# Patient Record
Sex: FEMALE | Race: BLACK OR AFRICAN AMERICAN | Employment: UNEMPLOYED | URBAN - METROPOLITAN AREA
[De-identification: names, ages, dates, MRNs, and addresses within clinical notes are randomized per-mention and may not be internally consistent; named-entity substitution may affect disease eponyms.]

---

## 2019-01-15 ENCOUNTER — OP HISTORICAL/CONVERTED ENCOUNTER (OUTPATIENT)
Dept: OTHER | Age: 49
End: 2019-01-15

## 2019-03-30 ENCOUNTER — ED HISTORICAL/CONVERTED ENCOUNTER (OUTPATIENT)
Dept: OTHER | Age: 49
End: 2019-03-30

## 2022-02-09 ENCOUNTER — TRANSCRIBE ORDER (OUTPATIENT)
Dept: SCHEDULING | Age: 52
End: 2022-02-09

## 2022-02-09 DIAGNOSIS — Z12.31 ENCOUNTER FOR SCREENING MAMMOGRAM FOR MALIGNANT NEOPLASM OF BREAST: Primary | ICD-10-CM

## 2022-06-01 ENCOUNTER — APPOINTMENT (OUTPATIENT)
Dept: GENERAL RADIOLOGY | Age: 52
End: 2022-06-01
Attending: EMERGENCY MEDICINE
Payer: MEDICAID

## 2022-06-01 ENCOUNTER — HOSPITAL ENCOUNTER (EMERGENCY)
Age: 52
Discharge: HOME | End: 2022-06-01
Attending: EMERGENCY MEDICINE | Admitting: EMERGENCY MEDICINE
Payer: MEDICAID

## 2022-06-01 ENCOUNTER — APPOINTMENT (OUTPATIENT)
Dept: CT IMAGING | Age: 52
End: 2022-06-01
Attending: EMERGENCY MEDICINE
Payer: MEDICAID

## 2022-06-01 VITALS
SYSTOLIC BLOOD PRESSURE: 128 MMHG | HEART RATE: 70 BPM | BODY MASS INDEX: 35 KG/M2 | OXYGEN SATURATION: 98 % | DIASTOLIC BLOOD PRESSURE: 80 MMHG | HEIGHT: 71 IN | RESPIRATION RATE: 16 BRPM | WEIGHT: 250 LBS | TEMPERATURE: 98.5 F

## 2022-06-01 DIAGNOSIS — R20.0 NUMBNESS: Primary | ICD-10-CM

## 2022-06-01 DIAGNOSIS — K04.7 DENTAL ABSCESS: ICD-10-CM

## 2022-06-01 LAB
ALBUMIN SERPL-MCNC: 3.7 G/DL (ref 3.5–5)
ALBUMIN/GLOB SERPL: 0.9 {RATIO} (ref 1.1–2.2)
ALP SERPL-CCNC: 99 U/L (ref 45–117)
ALT SERPL-CCNC: 33 U/L (ref 12–78)
ANION GAP SERPL CALC-SCNC: 6 MMOL/L (ref 5–15)
AST SERPL W P-5'-P-CCNC: 49 U/L (ref 15–37)
BASOPHILS # BLD: 0 K/UL (ref 0–0.1)
BASOPHILS NFR BLD: 0 % (ref 0–1)
BILIRUB SERPL-MCNC: 0.6 MG/DL (ref 0.2–1)
BUN SERPL-MCNC: 2 MG/DL (ref 6–20)
BUN/CREAT SERPL: 3 (ref 12–20)
CA-I BLD-MCNC: 9 MG/DL (ref 8.5–10.1)
CHLORIDE SERPL-SCNC: 97 MMOL/L (ref 97–108)
CO2 SERPL-SCNC: 33 MMOL/L (ref 21–32)
CREAT SERPL-MCNC: 0.65 MG/DL (ref 0.55–1.02)
DIFFERENTIAL METHOD BLD: ABNORMAL
EOSINOPHIL # BLD: 0 K/UL (ref 0–0.4)
EOSINOPHIL NFR BLD: 0 % (ref 0–7)
ERYTHROCYTE [DISTWIDTH] IN BLOOD BY AUTOMATED COUNT: 13.7 % (ref 11.5–14.5)
GLOBULIN SER CALC-MCNC: 3.9 G/DL (ref 2–4)
GLUCOSE SERPL-MCNC: 114 MG/DL (ref 65–100)
HCT VFR BLD AUTO: 33.9 % (ref 35–47)
HGB BLD-MCNC: 11.1 G/DL (ref 11.5–16)
IMM GRANULOCYTES # BLD AUTO: 0 K/UL (ref 0–0.04)
IMM GRANULOCYTES NFR BLD AUTO: 0 % (ref 0–0.5)
LYMPHOCYTES # BLD: 1.5 K/UL (ref 0.8–3.5)
LYMPHOCYTES NFR BLD: 33 % (ref 12–49)
MCH RBC QN AUTO: 35.1 PG (ref 26–34)
MCHC RBC AUTO-ENTMCNC: 32.7 G/DL (ref 30–36.5)
MCV RBC AUTO: 107.3 FL (ref 80–99)
MONOCYTES # BLD: 0.3 K/UL (ref 0–1)
MONOCYTES NFR BLD: 6 % (ref 5–13)
NEUTS SEG # BLD: 2.7 K/UL (ref 1.8–8)
NEUTS SEG NFR BLD: 61 % (ref 32–75)
NRBC # BLD: 0 K/UL (ref 0–0.01)
NRBC BLD-RTO: 0 PER 100 WBC
PLATELET # BLD AUTO: 267 K/UL (ref 150–400)
PMV BLD AUTO: 8.7 FL (ref 8.9–12.9)
POTASSIUM SERPL-SCNC: 3.4 MMOL/L (ref 3.5–5.1)
PROT SERPL-MCNC: 7.6 G/DL (ref 6.4–8.2)
RBC # BLD AUTO: 3.16 M/UL (ref 3.8–5.2)
SODIUM SERPL-SCNC: 136 MMOL/L (ref 136–145)
TROPONIN-HIGH SENSITIVITY: 5 NG/L (ref 0–51)
WBC # BLD AUTO: 4.5 K/UL (ref 3.6–11)

## 2022-06-01 PROCEDURE — 85025 COMPLETE CBC W/AUTO DIFF WBC: CPT

## 2022-06-01 PROCEDURE — 99285 EMERGENCY DEPT VISIT HI MDM: CPT

## 2022-06-01 PROCEDURE — 93005 ELECTROCARDIOGRAM TRACING: CPT

## 2022-06-01 PROCEDURE — 36415 COLL VENOUS BLD VENIPUNCTURE: CPT

## 2022-06-01 PROCEDURE — 80053 COMPREHEN METABOLIC PANEL: CPT

## 2022-06-01 PROCEDURE — 71046 X-RAY EXAM CHEST 2 VIEWS: CPT

## 2022-06-01 PROCEDURE — 70450 CT HEAD/BRAIN W/O DYE: CPT

## 2022-06-01 PROCEDURE — 84484 ASSAY OF TROPONIN QUANT: CPT

## 2022-06-01 RX ORDER — PENICILLIN V POTASSIUM 500 MG/1
500 TABLET, FILM COATED ORAL 4 TIMES DAILY
Qty: 28 TABLET | Refills: 0 | Status: SHIPPED | OUTPATIENT
Start: 2022-06-01 | End: 2022-06-08

## 2022-06-01 RX ORDER — HYDROXYZINE 25 MG/1
25 TABLET, FILM COATED ORAL
Qty: 20 TABLET | Refills: 0 | Status: SHIPPED | OUTPATIENT
Start: 2022-06-01 | End: 2022-06-11

## 2022-06-01 NOTE — ED PROVIDER NOTES
EMERGENCY DEPARTMENT HISTORY AND PHYSICAL EXAM      Date: 6/1/2022  Patient Name: Jessica Barfield    History of Presenting Illness     Chief Complaint   Patient presents with    Numbness       History Provided By: Patient    HPI: Jessica Barfield, 45 y.o. female with a past medical history significant No significant past medical history presents to the ED with cc of 5 days history of diffuse numbness. Patient reports symptoms are in her extremities during this time. Patient denies chest pain or shortness of breath, denies fevers or chills. Patient denies history of the same. There are no other complaints, changes, or physical findings at this time. PCP: None    No current facility-administered medications on file prior to encounter. No current outpatient medications on file prior to encounter. Past History     Past Medical History:  No past medical history on file. Past Surgical History:  No past surgical history on file. Family History:  No family history on file. Social History:  Social History     Tobacco Use    Smoking status: Not on file    Smokeless tobacco: Not on file   Substance Use Topics    Alcohol use: Not on file    Drug use: Not on file       Allergies: Allergies   Allergen Reactions    Codeine Nausea Only         Review of Systems   Review of Systems   Constitutional: Negative for chills and fever. HENT: Negative for sinus pressure and sinus pain. Eyes: Negative for photophobia and redness. Respiratory: Negative for shortness of breath and wheezing. Cardiovascular: Negative for chest pain and palpitations. Gastrointestinal: Negative for abdominal pain and nausea. Genitourinary: Negative for flank pain and hematuria. Musculoskeletal: Negative for arthralgias and gait problem. Skin: Negative for color change and pallor. Neurological: Negative for dizziness and weakness.      Review of Systems    Physical Exam   Physical Exam  Constitutional:       General: No acute distress. Appearance: Normal appearance. Not toxic-appearing. HENT:      Head: Normocephalic and atraumatic. Nose: Nose normal.      Mouth/Throat:      Mouth: Mucous membranes are moist.   Eyes:      Extraocular Movements: Extraocular movements intact. Pupils: Pupils are equal, round, and reactive to light. Cardiovascular:      Rate and Rhythm: Normal rate. Pulses: Normal pulses. Pulmonary:      Effort: Pulmonary effort is normal.      Breath sounds: No stridor. Abdominal:      General: Abdomen is flat. There is no distension. Musculoskeletal:         General: Normal range of motion. Cervical back: Normal range of motion and neck supple. Skin:     General: Skin is warm and dry. Capillary Refill: Capillary refill takes less than 2 seconds. Neurological:      General: No focal deficit present. Cranial nerves II through XII intact. Mental Status: Aert and oriented to person, place, and time. Psychiatric:         Mood and Affect: Mood normal.         Behavior: Behavior normal.       Physical Exam    Lab and Diagnostic Study Results     Labs -     Recent Results (from the past 12 hour(s))   CBC WITH AUTOMATED DIFF    Collection Time: 06/01/22  3:05 PM   Result Value Ref Range    WBC 4.5 3.6 - 11.0 K/uL    RBC 3.16 (L) 3.80 - 5.20 M/uL    HGB 11.1 (L) 11.5 - 16.0 g/dL    HCT 33.9 (L) 35.0 - 47.0 %    .3 (H) 80.0 - 99.0 FL    MCH 35.1 (H) 26.0 - 34.0 PG    MCHC 32.7 30.0 - 36.5 g/dL    RDW 13.7 11.5 - 14.5 %    PLATELET 300 735 - 598 K/uL    MPV 8.7 (L) 8.9 - 12.9 FL    NRBC 0.0 0.0  WBC    ABSOLUTE NRBC 0.00 0.00 - 0.01 K/uL    NEUTROPHILS 61 32 - 75 %    LYMPHOCYTES 33 12 - 49 %    MONOCYTES 6 5 - 13 %    EOSINOPHILS 0 0 - 7 %    BASOPHILS 0 0 - 1 %    IMMATURE GRANULOCYTES 0 0 - 0.5 %    ABS. NEUTROPHILS 2.7 1.8 - 8.0 K/UL    ABS. LYMPHOCYTES 1.5 0.8 - 3.5 K/UL    ABS. MONOCYTES 0.3 0.0 - 1.0 K/UL    ABS. EOSINOPHILS 0.0 0.0 - 0.4 K/UL    ABS. BASOPHILS 0.0 0.0 - 0.1 K/UL    ABS. IMM. GRANS. 0.0 0.00 - 0.04 K/UL    DF AUTOMATED     METABOLIC PANEL, COMPREHENSIVE    Collection Time: 06/01/22  3:05 PM   Result Value Ref Range    Sodium 136 136 - 145 mmol/L    Potassium 3.4 (L) 3.5 - 5.1 mmol/L    Chloride 97 97 - 108 mmol/L    CO2 33 (H) 21 - 32 mmol/L    Anion gap 6 5 - 15 mmol/L    Glucose 114 (H) 65 - 100 mg/dL    BUN 2 (L) 6 - 20 mg/dL    Creatinine 0.65 0.55 - 1.02 mg/dL    BUN/Creatinine ratio 3 (L) 12 - 20      GFR est AA >60 >60 ml/min/1.73m2    GFR est non-AA >60 >60 ml/min/1.73m2    Calcium 9.0 8.5 - 10.1 mg/dL    Bilirubin, total 0.6 0.2 - 1.0 mg/dL    AST (SGOT) 49 (H) 15 - 37 U/L    ALT (SGPT) 33 12 - 78 U/L    Alk. phosphatase 99 45 - 117 U/L    Protein, total 7.6 6.4 - 8.2 g/dL    Albumin 3.7 3.5 - 5.0 g/dL    Globulin 3.9 2.0 - 4.0 g/dL    A-G Ratio 0.9 (L) 1.1 - 2.2     TROPONIN-HIGH SENSITIVITY    Collection Time: 06/01/22  3:05 PM   Result Value Ref Range    Troponin-High Sensitivity 5 0 - 51 ng/L       Radiologic Studies -   @[email or web address]@  CT Results  (Last 48 hours)               06/01/22 1449  CT HEAD WO CONT Final result    Impression:  No acute intracranial process. Other findings as above. Narrative:  CT head, 6/1/2022       History: Dizziness. Technique: No intravenous contrast was administered. Multiple contiguous axial   images were acquired from the vertex to the skull base. Coronal and sagittal   reconstruction was made. All CT scans at this facility are performed using dose reduction optimization   techniques as appropriate to perform the exam including the following: Automated   exposure control, adjustments of the mA and/or kV according to patient size, or   use iterative reconstruction technique. Comparison: None. Findings:  Sulcal markings are prominent. The ventricular system is normal in   size and configuration. Gray-white differentiation is preserved.  No acute   intracranial hemorrhage or midline shift is identified. The calvarium is intact. The included orbits and globes are intact. There is trace opacification of the   right ethmoid sinus without acute sinusitis. The remainder of the visualized   paranasal sinuses, middle ear canals and mastoid air cells are clear. CXR Results  (Last 48 hours)    None            Medical Decision Making   - I am the first provider for this patient. - I reviewed the vital signs, available nursing notes, past medical history, past surgical history, family history and social history. - Initial assessment performed. The patients presenting problems have been discussed, and they are in agreement with the care plan formulated and outlined with them. I have encouraged them to ask questions as they arise throughout their visit. Vital Signs-Reviewed the patient's vital signs. Patient Vitals for the past 12 hrs:   Temp Pulse Resp BP SpO2   06/01/22 1903 -- 70 -- -- 98 %   06/01/22 1845 -- 70 -- 128/80 98 %   06/01/22 1726 98.5 °F (36.9 °C) 71 16 (!) 133/92 99 %   06/01/22 1421 -- -- -- -- 97 %   06/01/22 1408 98.5 °F (36.9 °C) 86 16 (!) 129/90 97 %         Disposition   Disposition: DC- Adult Discharges: All of the diagnostic tests were reviewed and questions answered. Diagnosis, care plan and treatment options were discussed. The patient understands the instructions and will follow up as directed. The patients results have been reviewed with them. They have been counseled regarding their diagnosis. The patient verbally convey understanding and agreement of the signs, symptoms, diagnosis, treatment and prognosis and additionally agrees to follow up as recommended with their PCP in 24 - 48 hours. They also agree with the care-plan and convey that all of their questions have been answered.   I have also put together some discharge instructions for them that include: 1) educational information regarding their diagnosis, 2) how to care for their diagnosis at home, as well a 3) list of reasons why they would want to return to the ED prior to their follow-up appointment, should their condition change. Discharged    DISCHARGE PLAN:  1. There are no discharge medications for this patient. 2.   Follow-up Information     Follow up With Specialties Details Why Contact Info    [facility]  Schedule an appointment as soon as possible for a visit   [address] [facility]  [address]  [address]    Jacque York MD Neurology   [address]  [place]   [phone]          3. Return to ED if worse   4. Current Discharge Medication List      START taking these medications    Details   penicillin v potassium (VEETID) 500 mg tablet Take 1 Tablet by mouth four (4) times daily for 7 days. Qty: 28 Tablet, Refills: 0  Start date: 6/1/2022, End date: 6/8/2022      hydrOXYzine HCL (ATARAX) 25 mg tablet Take 1 Tablet by mouth three (3) times daily as needed (Stress response) for up to 10 days. Qty: 20 Tablet, Refills: 0  Start date: 6/1/2022, End date: 6/11/2022               Diagnosis     Clinical Impression:   1. Numbness    2. Dental abscess        Attestations:    Terry Rhodes MD    Please note that this dictation was completed with [email or web address], the computer voice recognition software. Quite often unanticipated grammatical, syntax, homophones, and other interpretive errors are inadvertently transcribed by the computer software. Please disregard these errors. Please excuse any errors that have escaped final proofreading. Thank you.

## 2022-06-01 NOTE — ED NOTES
Pt seen in ED for    Chief Complaint   Patient presents with    Numbness         1. Numbness    2. Dental abscess       Pt states her pain is still 7:10, but does not want to wait for anything at this time. Asked for food/drink, provided with water and crackers. Reviewed discharge instructions with pt to include medications, cautions/side effects, follow-up with PCP or specialist (neuro/dental) as advised, and access to MyChart. Pt alert, oriented to baseline and able to make decisions and be discharged via their choice of transportation. Pt verbalized understanding of discharge instructions. Pt ambulatory with steady gait; alone ; discharged with (2) e-scripts/prescriptions and a copy of discharge instructions.

## 2022-06-01 NOTE — ED NOTES
Pt comes in with complaints of numbness generally all over. Pt also states that she has dental concerns to which she feels she has an infection in her mouth. Pt states it is difficult to eat or drink. Pt denies CP and SOB. Pt has a hx of HTN and neuropathy. Pt is alert and oriented x4. Pt states a pain of 6/10 in back and left shoulder blade. Pt is cooperative.

## 2022-06-01 NOTE — DISCHARGE INSTRUCTIONS
Thank you! Thank you for allowing me to care for you in the emergency department. I sincerely hope that you are satisfied with your visit today. It is my goal to provide you with excellent care. Below you will find a list of your labs and imaging from your visit today. Should you have any questions regarding these results please do not hesitate to call the emergency department. Labs -     Recent Results (from the past 12 hour(s))   CBC WITH AUTOMATED DIFF    Collection Time: 06/01/22  3:05 PM   Result Value Ref Range    WBC 4.5 3.6 - 11.0 K/uL    RBC 3.16 (L) 3.80 - 5.20 M/uL    HGB 11.1 (L) 11.5 - 16.0 g/dL    HCT 33.9 (L) 35.0 - 47.0 %    .3 (H) 80.0 - 99.0 FL    MCH 35.1 (H) 26.0 - 34.0 PG    MCHC 32.7 30.0 - 36.5 g/dL    RDW 13.7 11.5 - 14.5 %    PLATELET 665 607 - 327 K/uL    MPV 8.7 (L) 8.9 - 12.9 FL    NRBC 0.0 0.0  WBC    ABSOLUTE NRBC 0.00 0.00 - 0.01 K/uL    NEUTROPHILS 61 32 - 75 %    LYMPHOCYTES 33 12 - 49 %    MONOCYTES 6 5 - 13 %    EOSINOPHILS 0 0 - 7 %    BASOPHILS 0 0 - 1 %    IMMATURE GRANULOCYTES 0 0 - 0.5 %    ABS. NEUTROPHILS 2.7 1.8 - 8.0 K/UL    ABS. LYMPHOCYTES 1.5 0.8 - 3.5 K/UL    ABS. MONOCYTES 0.3 0.0 - 1.0 K/UL    ABS. EOSINOPHILS 0.0 0.0 - 0.4 K/UL    ABS. BASOPHILS 0.0 0.0 - 0.1 K/UL    ABS. IMM. GRANS. 0.0 0.00 - 0.04 K/UL    DF AUTOMATED     METABOLIC PANEL, COMPREHENSIVE    Collection Time: 06/01/22  3:05 PM   Result Value Ref Range    Sodium 136 136 - 145 mmol/L    Potassium 3.4 (L) 3.5 - 5.1 mmol/L    Chloride 97 97 - 108 mmol/L    CO2 33 (H) 21 - 32 mmol/L    Anion gap 6 5 - 15 mmol/L    Glucose 114 (H) 65 - 100 mg/dL    BUN 2 (L) 6 - 20 mg/dL    Creatinine 0.65 0.55 - 1.02 mg/dL    BUN/Creatinine ratio 3 (L) 12 - 20      GFR est AA >60 >60 ml/min/1.73m2    GFR est non-AA >60 >60 ml/min/1.73m2    Calcium 9.0 8.5 - 10.1 mg/dL    Bilirubin, total 0.6 0.2 - 1.0 mg/dL    AST (SGOT) 49 (H) 15 - 37 U/L    ALT (SGPT) 33 12 - 78 U/L    Alk.  phosphatase 99 45 - 117 U/L    Protein, total 7.6 6.4 - 8.2 g/dL    Albumin 3.7 3.5 - 5.0 g/dL    Globulin 3.9 2.0 - 4.0 g/dL    A-G Ratio 0.9 (L) 1.1 - 2.2     TROPONIN-HIGH SENSITIVITY    Collection Time: 06/01/22  3:05 PM   Result Value Ref Range    Troponin-High Sensitivity 5 0 - 51 ng/L       Radiologic Studies -   CT HEAD WO CONT   Final Result   No acute intracranial process. Other findings as above. XR CHEST PA LAT    (Results Pending)     CT Results  (Last 48 hours)                 06/01/22 1449  CT HEAD WO CONT Final result    Impression:  No acute intracranial process. Other findings as above. Narrative:  CT head, 6/1/2022       History: Dizziness. Technique: No intravenous contrast was administered. Multiple contiguous axial   images were acquired from the vertex to the skull base. Coronal and sagittal   reconstruction was made. All CT scans at this facility are performed using dose reduction optimization   techniques as appropriate to perform the exam including the following: Automated   exposure control, adjustments of the mA and/or kV according to patient size, or   use iterative reconstruction technique. Comparison: None. Findings:  Sulcal markings are prominent. The ventricular system is normal in   size and configuration. Gray-white differentiation is preserved. No acute   intracranial hemorrhage or midline shift is identified. The calvarium is intact. The included orbits and globes are intact. There is trace opacification of the   right ethmoid sinus without acute sinusitis. The remainder of the visualized   paranasal sinuses, middle ear canals and mastoid air cells are clear. CXR Results  (Last 48 hours)      None               If you feel that you have not received excellent quality care or timely care, please ask to speak to the nurse manager. Please choose us in the future for your continued health care needs. ------------------------------------------------------------------------------------------------------------  The exam and treatment you received in the Emergency Department were for an urgent problem and are not intended as complete care. It is important that you follow-up with a doctor, nurse practitioner, or physician assistant to:  (1) confirm your diagnosis,  (2) re-evaluation of changes in your illness and treatment, and  (3) for ongoing care. If your symptoms become worse or you do not improve as expected and you are unable to reach your usual health care provider, you should return to the Emergency Department. We are available 24 hours a day. Please take your discharge instructions with you when you go to your follow-up appointment. If you have any problem arranging a follow-up appointment, contact the Emergency Department immediately. If a prescription has been provided, please have it filled as soon as possible to prevent a delay in treatment. Read the entire medication instruction sheet provided to you by the pharmacy. If you have any questions or reservations about taking the medication due to side effects or interactions with other medications, please call your primary care physician or contact the ER to speak with the charge nurse. Make an appointment with your family doctor or the physician you were referred to for follow-up of this visit as instructed on your discharge paperwork, as this is a mandatory follow-up. Return to the ER if you are unable to be seen or if you are unable to be seen in a timely manner. If you have any problem arranging the follow-up visit, contact the Emergency Department immediately.

## 2022-06-02 LAB
ATRIAL RATE: 85 BPM
CALCULATED P AXIS, ECG09: 50 DEGREES
CALCULATED R AXIS, ECG10: 1 DEGREES
CALCULATED T AXIS, ECG11: -26 DEGREES
DIAGNOSIS, 93000: NORMAL
P-R INTERVAL, ECG05: 162 MS
Q-T INTERVAL, ECG07: 432 MS
QRS DURATION, ECG06: 82 MS
QTC CALCULATION (BEZET), ECG08: 514 MS
VENTRICULAR RATE, ECG03: 85 BPM

## 2022-06-07 ENCOUNTER — HOSPITAL ENCOUNTER (EMERGENCY)
Age: 52
Discharge: HOME | End: 2022-06-07
Attending: STUDENT IN AN ORGANIZED HEALTH CARE EDUCATION/TRAINING PROGRAM
Payer: MEDICAID

## 2022-06-07 VITALS
TEMPERATURE: 97.8 F | RESPIRATION RATE: 16 BRPM | BODY MASS INDEX: 37.52 KG/M2 | OXYGEN SATURATION: 99 % | HEART RATE: 91 BPM | WEIGHT: 268 LBS | SYSTOLIC BLOOD PRESSURE: 123 MMHG | HEIGHT: 71 IN | DIASTOLIC BLOOD PRESSURE: 86 MMHG

## 2022-06-07 DIAGNOSIS — R20.8 DYSESTHESIA: ICD-10-CM

## 2022-06-07 DIAGNOSIS — G62.9 PERIPHERAL POLYNEUROPATHY: Primary | ICD-10-CM

## 2022-06-07 PROCEDURE — 74011250637 HC RX REV CODE- 250/637: Performed by: STUDENT IN AN ORGANIZED HEALTH CARE EDUCATION/TRAINING PROGRAM

## 2022-06-07 PROCEDURE — 99283 EMERGENCY DEPT VISIT LOW MDM: CPT

## 2022-06-07 RX ORDER — AMLODIPINE BESYLATE 10 MG/1
TABLET ORAL
COMMUNITY
Start: 2022-05-24 | End: 2022-08-25

## 2022-06-07 RX ORDER — ACETAMINOPHEN 325 MG/1
650 TABLET ORAL
Qty: 20 TABLET | Refills: 0 | Status: SHIPPED | OUTPATIENT
Start: 2022-06-07 | End: 2022-08-25

## 2022-06-07 RX ORDER — HYDROCODONE BITARTRATE AND ACETAMINOPHEN 5; 325 MG/1; MG/1
1 TABLET ORAL
Qty: 6 TABLET | Refills: 0 | Status: SHIPPED | OUTPATIENT
Start: 2022-06-07 | End: 2022-06-10

## 2022-06-07 RX ORDER — GABAPENTIN 300 MG/1
CAPSULE ORAL
COMMUNITY
Start: 2022-04-27 | End: 2022-07-14

## 2022-06-07 RX ORDER — METHOCARBAMOL 750 MG/1
750 TABLET, FILM COATED ORAL
Qty: 20 TABLET | Refills: 0 | Status: SHIPPED | OUTPATIENT
Start: 2022-06-07 | End: 2022-08-20

## 2022-06-07 RX ORDER — METOPROLOL SUCCINATE 50 MG/1
TABLET, EXTENDED RELEASE ORAL
COMMUNITY
Start: 2022-05-24 | End: 2022-08-25

## 2022-06-07 RX ORDER — HYDROCODONE BITARTRATE AND ACETAMINOPHEN 5; 325 MG/1; MG/1
1 TABLET ORAL ONCE
Status: COMPLETED | OUTPATIENT
Start: 2022-06-07 | End: 2022-06-07

## 2022-06-07 RX ADMIN — HYDROCODONE BITARTRATE AND ACETAMINOPHEN 1 TABLET: 5; 325 TABLET ORAL at 07:18

## 2022-06-07 NOTE — DISCHARGE INSTRUCTIONS
Thank you! Thank you for allowing me to care for you in the emergency department. I sincerely hope that you are satisfied with your visit today. It is my goal to provide you with excellent care. Below you will find a list of your labs and imaging from your visit today if applicable. Should you have any questions regarding these results please do not hesitate to call the emergency department. Please review [facility] for a more detailed result list since the below list may not be comprehensive. Instructions on how to sign up to [facility] should be provided in this packet. Labs -   No results found for this or any previous visit (from the past 12 hour(s)). Radiologic Studies -   No orders to display     CT Results  (Last 48 hours)      None          CXR Results  (Last 48 hours)      None               If you feel that you have not received excellent quality care or timely care, please ask to speak to the nurse manager. Please choose us in the future for your continued health care needs. ------------------------------------------------------------------------------------------------------------  The exam and treatment you received in the Emergency Department were for an urgent problem and are not intended as complete care. It is important that you follow-up with a doctor, nurse practitioner, or physician assistant to:  (1) confirm your diagnosis,  (2) re-evaluation of changes in your illness and treatment, and  (3) for ongoing care. If your symptoms become worse or you do not improve as expected and you are unable to reach your usual health care provider, you should return to the Emergency Department. We are available 24 hours a day. Please take your discharge instructions with you when you go to your follow-up appointment. If a prescription has been provided, please have it filled as soon as possible to prevent a delay in treatment.  Read the entire medication instruction sheet provided to you by the pharmacy. If you have any questions or reservations about taking the medication due to side effects or interactions with other medications, please call your primary care physician or contact the ER to speak with the charge nurse. Make an appointment with your family doctor or the physician you were referred to for follow-up of this visit as instructed on your discharge paperwork, as this is a mandatory follow-up. Return to the ER if you are unable to be seen or if you are unable to be seen in a timely manner. If you have any problem arranging the follow-up visit, contact the Emergency Department immediately.

## 2022-06-07 NOTE — ED TRIAGE NOTES
Pt states \"neuropathy pain in both legs and both feet\" Pt was started on gabepentin a month ago , pt states \"It doesn't work\" Rates pain a 10 at this time

## 2022-06-07 NOTE — ED PROVIDER NOTES
[facility]  EMERGENCY DEPARTMENT ENCOUNTER NOTE    Date: 6/7/2022  Patient Name: Mel Goodman    History of Presenting Illness     Chief Complaint   Patient presents with    Peripheral Neuropathy     HPI: Mel Goodman, 45 y.o. female with a past medical history and outpatient medications as listed and reviewed below  presents for bilateral lower extremity pain that has been progressively worsening for the past 2 years. The patient was diagnosed with peripheral neuropathy and being worked up. So far, she has been told that she does not have diabetes and she has an appointment with neurology. No acute changes however the patient presents because she has been on gabapentin for the past 2 months without relief. The pain is worse in the distal lower extremities that has been progressively and slowly seeing approximately now is in the mid calf area. No focal weaknesses. No slurred speech. No chest pain or shortness of breath. No abdominal pain, nausea, or vomiting. No trauma. No dysuria or hematuria. No other complaints. She has been using gabapentin 600 BID. Medical History   I reviewed the medical, surgical, family, and social history, as well as allergies:    PCP: None    Past Medical History:  Past Medical History:   Diagnosis Date    Anxiety     CAD (coronary artery disease)     Hypertension     Psychiatric disorder     anxiety     Past Surgical History:  History reviewed. No pertinent surgical history. Current Outpatient Medications:  Current Outpatient Medications   Medication Instructions    acetaminophen (TYLENOL) 650 mg, Oral, EVERY 6 HOURS AS NEEDED    amLODIPine (NORVASC) 10 mg tablet No dose, route, or frequency recorded.  gabapentin (NEURONTIN) 300 mg capsule No dose, route, or frequency recorded.     HYDROcodone-acetaminophen (Norco) 5-325 mg per tablet 1 Tablet, Oral, EVERY 12 HOURS AS NEEDED    hydrOXYzine HCL (ATARAX) 25 mg, Oral, 3 TIMES DAILY AS NEEDED    methocarbamoL (ROBAXIN-750) 750 mg, Oral, 3 TIMES DAILY AS NEEDED    metoprolol succinate (TOPROL-XL) 50 mg XL tablet No dose, route, or frequency recorded.  penicillin v potassium (VEETID) 500 mg, Oral, 4 TIMES DAILY      Family History:  History reviewed. No pertinent family history. Social History:  Social History     Tobacco Use    Smoking status: Current Every Day Smoker     Packs/day: 0.50    Smokeless tobacco: Never Used   Substance Use Topics    Alcohol use: Yes    Drug use: Never     Allergies: Allergies   Allergen Reactions    Codeine Nausea Only       Review of Systems     Review of Systems  Negative: All other systems negative. Physical Exam and Vital Signs   Vital Signs - Reviewed the patient's vital signs. Patient Vitals for the past 12 hrs:   Temp Pulse Resp BP SpO2   06/07/22 0644 97.8 °F (36.6 °C) 91 16 123/86 99 %     Physical Exam:    GENERAL: not in apparent distress  HEENT:  * EOMI  * Head atraumatic  CV:  * warm extremities  * no cyanosis  PULMONARY: no respiratory distress, non labored breathing, no audible wheezing or stridor, no accessory muscle use  ABDOMEN: soft, moving in bed and pulls to seated position without grimace or pain  EXTREMITIES/BACK: moving four extremities without limitation  SKIN: no rashes or signs of trauma  NEURO:  * Speech clear  * GCS 15  * LE bilateral exam: dysesthesia noted. Normal power. Normal DP pulses. Normal skin color. Normal cap refill. Medical Decision Making   - I am the first and primary provider for this patient and am the primary provider of record. - I reviewed the vital signs, available nursing notes, past medical history, past surgical history, family history and social history. - Initial assessment performed. The patients presenting problems have been discussed, and the staff are in agreement with the care plan formulated and outlined with them.   I have encouraged them to ask questions as they arise throughout their visit. - Available medical records, nursing notes, old EKGs, and EMS run sheets (if patient was EMS transported) were reviewed    MDM:   Patient is a 45 y.o. female presenting for peripheral neuropathy. Vitals reveal no significant abnormalities and physical exam reveals dysesthesia. Based on the history, physical exam, risk factors, and vital signs, differential includes: Chronic peripheral neuropathy. No concern for CVA as the patient has symmetric progressively worsening dysesthesia and sensory loss that is progressing from distal to proximal over the past 2 years. The patient already has follow-up with neurology. Will give symptomatic treatment. Results     Labs:  No results found for this or any previous visit (from the past 12 hour(s)). Radiologic Studies:  CT Results  (Last 48 hours)    None        CXR Results  (Last 48 hours)    None        Medications ordered:  Medications   HYDROcodone-acetaminophen (NORCO) 5-325 mg per tablet 1 Tablet (has no administration in time range)     ED Course and Reassessment     ED Course:          Reassessment:    Understanding was insured that at this time there is no evidence for a more malignant underlying process, but that early in the process of an illness, an emergency department workup can be falsely reassuring. Routine discharge counseling was given including the fact that any worsening, changing or persistent symptoms should prompt an immediate call or follow up with their primary physician or the emergency department. The importance of appropriate follow up was also discussed. More extensive discharge instructions were given in the patient's discharge paperwork. After completion of evaluation and discussion of results and diagnoses, all the questions were answered. If required, all follow up appointments and treatments were discussed and explained. Understanding was insured prior to discharge.       Final Disposition     Discharge: DISCHARGED FROM EMERGENCY DEPARTMENT    Patient will be discharged from the Emergency Department in stable condition. All of the diagnostic tests were reviewed and any questions were answered. Diagnosis, results, follow up if applicable, and return precautions were discussed. I have also put together printed discharge instructions for them that include: 1) educational information regarding their diagnosis, 2) how to care for their diagnosis at home, as well a 3) list of reasons why they would want to return to the ED prior to their follow-up appointment, should their condition change. Any labs or imaging done in the ED will be either printed with the discharge paperwork or available through [address] DISCHARGE PLAN:  1. Current Discharge Medication List      START taking these medications    Details   acetaminophen (TYLENOL) 325 mg tablet Take 2 Tablets by mouth every six (6) hours as needed for Pain. Qty: 20 Tablet, Refills: 0      methocarbamoL (Robaxin-750) 750 mg tablet Take 1 Tablet by mouth three (3) times daily as needed for Muscle Spasm(s). Qty: 20 Tablet, Refills: 0      HYDROcodone-acetaminophen (Norco) 5-325 mg per tablet Take 1 Tablet by mouth every twelve (12) hours as needed for Pain for up to 3 days. Max Daily Amount: 2 Tablets. Qty: 6 Tablet, Refills: 0    Associated Diagnoses: Peripheral polyneuropathy         CONTINUE these medications which have NOT CHANGED    Details   gabapentin (NEURONTIN) 300 mg capsule       amLODIPine (NORVASC) 10 mg tablet       metoprolol succinate (TOPROL-XL) 50 mg XL tablet       penicillin v potassium (VEETID) 500 mg tablet Take 1 Tablet by mouth four (4) times daily for 7 days. Qty: 28 Tablet, Refills: 0      hydrOXYzine HCL (ATARAX) 25 mg tablet Take 1 Tablet by mouth three (3) times daily as needed (Stress response) for up to 10 days.   Qty: 20 Tablet, Refills: 0            2.   Follow-up Information     Follow up With Specialties Details Why [facility], Darcie Linn MD Neurology Schedule an appointment as soon as possible for a visit in 1 week  [address]  [address] [facility]      [address] [place] [address] Emergency Medicine Go to  If symptoms worsen [address]  [phone]        3. Return to ED if worse    4. Current Discharge Medication List      START taking these medications    Details   acetaminophen (TYLENOL) 325 mg tablet Take 2 Tablets by mouth every six (6) hours as needed for Pain. Qty: 20 Tablet, Refills: 0  Start date: 6/7/2022      methocarbamoL (Robaxin-750) 750 mg tablet Take 1 Tablet by mouth three (3) times daily as needed for Muscle Spasm(s). Qty: 20 Tablet, Refills: 0  Start date: 6/7/2022      HYDROcodone-acetaminophen (Norco) 5-325 mg per tablet Take 1 Tablet by mouth every twelve (12) hours as needed for Pain for up to 3 days. Max Daily Amount: 2 Tablets. Qty: 6 Tablet, Refills: 0  Start date: 6/7/2022, End date: 6/10/2022    Associated Diagnoses: Peripheral polyneuropathy             Diagnosis     Clinical Impression:   1. Peripheral polyneuropathy    2. Dysesthesia      Attestations:    Slick Laguerre MD    Please note that this dictation was completed with [employer], the computer voice recognition software. Quite often unanticipated grammatical, syntax, homophones, and other interpretive errors are inadvertently transcribed by the computer software. Please disregard these errors. Please excuse any errors that have escaped final proofreading. Thank you.

## 2022-07-11 ENCOUNTER — HOSPITAL ENCOUNTER (EMERGENCY)
Age: 52
Discharge: HOME | End: 2022-07-11
Attending: EMERGENCY MEDICINE
Payer: MEDICAID

## 2022-07-11 VITALS
RESPIRATION RATE: 18 BRPM | HEART RATE: 95 BPM | OXYGEN SATURATION: 100 % | BODY MASS INDEX: 37.52 KG/M2 | SYSTOLIC BLOOD PRESSURE: 131 MMHG | DIASTOLIC BLOOD PRESSURE: 83 MMHG | TEMPERATURE: 98.3 F | WEIGHT: 268 LBS | HEIGHT: 71 IN

## 2022-07-11 DIAGNOSIS — G62.9 PERIPHERAL POLYNEUROPATHY: Primary | ICD-10-CM

## 2022-07-11 PROCEDURE — 99283 EMERGENCY DEPT VISIT LOW MDM: CPT

## 2022-07-11 PROCEDURE — 74011250637 HC RX REV CODE- 250/637: Performed by: EMERGENCY MEDICINE

## 2022-07-11 RX ORDER — TRAMADOL HYDROCHLORIDE 50 MG/1
TABLET ORAL
COMMUNITY
Start: 2022-06-22 | End: 2022-08-20

## 2022-07-11 RX ORDER — HYDROCODONE BITARTRATE AND ACETAMINOPHEN 5; 325 MG/1; MG/1
1 TABLET ORAL ONCE
Status: COMPLETED | OUTPATIENT
Start: 2022-07-11 | End: 2022-07-11

## 2022-07-11 RX ADMIN — HYDROCODONE BITARTRATE AND ACETAMINOPHEN 1 TABLET: 5; 325 TABLET ORAL at 07:38

## 2022-07-11 NOTE — ED TRIAGE NOTES
Pt has hx of Fibro. States hand and foot pain got worse yesterday. Unable to  items with her hands and states it feels like she is walking on rocks when she stands up.

## 2022-07-11 NOTE — ED PROVIDER NOTES
EMERGENCY DEPARTMENT HISTORY AND PHYSICAL EXAM      Date: 7/11/2022  Patient Name: Catarina Rodriguez    History of Presenting Illness     Chief Complaint   Patient presents with    Hand Pain    Foot Pain       History Provided By: Patient    HPI: Catarina Rodriguez, 45 y.o. female with a significant past medical history of fibromyalgia, peripheral neuropathy presents to the ED with pain. Reports pain and numbness to her bilateral feet and hands. She states is a longstanding issue and was diagnosed with peripheral neuropathy. She states she was previously on gabapentin but her primary care doctor took her off of this since it was not working. She was taking tramadol for this but ran out recently. Apparently she told EMS she was here because she was prescribed gabapentin for fibromyalgia but ran out of that. There are no other complaints, changes, or physical findings at this time. PCP: None    No current facility-administered medications on file prior to encounter. Current Outpatient Medications on File Prior to Encounter   Medication Sig Dispense Refill    gabapentin (NEURONTIN) 300 mg capsule       amLODIPine (NORVASC) 10 mg tablet       metoprolol succinate (TOPROL-XL) 50 mg XL tablet       methocarbamoL (Robaxin-750) 750 mg tablet Take 1 Tablet by mouth three (3) times daily as needed for Muscle Spasm(s). 20 Tablet 0    traMADoL (ULTRAM) 50 mg tablet       acetaminophen (TYLENOL) 325 mg tablet Take 2 Tablets by mouth every six (6) hours as needed for Pain. 20 Tablet 0       Past History     Past Medical History:  Past Medical History:   Diagnosis Date    Anxiety     CAD (coronary artery disease)     Fibromyalgia     Hypertension     Psychiatric disorder     anxiety       Past Surgical History:  History reviewed. No pertinent surgical history. Family History:  History reviewed. No pertinent family history.     Social History:  Social History     Tobacco Use    Smoking status: Current Every Day Smoker Packs/day: 0.50    Smokeless tobacco: Never Used   Substance Use Topics    Alcohol use: Yes     Comment: occosionally     Drug use: Never       Allergies: Allergies   Allergen Reactions    Codeine Nausea Only       Review of Systems   Review of Systems   Constitutional: Negative for fever. Gastrointestinal: Negative for vomiting. Musculoskeletal: Negative for back pain and neck pain. Skin: Negative for rash and wound. Neurological: Positive for numbness. Negative for weakness. Physical Exam   Physical Exam  Vitals and nursing note reviewed. Constitutional:       General: She is not in acute distress. Appearance: Normal appearance. HENT:      Head: Normocephalic and atraumatic. Mouth/Throat:      Mouth: Mucous membranes are moist.   Eyes:      Conjunctiva/sclera: Conjunctivae normal.   Pulmonary:      Effort: Pulmonary effort is normal. No respiratory distress. Musculoskeletal:      Cervical back: Normal range of motion. Comments: Full range of motion to upper and lower extremities. Normal  strength. Sensation grossly intact  Well-perfused extremities   Skin:     General: Skin is warm and dry. Neurological:      General: No focal deficit present. Mental Status: She is alert and oriented to person, place, and time. Mental status is at baseline. Lab and Diagnostic Study Results   Labs -   No results found for this or any previous visit (from the past 12 hour(s)). Radiologic Studies -   @lastxrresult@  CT Results  (Last 48 hours)    None        CXR Results  (Last 48 hours)    None          Medical Decision Making and ED Course   - I am the first provider for this patient. I reviewed the vital signs, available nursing notes, past medical history, past surgical history, family history and social history. - Initial assessment performed.  The patients presenting problems have been discussed, and they are in agreement with the care plan formulated and outlined with them. I have encouraged them to ask questions as they arise throughout their visit. Vital Signs-Reviewed the patient's vital signs. Patient Vitals for the past 12 hrs:   Temp Pulse Resp BP SpO2   07/11/22 0721 98.3 °F (36.8 °C) 95 18 131/83 100 %       Differential Diagnosis & Medical Decision Making Provider Note:   53-year-old female here for chronic discomfort from her peripheral neuropathy. Has been out of tramadol and no longer taking gabapentin. Counseled that patient's chronic pain medications should be prescribed by her primary care doctor. Will give dose of Norco here for pain control and discharge. [facility]     ED Course:            Disposition   Disposition: DC- Adult Discharges: All of the diagnostic tests were reviewed and questions answered. Diagnosis, care plan and treatment options were discussed. The patient understands the instructions and will follow up as directed. The patients results have been reviewed with them. They have been counseled regarding their diagnosis. The patient verbally convey understanding and agreement of the signs, symptoms, diagnosis, treatment and prognosis and additionally agrees to follow up as recommended with their PCP in 24 - 48 hours. They also agree with the care-plan and convey that all of their questions have been answered. I have also put together some discharge instructions for them that include: 1) educational information regarding their diagnosis, 2) how to care for their diagnosis at home, as well a 3) list of reasons why they would want to return to the ED prior to their follow-up appointment, should their condition change. Discharged    DISCHARGE PLAN:  1.    Current Discharge Medication List      CONTINUE these medications which have NOT CHANGED    Details   gabapentin (NEURONTIN) 300 mg capsule       amLODIPine (NORVASC) 10 mg tablet       metoprolol succinate (TOPROL-XL) 50 mg XL tablet       methocarbamoL (Robaxin-750) 750 mg tablet Take 1 Tablet by mouth three (3) times daily as needed for Muscle Spasm(s). Qty: 20 Tablet, Refills: 0      traMADoL (ULTRAM) 50 mg tablet       acetaminophen (TYLENOL) 325 mg tablet Take 2 Tablets by mouth every six (6) hours as needed for Pain. Qty: 20 Tablet, Refills: 0           2. Follow-up Information     Follow up With Specialties Details Why Contact Info    Everette Key MD Neurology   [address] [place] [address] [place] [address]  [phone]          3. Return to ED if worse   4. Current Discharge Medication List            Diagnosis/Clinical Impression     Clinical Impression:   1. Peripheral polyneuropathy        Attestations: Bea Medina MD    Please note that this dictation was completed with [employer], the computer voice recognition software. Quite often unanticipated grammatical, syntax, homophones, and other interpretive errors are inadvertently transcribed by the computer software. Please disregard these errors. Please excuse any errors that have escaped final proofreading. Thank you.

## 2022-07-14 ENCOUNTER — APPOINTMENT (OUTPATIENT)
Dept: GENERAL RADIOLOGY | Age: 52
End: 2022-07-14
Attending: EMERGENCY MEDICINE
Payer: MEDICAID

## 2022-07-14 ENCOUNTER — HOSPITAL ENCOUNTER (EMERGENCY)
Age: 52
Discharge: HOME | End: 2022-07-14
Attending: EMERGENCY MEDICINE
Payer: MEDICAID

## 2022-07-14 VITALS
SYSTOLIC BLOOD PRESSURE: 134 MMHG | TEMPERATURE: 98 F | OXYGEN SATURATION: 100 % | BODY MASS INDEX: 36.82 KG/M2 | HEIGHT: 71 IN | WEIGHT: 263 LBS | RESPIRATION RATE: 18 BRPM | HEART RATE: 104 BPM | DIASTOLIC BLOOD PRESSURE: 86 MMHG

## 2022-07-14 DIAGNOSIS — S00.83XA CONTUSION OF FACE, INITIAL ENCOUNTER: ICD-10-CM
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
## 2022-07-14 DIAGNOSIS — S63.502A SPRAIN OF LEFT WRIST, INITIAL ENCOUNTER: ICD-10-CM

## 2022-07-14 PROCEDURE — 96372 THER/PROPH/DIAG INJ SC/IM: CPT

## 2022-07-14 PROCEDURE — 99284 EMERGENCY DEPT VISIT MOD MDM: CPT

## 2022-07-14 PROCEDURE — 74011250636 HC RX REV CODE- 250/636: Performed by: EMERGENCY MEDICINE

## 2022-07-14 PROCEDURE — 73110 X-RAY EXAM OF WRIST: CPT

## 2022-07-14 RX ORDER — IBUPROFEN 800 MG/1
800 TABLET ORAL
Qty: 20 TABLET | Refills: 0 | Status: SHIPPED | OUTPATIENT
Start: 2022-07-14 | End: 2022-07-21

## 2022-07-14 RX ORDER — KETOROLAC TROMETHAMINE 30 MG/ML
60 INJECTION, SOLUTION INTRAMUSCULAR; INTRAVENOUS
Status: COMPLETED | OUTPATIENT
Start: 2022-07-14 | End: 2022-07-14

## 2022-07-14 RX ADMIN — KETOROLAC TROMETHAMINE 60 MG: 30 INJECTION, SOLUTION INTRAMUSCULAR; INTRAVENOUS at 03:07

## 2022-07-14 NOTE — Clinical Note
[address]  [address] [facility] [address]  [phone]    Work/School Note    Date: 7/14/2022    To Whom It May concern:    Sarah Suresh was seen and treated today in the emergency room by the following provider(s):  Attending Provider: Siddharth Quinn MD.      Sarah Suresh is excused from work/school on 07/14/22 and 07/15/22. She is medically clear to return to work/school on 7/16/2022.        Sincerely,          Clifton Anand MD

## 2022-07-14 NOTE — ED TRIAGE NOTES
Pt states GLF \"legs not working sometimes\" states hit face where toothache is, denies LOC, states left shoulder, left arm and left hand, denies dizziness, EMS Glucose 96

## 2022-07-14 NOTE — ED PROVIDER NOTES
EMERGENCY DEPARTMENT HISTORY AND PHYSICAL EXAM      Date: 2022  Patient Name: Anup Brasher    History of Presenting Illness     Chief Complaint   Patient presents with    Arm Injury       History Provided By: Patient EMS    HPI: Anup Brasher, 45 y.o. female   presents to the ED with cc of ground-level fall. Patient complains of a ground-level fall just prior to arrival at home. Patient states that she tripped and fell landed on her left side of face and shoulder and tried to break the fall with the left arm. No LOC. No neck pain. No back pain. Patient states that she has unsteady gait due to peripheral neuropathy. The pain is moderate degree and no OTC treatment. PCP: None    No current facility-administered medications on file prior to encounter. Current Outpatient Medications on File Prior to Encounter   Medication Sig Dispense Refill    amLODIPine (NORVASC) 10 mg tablet       metoprolol succinate (TOPROL-XL) 50 mg XL tablet       acetaminophen (TYLENOL) 325 mg tablet Take 2 Tablets by mouth every six (6) hours as needed for Pain. 20 Tablet 0    methocarbamoL (Robaxin-750) 750 mg tablet Take 1 Tablet by mouth three (3) times daily as needed for Muscle Spasm(s). 20 Tablet 0    traMADoL (ULTRAM) 50 mg tablet  (Patient not taking: Reported on 2022)      [DISCONTINUED] gabapentin (NEURONTIN) 300 mg capsule          Past History     Past Medical History:  Past Medical History:   Diagnosis Date    Anxiety     CAD (coronary artery disease)     Fibromyalgia     Hypertension     Neuropathy     Peripheral artery disease ([facility] [address]     Psychiatric disorder     anxiety       Past Surgical History:  Past Surgical History:   Procedure Laterality Date    HX ANKLE FRACTURE TX Left     surgical implants    HX  SECTION         Family History:  History reviewed. No pertinent family history.     Social History:  Social History     Tobacco Use    Smoking status: Current Every Day Smoker Packs/day: 0.50    Smokeless tobacco: Never Used   Substance Use Topics    Alcohol use: Yes     Comment: occosionally     Drug use: Not Currently       Allergies: Allergies   Allergen Reactions    Codeine Nausea Only         Review of Systems   Review of Systems   Constitutional: Negative for fever. HENT: Negative for facial swelling and nosebleeds. Eyes: Negative for pain. Respiratory: Negative for shortness of breath. Cardiovascular: Negative for chest pain. Gastrointestinal: Negative for abdominal pain and vomiting. Genitourinary: Negative for flank pain. Musculoskeletal: Negative for back pain and neck pain. Skin: Negative for wound. Neurological: Negative for headaches. Psychiatric/Behavioral: Negative for confusion. All other systems reviewed and are negative. Physical Exam   Physical Exam  Vitals and nursing note reviewed. Constitutional:       General: She is not in acute distress. Appearance: Normal appearance. She is well-developed. She is not ill-appearing or toxic-appearing. HENT:      Head: Normocephalic and atraumatic. Nose: No congestion. Mouth/Throat:      Mouth: Mucous membranes are moist.      Comments: No lip or tongue injury. Poor dentition. Left side of face mildly tender without edema or ecchymosis. Eyes:      Conjunctiva/sclera: Conjunctivae normal.   Cardiovascular:      Rate and Rhythm: Regular rhythm. Heart sounds: Normal heart sounds. Pulmonary:      Effort: Pulmonary effort is normal.      Breath sounds: Normal breath sounds. Abdominal:      General: Bowel sounds are normal.      Palpations: Abdomen is soft. Tenderness: There is no abdominal tenderness. Musculoskeletal:         General: No deformity. Cervical back: Neck supple. No tenderness. Comments: Left shoulder without tenderness deformity. Left elbow is normal nontender. Left wrist without deformity but with limited range of motion due to pain.   No localized tenderness. Left hand normal.   Skin:     General: Skin is warm and dry. Neurological:      General: No focal deficit present. Mental Status: She is alert. Psychiatric:         Mood and Affect: Mood normal.         Diagnostic Study Results     Labs -   No results found for this or any previous visit (from the past 12 hour(s)). Radiologic Studies -   XR WRIST LT AP/LAT/OBL MIN 3V   Final Result   No acute fracture. CT Results  (Last 48 hours)    None        CXR Results  (Last 48 hours)    None            Medical Decision Making   I am the first provider for this patient. I reviewed the vital signs, available nursing notes, past medical history, past surgical history, family history and social history. Vital Signs-Reviewed the patient's vital signs. Patient Vitals for the past 12 hrs:   Temp Pulse Resp BP SpO2   07/14/22 0240 98 °F (36.7 °C) (!) 104 18 134/86 100 %       Records Reviewed:     Provider Notes (Medical Decision Making):       ED Course:   Initial assessment performed. The patients presenting problems have been discussed, and they are in agreement with the care plan formulated and outlined with them. I have encouraged them to ask questions as they arise throughout their visit. PROCEDURES      Disposition: Condition stable   DC- Adult Discharges: All of the diagnostic tests were reviewed and questions answered. Diagnosis, care plan and treatment options were discussed. understand instructions and will follow up as directed. The patients results have been reviewed with them. They have been counseled regarding their diagnosis. The patient verbally convey understanding and agreement of the signs, symptoms, diagnosis, treatment and prognosis and additionally agrees to follow up as recommended. They also agree with the care-plan and convey that all of their questions have been answered.   I have also put together some discharge instructions for them that include: 1) educational information regarding their diagnosis, 2) how to care for their diagnosis at home, as well a 3) list of reasons why they would want to return to the ED prior to their follow-up appointment, should their condition change. PLAN:  1. Discharge Medication List as of 7/14/2022  3:29 AM      START taking these medications    Details   ibuprofen (MOTRIN) 800 mg tablet Take 1 Tablet by mouth every eight (8) hours as needed for Pain for up to 7 days. , Normal, Disp-20 Tablet, R-0         CONTINUE these medications which have NOT CHANGED    Details   amLODIPine (NORVASC) 10 mg tablet Historical Med      metoprolol succinate (TOPROL-XL) 50 mg XL tablet Historical Med      acetaminophen (TYLENOL) 325 mg tablet Take 2 Tablets by mouth every six (6) hours as needed for Pain., Normal, Disp-20 Tablet, R-0      methocarbamoL (Robaxin-750) 750 mg tablet Take 1 Tablet by mouth three (3) times daily as needed for Muscle Spasm(s). , Normal, Disp-20 Tablet, R-0      traMADoL (ULTRAM) 50 mg tablet Historical Med           2. Follow-up Information     Follow up With Specialties Details Why Contact Info    Follow up with [address] [place] [address] 16 [phone]  Schedule an appointment as soon as possible for a visit today          Return to ED if worse     Diagnosis     Clinical Impression:   1. Fall from ground level    2. Sprain of left wrist, initial encounter    3. Contusion of face, initial encounter        Please note that this dictation was completed with [email or web address], the computer voice recognition software. Quite often unanticipated grammatical, syntax, homophones, and other interpretive errors are inadvertently transcribed by the computer software. Please disregard these errors. Please excuse any errors that have escaped final proofreading. Thank you.

## 2022-07-30 ENCOUNTER — HOSPITAL ENCOUNTER (EMERGENCY)
Age: 52
Discharge: HOME | End: 2022-07-31
Attending: EMERGENCY MEDICINE
Payer: MEDICAID

## 2022-07-30 ENCOUNTER — APPOINTMENT (OUTPATIENT)
Dept: GENERAL RADIOLOGY | Age: 52
End: 2022-07-30
Attending: EMERGENCY MEDICINE
Payer: MEDICAID

## 2022-07-30 DIAGNOSIS — E87.6 HYPOKALEMIA: Primary | ICD-10-CM

## 2022-07-30 LAB
ALBUMIN SERPL-MCNC: 3 G/DL (ref 3.5–5)
ALBUMIN/GLOB SERPL: 0.8 {RATIO} (ref 1.1–2.2)
ALP SERPL-CCNC: 100 U/L (ref 45–117)
ALT SERPL-CCNC: 26 U/L (ref 12–78)
ANION GAP SERPL CALC-SCNC: 9 MMOL/L (ref 5–15)
AST SERPL W P-5'-P-CCNC: ABNORMAL U/L (ref 15–37)
BASOPHILS # BLD: 0 K/UL (ref 0–0.1)
BASOPHILS NFR BLD: 0 % (ref 0–1)
BILIRUB SERPL-MCNC: 0.7 MG/DL (ref 0.2–1)
BNP SERPL-MCNC: 133 PG/ML
BUN SERPL-MCNC: 9 MG/DL (ref 6–20)
BUN/CREAT SERPL: 17 (ref 12–20)
CA-I BLD-MCNC: 8.5 MG/DL (ref 8.5–10.1)
CHLORIDE SERPL-SCNC: 93 MMOL/L (ref 97–108)
CO2 SERPL-SCNC: 29 MMOL/L (ref 21–32)
CREAT SERPL-MCNC: 0.53 MG/DL (ref 0.55–1.02)
DIFFERENTIAL METHOD BLD: ABNORMAL
EOSINOPHIL # BLD: 0 K/UL (ref 0–0.4)
EOSINOPHIL NFR BLD: 0 % (ref 0–7)
ERYTHROCYTE [DISTWIDTH] IN BLOOD BY AUTOMATED COUNT: 15 % (ref 11.5–14.5)
GLOBULIN SER CALC-MCNC: 4 G/DL (ref 2–4)
GLUCOSE SERPL-MCNC: 78 MG/DL (ref 65–100)
HCT VFR BLD AUTO: 32.1 % (ref 35–47)
HGB BLD-MCNC: 10.6 G/DL (ref 11.5–16)
IMM GRANULOCYTES # BLD AUTO: 0 K/UL (ref 0–0.04)
IMM GRANULOCYTES NFR BLD AUTO: 1 % (ref 0–0.5)
LYMPHOCYTES # BLD: 1.2 K/UL (ref 0.8–3.5)
LYMPHOCYTES NFR BLD: 31 % (ref 12–49)
MCH RBC QN AUTO: 32.8 PG (ref 26–34)
MCHC RBC AUTO-ENTMCNC: 33 G/DL (ref 30–36.5)
MCV RBC AUTO: 99.4 FL (ref 80–99)
MONOCYTES # BLD: 0.3 K/UL (ref 0–1)
MONOCYTES NFR BLD: 7 % (ref 5–13)
NEUTS SEG # BLD: 2.3 K/UL (ref 1.8–8)
NEUTS SEG NFR BLD: 61 % (ref 32–75)
NRBC # BLD: 0 K/UL (ref 0–0.01)
NRBC BLD-RTO: 0 PER 100 WBC
PLATELET # BLD AUTO: 200 K/UL (ref 150–400)
PMV BLD AUTO: 9.3 FL (ref 8.9–12.9)
POTASSIUM SERPL-SCNC: ABNORMAL MMOL/L (ref 3.5–5.1)
PROT SERPL-MCNC: 7 G/DL (ref 6.4–8.2)
RBC # BLD AUTO: 3.23 M/UL (ref 3.8–5.2)
SODIUM SERPL-SCNC: 131 MMOL/L (ref 136–145)
TROPONIN-HIGH SENSITIVITY: 5 NG/L (ref 0–51)
WBC # BLD AUTO: 3.8 K/UL (ref 3.6–11)

## 2022-07-30 PROCEDURE — 85025 COMPLETE CBC W/AUTO DIFF WBC: CPT

## 2022-07-30 PROCEDURE — 36415 COLL VENOUS BLD VENIPUNCTURE: CPT

## 2022-07-30 PROCEDURE — 71045 X-RAY EXAM CHEST 1 VIEW: CPT

## 2022-07-30 PROCEDURE — 99285 EMERGENCY DEPT VISIT HI MDM: CPT

## 2022-07-30 PROCEDURE — 84132 ASSAY OF SERUM POTASSIUM: CPT

## 2022-07-30 PROCEDURE — 93005 ELECTROCARDIOGRAM TRACING: CPT

## 2022-07-30 PROCEDURE — 84484 ASSAY OF TROPONIN QUANT: CPT

## 2022-07-30 PROCEDURE — 83880 ASSAY OF NATRIURETIC PEPTIDE: CPT

## 2022-07-31 VITALS
WEIGHT: 263 LBS | RESPIRATION RATE: 17 BRPM | DIASTOLIC BLOOD PRESSURE: 79 MMHG | SYSTOLIC BLOOD PRESSURE: 119 MMHG | HEIGHT: 71 IN | HEART RATE: 78 BPM | TEMPERATURE: 98.4 F | OXYGEN SATURATION: 99 % | BODY MASS INDEX: 36.82 KG/M2

## 2022-07-31 LAB
ATRIAL RATE: 78 BPM
CALCULATED P AXIS, ECG09: 49 DEGREES
CALCULATED R AXIS, ECG10: 7 DEGREES
CALCULATED T AXIS, ECG11: 3 DEGREES
DIAGNOSIS, 93000: NORMAL
P-R INTERVAL, ECG05: 150 MS
POTASSIUM SERPL-SCNC: 2.5 MMOL/L (ref 3.5–5.1)
Q-T INTERVAL, ECG07: 454 MS
QRS DURATION, ECG06: 92 MS
QTC CALCULATION (BEZET), ECG08: 517 MS
VENTRICULAR RATE, ECG03: 78 BPM

## 2022-07-31 PROCEDURE — 96366 THER/PROPH/DIAG IV INF ADDON: CPT

## 2022-07-31 PROCEDURE — 74011250636 HC RX REV CODE- 250/636: Performed by: EMERGENCY MEDICINE

## 2022-07-31 PROCEDURE — 74011250637 HC RX REV CODE- 250/637: Performed by: EMERGENCY MEDICINE

## 2022-07-31 PROCEDURE — 96365 THER/PROPH/DIAG IV INF INIT: CPT

## 2022-07-31 RX ORDER — POTASSIUM CHLORIDE 1.5 G/1.77G
40 POWDER, FOR SOLUTION ORAL
Status: COMPLETED | OUTPATIENT
Start: 2022-07-31 | End: 2022-07-31

## 2022-07-31 RX ORDER — POTASSIUM CHLORIDE 7.45 MG/ML
10 INJECTION INTRAVENOUS
Status: COMPLETED | OUTPATIENT
Start: 2022-07-31 | End: 2022-07-31

## 2022-07-31 RX ADMIN — POTASSIUM CHLORIDE 10 MEQ: 7.46 INJECTION, SOLUTION INTRAVENOUS at 03:28

## 2022-07-31 RX ADMIN — POTASSIUM CHLORIDE 10 MEQ: 7.46 INJECTION, SOLUTION INTRAVENOUS at 01:18

## 2022-07-31 RX ADMIN — POTASSIUM CHLORIDE 10 MEQ: 7.46 INJECTION, SOLUTION INTRAVENOUS at 04:28

## 2022-07-31 RX ADMIN — POTASSIUM CHLORIDE 10 MEQ: 7.46 INJECTION, SOLUTION INTRAVENOUS at 05:26

## 2022-07-31 RX ADMIN — POTASSIUM CHLORIDE 40 MEQ: 1.5 FOR SOLUTION ORAL at 01:16

## 2022-07-31 NOTE — ED TRIAGE NOTES
Patient arrived via EMS. Patient states she has been having chest pressure for about 3 weeks. Patient denies any fevers, cough, congestion.

## 2022-07-31 NOTE — ED NOTES
Pt verbalized understanding of discharge instructions. IV removed. Pt wheeled to waiting room to wait for cab ride home.

## 2022-08-01 NOTE — ED PROVIDER NOTES
EMERGENCY DEPARTMENT HISTORY AND PHYSICAL EXAM      Date: 2022  Patient Name: Jeananne Hashimoto    History of Presenting Illness     Chief Complaint   Patient presents with    Chest Pain       History Provided By: Patient    HPI: Jeananne Hashimoto, 47 y.o. female with a significant past medical history of anxiety, CAD, hypertension, fibromyalgia presents to the ED with chief complaint of chest pressure for the last 3 weeks. It is described as constant in nature. Shortness of breath no nausea, no diaphoresis. Patient also complaining of bilateral lower extremity pain peripheral neuropathy. States that she is currently out of her gabapentin and is unable to sleep due to the pain. There are no other complaints, changes, or physical findings at this time. PCP: None    No current facility-administered medications on file prior to encounter. Current Outpatient Medications on File Prior to Encounter   Medication Sig Dispense Refill    traMADoL (ULTRAM) 50 mg tablet  (Patient not taking: Reported on 2022)      amLODIPine (NORVASC) 10 mg tablet       metoprolol succinate (TOPROL-XL) 50 mg XL tablet       acetaminophen (TYLENOL) 325 mg tablet Take 2 Tablets by mouth every six (6) hours as needed for Pain. 20 Tablet 0    methocarbamoL (Robaxin-750) 750 mg tablet Take 1 Tablet by mouth three (3) times daily as needed for Muscle Spasm(s). 20 Tablet 0       Past History     Past Medical History:  Past Medical History:   Diagnosis Date    Anxiety     CAD (coronary artery disease)     Fibromyalgia     Hypertension     Neuropathy     Peripheral artery disease ([facility] [address]     Psychiatric disorder     anxiety       Past Surgical History:  Past Surgical History:   Procedure Laterality Date    HX ANKLE FRACTURE TX Left     surgical implants    HX  SECTION         Family History:  No family history on file.     Social History:  Social History     Tobacco Use    Smoking status: Every Day     Packs/day: 0.50     Types: Cigarettes Smokeless tobacco: Never   Substance Use Topics    Alcohol use: Yes     Comment: occosionally     Drug use: Not Currently       Allergies: Allergies   Allergen Reactions    Codeine Nausea Only       Review of Systems   Review of Systems   Constitutional:  Negative for chills and fever. HENT:  Negative for congestion and sore throat. Eyes:  Negative for pain and visual disturbance. Respiratory:  Negative for cough and shortness of breath. Cardiovascular:  Positive for chest pain. Negative for palpitations. Gastrointestinal:  Negative for constipation, diarrhea, nausea and vomiting. Genitourinary:  Negative for dysuria and frequency. Musculoskeletal:  Negative for arthralgias and myalgias. Skin:  Negative for color change and rash. Neurological:  Positive for numbness (Chronic peripheral neuropathy). Negative for dizziness, weakness, light-headedness and headaches. Psychiatric/Behavioral:  Negative for dysphoric mood and sleep disturbance. Physical Exam   Physical Exam  Constitutional:       Appearance: Normal appearance. HENT:      Head: Normocephalic and atraumatic. Right Ear: External ear normal.      Left Ear: External ear normal.      Nose: Nose normal.      Mouth/Throat:      Mouth: Mucous membranes are moist.   Eyes:      Extraocular Movements: Extraocular movements intact. Conjunctiva/sclera: Conjunctivae normal.   Cardiovascular:      Rate and Rhythm: Normal rate and regular rhythm. Pulses: Normal pulses. Heart sounds: Normal heart sounds. Pulmonary:      Effort: Pulmonary effort is normal.      Breath sounds: Normal breath sounds. Abdominal:      General: Abdomen is flat. There is no distension. Palpations: Abdomen is soft. Tenderness: There is no abdominal tenderness. Musculoskeletal:         General: Normal range of motion. Cervical back: Normal range of motion. Skin:     General: Skin is warm and dry.       Capillary Refill: Capillary refill takes less than 2 seconds. Neurological:      General: No focal deficit present. Mental Status: She is alert and oriented to person, place, and time. Mental status is at baseline. Psychiatric:         Mood and Affect: Mood normal.         Behavior: Behavior normal.       Lab and Diagnostic Study Results   Labs -   No results found for this or any previous visit (from the past 12 hour(s)). Radiologic Studies -   @lastxrresult@  CT Results  (Last 48 hours)      None          CXR Results  (Last 48 hours)                 07/30/22 2243  XR CHEST PORT Final result    Impression:  No acute cardiopulmonary process. Narrative:  EXAM: XR CHEST PORT       INDICATION: cp       COMPARISON: None. FINDINGS: A portable AP radiograph of the chest was obtained at 2229 hours. The   patient is on a cardiac monitor. The lungs are clear. The cardiac and   mediastinal contours and pulmonary vascularity are normal.  The bones and soft   tissues are grossly within normal limits. Medical Decision Making and ED Course   - I am the first provider for this patient. I reviewed the vital signs, available nursing notes, past medical history, past surgical history, family history and social history. - Initial assessment performed. The patients presenting problems have been discussed, and they are in agreement with the care plan formulated and outlined with them. I have encouraged them to ask questions as they arise throughout their visit. Vital Signs-Reviewed the patient's vital signs. No data found. Differential Diagnosis & Medical Decision Making Provider Note:   50-year-old female with past medical history significant for anxiety, fibromyalgia, CAD presenting to the emergency department for evaluation of 3 weeks of chest pain. As well as bilateral lower extremity. Comfortably in bed. Vital signs limits.   Laboratory evaluation demonstrating significant hypokalemia with potassium of 2.5. Potassium replaced both p.o. and IV. Patient to be discharged after completion of potassium replacement. [facility]       ED Course:        Procedures   Performed by: Khris Rodney DO  Procedures      Disposition   Disposition: Condition stable  DC- Adult Discharges: All of the diagnostic tests were reviewed and questions answered. Diagnosis, care plan and treatment options were discussed. The patient understands the instructions and will follow up as directed. The patients results have been reviewed with them. They have been counseled regarding their diagnosis. The patient verbally convey understanding and agreement of the signs, symptoms, diagnosis, treatment and prognosis and additionally agrees to follow up as recommended with their PCP in 24 - 48 hours. They also agree with the care-plan and convey that all of their questions have been answered. I have also put together some discharge instructions for them that include: 1) educational information regarding their diagnosis, 2) how to care for their diagnosis at home, as well a 3) list of reasons why they would want to return to the ED prior to their follow-up appointment, should their condition change. DC-The patient was given verbal hypokalemia instructions  Discharged    DISCHARGE PLAN:  1. Cannot display discharge medications since this patient is not currently admitted. 2.   Follow-up Information    None       3. Return to ED if worse   4. Discharge Medication List as of 7/31/2022  7:05 AM         Remove if admitted/transferred    Diagnosis/Clinical Impression     Clinical Impression:   1. Hypokalemia        Attestations: Vi Quevedo DO, am the primary clinician of record. Please note that this dictation was completed with [email or web address], the computer voice recognition software. Quite often unanticipated grammatical, syntax, homophones, and other interpretive errors are inadvertently transcribed by the computer software. Please disregard these errors. Please excuse any errors that have escaped final proofreading. Thank you.

## 2022-08-08 ENCOUNTER — TRANSCRIBE ORDER (OUTPATIENT)
Dept: SCHEDULING | Age: 52
End: 2022-08-08

## 2022-08-08 DIAGNOSIS — G82.50 QUADRIPLEGIA (HCC): Primary | ICD-10-CM

## 2022-08-12 ENCOUNTER — HOSPITAL ENCOUNTER (OUTPATIENT)
Dept: MRI IMAGING | Age: 52
Discharge: HOME | End: 2022-08-12
Attending: PSYCHIATRY & NEUROLOGY
Payer: COMMERCIAL

## 2022-08-12 DIAGNOSIS — G82.50 QUADRIPLEGIA (HCC): ICD-10-CM

## 2022-08-12 PROCEDURE — 72141 MRI NECK SPINE W/O DYE: CPT

## 2022-08-16 ENCOUNTER — APPOINTMENT (OUTPATIENT)
Dept: GENERAL RADIOLOGY | Age: 52
End: 2022-08-16
Attending: NURSE PRACTITIONER
Payer: COMMERCIAL

## 2022-08-16 ENCOUNTER — HOSPITAL ENCOUNTER (EMERGENCY)
Age: 52
Discharge: HOME | End: 2022-08-16
Payer: COMMERCIAL

## 2022-08-16 VITALS
HEIGHT: 71 IN | SYSTOLIC BLOOD PRESSURE: 116 MMHG | DIASTOLIC BLOOD PRESSURE: 76 MMHG | TEMPERATURE: 98.7 F | OXYGEN SATURATION: 100 % | WEIGHT: 263 LBS | RESPIRATION RATE: 16 BRPM | HEART RATE: 73 BPM | BODY MASS INDEX: 36.82 KG/M2

## 2022-08-16 DIAGNOSIS — R07.89 CHEST WALL PAIN: Primary | ICD-10-CM

## 2022-08-16 DIAGNOSIS — D64.9 ANEMIA, UNSPECIFIED TYPE: ICD-10-CM

## 2022-08-16 DIAGNOSIS — E87.6 HYPOKALEMIA: ICD-10-CM

## 2022-08-16 DIAGNOSIS — M25.472 PAIN AND SWELLING OF LEFT ANKLE: ICD-10-CM

## 2022-08-16 DIAGNOSIS — M25.572 PAIN AND SWELLING OF LEFT ANKLE: ICD-10-CM

## 2022-08-16 LAB
ALBUMIN SERPL-MCNC: 3.4 G/DL (ref 3.5–5)
ALBUMIN/GLOB SERPL: 0.9 {RATIO} (ref 1.1–2.2)
ALP SERPL-CCNC: 102 U/L (ref 45–117)
ALT SERPL-CCNC: 20 U/L (ref 12–78)
ANION GAP SERPL CALC-SCNC: 19 MMOL/L (ref 5–15)
AST SERPL W P-5'-P-CCNC: 29 U/L (ref 15–37)
BASOPHILS # BLD: 0 K/UL (ref 0–0.1)
BASOPHILS NFR BLD: 0 % (ref 0–1)
BILIRUB SERPL-MCNC: 1.3 MG/DL (ref 0.2–1)
BUN SERPL-MCNC: 7 MG/DL (ref 6–20)
BUN/CREAT SERPL: 7 (ref 12–20)
CA-I BLD-MCNC: 9.2 MG/DL (ref 8.5–10.1)
CHLORIDE SERPL-SCNC: 93 MMOL/L (ref 97–108)
CO2 SERPL-SCNC: 26 MMOL/L (ref 21–32)
CREAT SERPL-MCNC: 0.94 MG/DL (ref 0.55–1.02)
DIFFERENTIAL METHOD BLD: ABNORMAL
EOSINOPHIL # BLD: 0 K/UL (ref 0–0.4)
EOSINOPHIL NFR BLD: 0 % (ref 0–7)
ERYTHROCYTE [DISTWIDTH] IN BLOOD BY AUTOMATED COUNT: 14.8 % (ref 11.5–14.5)
GLOBULIN SER CALC-MCNC: 3.8 G/DL (ref 2–4)
GLUCOSE SERPL-MCNC: 84 MG/DL (ref 65–100)
HCT VFR BLD AUTO: 28.9 % (ref 35–47)
HGB BLD-MCNC: 9.5 G/DL (ref 11.5–16)
IMM GRANULOCYTES # BLD AUTO: 0 K/UL (ref 0–0.04)
IMM GRANULOCYTES NFR BLD AUTO: 0 % (ref 0–0.5)
LYMPHOCYTES # BLD: 1 K/UL (ref 0.8–3.5)
LYMPHOCYTES NFR BLD: 20 % (ref 12–49)
MCH RBC QN AUTO: 33.1 PG (ref 26–34)
MCHC RBC AUTO-ENTMCNC: 32.9 G/DL (ref 30–36.5)
MCV RBC AUTO: 100.7 FL (ref 80–99)
MONOCYTES # BLD: 0.3 K/UL (ref 0–1)
MONOCYTES NFR BLD: 5 % (ref 5–13)
NEUTS SEG # BLD: 3.8 K/UL (ref 1.8–8)
NEUTS SEG NFR BLD: 75 % (ref 32–75)
PLATELET # BLD AUTO: 196 K/UL (ref 150–400)
PMV BLD AUTO: 8.6 FL (ref 8.9–12.9)
POTASSIUM SERPL-SCNC: 2.9 MMOL/L (ref 3.5–5.1)
PROT SERPL-MCNC: 7.2 G/DL (ref 6.4–8.2)
RBC # BLD AUTO: 2.87 M/UL (ref 3.8–5.2)
SODIUM SERPL-SCNC: 138 MMOL/L (ref 136–145)
TROPONIN-HIGH SENSITIVITY: 7 NG/L (ref 0–51)
TROPONIN-HIGH SENSITIVITY: 7 NG/L (ref 0–51)
WBC # BLD AUTO: 5.1 K/UL (ref 3.6–11)

## 2022-08-16 PROCEDURE — 73562 X-RAY EXAM OF KNEE 3: CPT

## 2022-08-16 PROCEDURE — 85025 COMPLETE CBC W/AUTO DIFF WBC: CPT

## 2022-08-16 PROCEDURE — 99285 EMERGENCY DEPT VISIT HI MDM: CPT

## 2022-08-16 PROCEDURE — 73610 X-RAY EXAM OF ANKLE: CPT

## 2022-08-16 PROCEDURE — 74011250637 HC RX REV CODE- 250/637: Performed by: NURSE PRACTITIONER

## 2022-08-16 PROCEDURE — 93005 ELECTROCARDIOGRAM TRACING: CPT

## 2022-08-16 PROCEDURE — 71045 X-RAY EXAM CHEST 1 VIEW: CPT

## 2022-08-16 PROCEDURE — 84484 ASSAY OF TROPONIN QUANT: CPT

## 2022-08-16 PROCEDURE — 80053 COMPREHEN METABOLIC PANEL: CPT

## 2022-08-16 RX ORDER — POTASSIUM CHLORIDE 750 MG/1
40 TABLET, FILM COATED, EXTENDED RELEASE ORAL
Status: COMPLETED | OUTPATIENT
Start: 2022-08-16 | End: 2022-08-16

## 2022-08-16 RX ORDER — PREGABALIN 100 MG/1
100 CAPSULE ORAL 3 TIMES DAILY
COMMUNITY
End: 2022-10-07

## 2022-08-16 RX ORDER — ASPIRIN 325 MG
325 TABLET ORAL ONCE
Status: COMPLETED | OUTPATIENT
Start: 2022-08-16 | End: 2022-08-16

## 2022-08-16 RX ORDER — POTASSIUM CHLORIDE 750 MG/1
20 TABLET, FILM COATED, EXTENDED RELEASE ORAL DAILY
Qty: 10 TABLET | Refills: 0 | Status: SHIPPED | OUTPATIENT
Start: 2022-08-16 | End: 2022-08-20

## 2022-08-16 RX ORDER — NAPROXEN 500 MG/1
500 TABLET ORAL 2 TIMES DAILY WITH MEALS
Qty: 20 TABLET | Refills: 0 | Status: SHIPPED | OUTPATIENT
Start: 2022-08-16 | End: 2022-08-25

## 2022-08-16 RX ADMIN — POTASSIUM CHLORIDE 40 MEQ: 750 TABLET, FILM COATED, EXTENDED RELEASE ORAL at 13:00

## 2022-08-16 RX ADMIN — ASPIRIN 325 MG ORAL TABLET 325 MG: 325 PILL ORAL at 11:39

## 2022-08-16 NOTE — ED TRIAGE NOTES
Natan Kimfred Saturday out of chair x 2 while sleeping both times, fell on left knee and ankle tender no swelling walks with assistance, after triage pt states she has chest pain on left side, hurts to move arms with chest pain, tender to touch mid sternal area and left lower chest, sharp pain.

## 2022-08-16 NOTE — ED PROVIDER NOTES
EMERGENCY DEPARTMENT HISTORY AND PHYSICAL EXAM      Date: 8/16/2022  Patient Name: Awilda Levine      History of Presenting Illness     Chief Complaint   Patient presents with    Knee Pain    Ankle Pain    Chest Pain       History Provided By: Patient    HPI: Awilda Levine, 47 y.o. female with a past medical history significant  presents to the ED with HTN, CAD, fibromyalgia, neuropathy cc of left knee and left ankle pain status post fall. She reports after taking her medications was sitting in a chair on Saturday evening when she dose of fell asleep fell on the ground. Reports left leg popped complains of left knee pain and swelling to ankle difficulty with ambulation. Today while in triage she also complains of sharp intermittent sternal chest pain. Aggravated with palpation. Denies any shortness of breath, fever, chills, nausea, vomiting, nichole pain, urinary frequency urgency burning, headache, diaphoresis dizziness. There are no other complaints, changes, or physical findings at this time. PCP: Rachelle Meraz NP    Current Outpatient Medications   Medication Sig Dispense Refill    pregabalin (LYRICA) 100 mg capsule Take 100 mg by mouth three (3) times daily. naproxen (Naprosyn) 500 mg tablet Take 1 Tablet by mouth two (2) times daily (with meals) for 10 days. 20 Tablet 0    potassium chloride SR (KLOR-CON 10) 10 mEq tablet Take 2 Tablets by mouth in the morning for 5 doses. 10 Tablet 0    amLODIPine (NORVASC) 10 mg tablet       metoprolol succinate (TOPROL-XL) 50 mg XL tablet       traMADoL (ULTRAM) 50 mg tablet  (Patient not taking: Reported on 7/14/2022)      acetaminophen (TYLENOL) 325 mg tablet Take 2 Tablets by mouth every six (6) hours as needed for Pain. 20 Tablet 0    methocarbamoL (Robaxin-750) 750 mg tablet Take 1 Tablet by mouth three (3) times daily as needed for Muscle Spasm(s).  (Patient not taking: Reported on 8/16/2022) 20 Tablet 0       Past History   Past Medical History:  Past Medical History:   Diagnosis Date    Anxiety     CAD (coronary artery disease)     Fibromyalgia     Hypertension     Neuropathy     Peripheral artery disease ([address]     Psychiatric disorder     anxiety       Past Surgical History:  Past Surgical History:   Procedure Laterality Date    HX ANKLE FRACTURE TX Left     surgical implants    HX  SECTION         Family History:  History reviewed. No pertinent family history. Social History:  Social History     Tobacco Use    Smoking status: Every Day     Packs/day: 0.50     Types: Cigarettes    Smokeless tobacco: Never   Substance Use Topics    Alcohol use: Yes     Comment: occosionally     Drug use: Not Currently       Allergies: Allergies   Allergen Reactions    Codeine Nausea Only     Review of Systems   Review of Systems   Constitutional:  Negative for chills and fever. HENT:  Negative for congestion, sinus pressure and sinus pain. Respiratory:  Negative for cough and shortness of breath. Cardiovascular:  Positive for chest pain. Negative for leg swelling. Gastrointestinal:  Negative for abdominal pain, nausea and vomiting. Genitourinary:  Negative for dysuria, frequency and urgency. Musculoskeletal:  Positive for arthralgias, gait problem and joint swelling. Negative for myalgias. Skin: Negative. Neurological:  Negative for dizziness, weakness, light-headedness, numbness and headaches. Psychiatric/Behavioral: Negative. All other systems reviewed and are negative. Physical Exam   Physical Exam  Vitals and nursing note reviewed. Constitutional:       General: She is not in acute distress. Appearance: Normal appearance. She is normal weight. She is not ill-appearing or toxic-appearing. HENT:      Head: Normocephalic and atraumatic.       Right Ear: Hearing normal.      Left Ear: Hearing normal.      Nose: Nose normal.      Mouth/Throat:      Mouth: Mucous membranes are moist.   Eyes:      General: Lids are normal.      Extraocular Movements: Extraocular movements intact. Pupils: Pupils are equal, round, and reactive to light. Cardiovascular:      Rate and Rhythm: Normal rate and regular rhythm. Pulses: Normal pulses. Radial pulses are 2+ on the right side and 2+ on the left side. Dorsalis pedis pulses are 2+ on the right side and 2+ on the left side. Heart sounds: Normal heart sounds. Heart sounds not distant. Pulmonary:      Effort: Pulmonary effort is normal. No accessory muscle usage or respiratory distress. Breath sounds: Normal breath sounds. No wheezing or rhonchi. Chest:      Chest wall: Tenderness present. Abdominal:      General: Bowel sounds are normal.      Palpations: Abdomen is soft. Tenderness: There is no abdominal tenderness. There is no right CVA tenderness or left CVA tenderness. Musculoskeletal:      Cervical back: Normal range of motion and neck supple. No muscular tenderness. Right knee: Decreased range of motion. Right lower leg: No edema. Left lower leg: No edema. Right ankle: Swelling present. Tenderness present. Decreased range of motion. Comments: No erythema, warmth, drainage, laceration, abrasion noted to site. Positive sensation bilaterally. 2+ distal pulses. Less than 2-second capillary refill. Feet:      Right foot:      Skin integrity: No skin breakdown. Left foot:      Skin integrity: No skin breakdown. Skin:     General: Skin is warm and dry. Capillary Refill: Capillary refill takes less than 2 seconds. Findings: No abrasion, bruising, ecchymosis, erythema or signs of injury. Neurological:      Mental Status: She is alert and oriented to person, place, and time. GCS: GCS eye subscore is 4. GCS verbal subscore is 5. GCS motor subscore is 6. Cranial Nerves: Cranial nerves are intact. Sensory: Sensation is intact.    Psychiatric:         Attention and Perception: Attention normal.         Mood and Affect: Mood normal.         Behavior: Behavior normal. Behavior is cooperative. Cognition and Memory: Cognition normal.       Lab and Diagnostic Study Results   Labs -     Recent Results (from the past 12 hour(s))   CBC WITH AUTOMATED DIFF    Collection Time: 08/16/22 11:35 AM   Result Value Ref Range    WBC 5.1 3.6 - 11.0 K/uL    RBC 2.87 (L) 3.80 - 5.20 M/uL    HGB 9.5 (L) 11.5 - 16.0 g/dL    HCT 28.9 (L) 35.0 - 47.0 %    .7 (H) 80.0 - 99.0 FL    MCH 33.1 26.0 - 34.0 PG    MCHC 32.9 30.0 - 36.5 g/dL    RDW 14.8 (H) 11.5 - 14.5 %    PLATELET 239 382 - 118 K/uL    MPV 8.6 (L) 8.9 - 12.9 FL    NEUTROPHILS 75 32 - 75 %    LYMPHOCYTES 20 12 - 49 %    MONOCYTES 5 5 - 13 %    EOSINOPHILS 0 0 - 7 %    BASOPHILS 0 0 - 1 %    IMMATURE GRANULOCYTES 0 0.0 - 0.5 %    ABS. NEUTROPHILS 3.8 1.8 - 8.0 K/UL    ABS. LYMPHOCYTES 1.0 0.8 - 3.5 K/UL    ABS. MONOCYTES 0.3 0.0 - 1.0 K/UL    ABS. EOSINOPHILS 0.0 0.0 - 0.4 K/UL    ABS. BASOPHILS 0.0 0.0 - 0.1 K/UL    ABS. IMM. GRANS. 0.0 0.00 - 0.04 K/UL    DF AUTOMATED     METABOLIC PANEL, COMPREHENSIVE    Collection Time: 08/16/22 11:35 AM   Result Value Ref Range    Sodium 138 136 - 145 mmol/L    Potassium 2.9 (L) 3.5 - 5.1 mmol/L    Chloride 93 (L) 97 - 108 mmol/L    CO2 26 21 - 32 mmol/L    Anion gap 19 (H) 5 - 15 mmol/L    Glucose 84 65 - 100 mg/dL    BUN 7 6 - 20 mg/dL    Creatinine 0.94 0.55 - 1.02 mg/dL    BUN/Creatinine ratio 7 (L) 12 - 20      GFR est AA >60 >60 ml/min/1.73m2    GFR est non-AA >60 >60 ml/min/1.73m2    Calcium 9.2 8.5 - 10.1 mg/dL    Bilirubin, total 1.3 (H) 0.2 - 1.0 mg/dL    AST (SGOT) 29 15 - 37 U/L    ALT (SGPT) 20 12 - 78 U/L    Alk.  phosphatase 102 45 - 117 U/L    Protein, total 7.2 6.4 - 8.2 g/dL    Albumin 3.4 (L) 3.5 - 5.0 g/dL    Globulin 3.8 2.0 - 4.0 g/dL    A-G Ratio 0.9 (L) 1.1 - 2.2     TROPONIN-HIGH SENSITIVITY    Collection Time: 08/16/22 11:35 AM   Result Value Ref Range    Troponin-High Sensitivity 7 0 - 51 ng/L   TROPONIN-HIGH SENSITIVITY    Collection Time: 08/16/22  1:45 PM   Result Value Ref Range    Troponin-High Sensitivity 7 0 - 51 ng/L       Radiologic Studies -   [unfilled]  CT Results  (Last 48 hours)      None          CXR Results  (Last 48 hours)                 08/16/22 1157  XR CHEST PORT Final result    Impression:      No acute findings. Narrative:  EXAM:  XR CHEST PORT       INDICATION: chest pain       COMPARISON: Chest radiograph 7/30/2022       TECHNIQUE: Upright portable chest AP view       FINDINGS:        Cardiomediastinal silhouette within normal limits. Lungs and pleural spaces   grossly clear. Medical Decision Making and ED Course   - I am the first and primary provider for this patient AND AM THE PRIMARY PROVIDER OF RECORD. I reviewed the vital signs, available nursing notes, past medical history, past surgical history, family history and social history. - Initial assessment performed. The patients presenting problems have been discussed, and the staff are in agreement with the care plan formulated and outlined with them. I have encouraged them to ask questions as they arise throughout their visit. Differential Diagnosis & Medical Decision Making Provider Note: fx, sprain, dislocation, edema,      Patient afebrile not tachycardic SPO2 100% on room air. EKG with prolonged QT normal sinus rhythm at 86. Patient reports pain with palpation reproducible however reports improvement since ED evaluation. Negative troponin x2. Basic labs show mild hypokalemia replace with p.o. potassium and chronic anemia. Denies any additional symptoms at this time. X-rays grossly negative left foot with mild edema noted Ace wrap given and advised of rice therapy follow-up with PCP if no improvement in symptoms. Verbalized understanding stable at time of discharge. Vital Signs-Reviewed the patient's vital signs.   Patient Vitals for the past 12 hrs:   Temp Pulse Resp BP SpO2   08/16/22 1240 -- 73 16 116/76 100 %   08/16/22 1114 98.7 °F (37.1 °C) 88 16 121/79 100 %       EKG interpretation: (Preliminary): Performed at 1127. EKG NSR  @ 86 with prolonged QT    ED Course:          Procedures and Critical Care     NOTES   :  I have spent the above critical care time involved in lab review, consultations with specialist, family decision- making, bedside attention and documentation. This time excludes time spent in any separate billed procedures. During this entire length of time I was immediately available to the patient . Idalia Stroud NP    Disposition   Disposition: DC- Adult Discharges: All of the diagnostic tests were reviewed and questions answered. Diagnosis, care plan and treatment options were discussed. The patient understands the instructions and will follow up as directed. The patients results have been reviewed with them. They have been counseled regarding their diagnosis. The patient verbally convey understanding and agreement of the signs, symptoms, diagnosis, treatment and prognosis and additionally agrees to follow up as recommended with their PCP in 24 - 48 hours. They also agree with the care-plan and convey that all of their questions have been answered. I have also put together some discharge instructions for them that include: 1) educational information regarding their diagnosis, 2) how to care for their diagnosis at home, as well a 3) list of reasons why they would want to return to the ED prior to their follow-up appointment, should their condition change. Discharged    DISCHARGE PLAN:  1. Current Discharge Medication List        START taking these medications    Details   naproxen (Naprosyn) 500 mg tablet Take 1 Tablet by mouth two (2) times daily (with meals) for 10 days. Qty: 20 Tablet, Refills: 0      potassium chloride SR (KLOR-CON 10) 10 mEq tablet Take 2 Tablets by mouth in the morning for 5 doses.   Qty: 10 Tablet, Refills: 0           CONTINUE these medications which have NOT CHANGED    Details   pregabalin (LYRICA) 100 mg capsule Take 100 mg by mouth three (3) times daily. amLODIPine (NORVASC) 10 mg tablet       metoprolol succinate (TOPROL-XL) 50 mg XL tablet       traMADoL (ULTRAM) 50 mg tablet       acetaminophen (TYLENOL) 325 mg tablet Take 2 Tablets by mouth every six (6) hours as needed for Pain. Qty: 20 Tablet, Refills: 0      methocarbamoL (Robaxin-750) 750 mg tablet Take 1 Tablet by mouth three (3) times daily as needed for Muscle Spasm(s). Qty: 20 Tablet, Refills: 0           2. Follow-up Information       Follow up With Specialties Details Why Contact Info    Shannan Bolton NP Nurse Practitioner In 2 days  [address] [place] [address]  [phone]            3. Return to ED if worse   4. Current Discharge Medication List        START taking these medications    Details   naproxen (Naprosyn) 500 mg tablet Take 1 Tablet by mouth two (2) times daily (with meals) for 10 days. Qty: 20 Tablet, Refills: 0  Start date: 8/16/2022, End date: 8/26/2022      potassium chloride SR (KLOR-CON 10) 10 mEq tablet Take 2 Tablets by mouth in the morning for 5 doses. Qty: 10 Tablet, Refills: 0  Start date: 8/16/2022, End date: 8/21/2022               Diagnosis/Clinical Impression     Clinical Impression:   1. Chest wall pain    2. Pain and swelling of left ankle    3. Hypokalemia    4. Anemia, unspecified type        Attestations: Dick THOMAS NP, am the primary clinician of record. Please note that this dictation was completed with [employer], the [employer] voice recognition software. Quite often unanticipated grammatical, syntax, homophones, and other interpretive errors are inadvertently transcribed by the computer software. Please disregard these errors. Please excuse any errors that have escaped final proofreading. Thank you.

## 2022-08-16 NOTE — DISCHARGE INSTRUCTIONS
Thank you! Thank you for allowing me to care for you in the emergency department. It is my goal to provide you with excellent care. If you have not received excellent quality care, please ask to speak to the nurse manager. Please fill out the survey that will come to you by mail or email since we listen to your feedback! Below you will find a list of your tests from today's visit. Should you have any questions, please do not hesitate to call the emergency department. Labs  Recent Results (from the past 12 hour(s))   CBC WITH AUTOMATED DIFF    Collection Time: 08/16/22 11:35 AM   Result Value Ref Range    WBC 5.1 3.6 - 11.0 K/uL    RBC 2.87 (L) 3.80 - 5.20 M/uL    HGB 9.5 (L) 11.5 - 16.0 g/dL    HCT 28.9 (L) 35.0 - 47.0 %    .7 (H) 80.0 - 99.0 FL    MCH 33.1 26.0 - 34.0 PG    MCHC 32.9 30.0 - 36.5 g/dL    RDW 14.8 (H) 11.5 - 14.5 %    PLATELET 380 542 - 444 K/uL    MPV 8.6 (L) 8.9 - 12.9 FL    NEUTROPHILS 75 32 - 75 %    LYMPHOCYTES 20 12 - 49 %    MONOCYTES 5 5 - 13 %    EOSINOPHILS 0 0 - 7 %    BASOPHILS 0 0 - 1 %    IMMATURE GRANULOCYTES 0 0.0 - 0.5 %    ABS. NEUTROPHILS 3.8 1.8 - 8.0 K/UL    ABS. LYMPHOCYTES 1.0 0.8 - 3.5 K/UL    ABS. MONOCYTES 0.3 0.0 - 1.0 K/UL    ABS. EOSINOPHILS 0.0 0.0 - 0.4 K/UL    ABS. BASOPHILS 0.0 0.0 - 0.1 K/UL    ABS. IMM. GRANS. 0.0 0.00 - 0.04 K/UL    DF AUTOMATED     METABOLIC PANEL, COMPREHENSIVE    Collection Time: 08/16/22 11:35 AM   Result Value Ref Range    Sodium 138 136 - 145 mmol/L    Potassium 2.9 (L) 3.5 - 5.1 mmol/L    Chloride 93 (L) 97 - 108 mmol/L    CO2 26 21 - 32 mmol/L    Anion gap 19 (H) 5 - 15 mmol/L    Glucose 84 65 - 100 mg/dL    BUN 7 6 - 20 mg/dL    Creatinine 0.94 0.55 - 1.02 mg/dL    BUN/Creatinine ratio 7 (L) 12 - 20      GFR est AA >60 >60 ml/min/1.73m2    GFR est non-AA >60 >60 ml/min/1.73m2    Calcium 9.2 8.5 - 10.1 mg/dL    Bilirubin, total 1.3 (H) 0.2 - 1.0 mg/dL    AST (SGOT) 29 15 - 37 U/L    ALT (SGPT) 20 12 - 78 U/L    Alk. phosphatase 102 45 - 117 U/L    Protein, total 7.2 6.4 - 8.2 g/dL    Albumin 3.4 (L) 3.5 - 5.0 g/dL    Globulin 3.8 2.0 - 4.0 g/dL    A-G Ratio 0.9 (L) 1.1 - 2.2     TROPONIN-HIGH SENSITIVITY    Collection Time: 08/16/22 11:35 AM   Result Value Ref Range    Troponin-High Sensitivity 7 0 - 51 ng/L       Radiologic Studies  XR KNEE LT 3 V   Final Result      No acute fracture. Nonspecific trace joint effusion. Juvenal-Stieda lesion, a   chronic incidental finding. XR ANKLE LT MIN 3 V   Final Result      Diffuse soft tissue swelling, without appreciable acute fracture. XR CHEST PORT   Final Result      No acute findings. CT Results  (Last 48 hours)      None          CXR Results  (Last 48 hours)                 08/16/22 1157  XR CHEST PORT Final result    Impression:      No acute findings. Narrative:  EXAM:  XR CHEST PORT       INDICATION: chest pain       COMPARISON: Chest radiograph 7/30/2022       TECHNIQUE: Upright portable chest AP view       FINDINGS:        Cardiomediastinal silhouette within normal limits. Lungs and pleural spaces   grossly clear. ------------------------------------------------------------------------------------------------------------  The exam and treatment you received in the Emergency Department were for an urgent problem and are not intended as complete care. It is important that you follow-up with a doctor, nurse practitioner, or physician assistant to:  (1) confirm your diagnosis,  (2) re-evaluation of changes in your illness and treatment, and  (3) for ongoing care. Please take your discharge instructions with you when you go to your follow-up appointment. If you have any problem arranging a follow-up appointment, contact the Emergency Department. If your symptoms become worse or you do not improve as expected and you are unable to reach your health care provider, please return to the Emergency Department.  We are available 24 hours a day. If a prescription has been provided, please have it filled as soon as possible to prevent a delay in treatment. If you have any questions or reservations about taking the medication due to side effects or interactions with other medications, please call your primary care provider or contact the ER.

## 2022-08-16 NOTE — Clinical Note
[address] EMERGENCY DEPARTMENT  [address] [facility] [address]  [phone]    Work/School Note    Date: 8/16/2022    To Whom It May concern:      Eduardo Colunga was seen and treated today in the emergency room by the following provider(s):  Nurse Practitioner: Ander Ware NP. Eduardo Colunga is excused from work/school on 08/16/22. She is clear to return to work/school on 08/17/22.         Sincerely,          Puneet Grider NP

## 2022-08-19 ENCOUNTER — HOSPITAL ENCOUNTER (INPATIENT)
Age: 52
LOS: 1 days | Discharge: HOME HEALTH | DRG: 861 | End: 2022-08-25
Attending: STUDENT IN AN ORGANIZED HEALTH CARE EDUCATION/TRAINING PROGRAM | Admitting: HOSPITALIST
Payer: COMMERCIAL

## 2022-08-19 DIAGNOSIS — R53.1 RAPIDLY PROGRESSIVE WEAKNESS: Primary | ICD-10-CM

## 2022-08-19 LAB
ATRIAL RATE: 86 BPM
BASOPHILS # BLD: 0 K/UL (ref 0–0.1)
BASOPHILS NFR BLD: 0 % (ref 0–1)
CALCULATED P AXIS, ECG09: 78 DEGREES
CALCULATED R AXIS, ECG10: 18 DEGREES
CALCULATED T AXIS, ECG11: 30 DEGREES
DIAGNOSIS, 93000: NORMAL
DIFFERENTIAL METHOD BLD: ABNORMAL
EOSINOPHIL # BLD: 0 K/UL (ref 0–0.4)
EOSINOPHIL NFR BLD: 0 % (ref 0–7)
ERYTHROCYTE [DISTWIDTH] IN BLOOD BY AUTOMATED COUNT: 14.9 % (ref 11.5–14.5)
HCT VFR BLD AUTO: 27.6 % (ref 35–47)
HGB BLD-MCNC: 9 G/DL (ref 11.5–16)
IMM GRANULOCYTES # BLD AUTO: 0 K/UL (ref 0–0.04)
IMM GRANULOCYTES NFR BLD AUTO: 0 % (ref 0–0.5)
LYMPHOCYTES # BLD: 1.1 K/UL (ref 0.8–3.5)
LYMPHOCYTES NFR BLD: 29 % (ref 12–49)
MCH RBC QN AUTO: 32.3 PG (ref 26–34)
MCHC RBC AUTO-ENTMCNC: 32.6 G/DL (ref 30–36.5)
MCV RBC AUTO: 98.9 FL (ref 80–99)
MONOCYTES # BLD: 0.1 K/UL (ref 0–1)
MONOCYTES NFR BLD: 4 % (ref 5–13)
NEUTS SEG # BLD: 2.4 K/UL (ref 1.8–8)
NEUTS SEG NFR BLD: 67 % (ref 32–75)
NRBC # BLD: 0 K/UL (ref 0–0.01)
NRBC BLD-RTO: 0 PER 100 WBC
P-R INTERVAL, ECG05: 174 MS
PLATELET # BLD AUTO: 142 K/UL (ref 150–400)
PMV BLD AUTO: 8.6 FL (ref 8.9–12.9)
Q-T INTERVAL, ECG07: 422 MS
QRS DURATION, ECG06: 92 MS
QTC CALCULATION (BEZET), ECG08: 504 MS
RBC # BLD AUTO: 2.79 M/UL (ref 3.8–5.2)
VENTRICULAR RATE, ECG03: 86 BPM
WBC # BLD AUTO: 3.6 K/UL (ref 3.6–11)

## 2022-08-19 PROCEDURE — 85025 COMPLETE CBC W/AUTO DIFF WBC: CPT

## 2022-08-19 PROCEDURE — 82550 ASSAY OF CK (CPK): CPT

## 2022-08-19 PROCEDURE — 84484 ASSAY OF TROPONIN QUANT: CPT

## 2022-08-19 PROCEDURE — 87635 SARS-COV-2 COVID-19 AMP PRB: CPT

## 2022-08-19 PROCEDURE — 86235 NUCLEAR ANTIGEN ANTIBODY: CPT

## 2022-08-19 PROCEDURE — 80053 COMPREHEN METABOLIC PANEL: CPT

## 2022-08-19 PROCEDURE — 83516 IMMUNOASSAY NONANTIBODY: CPT

## 2022-08-19 PROCEDURE — 36415 COLL VENOUS BLD VENIPUNCTURE: CPT

## 2022-08-19 PROCEDURE — 99285 EMERGENCY DEPT VISIT HI MDM: CPT

## 2022-08-19 PROCEDURE — 82077 ASSAY SPEC XCP UR&BREATH IA: CPT

## 2022-08-19 PROCEDURE — 83735 ASSAY OF MAGNESIUM: CPT

## 2022-08-20 PROBLEM — R26.9 GAIT DISTURBANCE: Status: ACTIVE | Noted: 2022-08-20

## 2022-08-20 LAB
ALBUMIN SERPL-MCNC: 3 G/DL (ref 3.5–5)
ALBUMIN SERPL-MCNC: 3.2 G/DL (ref 3.5–5)
ALBUMIN/GLOB SERPL: 0.8 {RATIO} (ref 1.1–2.2)
ALP SERPL-CCNC: 89 U/L (ref 45–117)
ALT SERPL-CCNC: 21 U/L (ref 12–78)
ANION GAP SERPL CALC-SCNC: 6 MMOL/L (ref 5–15)
ANION GAP SERPL CALC-SCNC: 9 MMOL/L (ref 5–15)
AST SERPL W P-5'-P-CCNC: 27 U/L (ref 15–37)
BILIRUB SERPL-MCNC: 1 MG/DL (ref 0.2–1)
BUN SERPL-MCNC: 12 MG/DL (ref 6–20)
BUN SERPL-MCNC: 9 MG/DL (ref 6–20)
BUN/CREAT SERPL: 15 (ref 12–20)
BUN/CREAT SERPL: 17 (ref 12–20)
CA-I BLD-MCNC: 8.6 MG/DL (ref 8.5–10.1)
CA-I BLD-MCNC: 8.8 MG/DL (ref 8.5–10.1)
CHLORIDE SERPL-SCNC: 101 MMOL/L (ref 97–108)
CHLORIDE SERPL-SCNC: 97 MMOL/L (ref 97–108)
CK SERPL-CCNC: 33 U/L (ref 26–192)
CK SERPL-CCNC: 39 U/L (ref 26–192)
CO2 SERPL-SCNC: 27 MMOL/L (ref 21–32)
CO2 SERPL-SCNC: 29 MMOL/L (ref 21–32)
COVID-19 RAPID TEST, COVR: NOT DETECTED
CREAT SERPL-MCNC: 0.62 MG/DL (ref 0.55–1.02)
CREAT SERPL-MCNC: 0.72 MG/DL (ref 0.55–1.02)
CRP SERPL HS-MCNC: >9.5 MG/L
CRP SERPL-MCNC: 1.14 MG/DL (ref 0–0.6)
ERYTHROCYTE [DISTWIDTH] IN BLOOD BY AUTOMATED COUNT: 15.1 % (ref 11.5–14.5)
ERYTHROCYTE [SEDIMENTATION RATE] IN BLOOD: 72 MM/HR (ref 0–30)
ETHANOL SERPL-MCNC: <10 MG/DL
FOLATE SERPL-MCNC: 1.7 NG/ML (ref 5–21)
GLOBULIN SER CALC-MCNC: 3.8 G/DL (ref 2–4)
GLUCOSE SERPL-MCNC: 90 MG/DL (ref 65–100)
GLUCOSE SERPL-MCNC: 95 MG/DL (ref 65–100)
HCT VFR BLD AUTO: 27.8 % (ref 35–47)
HGB BLD-MCNC: 9 G/DL (ref 11.5–16)
HIV 1+2 AB+HIV1 P24 AG SERPL QL IA: NONREACTIVE
HIV12 RESULT COMMENT, HHIVC: NORMAL
LDH SERPL L TO P-CCNC: 187 U/L (ref 81–246)
MAGNESIUM SERPL-MCNC: 1.7 MG/DL (ref 1.6–2.4)
MCH RBC QN AUTO: 31.9 PG (ref 26–34)
MCHC RBC AUTO-ENTMCNC: 32.4 G/DL (ref 30–36.5)
MCV RBC AUTO: 98.6 FL (ref 80–99)
MYOGLOBIN SERPL-MCNC: 41 NG/ML (ref 13–71)
NRBC # BLD: 0 K/UL (ref 0–0.01)
NRBC BLD-RTO: 0 PER 100 WBC
PHOSPHATE SERPL-MCNC: 3.3 MG/DL (ref 2.6–4.7)
PLATELET # BLD AUTO: 154 K/UL (ref 150–400)
PMV BLD AUTO: 8.9 FL (ref 8.9–12.9)
POTASSIUM SERPL-SCNC: 3 MMOL/L (ref 3.5–5.1)
POTASSIUM SERPL-SCNC: 3.2 MMOL/L (ref 3.5–5.1)
PROCALCITONIN SERPL-MCNC: 0.05 NG/ML
PROT SERPL-MCNC: 7 G/DL (ref 6.4–8.2)
RBC # BLD AUTO: 2.82 M/UL (ref 3.8–5.2)
RETICS # AUTO: 0.02 M/UL (ref 0.02–0.08)
RETICS/RBC NFR AUTO: 0.9 % (ref 0.7–2.1)
RPR SER QL: NONREACTIVE
SODIUM SERPL-SCNC: 133 MMOL/L (ref 136–145)
SODIUM SERPL-SCNC: 136 MMOL/L (ref 136–145)
TROPONIN-HIGH SENSITIVITY: 6 NG/L (ref 0–51)
TSH SERPL DL<=0.05 MIU/L-ACNC: 4.84 UIU/ML (ref 0.36–3.74)
URATE SERPL-MCNC: 8.2 MG/DL (ref 2.6–6)
VIT B12 SERPL-MCNC: 198 PG/ML (ref 193–986)
WBC # BLD AUTO: 3.5 K/UL (ref 3.6–11)

## 2022-08-20 PROCEDURE — 74011250637 HC RX REV CODE- 250/637: Performed by: HOSPITALIST

## 2022-08-20 PROCEDURE — 83916 OLIGOCLONAL BANDS: CPT

## 2022-08-20 PROCEDURE — 86235 NUCLEAR ANTIGEN ANTIBODY: CPT

## 2022-08-20 PROCEDURE — 86592 SYPHILIS TEST NON-TREP QUAL: CPT

## 2022-08-20 PROCEDURE — 82607 VITAMIN B-12: CPT

## 2022-08-20 PROCEDURE — 96365 THER/PROPH/DIAG IV INF INIT: CPT

## 2022-08-20 PROCEDURE — 84550 ASSAY OF BLOOD/URIC ACID: CPT

## 2022-08-20 PROCEDURE — 96366 THER/PROPH/DIAG IV INF ADDON: CPT

## 2022-08-20 PROCEDURE — 74011250636 HC RX REV CODE- 250/636: Performed by: HOSPITALIST

## 2022-08-20 PROCEDURE — 86225 DNA ANTIBODY NATIVE: CPT

## 2022-08-20 PROCEDURE — 82728 ASSAY OF FERRITIN: CPT

## 2022-08-20 PROCEDURE — 83615 LACTATE (LD) (LDH) ENZYME: CPT

## 2022-08-20 PROCEDURE — 83540 ASSAY OF IRON: CPT

## 2022-08-20 PROCEDURE — G0378 HOSPITAL OBSERVATION PER HR: HCPCS

## 2022-08-20 PROCEDURE — 84443 ASSAY THYROID STIM HORMONE: CPT

## 2022-08-20 PROCEDURE — 80069 RENAL FUNCTION PANEL: CPT

## 2022-08-20 PROCEDURE — 87389 HIV-1 AG W/HIV-1&-2 AB AG IA: CPT

## 2022-08-20 PROCEDURE — 83874 ASSAY OF MYOGLOBIN: CPT

## 2022-08-20 PROCEDURE — 84145 PROCALCITONIN (PCT): CPT

## 2022-08-20 PROCEDURE — 86617 LYME DISEASE ANTIBODY: CPT

## 2022-08-20 PROCEDURE — 74011250636 HC RX REV CODE- 250/636: Performed by: STUDENT IN AN ORGANIZED HEALTH CARE EDUCATION/TRAINING PROGRAM

## 2022-08-20 PROCEDURE — 85652 RBC SED RATE AUTOMATED: CPT

## 2022-08-20 PROCEDURE — 96372 THER/PROPH/DIAG INJ SC/IM: CPT

## 2022-08-20 PROCEDURE — 85027 COMPLETE CBC AUTOMATED: CPT

## 2022-08-20 PROCEDURE — 83516 IMMUNOASSAY NONANTIBODY: CPT

## 2022-08-20 PROCEDURE — 82085 ASSAY OF ALDOLASE: CPT

## 2022-08-20 PROCEDURE — 36415 COLL VENOUS BLD VENIPUNCTURE: CPT

## 2022-08-20 PROCEDURE — 86140 C-REACTIVE PROTEIN: CPT

## 2022-08-20 PROCEDURE — 85045 AUTOMATED RETICULOCYTE COUNT: CPT

## 2022-08-20 PROCEDURE — 74011000250 HC RX REV CODE- 250: Performed by: HOSPITALIST

## 2022-08-20 PROCEDURE — 74011250637 HC RX REV CODE- 250/637: Performed by: PSYCHIATRY & NEUROLOGY

## 2022-08-20 PROCEDURE — 86141 C-REACTIVE PROTEIN HS: CPT

## 2022-08-20 PROCEDURE — 82550 ASSAY OF CK (CPK): CPT

## 2022-08-20 RX ORDER — OXYCODONE AND ACETAMINOPHEN 5; 325 MG/1; MG/1
1 TABLET ORAL
Status: DISPENSED | OUTPATIENT
Start: 2022-08-20 | End: 2022-08-22

## 2022-08-20 RX ORDER — ONDANSETRON 2 MG/ML
4 INJECTION INTRAMUSCULAR; INTRAVENOUS
Status: DISCONTINUED | OUTPATIENT
Start: 2022-08-20 | End: 2022-08-25 | Stop reason: HOSPADM

## 2022-08-20 RX ORDER — SODIUM CHLORIDE 0.9 % (FLUSH) 0.9 %
5-40 SYRINGE (ML) INJECTION AS NEEDED
Status: DISCONTINUED | OUTPATIENT
Start: 2022-08-20 | End: 2022-08-25 | Stop reason: HOSPADM

## 2022-08-20 RX ORDER — POTASSIUM CHLORIDE 7.45 MG/ML
10 INJECTION INTRAVENOUS
Status: DISPENSED | OUTPATIENT
Start: 2022-08-20 | End: 2022-08-20

## 2022-08-20 RX ORDER — ENOXAPARIN SODIUM 100 MG/ML
40 INJECTION SUBCUTANEOUS DAILY
Status: DISCONTINUED | OUTPATIENT
Start: 2022-08-20 | End: 2022-08-25 | Stop reason: HOSPADM

## 2022-08-20 RX ORDER — SODIUM CHLORIDE 0.9 % (FLUSH) 0.9 %
5-40 SYRINGE (ML) INJECTION EVERY 8 HOURS
Status: DISCONTINUED | OUTPATIENT
Start: 2022-08-20 | End: 2022-08-25 | Stop reason: HOSPADM

## 2022-08-20 RX ORDER — ACETAMINOPHEN 650 MG/1
650 SUPPOSITORY RECTAL
Status: DISCONTINUED | OUTPATIENT
Start: 2022-08-20 | End: 2022-08-25 | Stop reason: HOSPADM

## 2022-08-20 RX ORDER — AMLODIPINE BESYLATE 5 MG/1
5 TABLET ORAL DAILY
Status: DISCONTINUED | OUTPATIENT
Start: 2022-08-20 | End: 2022-08-25 | Stop reason: HOSPADM

## 2022-08-20 RX ORDER — ONDANSETRON 4 MG/1
4 TABLET, ORALLY DISINTEGRATING ORAL
Status: DISCONTINUED | OUTPATIENT
Start: 2022-08-20 | End: 2022-08-25 | Stop reason: HOSPADM

## 2022-08-20 RX ORDER — ACETAMINOPHEN 325 MG/1
650 TABLET ORAL
Status: DISCONTINUED | OUTPATIENT
Start: 2022-08-20 | End: 2022-08-25 | Stop reason: HOSPADM

## 2022-08-20 RX ORDER — LIDOCAINE HYDROCHLORIDE 20 MG/ML
80 INJECTION, SOLUTION EPIDURAL; INFILTRATION; INTRACAUDAL; PERINEURAL ONCE
Status: DISPENSED | OUTPATIENT
Start: 2022-08-20 | End: 2022-08-20

## 2022-08-20 RX ORDER — METOPROLOL SUCCINATE 25 MG/1
25 TABLET, EXTENDED RELEASE ORAL DAILY
Status: DISCONTINUED | OUTPATIENT
Start: 2022-08-20 | End: 2022-08-25 | Stop reason: HOSPADM

## 2022-08-20 RX ORDER — BACLOFEN 10 MG/1
10 TABLET ORAL
Status: DISCONTINUED | OUTPATIENT
Start: 2022-08-20 | End: 2022-08-25 | Stop reason: HOSPADM

## 2022-08-20 RX ORDER — POTASSIUM CHLORIDE 750 MG/1
40 TABLET, FILM COATED, EXTENDED RELEASE ORAL
Status: COMPLETED | OUTPATIENT
Start: 2022-08-20 | End: 2022-08-20

## 2022-08-20 RX ORDER — MELOXICAM 15 MG/1
15 TABLET ORAL
COMMUNITY
Start: 2022-07-15 | End: 2022-08-25

## 2022-08-20 RX ADMIN — SODIUM CHLORIDE, PRESERVATIVE FREE 10 ML: 5 INJECTION INTRAVENOUS at 21:26

## 2022-08-20 RX ADMIN — POTASSIUM CHLORIDE 40 MEQ: 750 TABLET, FILM COATED, EXTENDED RELEASE ORAL at 08:07

## 2022-08-20 RX ADMIN — PREGABALIN 100 MG: 25 CAPSULE ORAL at 09:41

## 2022-08-20 RX ADMIN — BACLOFEN 10 MG: 10 TABLET ORAL at 16:30

## 2022-08-20 RX ADMIN — SODIUM CHLORIDE, PRESERVATIVE FREE 10 ML: 5 INJECTION INTRAVENOUS at 08:10

## 2022-08-20 RX ADMIN — ENOXAPARIN SODIUM 40 MG: 100 INJECTION SUBCUTANEOUS at 09:41

## 2022-08-20 RX ADMIN — OXYCODONE AND ACETAMINOPHEN 1 TABLET: 5; 325 TABLET ORAL at 12:23

## 2022-08-20 RX ADMIN — AMLODIPINE BESYLATE 5 MG: 5 TABLET ORAL at 09:41

## 2022-08-20 RX ADMIN — PREGABALIN 100 MG: 25 CAPSULE ORAL at 21:26

## 2022-08-20 RX ADMIN — POTASSIUM CHLORIDE 10 MEQ: 7.46 INJECTION, SOLUTION INTRAVENOUS at 00:52

## 2022-08-20 RX ADMIN — SODIUM CHLORIDE, PRESERVATIVE FREE 10 ML: 5 INJECTION INTRAVENOUS at 16:31

## 2022-08-20 RX ADMIN — METOPROLOL SUCCINATE 25 MG: 25 TABLET, EXTENDED RELEASE ORAL at 09:41

## 2022-08-20 RX ADMIN — PREGABALIN 100 MG: 25 CAPSULE ORAL at 16:28

## 2022-08-20 RX ADMIN — OXYCODONE AND ACETAMINOPHEN 1 TABLET: 5; 325 TABLET ORAL at 21:26

## 2022-08-20 NOTE — ED PROVIDER NOTES
[facility]  EMERGENCY DEPARTMENT ENCOUNTER NOTE    Date: 2022  Patient Name: Glenn Andujar    History of Presenting Illness     Chief Complaint   Patient presents with    Fall     HPI: Glenn Andujar, 47 y.o. female with a past medical history and outpatient medications as listed and reviewed below  presents for progressively worsening weakness and numbness in the upper and lower extremities that terminates at the upper level of the neck. She started having symptoms around 2 months ago, described as progressively worsening numbness followed by weakness in the lower extremities, initially started in the feet. She first felt some burning sensation in the feet and over few days, she started feeling progressively worsening numbness that was going of her body from her feet, then included the upper extremities, and stopped at the neck. The weakness has progressed to the extent that she started using a walker and been in a wheelchair. She does not have any symptoms over the face. No headaches. No slurred speech. Had no fevers or chills. Reports an episode of diarrhea around the time that the numbness started but no specific exacerbating or triggering factors. She underwent an MRI few days ago that did not explain her symptoms and has been following up with neurology for this.     Medical History   I reviewed the medical, surgical, family, and social history, as well as allergies:    PCP: Troy Duran NP    Past Medical History:  Past Medical History:   Diagnosis Date    Anxiety     CAD (coronary artery disease)     Fibromyalgia     Hypertension     Neuropathy     Peripheral artery disease ([facility] [address]     Psychiatric disorder     anxiety     Past Surgical History:  Past Surgical History:   Procedure Laterality Date    HX ANKLE FRACTURE TX Left     surgical implants    HX  SECTION       Current Outpatient Medications:  Current Outpatient Medications   Medication Instructions acetaminophen (TYLENOL) 650 mg, Oral, EVERY 6 HOURS AS NEEDED    amLODIPine (NORVASC) 10 mg tablet No dose, route, or frequency recorded. methocarbamoL (ROBAXIN-750) 750 mg, Oral, 3 TIMES DAILY AS NEEDED    metoprolol succinate (TOPROL-XL) 50 mg XL tablet No dose, route, or frequency recorded. naproxen (NAPROSYN) 500 mg, Oral, 2 TIMES DAILY WITH MEALS    potassium chloride SR (KLOR-CON 10) 10 mEq tablet 20 mEq, Oral, DAILY    pregabalin (LYRICA) 100 mg, Oral, 3 TIMES DAILY    traMADoL (ULTRAM) 50 mg tablet No dose, route, or frequency recorded. Family History:  No family history on file. Social History:  Social History     Tobacco Use    Smoking status: Every Day     Packs/day: 0.50     Types: Cigarettes    Smokeless tobacco: Never   Substance Use Topics    Alcohol use: Yes     Comment: occosionally     Drug use: Not Currently     Allergies: Allergies   Allergen Reactions    Codeine Nausea Only       Review of Systems     Review of Systems  Negative: Positives and pertinent negatives as per HPI. All other systems were reviewed and are negative. Physical Exam and Vital Signs   Vital Signs - Reviewed the patient's vital signs.     Patient Vitals for the past 12 hrs:   Temp Pulse Resp BP SpO2   08/19/22 2118 97.8 °F (36.6 °C) 81 18 116/78 99 %     Physical Exam:    GENERAL: awake, alert, cooperative, not in distress  HEENT:  * Pupils equal, EOMI  * Head atraumatic  CV:  * audible heart sounds  * warm and perfused extremities bilaterally  PULMONARY: Good air movement, no wheezes, no crackles  ABDOMEN/: soft, no distension, no guarding, no abdominal tenderness  EXTREMITIES/BACK: warm and perfused, no tenderness, no edema  SKIN: no rashes or signs of trauma  NEURO:   * GCS = 15 (E=4, V=5, M=6)  * Awake, alert, oriented x 3, normal speech  * CNs II-XII intact  * Strength 2/5 in bilateral LE, 3/5 in bilateral upper extremities  * Sensory exam frequently diminished over the bilateral lower extremities and moderately diminished over the upper body. No specific sensory level however the patient does not have any sensory deficits over the face. She reports that the numbness grossly terminates at the level of the neck. * No pronator drift or dysmetria    Medical Decision Making   - I am the first and primary provider for this patient and am the primary provider of record. - I reviewed the vital signs, available nursing notes, past medical history, past surgical history, family history and social history. - Initial assessment performed. The patients presenting problems have been discussed, and the staff are in agreement with the care plan formulated and outlined with them. I have encouraged them to ask questions as they arise throughout their visit. - Available medical records, nursing notes, old EKGs, and EMS run sheets (if patient was EMS transported) were reviewed    MDM:   Patient is a 47 y.o. female presenting for progressive numbness. Vitals reveal no significant abnormalities and physical exam reveals  LE>UE weakness and numbness . Based on the history, physical exam, risk factors, and vital signs, differential includes: Myelopathy, transverse myelitis, Guillain-Barré syndrome, polyneuropathy. See ED Course and Reassessment for evaluation and discussion.     Results     Labs:  Recent Results (from the past 12 hour(s))   CBC WITH AUTOMATED DIFF    Collection Time: 08/19/22 11:34 PM   Result Value Ref Range    WBC 3.6 3.6 - 11.0 K/uL    RBC 2.79 (L) 3.80 - 5.20 M/uL    HGB 9.0 (L) 11.5 - 16.0 g/dL    HCT 27.6 (L) 35.0 - 47.0 %    MCV 98.9 80.0 - 99.0 FL    MCH 32.3 26.0 - 34.0 PG    MCHC 32.6 30.0 - 36.5 g/dL    RDW 14.9 (H) 11.5 - 14.5 %    PLATELET 270 (L) 808 - 400 K/uL    MPV 8.6 (L) 8.9 - 12.9 FL    NRBC 0.0 0.0  WBC    ABSOLUTE NRBC 0.00 0.00 - 0.01 K/uL    NEUTROPHILS 67 32 - 75 %    LYMPHOCYTES 29 12 - 49 %    MONOCYTES 4 (L) 5 - 13 %    EOSINOPHILS 0 0 - 7 %    BASOPHILS 0 0 - 1 % IMMATURE GRANULOCYTES 0 0 - 0.5 %    ABS. NEUTROPHILS 2.4 1.8 - 8.0 K/UL    ABS. LYMPHOCYTES 1.1 0.8 - 3.5 K/UL    ABS. MONOCYTES 0.1 0.0 - 1.0 K/UL    ABS. EOSINOPHILS 0.0 0.0 - 0.4 K/UL    ABS. BASOPHILS 0.0 0.0 - 0.1 K/UL    ABS. IMM. GRANS. 0.0 0.00 - 0.04 K/UL    DF AUTOMATED     METABOLIC PANEL, COMPREHENSIVE    Collection Time: 08/19/22 11:34 PM   Result Value Ref Range    Sodium 133 (L) 136 - 145 mmol/L    Potassium 3.0 (L) 3.5 - 5.1 mmol/L    Chloride 97 97 - 108 mmol/L    CO2 27 21 - 32 mmol/L    Anion gap 9 5 - 15 mmol/L    Glucose 90 65 - 100 mg/dL    BUN 12 6 - 20 mg/dL    Creatinine 0.72 0.55 - 1.02 mg/dL    BUN/Creatinine ratio 17 12 - 20      GFR est AA >60 >60 ml/min/1.73m2    GFR est non-AA >60 >60 ml/min/1.73m2    Calcium 8.6 8.5 - 10.1 mg/dL    Bilirubin, total 1.0 0.2 - 1.0 mg/dL    AST (SGOT) 27 15 - 37 U/L    ALT (SGPT) 21 12 - 78 U/L    Alk.  phosphatase 89 45 - 117 U/L    Protein, total 7.0 6.4 - 8.2 g/dL    Albumin 3.2 (L) 3.5 - 5.0 g/dL    Globulin 3.8 2.0 - 4.0 g/dL    A-G Ratio 0.8 (L) 1.1 - 2.2     TROPONIN-HIGH SENSITIVITY    Collection Time: 08/19/22 11:34 PM   Result Value Ref Range    Troponin-High Sensitivity 6 0 - 51 ng/L   CK    Collection Time: 08/19/22 11:34 PM   Result Value Ref Range    CK 39 26 - 192 U/L   MAGNESIUM    Collection Time: 08/19/22 11:34 PM   Result Value Ref Range    Magnesium 1.7 1.6 - 2.4 mg/dL   ETHYL ALCOHOL    Collection Time: 08/19/22 11:34 PM   Result Value Ref Range    ALCOHOL(ETHYL),SERUM <10 <10 mg/dL   COVID-19 RAPID TEST    Collection Time: 08/19/22 11:34 PM   Result Value Ref Range    COVID-19 rapid test Not Detected Not Detected       Radiologic Studies:  CT Results  (Last 48 hours)      None          CXR Results  (Last 48 hours)      None          Medications ordered:  Medications   potassium chloride 10 mEq in 100 ml IVPB (has no administration in time range)     ED Course and Reassessment     ED Course:     ED Course as of 08/20/22 0030   Fri Aug 19, 2022   2348 CBC does not show any evidence of acute process. Leukocytosis not present to suggest infection. Hemoglobin not suggestive of acute anemia. [SS]   Sat Aug 20, 2022   0010 Troponin is <6, ACS ruled out per the high-sensitivity troponin algorithm as the duration of symptoms does not warrant repeat troponin level.   [SS]   0020 Noted Hypokalemia. No other significant electrolyte derangements. Creatinine is not elevated more than baseline range making ELENI unlikely. No significant transaminitis noted. Normal bilirubin. Will replace. Magnesium within normal limits. CPK not suggestive of significant rhabdomyolysis. ETOH level not elevated. [SS]   0028 COVID-19 testing is negative.   [SS]      ED Course User Index  [SS] Felix Hoskins MD       Reassessment:    I was concerned about Guillain-Barré syndrome. Consulted neurology. Less likely GBS due to the timeline however recommended admission for further evaluation. Will admit. Final Disposition     Admission: [facility]    After completion of ED workup and discussion of results and diagnoses, patient was admitted to the hospital. Case was discussed with admitting provider. Diagnosis     Clinical Impression:   1. Rapidly progressive weakness      Attestations:    Najma Persaud MD    Please note that this dictation was completed with TrumpIT, the computer voice recognition software. Quite often unanticipated grammatical, syntax, homophones, and other interpretive errors are inadvertently transcribed by the computer software. Please disregard these errors. Please excuse any errors that have escaped final proofreading. Thank you.

## 2022-08-20 NOTE — H&P
Hospitalist History & Physical Notes. [facility]. Name : Awilda Levine      MRN number : [id number]     YOB: 1970     Subjective :   Chief Complaint : Generalized weakness with fall and unable to ambulate    Source of information : ED provider, previous records. Patient. History of present illness:   47 y.o. female with a history of peripheral arterial disease, coronary artery disease, anxiety, hypertension presents to the emergency room complaining of progressively worsening weakness that now she is not able to ambulate. Affecting upper and lower extremities with weakness and numbness, started 2 months ago, gradually worsening. She had several emergency room visits since then, following with neurologist outpatient. Was given medications, but no improvement and gradually worsening. She was using her walker before now she is using wheelchair as she is not able to ambulate. Denies any headache, vision disturbance or speech disturbance. The most of the problem is more numbness and weakness, but able to move both upper and lower extremities. Denies any joint swelling or pain. Past Medical History:   Diagnosis Date    Anxiety     CAD (coronary artery disease)     Fibromyalgia     Hypertension     Neuropathy     Peripheral artery disease ([facility] [address]     Psychiatric disorder     anxiety     Past Surgical History:   Procedure Laterality Date    HX ANKLE FRACTURE TX Left     surgical implants    HX  SECTION       No family history on file. Social History     Tobacco Use    Smoking status: Every Day     Packs/day: 0.50     Types: Cigarettes    Smokeless tobacco: Never   Substance Use Topics    Alcohol use: Yes     Comment: occosionally        Prior to Admission medications    Medication Sig Start Date End Date Taking? Authorizing Provider   meloxicam (MOBIC) 15 mg tablet Take 15 mg by mouth daily as needed for Pain.  7/15/22   Provider, Historical   pregabalin (LYRICA) 100 mg capsule Take 100 mg by mouth three (3) times daily. Other, MD William   naproxen (Naprosyn) 500 mg tablet Take 1 Tablet by mouth two (2) times daily (with meals) for 10 days. 8/16/22 8/26/22  Citlalli Peña, NP   amLODIPine (NORVASC) 10 mg tablet  5/24/22   William Easton MD   metoprolol succinate (TOPROL-XL) 50 mg XL tablet  5/24/22   William Easton MD   acetaminophen (TYLENOL) 325 mg tablet Take 2 Tablets by mouth every six (6) hours as needed for Pain. 6/7/22   Vero Gupta MD     Allergies   Allergen Reactions    Codeine Nausea Only             Review of Systems:  Constitutional: Complains of severe fatigue for a long time. Appetite is not good, denies weight loss, no fever, no chills, no night sweats. Eye: No recent visual disturbances, no discharge, no double vision. Ear/nose/mouth/throat : No hearing disturbance, no ear pain, no nasal congestion, no sore throat, no trouble swallowing. Respiratory : No trouble breathing, no cough, ++ shortness of breath with activity, no hemoptysis, no wheezing. Cardiovascular : No chest pain, no palpitation, no orthopnea,no peripheral edema. Gastrointestinal : No nausea, no vomiting, no diarrhea, No constipation, No abdominal pain. Genitourinary : No dysuria, no hematuria, no increased frequency, No incontinence. Endocrine : No excessive thirst, No polyuria . Immunologic : No hives, urticaria, No seasonal allergies. Musculoskeletal : No joint swelling, No pain, No effusion,    Integumentary : No rash, No pruritus,   Hematology : No petechiae, No easy bruising,  No tendency to bleed easy. Neurology : Denies change in mental status, No abnormal balance, No headache, No confusion, No numbness or tingling. Psychiatric : No mood swings, No anxiety, No depression.     Vitals:   Patient Vitals for the past 12 hrs:   Temp Pulse Resp BP SpO2   08/20/22 0123 -- -- -- -- 99 %   08/20/22 0118 -- -- -- (!) 123/110 --   08/19/22 2118 97.8 °F (36.6 °C) 81 18 116/78 99 %       Physical Exam:   General : She is very tired, no acute respiratory distress noted. HEENT : PERRLA, normal oral mucosa, atraumatic normocephalic, Normal ear and nose. Neck : Supple, no JVD, no masses noted, no carotid bruit. Lungs : Breath sounds with good air entry bilaterally, no wheezes or rales, no accessory muscle use. CVS : Rhythm rate regular, S1+, S2+, no murmur or gallop. Abdomen : Soft, nontender, no organomegaly, bowel sounds active. Extremities : No edema noted,  pedal pulses not palpable. Musculoskeletal :  no joint swelling or effusion, muscle tone and power appears normal.   Skin : Moist, warm. No pathological rash. Lymphatic : No cervical lymphadenopathy. Neurological : Awake, alert, oriented to time place person. No focal deficits. Psychiatric : Flat affect on the face. Data Review:   Recent Results (from the past 24 hour(s))   CBC WITH AUTOMATED DIFF    Collection Time: 08/19/22 11:34 PM   Result Value Ref Range    WBC 3.6 3.6 - 11.0 K/uL    RBC 2.79 (L) 3.80 - 5.20 M/uL    HGB 9.0 (L) 11.5 - 16.0 g/dL    HCT 27.6 (L) 35.0 - 47.0 %    MCV 98.9 80.0 - 99.0 FL    MCH 32.3 26.0 - 34.0 PG    MCHC 32.6 30.0 - 36.5 g/dL    RDW 14.9 (H) 11.5 - 14.5 %    PLATELET 879 (L) 353 - 400 K/uL    MPV 8.6 (L) 8.9 - 12.9 FL    NRBC 0.0 0.0  WBC    ABSOLUTE NRBC 0.00 0.00 - 0.01 K/uL    NEUTROPHILS 67 32 - 75 %    LYMPHOCYTES 29 12 - 49 %    MONOCYTES 4 (L) 5 - 13 %    EOSINOPHILS 0 0 - 7 %    BASOPHILS 0 0 - 1 %    IMMATURE GRANULOCYTES 0 0 - 0.5 %    ABS. NEUTROPHILS 2.4 1.8 - 8.0 K/UL    ABS. LYMPHOCYTES 1.1 0.8 - 3.5 K/UL    ABS. MONOCYTES 0.1 0.0 - 1.0 K/UL    ABS. EOSINOPHILS 0.0 0.0 - 0.4 K/UL    ABS. BASOPHILS 0.0 0.0 - 0.1 K/UL    ABS. IMM.  GRANS. 0.0 0.00 - 0.04 K/UL    DF AUTOMATED     METABOLIC PANEL, COMPREHENSIVE    Collection Time: 08/19/22 11:34 PM   Result Value Ref Range    Sodium 133 (L) 136 - 145 mmol/L    Potassium 3.0 (L) 3.5 - 5.1 mmol/L Chloride 97 97 - 108 mmol/L    CO2 27 21 - 32 mmol/L    Anion gap 9 5 - 15 mmol/L    Glucose 90 65 - 100 mg/dL    BUN 12 6 - 20 mg/dL    Creatinine 0.72 0.55 - 1.02 mg/dL    BUN/Creatinine ratio 17 12 - 20      GFR est AA >60 >60 ml/min/1.73m2    GFR est non-AA >60 >60 ml/min/1.73m2    Calcium 8.6 8.5 - 10.1 mg/dL    Bilirubin, total 1.0 0.2 - 1.0 mg/dL    AST (SGOT) 27 15 - 37 U/L    ALT (SGPT) 21 12 - 78 U/L    Alk. phosphatase 89 45 - 117 U/L    Protein, total 7.0 6.4 - 8.2 g/dL    Albumin 3.2 (L) 3.5 - 5.0 g/dL    Globulin 3.8 2.0 - 4.0 g/dL    A-G Ratio 0.8 (L) 1.1 - 2.2     TROPONIN-HIGH SENSITIVITY    Collection Time: 08/19/22 11:34 PM   Result Value Ref Range    Troponin-High Sensitivity 6 0 - 51 ng/L   CK    Collection Time: 08/19/22 11:34 PM   Result Value Ref Range    CK 39 26 - 192 U/L   MAGNESIUM    Collection Time: 08/19/22 11:34 PM   Result Value Ref Range    Magnesium 1.7 1.6 - 2.4 mg/dL   ETHYL ALCOHOL    Collection Time: 08/19/22 11:34 PM   Result Value Ref Range    ALCOHOL(ETHYL),SERUM <10 <10 mg/dL   COVID-19 RAPID TEST    Collection Time: 08/19/22 11:34 PM   Result Value Ref Range    COVID-19 rapid test Not Detected Not Detected         Radiologic Studies : Reviewed previous work-up. None done today      Assessment and Plan :     Severe generalized weakness with inability to ambulate: Etiology unclear, need further work-up. Already seen the neurologist    Anemia: Hemoglobin is slowly drifting down, June 1 she has hemoglobin of 11.1 which is dropped to 9.0 now. Also platelets number is dropping. Need further evaluation, ordered for basic lab work for iron studies and inflammatory markers. Benefit consultation from hematology/oncology. Benign essential hypertension: On quetiapine and metoprolol, which we will continue    History of neuropathy: On Lyrica switched from gabapentin. On follow-up with neurology    Hypokalemia: Supplementation ordered already will order more by mouth.     This patient seems to have significant fatigue and generalized weakness which is gradually progressing, need further work-up. I will consult rheumatology to make sure there is no underlying rheumatological issues. Admitted to medical  floor full CODE STATUS, home medications reviewed and verified. No advance medical directives. CC : Kecia Reeves NP  Signed By: Jm Longoria MD     August 20, 2022      This dictation was done by dragon, computer voice recognition software. Often unanticipated grammatical, syntax, [employer] phones and other interpretive errors are inadvertently transcribed. Please excuse errors that have escaped final proofreading.

## 2022-08-20 NOTE — ED TRIAGE NOTES
Patient had a GLF earlier today due to weakness, 2nd fall tonight denies LOC, no blood thinners complains of left leg and knee pain

## 2022-08-20 NOTE — CONSULTS
Consult Date: 2022    Consults Ms. Eduardo Colunga is a 50-year-old woman with history of fibromyalgia, hypertension who saw me in my clinic earlier this month for ascending pain and dysesthesias as well as weakness for the past 2 months. She said it started in her feet and has ascended upwards to the point that now she is falling much more frequently even with a walker. At that clinic appointment I suspected cervical myelopathy I ordered an MRI of the cervical spine which came back as unrevealing with no evidence of myelopathy. She is clinically becoming worse and called my office so I advised her to come to the ER. She has severe pain in her legs as well as cramping and continues to get weaker. Subjective     Past Medical History:   Diagnosis Date    Anxiety     CAD (coronary artery disease)     Fibromyalgia     Hypertension     Neuropathy     Peripheral artery disease ([address]     Psychiatric disorder     anxiety      Past Surgical History:   Procedure Laterality Date    HX ANKLE FRACTURE TX Left     surgical implants    HX  SECTION       No family history on file.    Social History     Tobacco Use    Smoking status: Every Day     Packs/day: 0.50     Types: Cigarettes    Smokeless tobacco: Never   Substance Use Topics    Alcohol use: Yes     Comment: occosionally        Current Facility-Administered Medications   Medication Dose Route Frequency Provider Last Rate Last Admin    amLODIPine (NORVASC) tablet 5 mg  5 mg Oral DAILY Jemma Stewart MD   5 mg at 22 0941    metoprolol succinate (TOPROL-XL) XL tablet 25 mg  25 mg Oral DAILY Jemma Stewart MD   25 mg at 22 0941    pregabalin (LYRICA) capsule 100 mg  100 mg Oral TID Jemma Stewart MD   100 mg at 22 0941    sodium chloride (NS) flush 5-40 mL  5-40 mL IntraVENous Q8H Jemma Stewart MD   10 mL at 22 0810    sodium chloride (NS) flush 5-40 mL  5-40 mL IntraVENous PRN Jemma Stewart MD        acetaminophen (TYLENOL) tablet 650 mg  650 mg Oral Q6H PRN Alex Pyle MD        Or    acetaminophen (TYLENOL) suppository 650 mg  650 mg Rectal Q6H PRN Alex Pyle MD        ondansetron (ZOFRAN ODT) tablet 4 mg  4 mg Oral Q6H PRN Alex Pyle MD        Or    ondansetron (ZOFRAN) injection 4 mg  4 mg IntraVENous Q6H PRN Alex Pyle MD        enoxaparin (LOVENOX) injection 40 mg  40 mg SubCUTAneous DAILY Alex Pyle MD   40 mg at 08/20/22 0941    lidocaine (PF) (XYLOCAINE) 20 mg/mL (2 %) injection 80 mg  80 mg SubCUTAneous Sarah Vaca MD        oxyCODONE-acetaminophen (PERCOCET) 5-325 mg per tablet 1 Tablet  1 Tablet Oral Q6H PRN Scar Meyers MD            Review of Systems   Neurological:  Positive for weakness and numbness. Objective     Vital signs for last 24 hours:  Visit Vitals  /75 (BP 1 Location: Left upper arm, BP Patient Position: At rest)   Pulse 79   Temp 97.9 °F (36.6 °C)   Resp 20   Ht 5' 11\" (1.803 m)   Wt 119.3 kg (263 lb)   SpO2 98%   BMI 36.68 kg/m²       Intake/Output this shift:  Current Shift: 08/20 0701 - 08/20 1900  In: 100 [I.V.:100]  Out: -   Last 3 Shifts: No intake/output data recorded. Data Review:   Recent Results (from the past 24 hour(s))   CBC WITH AUTOMATED DIFF    Collection Time: 08/19/22 11:34 PM   Result Value Ref Range    WBC 3.6 3.6 - 11.0 K/uL    RBC 2.79 (L) 3.80 - 5.20 M/uL    HGB 9.0 (L) 11.5 - 16.0 g/dL    HCT 27.6 (L) 35.0 - 47.0 %    MCV 98.9 80.0 - 99.0 FL    MCH 32.3 26.0 - 34.0 PG    MCHC 32.6 30.0 - 36.5 g/dL    RDW 14.9 (H) 11.5 - 14.5 %    PLATELET 965 (L) 310 - 400 K/uL    MPV 8.6 (L) 8.9 - 12.9 FL    NRBC 0.0 0.0  WBC    ABSOLUTE NRBC 0.00 0.00 - 0.01 K/uL    NEUTROPHILS 67 32 - 75 %    LYMPHOCYTES 29 12 - 49 %    MONOCYTES 4 (L) 5 - 13 %    EOSINOPHILS 0 0 - 7 %    BASOPHILS 0 0 - 1 %    IMMATURE GRANULOCYTES 0 0 - 0.5 %    ABS.  NEUTROPHILS 2.4 1.8 - 8.0 K/UL ABS. LYMPHOCYTES 1.1 0.8 - 3.5 K/UL    ABS. MONOCYTES 0.1 0.0 - 1.0 K/UL    ABS. EOSINOPHILS 0.0 0.0 - 0.4 K/UL    ABS. BASOPHILS 0.0 0.0 - 0.1 K/UL    ABS. IMM. GRANS. 0.0 0.00 - 0.04 K/UL    DF AUTOMATED     METABOLIC PANEL, COMPREHENSIVE    Collection Time: 08/19/22 11:34 PM   Result Value Ref Range    Sodium 133 (L) 136 - 145 mmol/L    Potassium 3.0 (L) 3.5 - 5.1 mmol/L    Chloride 97 97 - 108 mmol/L    CO2 27 21 - 32 mmol/L    Anion gap 9 5 - 15 mmol/L    Glucose 90 65 - 100 mg/dL    BUN 12 6 - 20 mg/dL    Creatinine 0.72 0.55 - 1.02 mg/dL    BUN/Creatinine ratio 17 12 - 20      GFR est AA >60 >60 ml/min/1.73m2    GFR est non-AA >60 >60 ml/min/1.73m2    Calcium 8.6 8.5 - 10.1 mg/dL    Bilirubin, total 1.0 0.2 - 1.0 mg/dL    AST (SGOT) 27 15 - 37 U/L    ALT (SGPT) 21 12 - 78 U/L    Alk. phosphatase 89 45 - 117 U/L    Protein, total 7.0 6.4 - 8.2 g/dL    Albumin 3.2 (L) 3.5 - 5.0 g/dL    Globulin 3.8 2.0 - 4.0 g/dL    A-G Ratio 0.8 (L) 1.1 - 2.2     TROPONIN-HIGH SENSITIVITY    Collection Time: 08/19/22 11:34 PM   Result Value Ref Range    Troponin-High Sensitivity 6 0 - 51 ng/L   CK    Collection Time: 08/19/22 11:34 PM   Result Value Ref Range    CK 39 26 - 192 U/L   MAGNESIUM    Collection Time: 08/19/22 11:34 PM   Result Value Ref Range    Magnesium 1.7 1.6 - 2.4 mg/dL   ETHYL ALCOHOL    Collection Time: 08/19/22 11:34 PM   Result Value Ref Range    ALCOHOL(ETHYL),SERUM <10 <10 mg/dL   COVID-19 RAPID TEST    Collection Time: 08/19/22 11:34 PM   Result Value Ref Range    COVID-19 rapid test Not Detected Not Detected         Physical Exam    Neuro Physical Exam      General: Well developed, well nourished. Patient in no apparent distress. Cardiac: Regular rate and rhythm   Neurological Exam:  Mental Status: Alert awake oriented x3, normal speech   Cranial Nerves:   Intact visual fields. PERRL, EOM's full, no nystagmus, no ptosis. Facial sensation is normal. Facial movement is symmetric. Palate is midline. Normal sternocleidomastoid strength. Tongue is midline. Hearing is intact bilaterally. Motor:  4 out of 5 in proximal and distal muscles of both arms, 3-/5 in bilateral hip flexion, 3 out of 5 in bilateral dorsi flexion. Reflexes:   1+ bilateral brachioradialis, 0 and patellar's and Achilles bilaterally   Sensory:   Decreased sensation in both legs and arms, absent proprioception in both feet   Gait:  Deferred   Tremor:   No tremor noted. Cerebellar:  No cerebellar signs present. Babinski:      Equivocal bilaterally    Assessment and plan: Ms. Jacki Hassan is a 65-year-old woman with ascending dysesthesias and weakness for the past 2 months. The course is too long to be consistent with GBS. Cervical myelopathy ruled out with cervical MRI. Lumbar puncture attempted today but she was not able to tolerate it. We will proceed with fluoroscopy guided LP on Monday. Differential diagnosis is CIDP versus demyelinating versus inflammatory disorder of CNS. -MRI brain with and without contrast  -LP fluoroscopy guided on Monday to look for oligoclonal banding  -We will check serum RPR, vitamin B12 and TSH, HIV  -Empirically start Solu-Medrol 1 g daily for next 3 days.  -Bilateral leg pain: Lyrica 200 twice a day, Percocet for the next 2 days as needed every 6 hours and baclofen 10 mg twice a day as needed. Will continue to follow.

## 2022-08-20 NOTE — ED NOTES
TRANSFER - OUT REPORT:    Verbal report given to Clare CHAN on Nicolasalejandro Castellano  being transferred to [facility] rm [id number] for routine progression of care       Report consisted of patients Situation, Background, Assessment and   Recommendations(SBAR). Information from the following report(s) Kardex, ED Summary, MAR and Recent Results was reviewed with the receiving nurse. Lines:   Peripheral IV 08/19/22 Right Hand (Active)   Site Assessment Clean, dry, & intact 08/20/22 0124   Phlebitis Assessment 0 08/19/22 2337   Infiltration Assessment 4 08/20/22 0124   Dressing Status Clean, dry, & intact 08/20/22 0124   Hub Color/Line Status Blue 08/19/22 2337   Action Taken Blood drawn 08/19/22 2337        Opportunity for questions and clarification was provided.       Patient transported with:   [employer]

## 2022-08-20 NOTE — PROGRESS NOTES
FOLLOW-UP FROM ADMISSION BELOW By Dr. Suzette Dover    Now on floor  No active complaints. Sitting up in bed  AxOx3   NAD  Lungs CTA ant  S1S2    A/P as per below  Repeat labs  PT/OT  Neuro consulted already        Name : Jeananne Hashimoto      MRN number : [id number]     YOB: 1970     Subjective :   Chief Complaint : Generalized weakness with fall and unable to ambulate    Source of information : ED provider, previous records. Patient. History of present illness:   47 y.o. female with a history of peripheral arterial disease, coronary artery disease, anxiety, hypertension presents to the emergency room complaining of progressively worsening weakness that now she is not able to ambulate. Affecting upper and lower extremities with weakness and numbness, started 2 months ago, gradually worsening. She had several emergency room visits since then, following with neurologist outpatient. Was given medications, but no improvement and gradually worsening. She was using her walker before now she is using wheelchair as she is not able to ambulate. Denies any headache, vision disturbance or speech disturbance. The most of the problem is more numbness and weakness, but able to move both upper and lower extremities. Denies any joint swelling or pain. Past Medical History:   Diagnosis Date    Anxiety     CAD (coronary artery disease)     Fibromyalgia     Hypertension     Neuropathy     Peripheral artery disease ([facility] [address]     Psychiatric disorder     anxiety     Past Surgical History:   Procedure Laterality Date    HX ANKLE FRACTURE TX Left     surgical implants    HX  SECTION       No family history on file. Social History     Tobacco Use    Smoking status: Every Day     Packs/day: 0.50     Types: Cigarettes    Smokeless tobacco: Never   Substance Use Topics    Alcohol use: Yes     Comment: occosionally        Prior to Admission medications    Medication Sig Start Date End Date Taking?  Authorizing Provider   pregabalin (LYRICA) 100 mg capsule Take 100 mg by mouth three (3) times daily. Yes Other, MD William   meloxicam (MOBIC) 15 mg tablet Take 15 mg by mouth daily as needed for Pain. 7/15/22   Provider, Donis   naproxen (Naprosyn) 500 mg tablet Take 1 Tablet by mouth two (2) times daily (with meals) for 10 days. 8/16/22 8/26/22  Chris Sierra NP   amLODIPine (NORVASC) 10 mg tablet  5/24/22   Other, MD William   metoprolol succinate (TOPROL-XL) 50 mg XL tablet  5/24/22   Other, MD William   acetaminophen (TYLENOL) 325 mg tablet Take 2 Tablets by mouth every six (6) hours as needed for Pain. 6/7/22   Edwardo Blair MD     Allergies   Allergen Reactions    Codeine Nausea Only             Review of Systems:  Constitutional: Complains of severe fatigue for a long time. Appetite is not good, denies weight loss, no fever, no chills, no night sweats. Eye: No recent visual disturbances, no discharge, no double vision. Ear/nose/mouth/throat : No hearing disturbance, no ear pain, no nasal congestion, no sore throat, no trouble swallowing. Respiratory : No trouble breathing, no cough, ++ shortness of breath with activity, no hemoptysis, no wheezing. Cardiovascular : No chest pain, no palpitation, no orthopnea,no peripheral edema. Gastrointestinal : No nausea, no vomiting, no diarrhea, No constipation, No abdominal pain. Genitourinary : No dysuria, no hematuria, no increased frequency, No incontinence. Endocrine : No excessive thirst, No polyuria . Immunologic : No hives, urticaria, No seasonal allergies. Musculoskeletal : No joint swelling, No pain, No effusion,    Integumentary : No rash, No pruritus,   Hematology : No petechiae, No easy bruising,  No tendency to bleed easy. Neurology : Denies change in mental status, No abnormal balance, No headache, No confusion, No numbness or tingling. Psychiatric : No mood swings, No anxiety, No depression.     Vitals:   Patient Vitals for the past 12 hrs:   Temp Pulse Resp BP SpO2   08/20/22 1045 97.9 °F (36.6 °C) 79 20 113/75 98 %   08/20/22 0730 -- -- -- -- 100 %   08/20/22 0700 -- -- -- 117/81 --         Physical Exam:   General : She is very tired, no acute respiratory distress noted. HEENT : PERRLA, normal oral mucosa, atraumatic normocephalic, Normal ear and nose. Neck : Supple, no JVD, no masses noted, no carotid bruit. Lungs : Breath sounds with good air entry bilaterally, no wheezes or rales, no accessory muscle use. CVS : Rhythm rate regular, S1+, S2+, no murmur or gallop. Abdomen : Soft, nontender, no organomegaly, bowel sounds active. Extremities : No edema noted,  pedal pulses not palpable. Musculoskeletal :  no joint swelling or effusion, muscle tone and power appears normal.   Skin : Moist, warm. No pathological rash. Lymphatic : No cervical lymphadenopathy. Neurological : Awake, alert, oriented to time place person. No focal deficits. Psychiatric : Flat affect on the face. Data Review:   Recent Results (from the past 24 hour(s))   CBC WITH AUTOMATED DIFF    Collection Time: 08/19/22 11:34 PM   Result Value Ref Range    WBC 3.6 3.6 - 11.0 K/uL    RBC 2.79 (L) 3.80 - 5.20 M/uL    HGB 9.0 (L) 11.5 - 16.0 g/dL    HCT 27.6 (L) 35.0 - 47.0 %    MCV 98.9 80.0 - 99.0 FL    MCH 32.3 26.0 - 34.0 PG    MCHC 32.6 30.0 - 36.5 g/dL    RDW 14.9 (H) 11.5 - 14.5 %    PLATELET 200 (L) 466 - 400 K/uL    MPV 8.6 (L) 8.9 - 12.9 FL    NRBC 0.0 0.0  WBC    ABSOLUTE NRBC 0.00 0.00 - 0.01 K/uL    NEUTROPHILS 67 32 - 75 %    LYMPHOCYTES 29 12 - 49 %    MONOCYTES 4 (L) 5 - 13 %    EOSINOPHILS 0 0 - 7 %    BASOPHILS 0 0 - 1 %    IMMATURE GRANULOCYTES 0 0 - 0.5 %    ABS. NEUTROPHILS 2.4 1.8 - 8.0 K/UL    ABS. LYMPHOCYTES 1.1 0.8 - 3.5 K/UL    ABS. MONOCYTES 0.1 0.0 - 1.0 K/UL    ABS. EOSINOPHILS 0.0 0.0 - 0.4 K/UL    ABS. BASOPHILS 0.0 0.0 - 0.1 K/UL    ABS. IMM.  GRANS. 0.0 0.00 - 0.04 K/UL    DF AUTOMATED     METABOLIC PANEL, COMPREHENSIVE    Collection Time: 08/19/22 11:34 PM   Result Value Ref Range    Sodium 133 (L) 136 - 145 mmol/L    Potassium 3.0 (L) 3.5 - 5.1 mmol/L    Chloride 97 97 - 108 mmol/L    CO2 27 21 - 32 mmol/L    Anion gap 9 5 - 15 mmol/L    Glucose 90 65 - 100 mg/dL    BUN 12 6 - 20 mg/dL    Creatinine 0.72 0.55 - 1.02 mg/dL    BUN/Creatinine ratio 17 12 - 20      GFR est AA >60 >60 ml/min/1.73m2    GFR est non-AA >60 >60 ml/min/1.73m2    Calcium 8.6 8.5 - 10.1 mg/dL    Bilirubin, total 1.0 0.2 - 1.0 mg/dL    AST (SGOT) 27 15 - 37 U/L    ALT (SGPT) 21 12 - 78 U/L    Alk.  phosphatase 89 45 - 117 U/L    Protein, total 7.0 6.4 - 8.2 g/dL    Albumin 3.2 (L) 3.5 - 5.0 g/dL    Globulin 3.8 2.0 - 4.0 g/dL    A-G Ratio 0.8 (L) 1.1 - 2.2     TROPONIN-HIGH SENSITIVITY    Collection Time: 08/19/22 11:34 PM   Result Value Ref Range    Troponin-High Sensitivity 6 0 - 51 ng/L   CK    Collection Time: 08/19/22 11:34 PM   Result Value Ref Range    CK 39 26 - 192 U/L   MAGNESIUM    Collection Time: 08/19/22 11:34 PM   Result Value Ref Range    Magnesium 1.7 1.6 - 2.4 mg/dL   ETHYL ALCOHOL    Collection Time: 08/19/22 11:34 PM   Result Value Ref Range    ALCOHOL(ETHYL),SERUM <10 <10 mg/dL   COVID-19 RAPID TEST    Collection Time: 08/19/22 11:34 PM   Result Value Ref Range    COVID-19 rapid test Not Detected Not Detected     SED RATE (ESR)    Collection Time: 08/20/22 11:46 AM   Result Value Ref Range    Sed rate, automated 72 (H) 0 - 30 mm/hr   PROCALCITONIN    Collection Time: 08/20/22 11:46 AM   Result Value Ref Range    Procalcitonin 0.05 (H) 0 ng/mL   RETICULOCYTE COUNT    Collection Time: 08/20/22 11:46 AM   Result Value Ref Range    Reticulocyte count 0.9 0.7 - 2.1 %    Absolute Retic Cnt. 0.0239 0.0164 - 0.0776 M/ul   CK    Collection Time: 08/20/22 11:46 AM   Result Value Ref Range    CK 33 26 - 192 U/L   C REACTIVE PROTEIN, QT    Collection Time: 08/20/22 11:46 AM   Result Value Ref Range    C-Reactive protein 1.14 (H) 0.00 - 0.60 mg/dL   LD    Collection Time: 08/20/22 11:46 AM   Result Value Ref Range     81 - 246 U/L   RENAL FUNCTION PANEL    Collection Time: 08/20/22 11:46 AM   Result Value Ref Range    Sodium 136 136 - 145 mmol/L    Potassium 3.2 (L) 3.5 - 5.1 mmol/L    Chloride 101 97 - 108 mmol/L    CO2 29 21 - 32 mmol/L    Anion gap 6 5 - 15 mmol/L    Glucose 95 65 - 100 mg/dL    BUN 9 6 - 20 mg/dL    Creatinine 0.62 0.55 - 1.02 mg/dL    BUN/Creatinine ratio 15 12 - 20      GFR est AA >60 >60 ml/min/1.73m2    GFR est non-AA >60 >60 ml/min/1.73m2    Calcium 8.8 8.5 - 10.1 mg/dL    Phosphorus 3.3 2.6 - 4.7 mg/dL    Albumin 3.0 (L) 3.5 - 5.0 g/dL   TSH 3RD GENERATION    Collection Time: 08/20/22 11:46 AM   Result Value Ref Range    TSH 4.84 (H) 0.36 - 3.74 uIU/mL   URIC ACID    Collection Time: 08/20/22 11:46 AM   Result Value Ref Range    Uric acid 8.2 (H) 2.6 - 6.0 mg/dL   CBC W/O DIFF    Collection Time: 08/20/22 11:47 AM   Result Value Ref Range    WBC 3.5 (L) 3.6 - 11.0 K/uL    RBC 2.82 (L) 3.80 - 5.20 M/uL    HGB 9.0 (L) 11.5 - 16.0 g/dL    HCT 27.8 (L) 35.0 - 47.0 %    MCV 98.6 80.0 - 99.0 FL    MCH 31.9 26.0 - 34.0 PG    MCHC 32.4 30.0 - 36.5 g/dL    RDW 15.1 (H) 11.5 - 14.5 %    PLATELET 845 305 - 125 K/uL    MPV 8.9 8.9 - 12.9 FL    NRBC 0.0 0.0  WBC    ABSOLUTE NRBC 0.00 0.00 - 0.01 K/uL       Radiologic Studies : Reviewed previous work-up. None done today      Assessment and Plan :     Severe generalized weakness with inability to ambulate: Etiology unclear, need further work-up. Already seen the neurologist    Anemia: Hemoglobin is slowly drifting down, June 1 she has hemoglobin of 11.1 which is dropped to 9.0 now. Also platelets number is dropping. Need further evaluation, ordered for basic lab work for iron studies and inflammatory markers. Benefit consultation from hematology/oncology.     Benign essential hypertension: On quetiapine and metoprolol, which we will continue    History of neuropathy: On Lyrica switched from gabapentin. On follow-up with neurology    Hypokalemia: Supplementation ordered already will order more by mouth. This patient seems to have significant fatigue and generalized weakness which is gradually progressing, need further work-up. I will consult rheumatology to make sure there is no underlying rheumatological issues. Admitted to medical  floor full CODE STATUS, home medications reviewed and verified. No advance medical directives. CC : Kvng Campos NP  Signed By: Juan Callejas MD     August 20, 2022      This dictation was done by dragon, computer voice recognition software. Often unanticipated grammatical, syntax, [employer] phones and other interpretive errors are inadvertently transcribed. Please excuse errors that have escaped final proofreading.

## 2022-08-20 NOTE — PROGRESS NOTES
Medicare Outpatient Observation Notice SON)/ [place] Outpatient Observation Notice (Branden Barker) provided to patient/representative with verbal explanation of the notice. Time allotted for questions regarding the notice. Patient /representative provided a completed copy of the SON/VOON notice. Copy placed on bedside chart.

## 2022-08-20 NOTE — CONSULTS
Consult Date: 8/20/2022    IP CONSULT TO RHEUMATOLOGY  Consult performed by: Zamzam Garcia MD  Consult ordered by: Chase Daniels MD  Reason for consult: Progressive weakness, fatigue, numbness, incontinence  Assessment/Recommendations: Assessment and Plan: This is a 46 yo female with 2 months of progressive symptoms of pain and weakness of the extremities, sensory changes in the extremities, and incontinence not improved on gabapentin or lyrica. The initial differential diagnosis included neurologic conditions. With the seemingly large component of weakness of the body, there may be concern for myopathy. CK level is normal, making this less of a possibility, and most inflammatory myopathy conditions begin with proximal muscle involvement with distal spread rather than the opposite. Regardless, I am going to begin a lab investigation for connective tissue disease and myopathy. I appreciate additional recommendations from neurology as well. Lumbar puncture is planned for today.     -checking connective tissue disease panel  -myomarker panel  -muscle enzymes  -acute phase reactants        This is a 46 yo female who presented to the ED with complaints of 2 months of progressive weakness of muscles of the upper and the lower extremities that began in a distal distribution with proximal spreading. There are reported complaints of pains in the joints and muscles as well. The patient has been following with neurology as an outpatient and has been on treatment with gabapentin and lyrica. Additional complaints have included difficulty with ambulation, numbness of the extremities, and incontinence. She presented to the ED with complaints of progression of symptoms leading to admission for additional evaluation and treatment. Neurology has been consulted to evaluate the patient with differential diagnosis including transverse myelitis, GBS, and neuropathy.   The rheumatology service is consulted to evaluate the patient for possible connective tissue disease or idiopathic inflammatory myopathy. The patient was seen this morning. She corroborates the details of the above mentioned history. She adds, that she has also noted perioral numbness, and she has a history of alopoecia. Also, she complains of dysphagia and chest pressure. There is family history of lupus, gout, and rheumatoid arthritis in other family members. She denies rashes of the skin at this time. She denies GI symptoms, but she confirms incontinence. She denies feeling any urge to void before incontinence. Subjective     Past Medical History:   Diagnosis Date    Anxiety     CAD (coronary artery disease)     Fibromyalgia     Hypertension     Neuropathy     Peripheral artery disease ([facility] [address]     Psychiatric disorder     anxiety      Past Surgical History:   Procedure Laterality Date    HX ANKLE FRACTURE TX Left     surgical implants    HX  SECTION       No family history on file.    Social History     Tobacco Use    Smoking status: Every Day     Packs/day: 0.50     Types: Cigarettes    Smokeless tobacco: Never   Substance Use Topics    Alcohol use: Yes     Comment: occosionally        Current Facility-Administered Medications   Medication Dose Route Frequency Provider Last Rate Last Admin    amLODIPine (NORVASC) tablet 5 mg  5 mg Oral DAILY Marielos Nguyen MD   5 mg at 22 0941    metoprolol succinate (TOPROL-XL) XL tablet 25 mg  25 mg Oral DAILY Marielos Nguyen MD   25 mg at 22 0941    pregabalin (LYRICA) capsule 100 mg  100 mg Oral TID Marielos Nguyen MD   100 mg at 22 0941    sodium chloride (NS) flush 5-40 mL  5-40 mL IntraVENous Q8H Marielos Nguyen MD   10 mL at 22 0810    sodium chloride (NS) flush 5-40 mL  5-40 mL IntraVENous PRN Marielos Nguyen MD        acetaminophen (TYLENOL) tablet 650 mg  650 mg Oral Q6H PRN Marielos Nguyen MD        Or    acetaminophen (TYLENOL) suppository 650 mg  650 mg Rectal Q6H PRN Neil Orozco MD        ondansetron (ZOFRAN ODT) tablet 4 mg  4 mg Oral Q6H PRN Neil Orozco MD        Or    ondansetron (ZOFRAN) injection 4 mg  4 mg IntraVENous Q6H PRN Neil Orozco MD        enoxaparin (LOVENOX) injection 40 mg  40 mg SubCUTAneous DAILY Neil Orozco MD   40 mg at 08/20/22 0941        Review of Systems   Constitutional:  Positive for activity change and fatigue. HENT: Negative. Eyes: Negative. Respiratory: Negative. Cardiovascular: Negative. Gastrointestinal:         Dysphagia. Endocrine: Negative. Genitourinary:         Urinary incontinence. Musculoskeletal:  Positive for myalgias. Skin: Negative. Allergic/Immunologic: Negative. Neurological:  Positive for weakness and numbness. Hematological: Negative. Psychiatric/Behavioral: Negative. Objective     Vital signs for last 24 hours:  Visit Vitals  /81   Pulse 81   Temp 97.8 °F (36.6 °C)   Resp 18   Ht 5' 11\" (1.803 m)   Wt 119.3 kg (263 lb)   SpO2 100%   BMI 36.68 kg/m²       Intake/Output this shift:  Current Shift: 08/20 0701 - 08/20 1900  In: 100 [I.V.:100]  Out: -   Last 3 Shifts: No intake/output data recorded. Data Review:   Recent Results (from the past 24 hour(s))   CBC WITH AUTOMATED DIFF    Collection Time: 08/19/22 11:34 PM   Result Value Ref Range    WBC 3.6 3.6 - 11.0 K/uL    RBC 2.79 (L) 3.80 - 5.20 M/uL    HGB 9.0 (L) 11.5 - 16.0 g/dL    HCT 27.6 (L) 35.0 - 47.0 %    MCV 98.9 80.0 - 99.0 FL    MCH 32.3 26.0 - 34.0 PG    MCHC 32.6 30.0 - 36.5 g/dL    RDW 14.9 (H) 11.5 - 14.5 %    PLATELET 991 (L) 647 - 400 K/uL    MPV 8.6 (L) 8.9 - 12.9 FL    NRBC 0.0 0.0  WBC    ABSOLUTE NRBC 0.00 0.00 - 0.01 K/uL    NEUTROPHILS 67 32 - 75 %    LYMPHOCYTES 29 12 - 49 %    MONOCYTES 4 (L) 5 - 13 %    EOSINOPHILS 0 0 - 7 %    BASOPHILS 0 0 - 1 %    IMMATURE GRANULOCYTES 0 0 - 0.5 %    ABS.  NEUTROPHILS 2.4 1.8 - 8.0 K/UL    ABS. LYMPHOCYTES 1.1 0.8 - 3.5 K/UL    ABS. MONOCYTES 0.1 0.0 - 1.0 K/UL    ABS. EOSINOPHILS 0.0 0.0 - 0.4 K/UL    ABS. BASOPHILS 0.0 0.0 - 0.1 K/UL    ABS. IMM. GRANS. 0.0 0.00 - 0.04 K/UL    DF AUTOMATED     METABOLIC PANEL, COMPREHENSIVE    Collection Time: 08/19/22 11:34 PM   Result Value Ref Range    Sodium 133 (L) 136 - 145 mmol/L    Potassium 3.0 (L) 3.5 - 5.1 mmol/L    Chloride 97 97 - 108 mmol/L    CO2 27 21 - 32 mmol/L    Anion gap 9 5 - 15 mmol/L    Glucose 90 65 - 100 mg/dL    BUN 12 6 - 20 mg/dL    Creatinine 0.72 0.55 - 1.02 mg/dL    BUN/Creatinine ratio 17 12 - 20      GFR est AA >60 >60 ml/min/1.73m2    GFR est non-AA >60 >60 ml/min/1.73m2    Calcium 8.6 8.5 - 10.1 mg/dL    Bilirubin, total 1.0 0.2 - 1.0 mg/dL    AST (SGOT) 27 15 - 37 U/L    ALT (SGPT) 21 12 - 78 U/L    Alk. phosphatase 89 45 - 117 U/L    Protein, total 7.0 6.4 - 8.2 g/dL    Albumin 3.2 (L) 3.5 - 5.0 g/dL    Globulin 3.8 2.0 - 4.0 g/dL    A-G Ratio 0.8 (L) 1.1 - 2.2     TROPONIN-HIGH SENSITIVITY    Collection Time: 08/19/22 11:34 PM   Result Value Ref Range    Troponin-High Sensitivity 6 0 - 51 ng/L   CK    Collection Time: 08/19/22 11:34 PM   Result Value Ref Range    CK 39 26 - 192 U/L   MAGNESIUM    Collection Time: 08/19/22 11:34 PM   Result Value Ref Range    Magnesium 1.7 1.6 - 2.4 mg/dL   ETHYL ALCOHOL    Collection Time: 08/19/22 11:34 PM   Result Value Ref Range    ALCOHOL(ETHYL),SERUM <10 <10 mg/dL   COVID-19 RAPID TEST    Collection Time: 08/19/22 11:34 PM   Result Value Ref Range    COVID-19 rapid test Not Detected Not Detected         Physical Exam  HENT:      Head: Normocephalic. Eyes:      Pupils: Pupils are equal, round, and reactive to light. Cardiovascular:      Rate and Rhythm: Normal rate. Pulmonary:      Effort: Pulmonary effort is normal.   Abdominal:      General: Abdomen is flat. Genitourinary:     Comments: deferred  Musculoskeletal:         General: Normal range of motion. Cervical back: Normal range of motion. Skin:     General: Skin is warm. Neurological:      Mental Status: She is alert. Motor: Weakness present. Psychiatric:         Mood and Affect: Mood normal.       Assessment and Plan: This is a 46 yo female with 2 months of progressive symptoms of pain and weakness of the extremities, sensory changes in the extremities, and incontinence not improved on gabapentin or lyrica. The initial differential diagnosis included neurologic conditions. With the seemingly large component of weakness of the body, there may be concern for myopathy. CK level is normal, making this less of a possibility, and most inflammatory myopathy conditions begin with proximal muscle involvement with distal spread rather than the opposite. Regardless, I am going to begin a lab investigation for connective tissue disease and myopathy. I appreciate additional recommendations from neurology as well. Lumbar puncture is planned for today.     -checking connective tissue disease panel  -myomarker panel  -muscle enzymes  -acute phase reactants    Thank you for the consult. We will continue to monitor the patient. Please feel free to contact us with any questions.

## 2022-08-20 NOTE — CONSULTS
Lumbar Puncture Procedure Note    Pre-operative Diagnosis: Possible CIDP    Post-operative Diagnosis: As above    Indications: Diagnostic    Procedure Details     Consent: Informed consent was obtained. Risks of the procedure were discussed including: infection, bleeding, pain and headache. Under sterile conditions the patient was positioned. Betadine solution and sterile drapes were utilized. A spinal needle was inserted at the L4-L5 interspace. Patient did not tolerate the procedure as she found it too painful and was asked to stop. Spinal fluid was obtained and sent to the laboratory. Findings  Lumbar puncture not completed. Complications: None          Condition: Stable    Plan  Bed Rest for 1 hour. Head of bed elevated no higher than 45 degrees for 1 hour. If severe headache, nausea, vomiting, fever >100.5 F or changes call on call physician.

## 2022-08-20 NOTE — PROGRESS NOTES
Reason for Admission:  Gait disturbance                     RUR Score:     N/A                Plan for utilizing home health:    Not at this time      PCP: First and Last name:  Suzanne Vicente NP     Name of Practice:    Are you a current patient: Yes/No:    Approximate date of last visit: Will see her next month   Can you participate in a virtual visit with your PCP:                     Current Advanced Directive/Advance Care Plan: Full Code      Healthcare Decision Maker:   Click here to complete [address] including selection of the [address] Relationship (ie \"Primary\")           Jaime Rosario, [phone]                   Transition of Care Plan:     Met f/f  with Pt, she confirmed that the information on the face sheet is correct. Pt stated that her son lives with her. Pt stated no HH, she has a walker, and rollator. Pt stated that she has been sleeping on an air mattress on the floor. Pt stated that she has to call the Fire Department or EMS to come and get her up. Pt asked about getting a bed. Pt stated that she uses [employer]. Pt stated that she will need a Medicaid Ride home when she is D/C. CM dispo: not sure what Pt will need, will need PT/OT to eval and recommend.

## 2022-08-21 LAB
ALBUMIN SERPL-MCNC: 2.9 G/DL (ref 3.5–5)
ALBUMIN/GLOB SERPL: 0.9 {RATIO} (ref 1.1–2.2)
ALP SERPL-CCNC: 83 U/L (ref 45–117)
ALT SERPL-CCNC: 20 U/L (ref 12–78)
ANION GAP SERPL CALC-SCNC: 6 MMOL/L (ref 5–15)
AST SERPL W P-5'-P-CCNC: 26 U/L (ref 15–37)
BASOPHILS # BLD: 0 K/UL (ref 0–0.1)
BASOPHILS NFR BLD: 0 % (ref 0–1)
BILIRUB SERPL-MCNC: 0.8 MG/DL (ref 0.2–1)
BUN SERPL-MCNC: 8 MG/DL (ref 6–20)
BUN/CREAT SERPL: 13 (ref 12–20)
CA-I BLD-MCNC: 8.5 MG/DL (ref 8.5–10.1)
CHLORIDE SERPL-SCNC: 101 MMOL/L (ref 97–108)
CO2 SERPL-SCNC: 30 MMOL/L (ref 21–32)
CREAT SERPL-MCNC: 0.64 MG/DL (ref 0.55–1.02)
CRP SERPL-MCNC: 1.31 MG/DL (ref 0–0.6)
DIFFERENTIAL METHOD BLD: ABNORMAL
EOSINOPHIL # BLD: 0 K/UL (ref 0–0.4)
EOSINOPHIL NFR BLD: 0 % (ref 0–7)
ERYTHROCYTE [DISTWIDTH] IN BLOOD BY AUTOMATED COUNT: 15.2 % (ref 11.5–14.5)
ERYTHROCYTE [SEDIMENTATION RATE] IN BLOOD: 70 MM/HR (ref 0–30)
FERRITIN SERPL-MCNC: 401 NG/ML (ref 8–252)
FOLATE SERPL-MCNC: 2.1 NG/ML (ref 5–21)
GLOBULIN SER CALC-MCNC: 3.3 G/DL (ref 2–4)
GLUCOSE SERPL-MCNC: 90 MG/DL (ref 65–100)
HCT VFR BLD AUTO: 25.2 % (ref 35–47)
HGB BLD-MCNC: 8.1 G/DL (ref 11.5–16)
IMM GRANULOCYTES # BLD AUTO: 0 K/UL (ref 0–0.04)
IMM GRANULOCYTES NFR BLD AUTO: 0 % (ref 0–0.5)
IRON SATN MFR SERPL: 78 % (ref 20–50)
IRON SERPL-MCNC: 119 UG/DL (ref 35–150)
LYMPHOCYTES # BLD: 1 K/UL (ref 0.8–3.5)
LYMPHOCYTES NFR BLD: 40 % (ref 12–49)
MAGNESIUM SERPL-MCNC: 1.8 MG/DL (ref 1.6–2.4)
MCH RBC QN AUTO: 32.1 PG (ref 26–34)
MCHC RBC AUTO-ENTMCNC: 32.1 G/DL (ref 30–36.5)
MCV RBC AUTO: 100 FL (ref 80–99)
MONOCYTES # BLD: 0.1 K/UL (ref 0–1)
MONOCYTES NFR BLD: 4 % (ref 5–13)
NEUTS SEG # BLD: 1.4 K/UL (ref 1.8–8)
NEUTS SEG NFR BLD: 56 % (ref 32–75)
NRBC # BLD: 0 K/UL (ref 0–0.01)
NRBC BLD-RTO: 0 PER 100 WBC
PLATELET # BLD AUTO: 134 K/UL (ref 150–400)
PMV BLD AUTO: 8.9 FL (ref 8.9–12.9)
POTASSIUM SERPL-SCNC: 3.4 MMOL/L (ref 3.5–5.1)
PROT SERPL-MCNC: 6.2 G/DL (ref 6.4–8.2)
RBC # BLD AUTO: 2.52 M/UL (ref 3.8–5.2)
SODIUM SERPL-SCNC: 137 MMOL/L (ref 136–145)
TIBC SERPL-MCNC: 152 UG/DL (ref 250–450)
VIT B12 SERPL-MCNC: 246 PG/ML (ref 193–986)
WBC # BLD AUTO: 2.5 K/UL (ref 3.6–11)

## 2022-08-21 PROCEDURE — 96376 TX/PRO/DX INJ SAME DRUG ADON: CPT

## 2022-08-21 PROCEDURE — 74011250636 HC RX REV CODE- 250/636: Performed by: HOSPITALIST

## 2022-08-21 PROCEDURE — 74011250637 HC RX REV CODE- 250/637: Performed by: INTERNAL MEDICINE

## 2022-08-21 PROCEDURE — 83735 ASSAY OF MAGNESIUM: CPT

## 2022-08-21 PROCEDURE — 96375 TX/PRO/DX INJ NEW DRUG ADDON: CPT

## 2022-08-21 PROCEDURE — 97161 PT EVAL LOW COMPLEX 20 MIN: CPT

## 2022-08-21 PROCEDURE — 85652 RBC SED RATE AUTOMATED: CPT

## 2022-08-21 PROCEDURE — 74011250636 HC RX REV CODE- 250/636: Performed by: PSYCHIATRY & NEUROLOGY

## 2022-08-21 PROCEDURE — 85025 COMPLETE CBC W/AUTO DIFF WBC: CPT

## 2022-08-21 PROCEDURE — 80053 COMPREHEN METABOLIC PANEL: CPT

## 2022-08-21 PROCEDURE — 74011250637 HC RX REV CODE- 250/637: Performed by: HOSPITALIST

## 2022-08-21 PROCEDURE — 36415 COLL VENOUS BLD VENIPUNCTURE: CPT

## 2022-08-21 PROCEDURE — G0378 HOSPITAL OBSERVATION PER HR: HCPCS

## 2022-08-21 PROCEDURE — 74011250637 HC RX REV CODE- 250/637: Performed by: PSYCHIATRY & NEUROLOGY

## 2022-08-21 PROCEDURE — 74011000250 HC RX REV CODE- 250: Performed by: HOSPITALIST

## 2022-08-21 PROCEDURE — 97530 THERAPEUTIC ACTIVITIES: CPT

## 2022-08-21 PROCEDURE — 74011000250 HC RX REV CODE- 250: Performed by: INTERNAL MEDICINE

## 2022-08-21 PROCEDURE — 96372 THER/PROPH/DIAG INJ SC/IM: CPT

## 2022-08-21 PROCEDURE — 74011250636 HC RX REV CODE- 250/636: Performed by: INTERNAL MEDICINE

## 2022-08-21 PROCEDURE — 86140 C-REACTIVE PROTEIN: CPT

## 2022-08-21 PROCEDURE — 74011000258 HC RX REV CODE- 258: Performed by: HOSPITALIST

## 2022-08-21 RX ORDER — CYANOCOBALAMIN 1000 UG/ML
1000 INJECTION, SOLUTION INTRAMUSCULAR; SUBCUTANEOUS ONCE
Status: COMPLETED | OUTPATIENT
Start: 2022-08-21 | End: 2022-08-21

## 2022-08-21 RX ORDER — FOLIC ACID 1 MG/1
1 TABLET ORAL DAILY
Status: DISCONTINUED | OUTPATIENT
Start: 2022-08-21 | End: 2022-08-25 | Stop reason: HOSPADM

## 2022-08-21 RX ORDER — CYANOCOBALAMIN 1000 UG/ML
1000 INJECTION, SOLUTION INTRAMUSCULAR; SUBCUTANEOUS DAILY
Status: DISCONTINUED | OUTPATIENT
Start: 2022-08-22 | End: 2022-08-25 | Stop reason: HOSPADM

## 2022-08-21 RX ADMIN — SODIUM CHLORIDE, PRESERVATIVE FREE 10 ML: 5 INJECTION INTRAVENOUS at 06:27

## 2022-08-21 RX ADMIN — SODIUM CHLORIDE, PRESERVATIVE FREE 20 MG: 5 INJECTION INTRAVENOUS at 21:00

## 2022-08-21 RX ADMIN — ENOXAPARIN SODIUM 40 MG: 100 INJECTION SUBCUTANEOUS at 09:23

## 2022-08-21 RX ADMIN — SODIUM CHLORIDE, PRESERVATIVE FREE 10 ML: 5 INJECTION INTRAVENOUS at 22:31

## 2022-08-21 RX ADMIN — SODIUM CHLORIDE, PRESERVATIVE FREE 10 ML: 5 INJECTION INTRAVENOUS at 13:09

## 2022-08-21 RX ADMIN — METHYLPREDNISOLONE SODIUM SUCCINATE 1000 MG: 1 INJECTION, POWDER, LYOPHILIZED, FOR SOLUTION INTRAMUSCULAR; INTRAVENOUS at 09:24

## 2022-08-21 RX ADMIN — PREGABALIN 100 MG: 25 CAPSULE ORAL at 22:11

## 2022-08-21 RX ADMIN — FOLIC ACID 1 MG: 1 TABLET ORAL at 12:07

## 2022-08-21 RX ADMIN — PREGABALIN 100 MG: 25 CAPSULE ORAL at 16:57

## 2022-08-21 RX ADMIN — OXYCODONE AND ACETAMINOPHEN 1 TABLET: 5; 325 TABLET ORAL at 06:23

## 2022-08-21 RX ADMIN — OXYCODONE AND ACETAMINOPHEN 1 TABLET: 5; 325 TABLET ORAL at 22:09

## 2022-08-21 RX ADMIN — CYANOCOBALAMIN 1000 MCG: 1000 INJECTION, SOLUTION INTRAMUSCULAR at 12:07

## 2022-08-21 RX ADMIN — PREGABALIN 100 MG: 25 CAPSULE ORAL at 09:23

## 2022-08-21 RX ADMIN — SODIUM CHLORIDE, PRESERVATIVE FREE 20 MG: 5 INJECTION INTRAVENOUS at 11:17

## 2022-08-21 NOTE — PROGRESS NOTES
Hospitalist Progress Note    Subjective:   Daily Progress Note: 8/21/2022 9:02 AM    Admission HPI/Hx (per Dr. Elizabeth Celis)  :  47 y.o. female with a history of peripheral arterial disease, coronary artery disease, anxiety, hypertension presents to the emergency room complaining of progressively worsening weakness that now she is not able to ambulate. Affecting upper and lower extremities with weakness and numbness, started 2 months ago, gradually worsening. She had several emergency room visits since then, following with neurologist outpatient. Was given medications, but no improvement and gradually worsening. She was using her walker before now she is using wheelchair as she is not able to ambulate. Denies any headache, vision disturbance or speech disturbance. The most of the problem is more numbness and weakness, but able to move both upper and lower extremities. Denies any joint swelling or pain.  -----------------------------------  Subjective: Weakness, dysphagia. Vomiting. I discussed with Dr. Bauer/Rheumatology. Patient had LP attempt 8/20/22 by Dr. Afshin Villa. Unable to complete due to pain. Patient for LP via Fluoro Monday 8/22/22.     Current Facility-Administered Medications   Medication Dose Route Frequency    amLODIPine (NORVASC) tablet 5 mg  5 mg Oral DAILY    metoprolol succinate (TOPROL-XL) XL tablet 25 mg  25 mg Oral DAILY    pregabalin (LYRICA) capsule 100 mg  100 mg Oral TID    sodium chloride (NS) flush 5-40 mL  5-40 mL IntraVENous Q8H    sodium chloride (NS) flush 5-40 mL  5-40 mL IntraVENous PRN    acetaminophen (TYLENOL) tablet 650 mg  650 mg Oral Q6H PRN    Or    acetaminophen (TYLENOL) suppository 650 mg  650 mg Rectal Q6H PRN    ondansetron (ZOFRAN ODT) tablet 4 mg  4 mg Oral Q6H PRN    Or    ondansetron (ZOFRAN) injection 4 mg  4 mg IntraVENous Q6H PRN    enoxaparin (LOVENOX) injection 40 mg  40 mg SubCUTAneous DAILY    oxyCODONE-acetaminophen (PERCOCET) 5-325 mg per tablet 1 Tablet  1 Tablet Oral Q6H PRN    baclofen (LIORESAL) tablet 10 mg  10 mg Oral BID PRN    methylPREDNISolone (Solu-MEDROL) 1 g in 100 mL NS MBP  1,000 mg IntraVENous DAILY        Review of Systems    Constitutional: No - fever, chills, weight change    HEENT: No - sore throat, sinus pressure, ear pain    Respiratory: [place]    Cardiovascular: No - chest pain, palpitations, extremity edema    Gastrointestinal: Dysphagia    Genitourinary: No - dysuria, hematuria    Neurological: General weakness;     Skin: No - new rash, discoloration    M/S: No - joint pain, back pain, injury      Objective:     Visit Vitals  BP 99/68 (BP 1 Location: Left upper arm, BP Patient Position: At rest;Lying)   Pulse 77   Temp 97.5 °F (36.4 °C)   Resp 18   Ht 5' 11\" (1.803 m)   Wt 119.3 kg (263 lb)   SpO2 98%   Breastfeeding No   BMI 36.68 kg/m²      O2 Device: None (Room air)    Temp (24hrs), Av.7 °F (36.5 °C), Min:97.5 °F (36.4 °C), Max:97.9 °F (36.6 °C)      No intake/output data recorded.  1901 -  0700  In: 100 [I.V.:100]  Out: -     PHYSICAL EXAM    Constitutional: NAD, Awake    HEENT: No lesions, rash    Neck: Supple     Cardiovascular: S1S2, no murmur    Respiratory:  CTA ant bilat; no overt wheeze     GI: Soft, NT; no rigidity; + bowel sounds    : voiding, clear yellow urine    Musculoskeletal: Moving all extremities spontaneously; no overt injury; no significant joint swelling    Neurological:  AxOx3. Diminished DTRs. Subjective decreased sensation UE/LE. ; power symmetrically decreased bilat UE & LE    Psychiatric: Appropriate mood; oriented    Skin: No new rash or significant discoloration     Vascular: Palpable pulses;  No edema      Data Review    Recent Results (from the past 24 hour(s))   SED RATE (ESR)    Collection Time: 22 11:46 AM   Result Value Ref Range    Sed rate, automated 72 (H) 0 - 30 mm/hr   PROCALCITONIN    Collection Time: 22 11:46 AM   Result Value Ref Range    Procalcitonin 0.05 (H) 0 ng/mL   RETICULOCYTE COUNT    Collection Time: 08/20/22 11:46 AM   Result Value Ref Range    Reticulocyte count 0.9 0.7 - 2.1 %    Absolute Retic Cnt. 0.0239 0.0164 - 0.0776 M/ul   MYOGLOBIN    Collection Time: 08/20/22 11:46 AM   Result Value Ref Range    Myoglobin 41 13 - 71 ng/mL   CK    Collection Time: 08/20/22 11:46 AM   Result Value Ref Range    CK 33 26 - 192 U/L   RPR    Collection Time: 08/20/22 11:46 AM   Result Value Ref Range    RPR NONREACTIVE NONREACTIVE   HIV 1/2 AG/AB, 4TH GENERATION,W RFLX CONFIRM    Collection Time: 08/20/22 11:46 AM   Result Value Ref Range    HIV 1/2 Interpretation NONREACTIVE NONREACTIVE    HIV 1/2 result comment SEE NOTE     VITAMIN B12 & FOLATE    Collection Time: 08/20/22 11:46 AM   Result Value Ref Range    Vitamin B12 198 193 - 986 pg/mL    Folate 1.7 (L) 5.0 - 21.0 ng/mL   CRP, HIGH SENSITIVITY    Collection Time: 08/20/22 11:46 AM   Result Value Ref Range    CRP, High sensitivity >9.5 mg/L   C REACTIVE PROTEIN, QT    Collection Time: 08/20/22 11:46 AM   Result Value Ref Range    C-Reactive protein 1.14 (H) 0.00 - 0.60 mg/dL   LD    Collection Time: 08/20/22 11:46 AM   Result Value Ref Range     81 - 246 U/L   RENAL FUNCTION PANEL    Collection Time: 08/20/22 11:46 AM   Result Value Ref Range    Sodium 136 136 - 145 mmol/L    Potassium 3.2 (L) 3.5 - 5.1 mmol/L    Chloride 101 97 - 108 mmol/L    CO2 29 21 - 32 mmol/L    Anion gap 6 5 - 15 mmol/L    Glucose 95 65 - 100 mg/dL    BUN 9 6 - 20 mg/dL    Creatinine 0.62 0.55 - 1.02 mg/dL    BUN/Creatinine ratio 15 12 - 20      GFR est AA >60 >60 ml/min/1.73m2    GFR est non-AA >60 >60 ml/min/1.73m2    Calcium 8.8 8.5 - 10.1 mg/dL    Phosphorus 3.3 2.6 - 4.7 mg/dL    Albumin 3.0 (L) 3.5 - 5.0 g/dL   TSH 3RD GENERATION    Collection Time: 08/20/22 11:46 AM   Result Value Ref Range    TSH 4.84 (H) 0.36 - 3.74 uIU/mL   URIC ACID    Collection Time: 08/20/22 11:46 AM   Result Value Ref Range    Uric acid 8.2 (H) 2.6 - 6.0 mg/dL CBC W/O DIFF    Collection Time: 08/20/22 11:47 AM   Result Value Ref Range    WBC 3.5 (L) 3.6 - 11.0 K/uL    RBC 2.82 (L) 3.80 - 5.20 M/uL    HGB 9.0 (L) 11.5 - 16.0 g/dL    HCT 27.8 (L) 35.0 - 47.0 %    MCV 98.6 80.0 - 99.0 FL    MCH 31.9 26.0 - 34.0 PG    MCHC 32.4 30.0 - 36.5 g/dL    RDW 15.1 (H) 11.5 - 14.5 %    PLATELET 350 689 - 127 K/uL    MPV 8.9 8.9 - 12.9 FL    NRBC 0.0 0.0  WBC    ABSOLUTE NRBC 0.00 0.00 - 0.01 K/uL   CBC WITH AUTOMATED DIFF    Collection Time: 08/21/22  7:29 AM   Result Value Ref Range    WBC 2.5 (L) 3.6 - 11.0 K/uL    RBC 2.52 (L) 3.80 - 5.20 M/uL    HGB 8.1 (L) 11.5 - 16.0 g/dL    HCT 25.2 (L) 35.0 - 47.0 %    .0 (H) 80.0 - 99.0 FL    MCH 32.1 26.0 - 34.0 PG    MCHC 32.1 30.0 - 36.5 g/dL    RDW 15.2 (H) 11.5 - 14.5 %    PLATELET 243 (L) 735 - 400 K/uL    MPV 8.9 8.9 - 12.9 FL    NRBC 0.0 0.0  WBC    ABSOLUTE NRBC 0.00 0.00 - 0.01 K/uL    NEUTROPHILS 56 32 - 75 %    LYMPHOCYTES 40 12 - 49 %    MONOCYTES 4 (L) 5 - 13 %    EOSINOPHILS 0 0 - 7 %    BASOPHILS 0 0 - 1 %    IMMATURE GRANULOCYTES 0 0 - 0.5 %    ABS. NEUTROPHILS 1.4 (L) 1.8 - 8.0 K/UL    ABS. LYMPHOCYTES 1.0 0.8 - 3.5 K/UL    ABS. MONOCYTES 0.1 0.0 - 1.0 K/UL    ABS. EOSINOPHILS 0.0 0.0 - 0.4 K/UL    ABS. BASOPHILS 0.0 0.0 - 0.1 K/UL    ABS. IMM. GRANS. 0.0 0.00 - 0.04 K/UL    DF AUTOMATED         MRI BRAIN W WO CONT    (Results Pending)   IR FLUORO GUIDED NEEDLE PLACEMENT    (Results Pending)       Active Problems:    Gait disturbance (8/20/2022)        Assessment/Plan:     # Progressive Weakness/Neuropathy  - Etiology? CIDP, Autoimmune, Demyelinating process?   - Neuro/ [address] on consulting and evaluation  - For LP via Fluoro  - For MRI w/ contrast  - Receiving IV Solu-Medrol 1gm daily    # Multi-level Cervical Spinal Stenoses  - Recent MRI did not reveal myelopathy    # Pancytopenia  - Consult Heme    # Dysphagia, N/V  - Add Pepcid since patient is on high dose steroids  - C/s GI    # Hx of HTN, Anxsiety, PAD/CAD, Fibromyalgia    DVT Prophylaxis: On AC  Code Status: Full Code    Care Plan discussed with: Dr. Danita Webb  ________________________________________  Shannon Griffith MD

## 2022-08-21 NOTE — PROGRESS NOTES
Problem: Pain  Goal: *Control of Pain  Outcome: Progressing Towards Goal     Problem: Patient Education: Go to Patient Education Activity  Goal: Patient/Family Education  Outcome: Progressing Towards Goal     Problem: Falls - Risk of  Goal: *Absence of Falls  Description: Document Marg[place] Led Fall Risk and appropriate interventions in the flowsheet.   Outcome: Progressing Towards Goal  Note: Fall Risk Interventions:            Medication Interventions: Patient to call before getting OOB, Teach patient to arise slowly    Elimination Interventions: Call light in reach, Bed/chair exit alarm, Stay With Me (per policy)

## 2022-08-21 NOTE — CONSULTS
Consult Date: 2022    Consults Ms. Sarah Suresh is a 69-year-old woman with history of fibromyalgia, hypertension who saw me in my clinic earlier this month for ascending pain and dysesthesias as well as weakness for the past 2 months. She said it started in her feet and has ascended upwards to the point that now she is falling much more frequently even with a walker. At that clinic appointment I suspected cervical myelopathy I ordered an MRI of the cervical spine which came back as unrevealing with no evidence of myelopathy. She is clinically becoming worse and called my office so I advised her to come to the ER. She has severe pain in her legs as well as cramping and continues to get weaker. Subjective    continues to have pain and weakness     Past Medical History:   Diagnosis Date    Anxiety     CAD (coronary artery disease)     Fibromyalgia     Hypertension     Neuropathy     Peripheral artery disease ([address]     Psychiatric disorder     anxiety      Past Surgical History:   Procedure Laterality Date    HX ANKLE FRACTURE TX Left     surgical implants    HX  SECTION       No family history on file.    Social History     Tobacco Use    Smoking status: Every Day     Packs/day: 0.50     Types: Cigarettes    Smokeless tobacco: Never   Substance Use Topics    Alcohol use: Yes     Comment: occosionally        Current Facility-Administered Medications   Medication Dose Route Frequency Provider Last Rate Last Admin    famotidine (PF) (PEPCID) 20 mg in 0.9% sodium chloride 10 mL injection  20 mg IntraVENous Q12H Millie Saenz MD   20 mg at /16 5227    folic acid (FOLVITE) tablet 1 mg  1 mg Oral DAILY Bette Walker MD        [START ON 2022] cyanocobalamin (VITAMIN B12) injection 1,000 mcg  1,000 mcg IntraMUSCular DAILY Bette Walker MD        amLODIPine (NORVASC) tablet 5 mg  5 mg Oral DAILY Neil Orozco MD   5 mg at 22 0941    metoprolol succinate (TOPROL-XL) XL tablet 25 mg  25 mg Oral DAILY Marvin Carreno MD   25 mg at 08/20/22 0941    pregabalin (LYRICA) capsule 100 mg  100 mg Oral TID Marvin Carreno MD   100 mg at 08/21/22 5437    sodium chloride (NS) flush 5-40 mL  5-40 mL IntraVENous Q8H Marvin Carreno MD   10 mL at 08/21/22 9061    sodium chloride (NS) flush 5-40 mL  5-40 mL IntraVENous PRN Marvin Carreno MD        acetaminophen (TYLENOL) tablet 650 mg  650 mg Oral Q6H PRN Marvin Carreno MD        Or    acetaminophen (TYLENOL) suppository 650 mg  650 mg Rectal Q6H PRN Marvin Carreno MD        ondansetron (ZOFRAN ODT) tablet 4 mg  4 mg Oral Q6H PRN Marvin Carreno MD        Or    ondansetron (ZOFRAN) injection 4 mg  4 mg IntraVENous Q6H PRN Marvin Carreno MD        enoxaparin (LOVENOX) injection 40 mg  40 mg SubCUTAneous DAILY Marvin Carreno MD   40 mg at 08/21/22 0923    oxyCODONE-acetaminophen (PERCOCET) 5-325 mg per tablet 1 Tablet  1 Tablet Oral Q6H PRN Domonique Temple MD   1 Tablet at 08/21/22 9510    baclofen (LIORESAL) tablet 10 mg  10 mg Oral BID PRN Domonique Temple MD   10 mg at 08/20/22 1630    methylPREDNISolone (Solu-MEDROL) 1 g in 100 mL NS MBP  1,000 mg IntraVENous DAILY Marvin Carreno  mL/hr at 08/21/22 0924 1,000 mg at 08/21/22 2444        Review of Systems   Neurological:  Positive for weakness and numbness. Objective     Vital signs for last 24 hours:  Visit Vitals  BP 99/68 (BP 1 Location: Left upper arm, BP Patient Position: At rest;Lying)   Pulse 77   Temp 97.5 °F (36.4 °C)   Resp 18   Ht 5' 11\" (1.803 m)   Wt 119.3 kg (263 lb)   SpO2 98%   Breastfeeding No   BMI 36.68 kg/m²       Intake/Output this shift:  Current Shift: No intake/output data recorded.   Last 3 Shifts: 08/19 1901 - 08/21 0700  In: 100 [I.V.:100]  Out: -     Data Review:   Recent Results (from the past 24 hour(s))   SED RATE (ESR)    Collection Time: 08/20/22 11:46 AM   Result Value Ref Range Sed rate, automated 72 (H) 0 - 30 mm/hr   PROCALCITONIN    Collection Time: 08/20/22 11:46 AM   Result Value Ref Range    Procalcitonin 0.05 (H) 0 ng/mL   RETICULOCYTE COUNT    Collection Time: 08/20/22 11:46 AM   Result Value Ref Range    Reticulocyte count 0.9 0.7 - 2.1 %    Absolute Retic Cnt. 0.0239 0.0164 - 0.0776 M/ul   MYOGLOBIN    Collection Time: 08/20/22 11:46 AM   Result Value Ref Range    Myoglobin 41 13 - 71 ng/mL   CK    Collection Time: 08/20/22 11:46 AM   Result Value Ref Range    CK 33 26 - 192 U/L   RPR    Collection Time: 08/20/22 11:46 AM   Result Value Ref Range    RPR NONREACTIVE NONREACTIVE   HIV 1/2 AG/AB, 4TH GENERATION,W RFLX CONFIRM    Collection Time: 08/20/22 11:46 AM   Result Value Ref Range    HIV 1/2 Interpretation NONREACTIVE NONREACTIVE    HIV 1/2 result comment SEE NOTE     VITAMIN B12 & FOLATE    Collection Time: 08/20/22 11:46 AM   Result Value Ref Range    Vitamin B12 198 193 - 986 pg/mL    Folate 1.7 (L) 5.0 - 21.0 ng/mL   CRP, HIGH SENSITIVITY    Collection Time: 08/20/22 11:46 AM   Result Value Ref Range    CRP, High sensitivity >9.5 mg/L   C REACTIVE PROTEIN, QT    Collection Time: 08/20/22 11:46 AM   Result Value Ref Range    C-Reactive protein 1.14 (H) 0.00 - 0.60 mg/dL   LD    Collection Time: 08/20/22 11:46 AM   Result Value Ref Range     81 - 246 U/L   RENAL FUNCTION PANEL    Collection Time: 08/20/22 11:46 AM   Result Value Ref Range    Sodium 136 136 - 145 mmol/L    Potassium 3.2 (L) 3.5 - 5.1 mmol/L    Chloride 101 97 - 108 mmol/L    CO2 29 21 - 32 mmol/L    Anion gap 6 5 - 15 mmol/L    Glucose 95 65 - 100 mg/dL    BUN 9 6 - 20 mg/dL    Creatinine 0.62 0.55 - 1.02 mg/dL    BUN/Creatinine ratio 15 12 - 20      GFR est AA >60 >60 ml/min/1.73m2    GFR est non-AA >60 >60 ml/min/1.73m2    Calcium 8.8 8.5 - 10.1 mg/dL    Phosphorus 3.3 2.6 - 4.7 mg/dL    Albumin 3.0 (L) 3.5 - 5.0 g/dL   TSH 3RD GENERATION    Collection Time: 08/20/22 11:46 AM   Result Value Ref Range TSH 4.84 (H) 0.36 - 3.74 uIU/mL   URIC ACID    Collection Time: 08/20/22 11:46 AM   Result Value Ref Range    Uric acid 8.2 (H) 2.6 - 6.0 mg/dL   CBC W/O DIFF    Collection Time: 08/20/22 11:47 AM   Result Value Ref Range    WBC 3.5 (L) 3.6 - 11.0 K/uL    RBC 2.82 (L) 3.80 - 5.20 M/uL    HGB 9.0 (L) 11.5 - 16.0 g/dL    HCT 27.8 (L) 35.0 - 47.0 %    MCV 98.6 80.0 - 99.0 FL    MCH 31.9 26.0 - 34.0 PG    MCHC 32.4 30.0 - 36.5 g/dL    RDW 15.1 (H) 11.5 - 14.5 %    PLATELET 121 109 - 334 K/uL    MPV 8.9 8.9 - 12.9 FL    NRBC 0.0 0.0  WBC    ABSOLUTE NRBC 0.00 0.00 - 0.01 K/uL   CBC WITH AUTOMATED DIFF    Collection Time: 08/21/22  7:29 AM   Result Value Ref Range    WBC 2.5 (L) 3.6 - 11.0 K/uL    RBC 2.52 (L) 3.80 - 5.20 M/uL    HGB 8.1 (L) 11.5 - 16.0 g/dL    HCT 25.2 (L) 35.0 - 47.0 %    .0 (H) 80.0 - 99.0 FL    MCH 32.1 26.0 - 34.0 PG    MCHC 32.1 30.0 - 36.5 g/dL    RDW 15.2 (H) 11.5 - 14.5 %    PLATELET 665 (L) 128 - 400 K/uL    MPV 8.9 8.9 - 12.9 FL    NRBC 0.0 0.0  WBC    ABSOLUTE NRBC 0.00 0.00 - 0.01 K/uL    NEUTROPHILS 56 32 - 75 %    LYMPHOCYTES 40 12 - 49 %    MONOCYTES 4 (L) 5 - 13 %    EOSINOPHILS 0 0 - 7 %    BASOPHILS 0 0 - 1 %    IMMATURE GRANULOCYTES 0 0 - 0.5 %    ABS. NEUTROPHILS 1.4 (L) 1.8 - 8.0 K/UL    ABS. LYMPHOCYTES 1.0 0.8 - 3.5 K/UL    ABS. MONOCYTES 0.1 0.0 - 1.0 K/UL    ABS. EOSINOPHILS 0.0 0.0 - 0.4 K/UL    ABS. BASOPHILS 0.0 0.0 - 0.1 K/UL    ABS. IMM. GRANS. 0.0 0.00 - 0.04 K/UL    DF AUTOMATED     SED RATE (ESR)    Collection Time: 08/21/22  7:29 AM   Result Value Ref Range    Sed rate, automated 70 (H) 0 - 30 mm/hr       Physical Exam    Neuro Physical Exam      General: Well developed, well nourished. Patient in no apparent distress. Cardiac: Regular rate and rhythm   Neurological Exam:  Mental Status: Alert awake oriented x3, normal speech   Cranial Nerves:   Intact visual fields. PERRL, EOM's full, no nystagmus, no ptosis.  Facial sensation is normal. Facial movement is symmetric. Palate is midline. Normal sternocleidomastoid strength. Tongue is midline. Hearing is intact bilaterally. Motor:  4 out of 5 in proximal and distal muscles of both arms, 3-/5 in bilateral hip flexion, 3 out of 5 in bilateral dorsi flexion. Reflexes:   1+ bilateral brachioradialis, 0 and patellar's and Achilles bilaterally   Sensory:   Decreased sensation in both legs and arms, absent proprioception in both feet   Gait:  Deferred   Tremor:   No tremor noted. Cerebellar:  No cerebellar signs present. Babinski:      Equivocal bilaterally    Assessment and plan: Ms. Omega Roper is a 48-year-old woman with ascending dysesthesias and weakness for the past 2 months. The course is too long to be consistent with GBS. Cervical myelopathy ruled out with cervical MRI. Lumbar puncture attempted today but she was not able to tolerate it. We will proceed with fluoroscopy guided LP on Monday. Differential diagnosis is CIDP versus demyelinating versus inflammatory disorder of CNS. -MRI brain with and without contrast  -LP fluoroscopy guided on Monday to look for oligoclonal banding  -vitamin b12 and folate low. Will replace with IM b 12 injections, 1000 mcg per week for 4 weeks  - 1 mg PO folate  -RPR pending   -Empirically start Solu-Medrol 1 g daily (first dose 8/21/22)  -Bilateral leg pain: Lyrica 200 twice a day, Percocet for the next 2 days as needed every 6 hours and baclofen 10 mg twice a day as needed. -PT/OT    In light of her vitamin deficiencies I asked her about her nutrition and she tells me that she is unemployed and has been having many issues. She does not have a stove or fridge at home and has not been able to have a good diet for many months. Will continue to follow.

## 2022-08-21 NOTE — CONSULTS
Hematology Oncology Consultation    Reason for consult: Mild pancytopenia    Admitting Diagnosis: Gait disturbance [R26.9]    Impression:     B12 and folate def  -start supplementation  -assess iron/copper/zinc levels  -check MMA and intrinsic factor Abs  -denies alcohol as contributing factor  -likely cause of or contributing to the mild pancytopenia given the concurrrent macrocytosis seen on RBCs    2. Peripheral neuropathy  -active with neurology for this    3. Declining gait and functional status  -felt to be related to #2    4. CAD    5. Anxiety d/o    6. Tobacco abuse    Discussion: Jayro Maldonado is a  47y.o. year old seen in consultation at the request of Dr. Dexter Barton for mild pancytopenia  No prior known issues with this. No recent viral illnesses or new medications.   No significant alcohol or substance misuse known  Concurrent with this is a progressive decline in functionality from worseing peripheral neuropathy for which she is seeing both neuro and Rheum  Imaging:    Pre admission imaging reviwed    Labs:    Recent Results (from the past 24 hour(s))   SED RATE (ESR)    Collection Time: 08/20/22 11:46 AM   Result Value Ref Range    Sed rate, automated 72 (H) 0 - 30 mm/hr   PROCALCITONIN    Collection Time: 08/20/22 11:46 AM   Result Value Ref Range    Procalcitonin 0.05 (H) 0 ng/mL   RETICULOCYTE COUNT    Collection Time: 08/20/22 11:46 AM   Result Value Ref Range    Reticulocyte count 0.9 0.7 - 2.1 %    Absolute Retic Cnt. 0.0239 0.0164 - 0.0776 M/ul   MYOGLOBIN    Collection Time: 08/20/22 11:46 AM   Result Value Ref Range    Myoglobin 41 13 - 71 ng/mL   CK    Collection Time: 08/20/22 11:46 AM   Result Value Ref Range    CK 33 26 - 192 U/L   RPR    Collection Time: 08/20/22 11:46 AM   Result Value Ref Range    RPR NONREACTIVE NONREACTIVE   HIV 1/2 AG/AB, 4TH GENERATION,W RFLX CONFIRM    Collection Time: 08/20/22 11:46 AM   Result Value Ref Range    HIV 1/2 Interpretation NONREACTIVE NONREACTIVE HIV 1/2 result comment SEE NOTE     VITAMIN B12 & FOLATE    Collection Time: 08/20/22 11:46 AM   Result Value Ref Range    Vitamin B12 198 193 - 986 pg/mL    Folate 1.7 (L) 5.0 - 21.0 ng/mL   CRP, HIGH SENSITIVITY    Collection Time: 08/20/22 11:46 AM   Result Value Ref Range    CRP, High sensitivity >9.5 mg/L   C REACTIVE PROTEIN, QT    Collection Time: 08/20/22 11:46 AM   Result Value Ref Range    C-Reactive protein 1.14 (H) 0.00 - 0.60 mg/dL   LD    Collection Time: 08/20/22 11:46 AM   Result Value Ref Range     81 - 246 U/L   RENAL FUNCTION PANEL    Collection Time: 08/20/22 11:46 AM   Result Value Ref Range    Sodium 136 136 - 145 mmol/L    Potassium 3.2 (L) 3.5 - 5.1 mmol/L    Chloride 101 97 - 108 mmol/L    CO2 29 21 - 32 mmol/L    Anion gap 6 5 - 15 mmol/L    Glucose 95 65 - 100 mg/dL    BUN 9 6 - 20 mg/dL    Creatinine 0.62 0.55 - 1.02 mg/dL    BUN/Creatinine ratio 15 12 - 20      GFR est AA >60 >60 ml/min/1.73m2    GFR est non-AA >60 >60 ml/min/1.73m2    Calcium 8.8 8.5 - 10.1 mg/dL    Phosphorus 3.3 2.6 - 4.7 mg/dL    Albumin 3.0 (L) 3.5 - 5.0 g/dL   TSH 3RD GENERATION    Collection Time: 08/20/22 11:46 AM   Result Value Ref Range    TSH 4.84 (H) 0.36 - 3.74 uIU/mL   URIC ACID    Collection Time: 08/20/22 11:46 AM   Result Value Ref Range    Uric acid 8.2 (H) 2.6 - 6.0 mg/dL   CBC W/O DIFF    Collection Time: 08/20/22 11:47 AM   Result Value Ref Range    WBC 3.5 (L) 3.6 - 11.0 K/uL    RBC 2.82 (L) 3.80 - 5.20 M/uL    HGB 9.0 (L) 11.5 - 16.0 g/dL    HCT 27.8 (L) 35.0 - 47.0 %    MCV 98.6 80.0 - 99.0 FL    MCH 31.9 26.0 - 34.0 PG    MCHC 32.4 30.0 - 36.5 g/dL    RDW 15.1 (H) 11.5 - 14.5 %    PLATELET 645 009 - 628 K/uL    MPV 8.9 8.9 - 12.9 FL    NRBC 0.0 0.0  WBC    ABSOLUTE NRBC 0.00 0.00 - 0.01 K/uL   CBC WITH AUTOMATED DIFF    Collection Time: 08/21/22  7:29 AM   Result Value Ref Range    WBC 2.5 (L) 3.6 - 11.0 K/uL    RBC 2.52 (L) 3.80 - 5.20 M/uL    HGB 8.1 (L) 11.5 - 16.0 g/dL    HCT 25.2 (L) 35.0 - 47.0 %    .0 (H) 80.0 - 99.0 FL    MCH 32.1 26.0 - 34.0 PG    MCHC 32.1 30.0 - 36.5 g/dL    RDW 15.2 (H) 11.5 - 14.5 %    PLATELET 474 (L) 991 - 400 K/uL    MPV 8.9 8.9 - 12.9 FL    NRBC 0.0 0.0  WBC    ABSOLUTE NRBC 0.00 0.00 - 0.01 K/uL    NEUTROPHILS 56 32 - 75 %    LYMPHOCYTES 40 12 - 49 %    MONOCYTES 4 (L) 5 - 13 %    EOSINOPHILS 0 0 - 7 %    BASOPHILS 0 0 - 1 %    IMMATURE GRANULOCYTES 0 0 - 0.5 %    ABS. NEUTROPHILS 1.4 (L) 1.8 - 8.0 K/UL    ABS. LYMPHOCYTES 1.0 0.8 - 3.5 K/UL    ABS. MONOCYTES 0.1 0.0 - 1.0 K/UL    ABS. EOSINOPHILS 0.0 0.0 - 0.4 K/UL    ABS. BASOPHILS 0.0 0.0 - 0.1 K/UL    ABS. IMM. GRANS. 0.0 0.00 - 0.04 K/UL    DF AUTOMATED     SED RATE (ESR)    Collection Time: 22  7:29 AM   Result Value Ref Range    Sed rate, automated 70 (H) 0 - 30 mm/hr       History:  Past Medical History:   Diagnosis Date    Anxiety     CAD (coronary artery disease)     Fibromyalgia     Hypertension     Neuropathy     Peripheral artery disease ([facility] [address]     Psychiatric disorder     anxiety      Past Surgical History:   Procedure Laterality Date    HX ANKLE FRACTURE TX Left     surgical implants    HX  SECTION        Prior to Admission medications    Medication Sig Start Date End Date Taking? Authorizing Provider   pregabalin (LYRICA) 100 mg capsule Take 100 mg by mouth three (3) times daily. Yes Other, MD William   meloxicam (MOBIC) 15 mg tablet Take 15 mg by mouth daily as needed for Pain. 7/15/22   Provider, Donis   naproxen (Naprosyn) 500 mg tablet Take 1 Tablet by mouth two (2) times daily (with meals) for 10 days. 22  Harshad Mckeon NP   amLODIPine (NORVASC) 10 mg tablet  22   Other, MD William   metoprolol succinate (TOPROL-XL) 50 mg XL tablet  5/24/22   Other, MD William   acetaminophen (TYLENOL) 325 mg tablet Take 2 Tablets by mouth every six (6) hours as needed for Pain.  22   Martha Alvarado MD     Allergies   Allergen Reactions    Codeine Nausea Only      Social History     Tobacco Use    Smoking status: Every Day     Packs/day: 0.50     Types: Cigarettes    Smokeless tobacco: Never   Substance Use Topics    Alcohol use: Yes     Comment: occosionally       No family history on file. Current Medications:  Current Facility-Administered Medications   Medication Dose Route Frequency    famotidine (PF) (PEPCID) 20 mg in 0.9% sodium chloride 10 mL injection  20 mg IntraVENous R56U    folic acid (FOLVITE) tablet 1 mg  1 mg Oral DAILY    [START ON 8/22/2022] cyanocobalamin (VITAMIN B12) injection 1,000 mcg  1,000 mcg IntraMUSCular DAILY    amLODIPine (NORVASC) tablet 5 mg  5 mg Oral DAILY    metoprolol succinate (TOPROL-XL) XL tablet 25 mg  25 mg Oral DAILY    pregabalin (LYRICA) capsule 100 mg  100 mg Oral TID    sodium chloride (NS) flush 5-40 mL  5-40 mL IntraVENous Q8H    sodium chloride (NS) flush 5-40 mL  5-40 mL IntraVENous PRN    acetaminophen (TYLENOL) tablet 650 mg  650 mg Oral Q6H PRN    Or    acetaminophen (TYLENOL) suppository 650 mg  650 mg Rectal Q6H PRN    ondansetron (ZOFRAN ODT) tablet 4 mg  4 mg Oral Q6H PRN    Or    ondansetron (ZOFRAN) injection 4 mg  4 mg IntraVENous Q6H PRN    enoxaparin (LOVENOX) injection 40 mg  40 mg SubCUTAneous DAILY    oxyCODONE-acetaminophen (PERCOCET) 5-325 mg per tablet 1 Tablet  1 Tablet Oral Q6H PRN    baclofen (LIORESAL) tablet 10 mg  10 mg Oral BID PRN    methylPREDNISolone (Solu-MEDROL) 1 g in 100 mL NS MBP  1,000 mg IntraVENous DAILY         Review of Systems:  Constitutional No fevers, chills, night sweats, excessive fatigue or weight loss. Allergic/Immunologic No recent allergic reactions   Eyes No significant visual difficulties. No diplopia. ENMT No problems with hearing, no sore throat, no sinus drainage. Endocrine No hot flashes or night sweats. No cold intolerance, polyuria, or polydipsia   Hematologic/Lymphatic No easy bruising or bleeding.   The patient denies any tender or palpable lymph nodes   Breasts No abnormal masses of breast, nipple discharge or pain. Respiratory No dyspnea on exertion, orthopnea, chest pain, cough or hemoptysis. Cardiovascular No anginal chest pain, irregular heart beat, tachycardia, palpitations or orthopnea. Gastrointestinal No nausea, vomiting, diarrhea, constipation, cramping, dysphagia, reflux, heartburn, GI bleeding, or early satiety. No change in bowel habits. Genitourinary (M) No hematuria, dysuria, increased frequency, urgency, hesitancy or incontinence. Musculoskeletal No joint pain, swelling or redness. No decreased range of motion. Integumentary No chronic rashes, inflammation, ulcerations, pruritus, petechiae, purpura, ecchymoses, or skin changes. Neurologic No headache, blurred vision, and no areas of focal weakness or numbness. Normal gait. No sensory problems. Psychiatric No insomnia, depression, laurie or mood swings. No psychotropic drugs. Physical Exam:  Constitutional Alert, cooperative, oriented. Mood and affect appropriate. Appears close to chronological age. Well nourished. Well developed. Head Normocephalic; no scars   Eyes Conjunctivae and sclerae are clear and without icterus. Pupils are reactive and equal.   ENMT Sinuses are nontender. No oral exudates, ulcers, masses, thrush or mucositis. Oropharynx clear. Tongue normal.   Neck Supple without masses or thyromegaly. No jugular venous distension. Hematologic/Lymphatic No petechiae or purpura. No tender or palpable lymph nodes in the cervical, supraclavicular, axillary or inguinal area. Respiratory Lungs are clear to auscultation without rhonchi or wheezing. Cardiovascular Regular rate and rhythm of heart without murmurs, gallops or rubs. Chest / Line Site Chest is symmetric with no chest wall deformities. Abdomen Non-tender, non-distended, no masses, ascites or hepatosplenomegaly. Good bowel sounds. No guarding or rebound tenderness. No pulsatile masses. Musculoskeletal No tenderness or swelling, normal range of motion without obvious weakness. Extremities No visible deformities, no cyanosis, clubbing or edema. Skin No rashes, scars, or lesions suggestive of malignancy. No petechiae, purpura, or ecchymoses. No excoriations. Neurologic No sensory or motor deficits, normal cerebellar function, normal gait, cranial nerves intact. Psychiatric Alert and oriented times three. Coherent speech. Verbalizes understanding of our discussions today.

## 2022-08-21 NOTE — CONSULTS
Gastroenterology Consult     Referring Physician: Catarina Muñoz NP     Consult Date: 2022     Subjective:     Chief Complaint: Recurrent falls and nausea vomiting and dysphagia    History of Present Illness: Pat Luna is a 47 y.o. female who is seen in consultation for difficulty swallowing. Patient was admitted to the hospital with recurrent falls and has history of peripheral neuropathy and is being worked up by neurology. She also complains of anorexia, difficulty swallowing and vomiting. She does not have any history of gallstone disease but she has history of excessive alcohol consumption up until 2 months ago she says that she has cut down her drinking. She also smokes about a pack of cigarettes a day. She does not have any fever or chills she does not have any history of diabetes but have history of peripheral vascular disease and anxiety. She also complains of upper abdominal discomfort and pain    Past Medical History:   Diagnosis Date    Anxiety     CAD (coronary artery disease)     Fibromyalgia     Hypertension     Neuropathy     Peripheral artery disease ([address]     Psychiatric disorder     anxiety     Past Surgical History:   Procedure Laterality Date    HX ANKLE FRACTURE TX Left     surgical implants    HX  SECTION        No family history on file.   Social History     Tobacco Use    Smoking status: Every Day     Packs/day: 0.50     Types: Cigarettes    Smokeless tobacco: Never   Substance Use Topics    Alcohol use: Yes     Comment: occosionally       Allergies   Allergen Reactions    Codeine Nausea Only     Current Facility-Administered Medications   Medication Dose Route Frequency    famotidine (PF) (PEPCID) 20 mg in 0.9% sodium chloride 10 mL injection  20 mg IntraVENous A81Y    folic acid (FOLVITE) tablet 1 mg  1 mg Oral DAILY    [START ON 2022] cyanocobalamin (VITAMIN B12) injection 1,000 mcg  1,000 mcg IntraMUSCular DAILY    amLODIPine (NORVASC) tablet 5 mg  5 mg Oral DAILY    metoprolol succinate (TOPROL-XL) XL tablet 25 mg  25 mg Oral DAILY    pregabalin (LYRICA) capsule 100 mg  100 mg Oral TID    sodium chloride (NS) flush 5-40 mL  5-40 mL IntraVENous Q8H    sodium chloride (NS) flush 5-40 mL  5-40 mL IntraVENous PRN    acetaminophen (TYLENOL) tablet 650 mg  650 mg Oral Q6H PRN    Or    acetaminophen (TYLENOL) suppository 650 mg  650 mg Rectal Q6H PRN    ondansetron (ZOFRAN ODT) tablet 4 mg  4 mg Oral Q6H PRN    Or    ondansetron (ZOFRAN) injection 4 mg  4 mg IntraVENous Q6H PRN    enoxaparin (LOVENOX) injection 40 mg  40 mg SubCUTAneous DAILY    oxyCODONE-acetaminophen (PERCOCET) 5-325 mg per tablet 1 Tablet  1 Tablet Oral Q6H PRN    baclofen (LIORESAL) tablet 10 mg  10 mg Oral BID PRN    methylPREDNISolone (Solu-MEDROL) 1 g in 100 mL NS MBP  1,000 mg IntraVENous DAILY        Review of Systems:  A detailed 10 organ review of systems is obtained with pertinent positives as listed in the History of Present Illness and Past Medical History. All others are negative. Objective:     Physical Exam:  Visit Vitals  BP 99/72 (BP 1 Location: Left upper arm, BP Patient Position: At rest)   Pulse (!) 119   Temp 98.1 °F (36.7 °C)   Resp 18   Ht 5' 11\" (1.803 m)   Wt 119.3 kg (263 lb)   SpO2 98%   Breastfeeding No   BMI 36.68 kg/m²        Skin:  Extremities and face reveal no rashes. No jack erythema. No telangiectasias on the chest wall. HEENT: Sclerae anicteric. Extra-occular muscles are intact. No oral ulcers. No abnormal pigmentation of the lips. The neck is supple. Cardiovascular: Regular rate and rhythm. No murmurs, gallops, or rubs. PMI nondisplaced. Carotids without bruits. Respiratory:  Comfortable breathing with no accessory muscle use. Clear breath sounds with no wheezes, rales, or rhonchi. GI:  Abdomen nondistended, soft, and nontender. Normal active bowel sounds. No enlargement of the liver or spleen. No masses palpable.   Rectal:  Deferred  Musculoskeletal:  No pitting edema of the lower legs. Extremities have good range of motion. No costovertebral tenderness. Neurological:  Gross memory appears intact. Patient is alert and oriented. Psychiatric:  Mood appears appropriate with judgement intact. Lymphatic:  No cervical or supraclavicular adenopathy. Lab/Data Review:  Recent Results (from the past 24 hour(s))   CBC WITH AUTOMATED DIFF    Collection Time: 08/21/22  7:29 AM   Result Value Ref Range    WBC 2.5 (L) 3.6 - 11.0 K/uL    RBC 2.52 (L) 3.80 - 5.20 M/uL    HGB 8.1 (L) 11.5 - 16.0 g/dL    HCT 25.2 (L) 35.0 - 47.0 %    .0 (H) 80.0 - 99.0 FL    MCH 32.1 26.0 - 34.0 PG    MCHC 32.1 30.0 - 36.5 g/dL    RDW 15.2 (H) 11.5 - 14.5 %    PLATELET 815 (L) 297 - 400 K/uL    MPV 8.9 8.9 - 12.9 FL    NRBC 0.0 0.0  WBC    ABSOLUTE NRBC 0.00 0.00 - 0.01 K/uL    NEUTROPHILS 56 32 - 75 %    LYMPHOCYTES 40 12 - 49 %    MONOCYTES 4 (L) 5 - 13 %    EOSINOPHILS 0 0 - 7 %    BASOPHILS 0 0 - 1 %    IMMATURE GRANULOCYTES 0 0 - 0.5 %    ABS. NEUTROPHILS 1.4 (L) 1.8 - 8.0 K/UL    ABS. LYMPHOCYTES 1.0 0.8 - 3.5 K/UL    ABS. MONOCYTES 0.1 0.0 - 1.0 K/UL    ABS. EOSINOPHILS 0.0 0.0 - 0.4 K/UL    ABS. BASOPHILS 0.0 0.0 - 0.1 K/UL    ABS. IMM. GRANS. 0.0 0.00 - 0.04 K/UL    DF AUTOMATED     SED RATE (ESR)    Collection Time: 08/21/22  7:29 AM   Result Value Ref Range    Sed rate, automated 70 (H) 0 - 30 mm/hr        MRI BRAIN W WO CONT    (Results Pending)   IR FLUORO GUIDED NEEDLE PLACEMENT    (Results Pending)          Assessment/Plan:     Active Problems:    Gait disturbance (8/20/2022)    Dysphagia likely neuromuscular in nature plus minus gastroparesis. I will do upper endoscopy tomorrow to look for mechanical causes of dysphagia and she should undergo a gastric emptying study. Some of her neuropathy could be explained on the basis of chronic alcohol consumption however further work-up is in progress.   Your sedimentation rate is also high which could be related to an inflammatory process.   Will follow this patient along with you      IP CONSULT TO NEUROLOGY  IP CONSULT TO RHEUMATOLOGY  IP CONSULT TO GASTROENTEROLOGY  IP CONSULT TO HEMATOLOGY    Thank you for allowing me to participate in this patients care  Cc Referring Physician   Blu Apple NP

## 2022-08-21 NOTE — PROGRESS NOTES
Progress Note  Date:2022       Room:[facility]  Patient Corina Bai     YOB: 1970     Age:52 y.o. Subjective    Subjective:  Symptoms:  She reports weakness. There were no acute events overnight. The patient had lumbar puncture performed yesterday, and although fluid was obtained for testing, the procedure was not able to be completed due to patient intolerance. Diagnostic antibody testing for connective tissue disease or myopathy has been send and is pending. Review of Systems   Constitutional:  Positive for activity change and fatigue. HENT: Negative. Eyes: Negative. Respiratory: Negative. Cardiovascular: Negative. Gastrointestinal: Negative. Endocrine: Negative. Genitourinary:         Incontinence. Musculoskeletal:  Positive for myalgias. Skin: Negative. Allergic/Immunologic: Negative. Neurological:  Positive for weakness and numbness. Hematological: Negative. Psychiatric/Behavioral: Negative. Objective         Vitals Last 24 Hours:  TEMPERATURE:  Temp  Av.7 °F (36.5 °C)  Min: 97.5 °F (36.4 °C)  Max: 97.9 °F (36.6 °C)  RESPIRATIONS RANGE: Resp  Av  Min: 18  Max: 20  PULSE OXIMETRY RANGE: SpO2  Av %  Min: 98 %  Max: 98 %  PULSE RANGE: Pulse  Av  Min: 77  Max: 79  BLOOD PRESSURE RANGE: Systolic (69PAQ), MWL:098 , Min:99 , FFC:565   ; Diastolic (10EQG), TVJ:12, Min:68, Max:75    I/O (24Hr): No intake or output data in the 24 hours ending 22 0833  Objective:  General Appearance:  Comfortable. Vital signs: (most recent): Blood pressure 99/68, pulse 77, temperature 97.5 °F (36.4 °C), resp. rate 18, height 5' 11\" (1.803 m), weight 119.3 kg (263 lb), SpO2 98 %, not currently breastfeeding. Vital signs are normal.    HEENT: Normal HEENT exam.    Lungs:  Normal effort. Heart: Normal rate. Abdomen: Abdomen is soft. Extremities: Normal range of motion.     Neurological: Patient is alert and oriented to person, place and time. Pupils:  Pupils are equal, round, and reactive to light. Skin:  Warm. Labs/Imaging/Diagnostics    Labs:  CBC:  Recent Labs     08/20/22  1147 08/19/22  2334   WBC 3.5* 3.6   RBC 2.82* 2.79*   HGB 9.0* 9.0*   HCT 27.8* 27.6*   MCV 98.6 98.9   RDW 15.1* 14.9*    142*     CHEMISTRIES:  Recent Labs     08/20/22  1146 08/19/22  2334    133*   K 3.2* 3.0*    97   CO2 29 27   BUN 9 12   CA 8.8 8.6   PHOS 3.3  --    MG  --  1.7   PT/INR:No results for input(s): INR, INREXT in the last 72 hours. No lab exists for component: PROTIME  APTT:No results for input(s): APTT in the last 72 hours. LIVER PROFILE:  Recent Labs     08/19/22  2334   AST 27   ALT 21     Lab Results   Component Value Date/Time    ALT (SGPT) 21 08/19/2022 11:34 PM    AST (SGOT) 27 08/19/2022 11:34 PM    Alk. phosphatase 89 08/19/2022 11:34 PM    Bilirubin, total 1.0 08/19/2022 11:34 PM       Imaging Last 24 Hours:  No results found. Assessment//Plan   Active Problems:    Gait disturbance (8/20/2022)      Assessment:   (This is a 46 yo female with 2 months of progressive weakness of the extremities with pain and episodes of falling. Neurology is also following the patient and are conducting and additional work up. Labs have been sent to evaluate for underlying autoimmune disease with the results still pending. ). Plan:    (-We will monitor for the results of the antibody testing that is still pending.  -We appreciate the assistance of neurology.   -I will hold on starting any immunosuppression at this time. Thank you for the consult. We will continue to follow the patient. Please feel free to contact us with any questions. ).      Electronically signed by Meron Craig MD on 8/21/2022 at 8:33 AM

## 2022-08-21 NOTE — PROGRESS NOTES
Problem: Mobility Impaired (Adult and Pediatric)  Goal: *Acute Goals and Plan of Care (Insert Text)  Description: I with LE HEP x7 days  Supine to sit EOB with CGAx1 x7 days  Sit EOB to perform LE exercises without LOB x 8-10min x 7 days  Sit to stand with min Ax2 x 7 days  Stand pivot from bed to chair with min Ax2 x 7 days    Future goals TBD as pt progresses with PT    Pt stated goal: for balance to improve  Outcome: Not Met    PHYSICAL THERAPY EVALUATION  Patient: Antoinette Townsend (15 y.o. female)  Date: 8/21/2022  Primary Diagnosis: Gait disturbance [R26.9]       Precautions:        ASSESSMENT    49yo F admitted to hospital with progressive weakness/gt disturbance with unknown etiology presents to PT with decreased bed mob, transfers, balance, gt, activity tolerance, and overall functional mobility. PMH listed below. Neuro attempted lumbar puncture yesterday, but will attempt again via fluoro tomorrow per neuro MD note. Pt semi supine in bed upon PT arrival, agreeable to work with PT. Pt alert and oriented x4. Pt lives with son in 1 story apartment with 4 NEPTALI home with B rails. PTA pt was using a walker for mobility, but does also have w/c. PTA pt reports I with ADLs. Currently, pt is mod A for supine to sit EOB. Pt demos fair sitting balance at times and needs to be guarded with attempts at scooting to EOB or up to [facility] as she demos decreased trunk control to control scooting motions. Pt attempted sit to stand with max Ax1 and was only able to clear buttocks from EOB briefly upon standing. Will need 2 person assist for future OOB mobility attempts. Pt not able to ambulate at this time. Pt may benefit from skilled PT to address her functional deficits. Recommend IRF at this time as pt was I PTA and has progressively gotten weaker recently especially with ambulation. Pt would not be safe to return home at this time due to poor balance and high fall risk.         PLAN :  Recommendations and Planned Interventions: bed mobility training, transfer training, gait training, therapeutic exercises, patient and family training/education, and therapeutic activities      Frequency/Duration: Patient will be followed by physical therapy:  5 times a week to address goals. Recommendation for discharge: (in order for the patient to meet his/her long term goals)  Inpatient Rehabilitation Facility              SUBJECTIVE:   Patient semi supine in bed upon PT arrival, agreeable to work with PT    OBJECTIVE DATA SUMMARY:   HISTORY:    Past Medical History:   Diagnosis Date    Anxiety     CAD (coronary artery disease)     Fibromyalgia     Hypertension     Neuropathy     Peripheral artery disease ([facility] [address]     Psychiatric disorder     anxiety     Past Surgical History:   Procedure Laterality Date    HX ANKLE FRACTURE TX Left     surgical implants    HX  SECTION         Personal factors and/or comorbidities impacting plan of care:     Home Situation  Home Environment: Apartment  # Steps to Enter: 4  Rails to Enter: Yes  Hand Rails : Bilateral  One/Two Story Residence: One story  Living Alone: No  Support Systems: Child(suman) (son)  Patient Expects to be Discharged to[de-identified] Rehab hospital/unit acute  Current DME Used/Available at Home: Walker, rolling, Wheelchair        EXAMINATION/PRESENTATION/DECISION MAKING:   Critical Behavior:  Neurologic State: Alert  Orientation Level: Oriented X4  Cognition: Follows commands           Range Of Motion:  AROM: Generally decreased, functional  B LE    Strength:    Strength: Generally decreased, functional     Grossly 3-/5 B LE    Tone & Sensation:      Intact to LT  B LE    Coordination:  Demos decreased overall coordination with bed mob and transfers    Functional Mobility:  Bed Mobility:     Supine to Sit: Moderate assistance  Sit to Supine: Maximum assistance  Scooting:  Moderate assistance  Transfers:  Sit to Stand: Maximum assistance  Stand to Sit: Maximum assistance Balance:   Sitting: Impaired; With support  Sitting - Static: Fair (occasional)  Sitting - Dynamic: Fair (occasional)  Standing: Impaired;Pull to stand; With support  Standing - Static: Poor;Constant support  Ambulation/Gait Training:        Pt not able to amb at this time due to poor static standing balance      Functional Measure:  [facility] AM-PAC 6 Clicks         Basic Mobility Inpatient Short Form  How much difficulty does the patient currently have. .. Unable A Lot A Little None   1. Turning over in bed (including adjusting bedclothes, sheets and blankets)? [] 1   [x] 2   [] 3   [] 4   2. Sitting down on and standing up from a chair with arms ( e.g., wheelchair, bedside commode, etc.)   [] 1   [x] 2   [] 3   [] 4   3. Moving from lying on back to sitting on the side of the bed? [] 1   [x] 2   [] 3   [] 4          How much help from another person does the patient currently need. .. Total A Lot A Little None   4. Moving to and from a bed to a chair (including a wheelchair)? [] 1   [x] 2   [] 3   [] 4   5. Need to walk in hospital room? [] 1   [x] 2   [] 3   [] 4   6. Climbing 3-5 steps with a railing? [] 1   [x] 2   [] 3   [] 4   © 2007, Trustees of [facility], under license to [employer]. All rights reserved     Score:  Initial: 12 Most Recent: X (Date: -- )   Interpretation of Tool:  Represents activities that are increasingly more difficult (i.e. Bed mobility, Transfers, Gait).   Score 24 23 22-20 19-15 14-10 9-7 6   Modifier CH CI CJ CK CL CM CN           Physical Therapy Evaluation Charge Determination   History Examination Presentation Decision-Making   MEDIUM  Complexity : 1-2 comorbidities / personal factors will impact the outcome/ POC  MEDIUM Complexity : 3 Standardized tests and measures addressing body structure, function, activity limitation and / or participation in recreation  MEDIUM Complexity : Evolving with changing characteristics  Other Functional Measure [facility] 6 HIGH      Based on the above components, the patient evaluation is determined to be of the following complexity level: MEDIUM    Pain Rating:  No c/o pain during session    Activity Tolerance:   Fair      After treatment patient left in no apparent distress:   Supine in bed, Call bell within reach, Bed / chair alarm activated, Side rails x 3, and bed locked and in lowest level          COMMUNICATION/EDUCATION:   The patients plan of care was discussed with: Occupational therapist.     Patient/family agree to work toward stated goals and plan of care.       Thank you for this referral.  Inez Weiss   Time Calculation: 20 mins

## 2022-08-21 NOTE — PROGRESS NOTES
Report given to Terrance Damico LPN oncoming nurse to include sbar, mar, kardex, recent orders and changes;

## 2022-08-22 ENCOUNTER — ANESTHESIA (OUTPATIENT)
Dept: ENDOSCOPY | Age: 52
DRG: 861 | End: 2022-08-22
Payer: COMMERCIAL

## 2022-08-22 ENCOUNTER — APPOINTMENT (OUTPATIENT)
Dept: MRI IMAGING | Age: 52
DRG: 861 | End: 2022-08-22
Attending: PSYCHIATRY & NEUROLOGY
Payer: COMMERCIAL

## 2022-08-22 ENCOUNTER — APPOINTMENT (OUTPATIENT)
Dept: ENDOSCOPY | Age: 52
DRG: 861 | End: 2022-08-22
Attending: INTERNAL MEDICINE
Payer: COMMERCIAL

## 2022-08-22 ENCOUNTER — ANESTHESIA EVENT (OUTPATIENT)
Dept: ENDOSCOPY | Age: 52
DRG: 861 | End: 2022-08-22
Payer: COMMERCIAL

## 2022-08-22 LAB
ALBUMIN SERPL-MCNC: 3.2 G/DL (ref 3.5–5)
ALBUMIN/GLOB SERPL: 0.8 {RATIO} (ref 1.1–2.2)
ALDOLASE SERPL-CCNC: 3.2 U/L (ref 3.3–10.3)
ALP SERPL-CCNC: 91 U/L (ref 45–117)
ALT SERPL-CCNC: 23 U/L (ref 12–78)
ANION GAP SERPL CALC-SCNC: 7 MMOL/L (ref 5–15)
AST SERPL W P-5'-P-CCNC: 24 U/L (ref 15–37)
BASOPHILS # BLD: 0 K/UL (ref 0–0.1)
BASOPHILS NFR BLD: 0 % (ref 0–1)
BILIRUB SERPL-MCNC: 0.8 MG/DL (ref 0.2–1)
BUN SERPL-MCNC: 8 MG/DL (ref 6–20)
BUN/CREAT SERPL: 12 (ref 12–20)
CA-I BLD-MCNC: 9.1 MG/DL (ref 8.5–10.1)
CENTROMERE B AB SER-ACNC: <0.2 AI (ref 0–0.9)
CHLORIDE SERPL-SCNC: 101 MMOL/L (ref 97–108)
CHROMATIN AB SERPL-ACNC: <0.2 AI (ref 0–0.9)
CO2 SERPL-SCNC: 29 MMOL/L (ref 21–32)
CREAT SERPL-MCNC: 0.66 MG/DL (ref 0.55–1.02)
DIFFERENTIAL METHOD BLD: ABNORMAL
DSDNA AB SER-ACNC: <1 IU/ML (ref 0–9)
DSDNA AB SER-ACNC: <1 IU/ML (ref 0–9)
ENA JO1 AB SER-ACNC: <0.2 AI (ref 0–0.9)
ENA JO1 AB SER-ACNC: <0.2 AI (ref 0–0.9)
ENA RNP AB SER-ACNC: <0.2 AI (ref 0–0.9)
ENA SCL70 AB SER-ACNC: <0.2 AI (ref 0–0.9)
ENA SM AB SER-ACNC: <0.2 AI (ref 0–0.9)
ENA SM+RNP AB SER-ACNC: <0.2 AI (ref 0–0.9)
ENA SS-A AB SER-ACNC: 5 AI (ref 0–0.9)
ENA SS-B AB SER-ACNC: <0.2 AI (ref 0–0.9)
EOSINOPHIL # BLD: 0 K/UL (ref 0–0.4)
EOSINOPHIL NFR BLD: 0 % (ref 0–7)
ERYTHROCYTE [DISTWIDTH] IN BLOOD BY AUTOMATED COUNT: 15.1 % (ref 11.5–14.5)
GLOBULIN SER CALC-MCNC: 4.2 G/DL (ref 2–4)
GLUCOSE SERPL-MCNC: 136 MG/DL (ref 65–100)
HCT VFR BLD AUTO: 26.3 % (ref 35–47)
HGB BLD-MCNC: 8.7 G/DL (ref 11.5–16)
IMM GRANULOCYTES # BLD AUTO: 0 K/UL (ref 0–0.04)
IMM GRANULOCYTES NFR BLD AUTO: 0 % (ref 0–0.5)
IRON SATN MFR SERPL: 17 % (ref 20–50)
IRON SERPL-MCNC: 30 UG/DL (ref 35–150)
LYMPHOCYTES # BLD: 0.5 K/UL (ref 0.8–3.5)
LYMPHOCYTES NFR BLD: 7 % (ref 12–49)
MAGNESIUM SERPL-MCNC: 1.8 MG/DL (ref 1.6–2.4)
MCH RBC QN AUTO: 32.6 PG (ref 26–34)
MCHC RBC AUTO-ENTMCNC: 33.1 G/DL (ref 30–36.5)
MCV RBC AUTO: 98.5 FL (ref 80–99)
MONOCYTES # BLD: 0 K/UL (ref 0–1)
MONOCYTES NFR BLD: 1 % (ref 5–13)
NEUTS SEG # BLD: 6.1 K/UL (ref 1.8–8)
NEUTS SEG NFR BLD: 92 % (ref 32–75)
NRBC # BLD: 0 K/UL (ref 0–0.01)
NRBC BLD-RTO: 0 PER 100 WBC
PLATELET # BLD AUTO: 155 K/UL (ref 150–400)
PMV BLD AUTO: 9.3 FL (ref 8.9–12.9)
POTASSIUM SERPL-SCNC: 3.2 MMOL/L (ref 3.5–5.1)
PROT SERPL-MCNC: 7.4 G/DL (ref 6.4–8.2)
RBC # BLD AUTO: 2.67 M/UL (ref 3.8–5.2)
RIBOSOMAL P AB SER-ACNC: <0.2 AI (ref 0–0.9)
SEE BELOW:, 164879: ABNORMAL
SODIUM SERPL-SCNC: 137 MMOL/L (ref 136–145)
TIBC SERPL-MCNC: 172 UG/DL (ref 250–450)
TSH SERPL DL<=0.05 MIU/L-ACNC: 0.57 UIU/ML (ref 0.36–3.74)
WBC # BLD AUTO: 6.7 K/UL (ref 3.6–11)

## 2022-08-22 PROCEDURE — 84630 ASSAY OF ZINC: CPT

## 2022-08-22 PROCEDURE — 88305 TISSUE EXAM BY PATHOLOGIST: CPT

## 2022-08-22 PROCEDURE — 74011250636 HC RX REV CODE- 250/636: Performed by: NURSE ANESTHETIST, CERTIFIED REGISTERED

## 2022-08-22 PROCEDURE — 83735 ASSAY OF MAGNESIUM: CPT

## 2022-08-22 PROCEDURE — 77030021593 HC FCPS BIOP ENDOSC BSC -A: Performed by: INTERNAL MEDICINE

## 2022-08-22 PROCEDURE — 86340 INTRINSIC FACTOR ANTIBODY: CPT

## 2022-08-22 PROCEDURE — 96376 TX/PRO/DX INJ SAME DRUG ADON: CPT

## 2022-08-22 PROCEDURE — 74011250637 HC RX REV CODE- 250/637: Performed by: NURSE PRACTITIONER

## 2022-08-22 PROCEDURE — 84443 ASSAY THYROID STIM HORMONE: CPT

## 2022-08-22 PROCEDURE — 2709999900 HC NON-CHARGEABLE SUPPLY: Performed by: INTERNAL MEDICINE

## 2022-08-22 PROCEDURE — 0DB38ZX EXCISION OF LOWER ESOPHAGUS, VIA NATURAL OR ARTIFICIAL OPENING ENDOSCOPIC, DIAGNOSTIC: ICD-10-PCS | Performed by: INTERNAL MEDICINE

## 2022-08-22 PROCEDURE — 74011250637 HC RX REV CODE- 250/637: Performed by: HOSPITALIST

## 2022-08-22 PROCEDURE — 82525 ASSAY OF COPPER: CPT

## 2022-08-22 PROCEDURE — 83540 ASSAY OF IRON: CPT

## 2022-08-22 PROCEDURE — 74011250636 HC RX REV CODE- 250/636: Performed by: INTERNAL MEDICINE

## 2022-08-22 PROCEDURE — 36415 COLL VENOUS BLD VENIPUNCTURE: CPT

## 2022-08-22 PROCEDURE — 96372 THER/PROPH/DIAG INJ SC/IM: CPT

## 2022-08-22 PROCEDURE — 83921 ORGANIC ACID SINGLE QUANT: CPT

## 2022-08-22 PROCEDURE — 74011000250 HC RX REV CODE- 250: Performed by: NURSE ANESTHETIST, CERTIFIED REGISTERED

## 2022-08-22 PROCEDURE — 70553 MRI BRAIN STEM W/O & W/DYE: CPT

## 2022-08-22 PROCEDURE — 85025 COMPLETE CBC W/AUTO DIFF WBC: CPT

## 2022-08-22 PROCEDURE — 74011000250 HC RX REV CODE- 250: Performed by: HOSPITALIST

## 2022-08-22 PROCEDURE — 74011250637 HC RX REV CODE- 250/637: Performed by: INTERNAL MEDICINE

## 2022-08-22 PROCEDURE — 97530 THERAPEUTIC ACTIVITIES: CPT

## 2022-08-22 PROCEDURE — 76060000031 HC ANESTHESIA FIRST 0.5 HR: Performed by: INTERNAL MEDICINE

## 2022-08-22 PROCEDURE — 84165 PROTEIN E-PHORESIS SERUM: CPT

## 2022-08-22 PROCEDURE — 74011250636 HC RX REV CODE- 250/636: Performed by: HOSPITALIST

## 2022-08-22 PROCEDURE — 88342 IMHCHEM/IMCYTCHM 1ST ANTB: CPT

## 2022-08-22 PROCEDURE — A9577 INJ MULTIHANCE: HCPCS | Performed by: INTERNAL MEDICINE

## 2022-08-22 PROCEDURE — 97165 OT EVAL LOW COMPLEX 30 MIN: CPT

## 2022-08-22 PROCEDURE — 74011000250 HC RX REV CODE- 250: Performed by: INTERNAL MEDICINE

## 2022-08-22 PROCEDURE — G0378 HOSPITAL OBSERVATION PER HR: HCPCS

## 2022-08-22 PROCEDURE — 0DB78ZX EXCISION OF STOMACH, PYLORUS, VIA NATURAL OR ARTIFICIAL OPENING ENDOSCOPIC, DIAGNOSTIC: ICD-10-PCS | Performed by: INTERNAL MEDICINE

## 2022-08-22 PROCEDURE — 76040000019: Performed by: INTERNAL MEDICINE

## 2022-08-22 PROCEDURE — 80053 COMPREHEN METABOLIC PANEL: CPT

## 2022-08-22 PROCEDURE — 74011000258 HC RX REV CODE- 258: Performed by: HOSPITALIST

## 2022-08-22 RX ORDER — SODIUM CHLORIDE, SODIUM LACTATE, POTASSIUM CHLORIDE, CALCIUM CHLORIDE 600; 310; 30; 20 MG/100ML; MG/100ML; MG/100ML; MG/100ML
INJECTION, SOLUTION INTRAVENOUS
Status: DISCONTINUED | OUTPATIENT
Start: 2022-08-22 | End: 2022-08-22 | Stop reason: HOSPADM

## 2022-08-22 RX ORDER — PROPOFOL 10 MG/ML
INJECTION, EMULSION INTRAVENOUS AS NEEDED
Status: DISCONTINUED | OUTPATIENT
Start: 2022-08-22 | End: 2022-08-22 | Stop reason: HOSPADM

## 2022-08-22 RX ORDER — PROPOFOL 10 MG/ML
INJECTION, EMULSION INTRAVENOUS
Status: COMPLETED
Start: 2022-08-22 | End: 2022-08-22

## 2022-08-22 RX ORDER — TRAMADOL HYDROCHLORIDE 50 MG/1
50 TABLET ORAL
Status: DISCONTINUED | OUTPATIENT
Start: 2022-08-22 | End: 2022-08-25 | Stop reason: HOSPADM

## 2022-08-22 RX ORDER — POLYETHYLENE GLYCOL 3350 17 G/17G
17 POWDER, FOR SOLUTION ORAL DAILY
Status: DISCONTINUED | OUTPATIENT
Start: 2022-08-23 | End: 2022-08-25 | Stop reason: HOSPADM

## 2022-08-22 RX ORDER — DOCUSATE SODIUM 100 MG/1
100 CAPSULE, LIQUID FILLED ORAL
Status: DISCONTINUED | OUTPATIENT
Start: 2022-08-22 | End: 2022-08-24

## 2022-08-22 RX ORDER — LIDOCAINE HYDROCHLORIDE 20 MG/ML
INJECTION, SOLUTION EPIDURAL; INFILTRATION; INTRACAUDAL; PERINEURAL AS NEEDED
Status: DISCONTINUED | OUTPATIENT
Start: 2022-08-22 | End: 2022-08-22 | Stop reason: HOSPADM

## 2022-08-22 RX ADMIN — SODIUM CHLORIDE, POTASSIUM CHLORIDE, SODIUM LACTATE AND CALCIUM CHLORIDE: 600; 310; 30; 20 INJECTION, SOLUTION INTRAVENOUS at 11:00

## 2022-08-22 RX ADMIN — PREGABALIN 100 MG: 25 CAPSULE ORAL at 13:40

## 2022-08-22 RX ADMIN — ENOXAPARIN SODIUM 40 MG: 100 INJECTION SUBCUTANEOUS at 13:40

## 2022-08-22 RX ADMIN — SODIUM CHLORIDE, PRESERVATIVE FREE 10 ML: 5 INJECTION INTRAVENOUS at 13:41

## 2022-08-22 RX ADMIN — GADOBENATE DIMEGLUMINE 20 ML: 529 INJECTION, SOLUTION INTRAVENOUS at 12:34

## 2022-08-22 RX ADMIN — SODIUM CHLORIDE, PRESERVATIVE FREE 10 ML: 5 INJECTION INTRAVENOUS at 22:00

## 2022-08-22 RX ADMIN — CYANOCOBALAMIN 1000 MCG: 1000 INJECTION, SOLUTION INTRAMUSCULAR at 14:29

## 2022-08-22 RX ADMIN — LIDOCAINE HYDROCHLORIDE 100 MG: 20 INJECTION, SOLUTION EPIDURAL; INFILTRATION; INTRACAUDAL; PERINEURAL at 11:11

## 2022-08-22 RX ADMIN — PREGABALIN 100 MG: 25 CAPSULE ORAL at 22:23

## 2022-08-22 RX ADMIN — PROPOFOL 50 MG: 10 INJECTION, EMULSION INTRAVENOUS at 11:15

## 2022-08-22 RX ADMIN — PROPOFOL 50 MG: 10 INJECTION, EMULSION INTRAVENOUS at 11:13

## 2022-08-22 RX ADMIN — TRAMADOL HYDROCHLORIDE 50 MG: 50 TABLET, COATED ORAL at 22:23

## 2022-08-22 RX ADMIN — SODIUM CHLORIDE, PRESERVATIVE FREE 20 MG: 5 INJECTION INTRAVENOUS at 22:22

## 2022-08-22 RX ADMIN — SODIUM CHLORIDE, PRESERVATIVE FREE 20 MG: 5 INJECTION INTRAVENOUS at 14:27

## 2022-08-22 RX ADMIN — PROPOFOL 100 MG: 10 INJECTION, EMULSION INTRAVENOUS at 11:11

## 2022-08-22 RX ADMIN — DOCUSATE SODIUM 100 MG: 100 CAPSULE, LIQUID FILLED ORAL at 22:23

## 2022-08-22 RX ADMIN — FOLIC ACID 1 MG: 1 TABLET ORAL at 13:40

## 2022-08-22 RX ADMIN — METHYLPREDNISOLONE SODIUM SUCCINATE 1000 MG: 1 INJECTION, POWDER, LYOPHILIZED, FOR SOLUTION INTRAMUSCULAR; INTRAVENOUS at 14:32

## 2022-08-22 RX ADMIN — METOPROLOL SUCCINATE 25 MG: 25 TABLET, EXTENDED RELEASE ORAL at 13:41

## 2022-08-22 RX ADMIN — AMLODIPINE BESYLATE 5 MG: 5 TABLET ORAL at 13:41

## 2022-08-22 RX ADMIN — SODIUM CHLORIDE, PRESERVATIVE FREE 10 ML: 5 INJECTION INTRAVENOUS at 06:35

## 2022-08-22 RX ADMIN — TRAMADOL HYDROCHLORIDE 50 MG: 50 TABLET, COATED ORAL at 18:28

## 2022-08-22 NOTE — PROGRESS NOTES
OCCUPATIONAL THERAPY EVALUATION  Patient: Smith Anrdew (10 y.o. female)  Date: 8/22/2022  Primary Diagnosis: Gait disturbance [R26.9]  Procedure(s) (LRB):  ESOPHAGOGASTRODUODENOSCOPY (EGD) (N/A)  ESOPHAGOGASTRODUODENAL (EGD) BIOPSY (N/A) Day of Surgery   Precautions: fall risk, Ax2  OOB     ASSESSMENT  Pt is a 48 y/o F with PMH of HTN, anxiety, CAD, fibromyalgia, peripheral artery disease, and neuropathy presenting to [facility] with c/o a fall, admitted 08/19/22 and being treated for severe generalized weakness with inability to ambulate, neuro workup, benign essential HTN, hx of neuropathy, and hypokalemia. Pt reports falling everyday, multiple times per day for the past ~3 months. Pt received semi-supine in bed with nsg passing medications upon arrival, AXO x4, and agreeable to OT evaluation at this time. Per pt report, pt lives alone with her mom next door in duplex apartment with 4 NEPTALI and 0 HR, was IND with ADLs and Mod I using RW for mobility at [facility]. Other DME owned includes: rollator and wheelchair     Based on current observations, pt presents with deficits in generalized strength/AROM, sitting and standing balance, functional activity tolerance, sensation, and FM and GM coordination impacting overall performance of ADLs and functional transfers/mobility. Pt doffs/dons socks while long sitting on bed with CGA. Pt currently requires CGA-min A for sup>sit EOB and scooting. Pt requires min A for sit EOB>sup to guide BLE onto bed. Pt sits EOB with fair static/dynamic sitting balance. Pt attempted to stand x2 attempts using RW with max A, however, pt unable to clear buttocks from EOB so anticipate pt will require Ax2 at this time for safety of pt and clinician during OOB tasks. Pt washes face with set up while seated EOB. Pt demo's decreased FM and GM coordination with ADL tasks and during coordination screening (see chart below for details).  Pt reports BUE sensation deficits of sensitivity to light touch from the elbow to fingertips. Overall, pt tolerates session fair. Pt would benefit from continued skilled OT services to address current impairments and improve IND and safety with self cares and functional transfers/mobility. Recommend discharge to IRF once medically appropriate. Other factors to consider for discharge: home support, PLOF, severity of deficits     Patient will benefit from skilled therapy intervention to address the above noted impairments. PLAN :  Recommendations and Planned Interventions: self care training, functional mobility training, therapeutic exercise, balance training, therapeutic activities, endurance activities, patient education, and home safety training    Frequency/Duration: Patient will be followed by occupational therapy:  3-5x/week to address goals. Recommendation for discharge: (in order for the patient to meet his/her long term goals)  [address] [facility],[address]     This discharge recommendation:  Has been made in collaboration with the attending provider and/or case management    IF patient discharges home will need the following DME: AE: long handled bathing, AE: long handled dressing, bedside commode, and shower chair       SUBJECTIVE:   Patient stated I want to try to stand.     OBJECTIVE DATA SUMMARY:   HISTORY:   Past Medical History:   Diagnosis Date    Anxiety     CAD (coronary artery disease)     Fibromyalgia     Hypertension     Neuropathy     Peripheral artery disease (HCC)     Psychiatric disorder     anxiety     Past Surgical History:   Procedure Laterality Date    HX ANKLE FRACTURE TX Left     surgical implants    HX  SECTION         Expanded or extensive additional review of patient history:     Home Situation  Home Environment: Apartment  # Steps to Enter: 4  Rails to Enter: Yes  Hand Rails : Bilateral  One/Two Story Residence: One story  Living Alone: No  Support Systems: Child(suman) (son)  Patient Expects to be Discharged to[de-identified] Rehab hospital/unit acute  Current DME Used/Available at Home: Walker, rolling, Wheelchair    Hand dominance: Right    EXAMINATION OF PERFORMANCE DEFICITS:  Cognitive/Behavioral Status:  Neurologic State: Alert  Orientation Level: Oriented X4  Cognition: Follows commands    Hearing: Auditory  Auditory Impairment: None    Range of Motion:  AROM: Generally decreased, functional    Strength:  Strength: Generally decreased, functional    Coordination:  Coordination: Generally decreased, functional  Fine Motor Skills-Upper: Left Impaired;Right Impaired    Gross Motor Skills-Upper: Left Impaired;Right Impaired     Finger Opposition Finger to Nose   Right UE  [x] Intact    [] Impaired      Slow, however, intact   [] Intact    [x] Impaired   Left UE [x] Intact    [] Impaired    Slow, however, intact    [] Intact    [x] Impaired         Tone & Sensation:  Sensation: Impaired (Sensitivity to light touch from elbows to fingertips)    Balance:  Sitting: Impaired; With support  Sitting - Static: Fair (occasional)  Sitting - Dynamic: Fair (occasional)  Standing:  (Attempted standing, did not clear buttocks from EOB)    Functional Mobility and Transfers for ADLs:  Bed Mobility:  Supine to Sit: Contact guard assistance;Minimum assistance  Sit to Supine: Minimum assistance  Scooting: Contact guard assistance;Minimum assistance    Transfers:  Sit to Stand: Maximum assistance (Attempted, unable to clear buttocks)  Stand to Sit: Maximum assistance (Attempted, unable to clear buttocks)    ADL Intervention and task modifications:  Grooming  Position Performed: Seated edge of bed  Washing Face: Set-up    Upper Body [address]: Minimum  assistance    Lower Body Dressing Assistance  Socks: Contact guard assistance (Decreased coordination requiring additional time to complete task.)    Therapeutic Exercise:  Pt would benefit from UE HEP initiated at next session in prep for ADLs and functional mobility/transfers. Functional Measure:    [address] [place] [address] AM-PACTM \"6 Clicks\"                                                       Daily Activity Inpatient Short Form  How much help from another person does the patient currently need. .. Total; A Lot A Little None   1. Putting on and taking off regular lower body clothing? []  1 []  2 [x]  3 []  4   2. Bathing (including washing, rinsing, drying)? []  1 [x]  2 []  3 []  4   3. Toileting, which includes using toilet, bedpan or urinal? [] 1 [x]  2 []  3 []  4   4. Putting on and taking off regular upper body clothing? []  1 []  2 [x]  3 []  4   5. Taking care of personal grooming such as brushing teeth? []  1 []  2 [x]  3 []  4   6. Eating meals? []  1 []  2 [x]  3 []  4   © 2007, Trustees of [address] [place] [address], under license to [email or web address]. All rights reserved     Score: 16/24     Interpretation of Tool:  Represents clinically-significant functional categories (i.e. Activities of daily living). Percentage of Impairment CH    0%   CI    1-19% CJ    20-39% CK    40-59% CL    60-79% CM    80-99% CN     100%   [facility]  Score 6-24 24 23 20-22 15-19 10-14 7-9 6         Occupational Therapy Evaluation Charge Determination   History Examination Decision-Making   LOW Complexity : Brief history review  LOW Complexity : 1-3 performance deficits relating to physical, cognitive , or psychosocial skils that result in activity limitations and / or participation restrictions  LOW Complexity : No comorbidities that affect functional and no verbal or physical assistance needed to complete eval tasks       Based on the above components, the patient evaluation is determined to be of the following complexity level: LOW   Pain Rating:  Pt reported headache to nsg while in room, did not rate on 1-10 scale    Activity Tolerance:   Fair and requires rest breaks  Please refer to the flowsheet for vital signs taken during this treatment.     After treatment patient left in no apparent distress:    Supine in bed, Call bell within reach, Bed / chair alarm activated, Side rails x 3, and bed locked and in lowest position    COMMUNICATION/EDUCATION:   The patients plan of care was discussed with: Physical therapist and Registered nurse. Patient/family agree to work toward stated goals and plan of care. This patients plan of care is appropriate for delegation to [facility]. Thank you for this referral.  Sandy Caraballo OT  Time Calculation: 48 mins    Problem: Self Care Deficits Care Plan (Adult)  Goal: *Acute Goals and Plan of Care (Insert Text)  Description: Patient stated goal: \"Move around and do things I used to know how to do. \"    1. Pt will be IND sup <> sit in prep for EOB ADLs  2. Pt will be IND grooming sitting EOB  3. Pt will be IND LE dressing sitting EOB/long sit  4. Pt will be IND sit <>  prep for toileting LRAD  5. Pt will be IND toileting/toilet transfer/cloth mgmt LRAD  6.  Pt will be IND following UE HEP in prep for self care tasks      Outcome: Not Met

## 2022-08-22 NOTE — PROGRESS NOTES
Bedside and Verbal shift change report given to Patty eRed RN (oncoming nurse) by Mabel Shah RN (offgoing nurse). Report included the following information SBAR, Procedure Summary, Intake/Output, and MAR.

## 2022-08-22 NOTE — PROGRESS NOTES
DC Plan: Disposition pending    PT is recommending IRF. Cm met with pt at the bedside to f/up on her plan of care. Cm discussed and educated IRF. Pt expressed concerns with going to rehab and needing to take care of her granddaughter. Pt also indicated she needs a hospital bed. Cm explained to pt that we cannot order her a hospital bed if the plan is for her to go to rehab. Cm informed pt that the CM's at the rehab facility can order the equipment for her. Cm informed pt Cm can set her up with home health and order DME if the plan is for her to go home. Pt reports she can't have anyone coming into her house at this time. Pt indicated she had to throw away her furniture due to bed bugs. Pt did not want to make a decision at this time. Cm informed pt Cm will f/up with her tomorrow.

## 2022-08-22 NOTE — ANESTHESIA POSTPROCEDURE EVALUATION
Procedure(s):  ESOPHAGOGASTRODUODENOSCOPY (EGD)  ESOPHAGOGASTRODUODENAL (EGD) BIOPSY. MAC, total IV anesthesia    Anesthesia Post Evaluation      Multimodal analgesia: multimodal analgesia not used between 6 hours prior to anesthesia start to PACU discharge  Patient location during evaluation: bedside (Endoscopy suite)  Patient participation: complete - patient cannot participate  Level of consciousness: sleepy but conscious  Pain score: 0  Pain management: adequate  Airway patency: patent  Anesthetic complications: no  Cardiovascular status: acceptable  Respiratory status: acceptable and nasal cannula  Hydration status: acceptable  Comments: This patient remained on the stretcher. The patient was handed off to the endoscopy nursing team.  All questions regarding pre-, intra-, and postoperative care were answered. Post anesthesia nausea and vomiting:  none      INITIAL Post-op Vital signs:   Vitals Value Taken Time   /97 08/22/22 1145   Temp 37.1 °C (98.7 °F) 08/22/22 1132   Pulse 73 08/22/22 1146   Resp 22 08/22/22 1146   SpO2 100 % 08/22/22 1146   Vitals shown include unvalidated device data.

## 2022-08-22 NOTE — PROGRESS NOTES
Hospitalist Progress Note         Allansamantha MoreNYDIA, FNP-C    Daily Progress Note: 8/22/2022      Subjective:   Subjective   Patient examined alert and oriented lying in bed. She denies pain on examination. Continues to report overall weakness, legs worse then arms. Review of Systems:   Review of Systems   Constitutional:  Negative for chills and fever. Eyes:  Negative for blurred vision, double vision and photophobia. Respiratory:  Negative for cough, sputum production and shortness of breath. Cardiovascular:  Negative for chest pain, palpitations, orthopnea and leg swelling. Gastrointestinal:  Positive for nausea. Negative for heartburn and vomiting. Genitourinary:  Negative for dysuria, flank pain and hematuria. Neurological:  Positive for tingling, sensory change and weakness. Objective:   Objective      Vitals:  Patient Vitals for the past 12 hrs:   Temp Pulse BP   08/21/22 2240 97.5 °F (36.4 °C) 80 (!) 145/64        Physical Exam:  Physical Exam  Vitals and nursing note reviewed. Constitutional:       Appearance: Normal appearance. HENT:      Mouth/Throat:      Comments: Teeth decay  Eyes:      Extraocular Movements: Extraocular movements intact. Cardiovascular:      Rate and Rhythm: Normal rate. Heart sounds: Normal heart sounds. Pulmonary:      Breath sounds: Normal breath sounds. Abdominal:      General: Bowel sounds are normal.   Musculoskeletal:      Comments: 4/5 in proximal and distal muscles of both arms, 3/5 bilateral dorsi flexion. Bilateral hand  3/5       Skin:     General: Skin is warm and dry. Neurological:      Mental Status: She is alert and oriented to person, place, and time. Comments: Decrease sensation in bilateral legs/feet and arms        Lab Results:  No results found for this or any previous visit (from the past 24 hour(s)).    Results       Procedure Component Value Units Date/Time    COVID-19 RAPID TEST [[id number]] Collected: 08/19/22 2334    Order Status: Completed Specimen: Nasopharyngeal Updated: 08/20/22 0022     COVID-19 rapid test Not Detected        Comment: Rapid Abbott ID Now   Rapid NAAT:  The specimen is NEGATIVE for SARS-CoV-2, the novel coronavirus associated with COVID-19. Negative results should be treated as presumptive and, if inconsistent with clinical signs and symptoms or necessary for patient management, should be tested with an alternative molecular assay. Negative results do not preclude SARS-CoV-2 infection and should not be used as the sole basis for patient management decisions. This test has been authorized by the FDA under   an Emergency Use Authorization (EUA) for use by authorized laboratories.  Fact sheet for Healthcare Providers: [employer] Fact sheet for Patients: [employer]   Methodology: Isothermal Nucleic Acid Amplification                  Diagnostic Images:  CT Results  (Last 48 hours)      None                Current Medications:    Current Facility-Administered Medications:     famotidine (PF) (PEPCID) 20 mg in 0.9% sodium chloride 10 mL injection, 20 mg, IntraVENous, Q12H, Selam Farfan MD, 20 mg at 53/76/01 4352    folic acid (FOLVITE) tablet 1 mg, 1 mg, Oral, DAILY, Jamila Lowery MD, 1 mg at 08/21/22 1207    cyanocobalamin (VITAMIN B12) injection 1,000 mcg, 1,000 mcg, IntraMUSCular, DAILY, Jamila Lowery MD    amLODIPine (NORVASC) tablet 5 mg, 5 mg, Oral, DAILY, Zoe Bruno MD, 5 mg at 08/20/22 0941    metoprolol succinate (TOPROL-XL) XL tablet 25 mg, 25 mg, Oral, DAILY, Zoe Bruno MD, 25 mg at 08/20/22 0941    pregabalin (LYRICA) capsule 100 mg, 100 mg, Oral, TID, Zoe Bruno MD, 100 mg at 08/21/22 2211    sodium chloride (NS) flush 5-40 mL, 5-40 mL, IntraVENous, Q8H, Jayla Phillips MD, 10 mL at 08/22/22 0635    sodium chloride (NS) flush 5-40 mL, 5-40 mL, IntraVENous, PRN, Live Emmanuel MD    acetaminophen (TYLENOL) tablet 650 mg, 650 mg, Oral, Q6H PRN **OR** acetaminophen (TYLENOL) suppository 650 mg, 650 mg, Rectal, Q6H PRN, Live Emmanuel MD    ondansetron (ZOFRAN ODT) tablet 4 mg, 4 mg, Oral, Q6H PRN **OR** ondansetron (ZOFRAN) injection 4 mg, 4 mg, IntraVENous, Q6H PRN, Live Emmanuel MD    enoxaparin (LOVENOX) injection 40 mg, 40 mg, SubCUTAneous, DAILY, Live Emmanuel MD, 40 mg at 08/21/22 1549    oxyCODONE-acetaminophen (PERCOCET) 5-325 mg per tablet 1 Tablet, 1 Tablet, Oral, Q6H PRN, Dawna Humphries MD, 1 Tablet at 08/21/22 2209    baclofen (LIORESAL) tablet 10 mg, 10 mg, Oral, BID PRN, Dawna Humphries MD, 10 mg at 08/20/22 1630    methylPREDNISolone (Solu-MEDROL) 1 g in 100 mL NS MBP, 1,000 mg, IntraVENous, DAILY, Live Emmanuel MD, Last Rate: 100 mL/hr at 08/21/22 0924, 1,000 mg at 08/21/22 4324       ASSESSMENT:  47 y.o. female with a history of peripheral arterial disease, coronary artery disease, anxiety, hypertension presents to the emergency room complaining of progressively worsening weakness that now she is not able to ambulate. Affecting upper and lower extremities with weakness and numbness, started 2 months ago, gradually worsening. She had several emergency room visits since then, following with neurologist outpatient. Was given medications, but no improvement and gradually worsening. She was using her walker before now she is using wheelchair as she is not able to ambulate. Denies any headache, vision disturbance or speech disturbance. The most of the problem is more numbness and weakness, but able to move both upper and lower extremities. Denies any joint swelling or pain. Patient had LP attempt 8/20/22 by Dr. Elmer Leonardo. Unable to complete due to pain. Patient for LP via Fluoro Monday 8/22/22. # 1 Progressive Weakness/Neuropathy  - Etiology? CIDP, Autoimmune, Demyelinating process?   - Neuro-->Dr. Elmer Leonardo   - Rheum-->Dr. Rakan Irwin    - For LP via Fluoro TODAY  - For MRI w/ contrast  - Receiving IV Solu-Medrol 1gm daily     # 2 Multi-level Cervical Spinal Stenoses  - Recent MRI did not reveal myelopathy  - Repeat MRI pending     # 3 Pancytopenia  - Consult Heme  - Obtain iron profile     # 4 Dysphagia w/ N/V  - continue pepcid and prn antiemetics  - GI--> [facility]-->EGD planned TODAY     # 5 Hx of HTN  - BP currently acceptable  - Continue norvasc 5mg    # 6 Fibromyalgia  - Managed with lyrica 100mg tid    #7 Hx PAD/CAD  - Currently not on any medications          Full Code  Dvt Prophylaxis Lovenox  GI Prophylaxis Pepcid  Discharge barriers:  - EGD TODAY  - LUMBAR PUNCTURE WITH FLUORO  - MRI BRAIN  - CM FOR PLACEMENT      Above treatment plan reviewed and discussed with patient in detail at bedside, all questions answered. Care Plan discussed with: Patient/Family    Total time spent with patient: 30 minutes.     Lady Ludwig NP

## 2022-08-22 NOTE — PROGRESS NOTES
Neurology Progress Note    Patient ID:  Cydney Horvath  [id number]  77 y.o.  1970    Subjective: I talked to Ms. Jacki Hassan this morning. She was awake and alert. She verbalized she started experiencing weakness mostly in the lower extremities bilaterally in 2021 but did not do anything about it. She hopes they find what is going on with her so she can walk again. She denied any pain. Ms. Cydney Horvath is a 71-year-old woman with history of fibromyalgia, hypertension who has been followed in the clinic by Dr. Grant Gracia for ascending pain and dysesthesias as well as weakness for the past 2 months. She said it started in her feet and has ascended upwards to the point that now she is falling much more frequently even with a walker. At that clinic appointment, cervical myelopathy was suspected. MRI of the cervical spine was ordered which came back as unrevealing with no evidence of myelopathy. She is clinically becoming worse and called the office and was advised to come to the ER. She has severe pain in her legs as well as cramping and continues to get weaker.     Current Facility-Administered Medications   Medication Dose Route Frequency    famotidine (PF) (PEPCID) 20 mg in 0.9% sodium chloride 10 mL injection  20 mg IntraVENous [id number]    folic acid (FOLVITE) tablet 1 mg  1 mg Oral DAILY    cyanocobalamin (VITAMIN B12) injection 1,000 mcg  1,000 mcg IntraMUSCular DAILY    amLODIPine (NORVASC) tablet 5 mg  5 mg Oral DAILY    metoprolol succinate (TOPROL-XL) XL tablet 25 mg  25 mg Oral DAILY    pregabalin (LYRICA) capsule 100 mg  100 mg Oral TID    sodium chloride (NS) flush 5-40 mL  5-40 mL IntraVENous Q8H    sodium chloride (NS) flush 5-40 mL  5-40 mL IntraVENous PRN    acetaminophen (TYLENOL) tablet 650 mg  650 mg Oral Q6H PRN    Or    acetaminophen (TYLENOL) suppository 650 mg  650 mg Rectal Q6H PRN    ondansetron (ZOFRAN ODT) tablet 4 mg  4 mg Oral Q6H PRN    Or    ondansetron (ZOFRAN) injection 4 mg  4 mg IntraVENous Q6H PRN    enoxaparin (LOVENOX) injection 40 mg  40 mg SubCUTAneous DAILY    oxyCODONE-acetaminophen (PERCOCET) 5-325 mg per tablet 1 Tablet  1 Tablet Oral Q6H PRN    baclofen (LIORESAL) tablet 10 mg  10 mg Oral BID PRN    methylPREDNISolone (Solu-MEDROL) 1 g in 100 mL NS MBP  1,000 mg IntraVENous DAILY          Objective: Ms. Charma Hodgkins was alert and oriented x 4. Obeys commands on all upper and lower extremity bilaterally. Minimal weakness noted on the upper extremity symmetrically but more so on the lower extremity. No data found. No intake/output data recorded. 08/20 1901 - 08/22 0700  In: 480 [P.O.:480]  Out: 500 [Urine:500]    Lab Review No results found for this or any previous visit (from the past 24 hour(s)). NEUROLOGICAL EXAM:    Appearance: The patient is well developed, well nourished, provides a coherent history and is in no acute distress. Mental Status: Oriented to time, place and person. Mood and affect appropriate. Cranial Nerves:   Intact visual fields. MEGHNA, EOM's full, no nystagmus, no ptosis. Facial sensation is normal.  Facial movement is symmetric. Hearing is normal bilaterally. Palate is midline with normal sternocleidomastoid and trapezius muscles are normal. Tongue is midline. Motor:  4/5 strength in upper proxima and distal muscles. 3/5 in lower proximal and distal muscles. Normal bulk and tone. Reflexes:   Deep tendon reflexes 1+ bilateral brachioradialis, 0 in patellar and achilles bilaterally. Sensory:   Decreased sensation in both legs and arms. Absent proprioception in foot bilaterally. Gait:  Deferred   Tremor:   No tremor noted. Cerebellar:  No cerebellar signs present. Babinski:  Down b/l         Assessment: Ms. Charma Hodgkins is a 47year old female with ascending dysesthesias ans weakness for the past 2 months. Cervical Myelopathy rule out with cervical MRI. Lumbar puncture was attempted yesterday and patient couldn't tolerate procedure.  Differential diagnosis is CIDP vs. Demyelinating vs. Inflammatory disorder of CNS. Active Problems:    Gait disturbance (8/20/2022)        Plan:  - MRI brain with and without contrast pending.  - LP fluoroscopy guided today to look for oligoclonal banding.  - Continue Vitamin B12 1000 mcg IM. - Continue 1 mg Folic acid daily. - Continue Solu medrol 1g daily. - Continue Lyrica 200 mg BID , Percocet 5- 325 mg for the next 2 days as needed and baclofen 10 mg BID as needed. - Will continue to follow patient clinically.      Thank you for the consult,    Signed:  Colt Maxwell  8/22/2022  9:19 AM     Collaborating Physician:  Dr. Robbin Paulino

## 2022-08-22 NOTE — PROGRESS NOTES
Progress Note  Date:2022       Room:[facility]  Patient Rocio Dalton     YOB: 1970     Age:52 y.o. Subjective    Subjective:  Symptoms:  She reports weakness. There were no acute events overnight. Imaging guided lumbar puncture is planned for today. The patient is receiving IV solumedrol. Review of Systems   Constitutional:  Positive for activity change and fatigue. HENT: Negative. Eyes: Negative. Respiratory: Negative. Cardiovascular: Negative. Gastrointestinal: Negative. Endocrine: Negative. Genitourinary:         Incontinence. Musculoskeletal:  Positive for myalgias. Skin: Negative. Allergic/Immunologic: Negative. Neurological:  Positive for weakness and numbness. Hematological: Negative. Psychiatric/Behavioral: Negative. Objective         Vitals Last 24 Hours:  TEMPERATURE:  Temp  Av.8 °F (36.6 °C)  Min: 97.2 °F (36.2 °C)  Max: 98.7 °F (37.1 °C)  RESPIRATIONS RANGE: Resp  Avg: 15.6  Min: 11  Max: 19  PULSE OXIMETRY RANGE: SpO2  Av.7 %  Min: 94 %  Max: 100 %  PULSE RANGE: Pulse  Av.8  Min: 73  Max: 85  BLOOD PRESSURE RANGE: Systolic (12VEE), UYO:129 , Min:91 , SOW:479   ; Diastolic (49FHT), RPP:81, Min:64, Max:97    I/O (24Hr): Intake/Output Summary (Last 24 hours) at 2022 1842  Last data filed at 2022 1136  Gross per 24 hour   Intake 170 ml   Output 1025 ml   Net -855 ml     Objective:  General Appearance:  Comfortable. Vital signs: (most recent): Blood pressure (!) 142/97, pulse 85, temperature 97.9 °F (36.6 °C), resp. rate 16, height 5' 11\" (1.803 m), weight 119.3 kg (263 lb), SpO2 99 %, not currently breastfeeding. Vital signs are normal.    HEENT: Normal HEENT exam.    Lungs:  Normal effort. Heart: Normal rate. Abdomen: Abdomen is soft. Extremities: Normal range of motion. Neurological: Patient is alert and oriented to person, place and time.     Pupils: Pupils are equal, round, and reactive to light. Skin:  Warm. Labs/Imaging/Diagnostics    Labs:  CBC:  Recent Labs     08/22/22  1338 08/21/22  0729 08/20/22  1147   WBC 6.7 2.5* 3.5*   RBC 2.67* 2.52* 2.82*   HGB 8.7* 8.1* 9.0*   HCT 26.3* 25.2* 27.8*   MCV 98.5 100.0* 98.6   RDW 15.1* 15.2* 15.1*    134* 154       CHEMISTRIES:  Recent Labs     08/22/22  1338 08/21/22  0729 08/20/22  1146 08/19/22  2334    137 136 133*   K 3.2* 3.4* 3.2* 3.0*    101 101 97   CO2 29 30 29 27   BUN 8 8 9 12   CA 9.1 8.5 8.8 8.6   PHOS  --   --  3.3  --    MG 1.8 1.8  --  1.7     PT/INR:No results for input(s): INR, INREXT, INREXT in the last 72 hours. No lab exists for component: PROTIME  APTT:No results for input(s): APTT in the last 72 hours. LIVER PROFILE:  Recent Labs     08/22/22  1338 08/21/22  0729 08/19/22  2334   AST 24 26 27   ALT 23 20 21       Lab Results   Component Value Date/Time    ALT (SGPT) 23 08/22/2022 01:38 PM    AST (SGOT) 24 08/22/2022 01:38 PM    Alk. phosphatase 91 08/22/2022 01:38 PM    Bilirubin, total 0.8 08/22/2022 01:38 PM       Imaging Last 24 Hours:  MRI BRAIN W WO CONT    Result Date: 8/22/2022  EXAM:  MRI BRAIN W WO CONT INDICATION:    tetraparesis COMPARISON:  CT head 6/1/2022. CONTRAST: 20 ml Dotarem. TECHNIQUE:  Multiplanar multisequence acquisition without and with contrast of the brain. FINDINGS: The ventricles are normal in size and position. The brain parenchyma has normal signal characteristics for age. There is no acute infarct, hemorrhage, extra-axial fluid collection, or mass effect. There is no cerebellar tonsillar herniation. Expected arterial flow-voids are present. No evidence of abnormal enhancement. The paranasal sinuses are clear. Small bilateral mastoid effusions. The orbital contents are within normal limits. No significant osseous or scalp lesions are identified. 1. No acute or significant intracranial abnormality.     Assessment//Plan   Principal Problem:    Gait disturbance (8/20/2022)    Assessment:   (This is a 46 yo female with 2 months of progressive weakness of the extremities with pain and episodes of falling. Neurology is also following the patient and are conducting and additional work up. 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 LENIN is still pending. Muscle enzyme levels are normal.   ). Plan:    (-We will monitor for the remainder of the antibody testing that is still pending.  -We appreciate the assistance of neurology.   -Agree with solumedrol under control of neurology. Thank you for the consult. We will continue to follow the patient. Please feel free to contact us with any questions. ).      Electronically signed by Shady Perkins MD on 8/22/2022 at 8:33 AM

## 2022-08-22 NOTE — PROGRESS NOTES
Progress Note  Date:2022       Room:[facility]  Patient Nenita Shaw     YOB: 1970     Age:52 y.o. Subjective    No new complaints. Objective         Vitals Last 24 Hours:  TEMPERATURE:  Temp  Av.8 °F (36.6 °C)  Min: 97.2 °F (36.2 °C)  Max: 98.7 °F (37.1 °C)  RESPIRATIONS RANGE: Resp  Avg: 15.9  Min: 11  Max: 19  PULSE OXIMETRY RANGE: SpO2  Av.4 %  Min: 94 %  Max: 100 %  PULSE RANGE: Pulse  Av.4  Min: 73  Max: 85  BLOOD PRESSURE RANGE: Systolic (46ZPQ), WW , Min:91 , TBQ:622   ; Diastolic (44JMG), YNA:70, Min:64, Max:97    I/O (24Hr): Intake/Output Summary (Last 24 hours) at 2022 1444  Last data filed at 2022 1136  Gross per 24 hour   Intake 170 ml   Output 1025 ml   Net -855 ml     Objective  Labs/Imaging/Diagnostics    Labs:  CBC:  Recent Labs     22  1338 22  0729 22  1147   WBC 6.7 2.5* 3.5*   RBC 2.67* 2.52* 2.82*   HGB 8.7* 8.1* 9.0*   HCT 26.3* 25.2* 27.8*   MCV 98.5 100.0* 98.6   RDW 15.1* 15.2* 15.1*    134* 154     CHEMISTRIES:  Recent Labs     22  1338 22  0729 22  1146 22  2334    137 136 133*   K 3.2* 3.4* 3.2* 3.0*    101 101 97   CO2 29 30 29 27   BUN 8 8 9 12   CA 9.1 8.5 8.8 8.6   PHOS  --   --  3.3  --    MG 1.8 1.8  --  1.7   PT/INR:No results for input(s): INR, INREXT in the last 72 hours. No lab exists for component: PROTIME  APTT:No results for input(s): APTT in the last 72 hours. LIVER PROFILE:  Recent Labs     22  1338 22  0729 22  2334   AST 24 26 27   ALT 23 20 21     Lab Results   Component Value Date/Time    ALT (SGPT) 23 2022 01:38 PM    AST (SGOT) 24 2022 01:38 PM    Alk.  phosphatase 91 2022 01:38 PM    Bilirubin, total 0.8 2022 01:38 PM       Imaging Last 24 Hours:  MRI BRAIN W WO CONT    Result Date: 2022  EXAM:  MRI BRAIN W WO CONT INDICATION:    tetraparesis COMPARISON:  CT head 6/1/2022. CONTRAST: 20 ml Dotarem. TECHNIQUE:  Multiplanar multisequence acquisition without and with contrast of the brain. FINDINGS: The ventricles are normal in size and position. The brain parenchyma has normal signal characteristics for age. There is no acute infarct, hemorrhage, extra-axial fluid collection, or mass effect. There is no cerebellar tonsillar herniation. Expected arterial flow-voids are present. No evidence of abnormal enhancement. The paranasal sinuses are clear. Small bilateral mastoid effusions. The orbital contents are within normal limits. No significant osseous or scalp lesions are identified. 1. No acute or significant intracranial abnormality. Assessment//Plan   Principal Problem:    Gait disturbance (8/20/2022)      Assessment & Plan  Pancytopenia:WBC and platelets normal.Hb improving.most likely Folate def and low normal B12  -start supplementation  -assess iron/copper/zinc levels  -check MMA and intrinsic factor Abs  -denies alcohol as contributing factor  -likely cause of or contributing to the mild pancytopenia given the concurrrent macrocytosis seen on RBCs  Check peripheral smear and SPEP. 2. Weakness of both arms and legs:  -active with neurology for this. MRI head negative. plans for LP noted. 3. Declining gait and functional status  -felt to be related to #2     4. CAD     5. Anxiety      6. Tobacco abuse:pt advised to quit.      Electronically signed by Wing Re MD on 8/22/2022 at 2:44 PM

## 2022-08-22 NOTE — PROGRESS NOTES
Called and left message for Dr. Brandi Murrieta about giving patient her medications while on NPO status.

## 2022-08-22 NOTE — PROGRESS NOTES
Progress Note    Patient: Lorenza Lim MRN: [id number]  SSN: [id number]    YOB: 1970  Age: 47 y.o. Sex: female      Admit Date: 8/19/2022    LOS: 0 days     Subjective:   GI in consultation for difficulty swallowing    8/22/22: Patient underwent EGD today showing Schatzki's ring distal third of esophagus. Biopsy was taken. Gastritis chronic without bleeding. Advance diet as tolerated. She states she does smoke about 1/2 PPD of cigarettes and has a history of alcohol abuse but no longer drinks alcohol. She reports she has not had a BM in 3 months. Her abdomen is soft and non-tender. Bowel sounds are active. She denies nausea and vomiting. Start Miralax daily, and colace at bedtime. EGD  8/22/22: showing Schatzki's ring distal third of esophagus. Biopsy was taken. Gastritis chronic without bleeding. History of Present Illness: Lorenza Lim is a 47 y.o. female who is seen in consultation for difficulty swallowing. Patient was admitted to the hospital with recurrent falls and has history of peripheral neuropathy and is being worked up by neurology. She also complains of anorexia, difficulty swallowing and vomiting. She does not have any history of gallstone disease but she has history of excessive alcohol consumption up until 2 months ago she says that she has cut down her drinking. She also smokes about a pack of cigarettes a day. She does not have any fever or chills she does not have any history of diabetes but have history of peripheral vascular disease and anxiety.       Objective:     Vitals:    08/22/22 1135 08/22/22 1140 08/22/22 1145 08/22/22 1302   BP: 91/67 136/89 (!) 138/97 (!) 142/97   Pulse: 79 74 73 85   Resp: 19 19 11 16   Temp:    97.9 °F (36.6 °C)   SpO2: 100% 100% 100% 99%   Weight:       Height:            Intake and Output:  Current Shift: 08/22 0701 - 08/22 1900  In: 50 [I.V.:50]  Out: 525 [Urine:525]  Last three shifts: 08/20 1901 - 08/22 0700  In: 480 [P.O.:480]  Out: 500 [Urine:500]    Physical Exam:   Skin:  Extremities and face reveal no rashes. No jack erythema. HEENT: Sclerae anicteric. Extra-occular muscles are intact. No abnormal pigmentation of the lips. The neck is supple. Cardiovascular: Regular rate and rhythm. Respiratory:  Comfortable breathing with no accessory muscle use. GI:  Abdomen nondistended, soft, and nontender. No enlargement of the liver or spleen. No masses palpable. Rectal:  Deferred  Musculoskeletal: Generalized weakness  Neurological:  Gross memory appears intact. Patient is alert and oriented. Psychiatric:  Mood appears appropriate with judgement intact. Lymphatic:  No visible adenopathy      Lab/Data Review:  Recent Results (from the past 24 hour(s))   METABOLIC PANEL, COMPREHENSIVE    Collection Time: 08/22/22  1:38 PM   Result Value Ref Range    Sodium 137 136 - 145 mmol/L    Potassium 3.2 (L) 3.5 - 5.1 mmol/L    Chloride 101 97 - 108 mmol/L    CO2 29 21 - 32 mmol/L    Anion gap 7 5 - 15 mmol/L    Glucose 136 (H) 65 - 100 mg/dL    BUN 8 6 - 20 mg/dL    Creatinine 0.66 0.55 - 1.02 mg/dL    BUN/Creatinine ratio 12 12 - 20      GFR est AA >60 >60 ml/min/1.73m2    GFR est non-AA >60 >60 ml/min/1.73m2    Calcium 9.1 8.5 - 10.1 mg/dL    Bilirubin, total 0.8 0.2 - 1.0 mg/dL    AST (SGOT) 24 15 - 37 U/L    ALT (SGPT) 23 12 - 78 U/L    Alk.  phosphatase 91 45 - 117 U/L    Protein, total 7.4 6.4 - 8.2 g/dL    Albumin 3.2 (L) 3.5 - 5.0 g/dL    Globulin 4.2 (H) 2.0 - 4.0 g/dL    A-G Ratio 0.8 (L) 1.1 - 2.2     CBC WITH AUTOMATED DIFF    Collection Time: 08/22/22  1:38 PM   Result Value Ref Range    WBC 6.7 3.6 - 11.0 K/uL    RBC 2.67 (L) 3.80 - 5.20 M/uL    HGB 8.7 (L) 11.5 - 16.0 g/dL    HCT 26.3 (L) 35.0 - 47.0 %    MCV 98.5 80.0 - 99.0 FL    MCH 32.6 26.0 - 34.0 PG    MCHC 33.1 30.0 - 36.5 g/dL    RDW 15.1 (H) 11.5 - 14.5 %    PLATELET 149 401 - 678 K/uL    MPV 9.3 8.9 - 12.9 FL    NRBC 0.0 0.0  WBC    ABSOLUTE NRBC 0.00 0.00 - 0.01 K/uL NEUTROPHILS 92 (H) 32 - 75 %    LYMPHOCYTES 7 (L) 12 - 49 %    MONOCYTES 1 (L) 5 - 13 %    EOSINOPHILS 0 0 - 7 %    BASOPHILS 0 0 - 1 %    IMMATURE GRANULOCYTES 0 0 - 0.5 %    ABS. NEUTROPHILS 6.1 1.8 - 8.0 K/UL    ABS. LYMPHOCYTES 0.5 (L) 0.8 - 3.5 K/UL    ABS. MONOCYTES 0.0 0.0 - 1.0 K/UL    ABS. EOSINOPHILS 0.0 0.0 - 0.4 K/UL    ABS. BASOPHILS 0.0 0.0 - 0.1 K/UL    ABS. IMM. GRANS. 0.0 0.00 - 0.04 K/UL    DF AUTOMATED     MAGNESIUM    Collection Time: 08/22/22  1:38 PM   Result Value Ref Range    Magnesium 1.8 1.6 - 2.4 mg/dL   TSH 3RD GENERATION    Collection Time: 08/22/22  1:38 PM   Result Value Ref Range    TSH 0.57 0.36 - 3.74 uIU/mL              MRI BRAIN W WO CONT   Final Result      1. No acute or significant intracranial abnormality. IR FLUORO GUIDED NEEDLE PLACEMENT    (Results Pending)   XR SPINAL PUNC LUMB DX    (Results Pending)       Assessment:     Principal Problem:    Gait disturbance (8/20/2022)        Plan:   Dysphagia      EGD  8/22/22: showing Schatzki's ring distal third of esophagus. Biopsy was taken. Gastritis chronic without bleeding. Advance diet as tolerated  2. Constipation      Miralax daily       Colace at bedtime    Thank you for allowing me to participate in this patients care. Plan discussed with Dr. Derick Mills and he approves.     Signed By: Tony Chu NP     August 22, 2022

## 2022-08-22 NOTE — ANESTHESIA PREPROCEDURE EVALUATION
Relevant Problems   No relevant active problems       Anesthetic History   No history of anesthetic complications            Review of Systems / Medical History  Patient summary reviewed and pertinent labs reviewed    Pulmonary          Smoker         Neuro/Psych         Psychiatric history     Cardiovascular    Hypertension          CAD      Comments: Normal sinus rhythm   Inferior-posterior infarct (cited on or before 01-JUN-2022)   Prolonged QT   Abnormal ECG   When compared with ECG of 30-JUL-2022 22:27,   Nonspecific T wave abnormality has replaced inverted T waves in Anterior   leads   Confirmed by Paula Corea ([id number]) on 8/19/2022 9:40:55 AM    GI/Hepatic/Renal  Within defined limits             Comments: GIB Endo/Other        Obesity and anemia     Other Findings            Lab Results   Component Value Date/Time    HGB 8.1 (L) 08/21/2022 07:29 AM         Physical Exam    Airway  Mallampati: III    Neck ROM: normal range of motion        Cardiovascular    Rhythm: regular  Rate: normal         Dental    Dentition: Edentulous     Pulmonary  Breath sounds clear to auscultation               Abdominal         Other Findings            Anesthetic Plan    ASA: 2  Anesthesia type: MAC and total IV anesthesia    Monitoring Plan: Continuous noninvasive hemodynamic monitoring      Induction: Intravenous  Anesthetic plan and risks discussed with: Patient

## 2022-08-23 LAB
COPPER SERPL-MCNC: 125 UG/DL (ref 80–158)
IF BLOCK AB SER-ACNC: 2.1 AU/ML (ref 0–1.1)
ZINC SERPL-MCNC: 49 UG/DL (ref 44–115)

## 2022-08-23 PROCEDURE — G0378 HOSPITAL OBSERVATION PER HR: HCPCS

## 2022-08-23 PROCEDURE — 96372 THER/PROPH/DIAG INJ SC/IM: CPT

## 2022-08-23 PROCEDURE — 74011250637 HC RX REV CODE- 250/637: Performed by: NURSE PRACTITIONER

## 2022-08-23 PROCEDURE — 74011250637 HC RX REV CODE- 250/637: Performed by: INTERNAL MEDICINE

## 2022-08-23 PROCEDURE — 74011000250 HC RX REV CODE- 250: Performed by: INTERNAL MEDICINE

## 2022-08-23 PROCEDURE — 74011250636 HC RX REV CODE- 250/636: Performed by: HOSPITALIST

## 2022-08-23 PROCEDURE — 74011250636 HC RX REV CODE- 250/636: Performed by: INTERNAL MEDICINE

## 2022-08-23 PROCEDURE — 74011000250 HC RX REV CODE- 250: Performed by: HOSPITALIST

## 2022-08-23 PROCEDURE — 97530 THERAPEUTIC ACTIVITIES: CPT

## 2022-08-23 PROCEDURE — 97535 SELF CARE MNGMENT TRAINING: CPT

## 2022-08-23 PROCEDURE — 96376 TX/PRO/DX INJ SAME DRUG ADON: CPT

## 2022-08-23 PROCEDURE — 74011000258 HC RX REV CODE- 258: Performed by: HOSPITALIST

## 2022-08-23 PROCEDURE — 74011250637 HC RX REV CODE- 250/637: Performed by: HOSPITALIST

## 2022-08-23 RX ORDER — POTASSIUM CHLORIDE 20 MEQ/1
40 TABLET, EXTENDED RELEASE ORAL 2 TIMES DAILY
Status: DISPENSED | OUTPATIENT
Start: 2022-08-23 | End: 2022-08-24

## 2022-08-23 RX ADMIN — PREGABALIN 100 MG: 25 CAPSULE ORAL at 09:10

## 2022-08-23 RX ADMIN — ENOXAPARIN SODIUM 40 MG: 100 INJECTION SUBCUTANEOUS at 09:09

## 2022-08-23 RX ADMIN — TRAMADOL HYDROCHLORIDE 50 MG: 50 TABLET, COATED ORAL at 21:03

## 2022-08-23 RX ADMIN — CYANOCOBALAMIN 1000 MCG: 1000 INJECTION, SOLUTION INTRAMUSCULAR at 09:00

## 2022-08-23 RX ADMIN — SODIUM CHLORIDE, PRESERVATIVE FREE 10 ML: 5 INJECTION INTRAVENOUS at 05:45

## 2022-08-23 RX ADMIN — PREGABALIN 100 MG: 25 CAPSULE ORAL at 16:33

## 2022-08-23 RX ADMIN — METOPROLOL SUCCINATE 25 MG: 25 TABLET, EXTENDED RELEASE ORAL at 09:09

## 2022-08-23 RX ADMIN — TRAMADOL HYDROCHLORIDE 50 MG: 50 TABLET, COATED ORAL at 14:41

## 2022-08-23 RX ADMIN — PREGABALIN 100 MG: 25 CAPSULE ORAL at 21:03

## 2022-08-23 RX ADMIN — POLYETHYLENE GLYCOL 3350 17 G: 17 POWDER, FOR SOLUTION ORAL at 09:09

## 2022-08-23 RX ADMIN — FOLIC ACID 1 MG: 1 TABLET ORAL at 09:11

## 2022-08-23 RX ADMIN — SODIUM CHLORIDE, PRESERVATIVE FREE 20 MG: 5 INJECTION INTRAVENOUS at 21:04

## 2022-08-23 RX ADMIN — METHYLPREDNISOLONE SODIUM SUCCINATE 1000 MG: 1 INJECTION, POWDER, LYOPHILIZED, FOR SOLUTION INTRAMUSCULAR; INTRAVENOUS at 09:10

## 2022-08-23 RX ADMIN — AMLODIPINE BESYLATE 5 MG: 5 TABLET ORAL at 09:09

## 2022-08-23 RX ADMIN — SODIUM CHLORIDE, PRESERVATIVE FREE 20 MG: 5 INJECTION INTRAVENOUS at 09:09

## 2022-08-23 RX ADMIN — SODIUM CHLORIDE, PRESERVATIVE FREE 10 ML: 5 INJECTION INTRAVENOUS at 21:09

## 2022-08-23 RX ADMIN — SODIUM CHLORIDE, PRESERVATIVE FREE 10 ML: 5 INJECTION INTRAVENOUS at 16:33

## 2022-08-23 RX ADMIN — POTASSIUM CHLORIDE 40 MEQ: 1500 TABLET, EXTENDED RELEASE ORAL at 14:41

## 2022-08-23 NOTE — PROGRESS NOTES
Neurology Progress Note    Patient ID:  Sam Casas  [id number]  24 y.o.  1970    Subjective: I talked to Ms. Miriam Fenton this morning. She was awake and alert. She verbalized she did not do LP test yesterday due to frustration with everything but she will get it done when she is ready. She verbalized pain on the left foot . Pain has been well managed with Percocet but medication was stopped yesterday. Ms. Sam Casas is a 76-year-old woman with history of fibromyalgia, hypertension who has been followed in the clinic by Dr. Geena Devlin for ascending pain and dysesthesias as well as weakness for the past 2 months. She said it started in her feet and has ascended upwards to the point that now she is falling much more frequently even with a walker. At that clinic appointment, cervical myelopathy was suspected. MRI of the cervical spine was ordered which came back as unrevealing with no evidence of myelopathy. She is clinically becoming worse and called the office and was advised to come to the ER. She has severe pain in her legs as well as cramping and continues to get weaker.     Current Facility-Administered Medications   Medication Dose Route Frequency    polyethylene glycol (MIRALAX) packet 17 g  17 g Oral DAILY    docusate sodium (COLACE) capsule 100 mg  100 mg Oral QHS    traMADoL (ULTRAM) tablet 50 mg  50 mg Oral Q8H PRN    famotidine (PF) (PEPCID) 20 mg in 0.9% sodium chloride 10 mL injection  20 mg IntraVENous B19R    folic acid (FOLVITE) tablet 1 mg  1 mg Oral DAILY    cyanocobalamin (VITAMIN B12) injection 1,000 mcg  1,000 mcg IntraMUSCular DAILY    amLODIPine (NORVASC) tablet 5 mg  5 mg Oral DAILY    metoprolol succinate (TOPROL-XL) XL tablet 25 mg  25 mg Oral DAILY    pregabalin (LYRICA) capsule 100 mg  100 mg Oral TID    sodium chloride (NS) flush 5-40 mL  5-40 mL IntraVENous Q8H    sodium chloride (NS) flush 5-40 mL  5-40 mL IntraVENous PRN    acetaminophen (TYLENOL) tablet 650 mg  650 mg Oral Q6H PRN Or    acetaminophen (TYLENOL) suppository 650 mg  650 mg Rectal Q6H PRN    ondansetron (ZOFRAN ODT) tablet 4 mg  4 mg Oral Q6H PRN    Or    ondansetron (ZOFRAN) injection 4 mg  4 mg IntraVENous Q6H PRN    enoxaparin (LOVENOX) injection 40 mg  40 mg SubCUTAneous DAILY    baclofen (LIORESAL) tablet 10 mg  10 mg Oral BID PRN    methylPREDNISolone (Solu-MEDROL) 1 g in 100 mL NS MBP  1,000 mg IntraVENous DAILY          Objective: Ms. Alfa Vasquez remained alert and oriented x 4. Obeys commands on all upper and lower extremity bilaterally. Minimal weakness noted on the upper extremity symmetrically but more so on the left lower extremity. Pain on the left foot with light touch. Patient Vitals for the past 8 hrs:   BP Temp Pulse Resp SpO2   08/23/22 0746 121/83 97.6 °F (36.4 °C) 85 16 100 %       No intake/output data recorded. 08/21 1901 - 08/23 0700  In: 170 [P.O.:120; I.V.:50]  Out: 1025 [Urine:1025]    Lab Review   Recent Results (from the past 24 hour(s))   METABOLIC PANEL, COMPREHENSIVE    Collection Time: 08/22/22  1:38 PM   Result Value Ref Range    Sodium 137 136 - 145 mmol/L    Potassium 3.2 (L) 3.5 - 5.1 mmol/L    Chloride 101 97 - 108 mmol/L    CO2 29 21 - 32 mmol/L    Anion gap 7 5 - 15 mmol/L    Glucose 136 (H) 65 - 100 mg/dL    BUN 8 6 - 20 mg/dL    Creatinine 0.66 0.55 - 1.02 mg/dL    BUN/Creatinine ratio 12 12 - 20      GFR est AA >60 >60 ml/min/1.73m2    GFR est non-AA >60 >60 ml/min/1.73m2    Calcium 9.1 8.5 - 10.1 mg/dL    Bilirubin, total 0.8 0.2 - 1.0 mg/dL    AST (SGOT) 24 15 - 37 U/L    ALT (SGPT) 23 12 - 78 U/L    Alk.  phosphatase 91 45 - 117 U/L    Protein, total 7.4 6.4 - 8.2 g/dL    Albumin 3.2 (L) 3.5 - 5.0 g/dL    Globulin 4.2 (H) 2.0 - 4.0 g/dL    A-G Ratio 0.8 (L) 1.1 - 2.2     CBC WITH AUTOMATED DIFF    Collection Time: 08/22/22  1:38 PM   Result Value Ref Range    WBC 6.7 3.6 - 11.0 K/uL    RBC 2.67 (L) 3.80 - 5.20 M/uL    HGB 8.7 (L) 11.5 - 16.0 g/dL    HCT 26.3 (L) 35.0 - 47.0 %    MCV 98.5 80.0 - 99.0 FL    MCH 32.6 26.0 - 34.0 PG    MCHC 33.1 30.0 - 36.5 g/dL    RDW 15.1 (H) 11.5 - 14.5 %    PLATELET 316 067 - 869 K/uL    MPV 9.3 8.9 - 12.9 FL    NRBC 0.0 0.0  WBC    ABSOLUTE NRBC 0.00 0.00 - 0.01 K/uL    NEUTROPHILS 92 (H) 32 - 75 %    LYMPHOCYTES 7 (L) 12 - 49 %    MONOCYTES 1 (L) 5 - 13 %    EOSINOPHILS 0 0 - 7 %    BASOPHILS 0 0 - 1 %    IMMATURE GRANULOCYTES 0 0 - 0.5 %    ABS. NEUTROPHILS 6.1 1.8 - 8.0 K/UL    ABS. LYMPHOCYTES 0.5 (L) 0.8 - 3.5 K/UL    ABS. MONOCYTES 0.0 0.0 - 1.0 K/UL    ABS. EOSINOPHILS 0.0 0.0 - 0.4 K/UL    ABS. BASOPHILS 0.0 0.0 - 0.1 K/UL    ABS. IMM. GRANS. 0.0 0.00 - 0.04 K/UL    DF AUTOMATED     MAGNESIUM    Collection Time: 08/22/22  1:38 PM   Result Value Ref Range    Magnesium 1.8 1.6 - 2.4 mg/dL   IRON PROFILE    Collection Time: 08/22/22  1:38 PM   Result Value Ref Range    Iron 30 (L) 35 - 150 ug/dL    TIBC 172 (L) 250 - 450 ug/dL    Iron % saturation 17 (L) 20 - 50 %   TSH 3RD GENERATION    Collection Time: 08/22/22  1:38 PM   Result Value Ref Range    TSH 0.57 0.36 - 3.74 uIU/mL         NEUROLOGICAL EXAM:    Appearance: The patient is well developed, well nourished, provides a coherent history and is in no acute distress. Mental Status: Oriented to time, place and person. Mood and affect appropriate. Cranial Nerves:   Intact visual fields. MEGHNA, EOM's full, no nystagmus, no ptosis. Facial sensation is normal.  Facial movement is symmetric. Hearing is normal bilaterally. Palate is midline with normal sternocleidomastoid and trapezius muscles are normal. Tongue is midline. Motor:  4/5 strength in upper proxima and distal muscles. 3/5 in lower proximal and distal muscles. Normal bulk and tone. Reflexes:   Deep tendon reflexes 1+ bilateral brachioradialis, 0 in patellar and achilles bilaterally. Sensory:   Decreased sensation in both legs and arms. Absent proprioception in foot bilaterally. Gait:  Deferred   Tremor:   No tremor noted. Cerebellar:  No cerebellar signs present. Babinski:  Down b/l         Assessment: Ms. Deisy Herrera is a 47year old female with ascending dysesthesias ans weakness for the past 2 months. - Cervical Myelopathy rule out with cervical MRI.   - Lumbar puncture was attempted on 8/21/22 and patient couldn't tolerate procedure. LP was scheduled for yesterday but patient refused at that time. - Differential diagnosis is CIDP vs. Demyelinating vs. Inflammatory disorder of CNS due to leg weakness and diminished reflexes. - Brain MRI showed no acute or significant intracranial abnormality. Principal Problem:    Gait disturbance (8/20/2022)      Plan:  - LP fluoroscopy guided to look for oligoclonal banding. Pending.  - Continue Vitamin B12 1000 mcg IM. - Continue 1 mg Folic acid daily. - Continue Solu medrol 1g daily. - Continue Lyrica 200 mg BID , and baclofen 10 mg BID as needed. - Will continue to follow patient clinically.      Thank you for the consult,    Signed:  Brent Garcia  8/23/2022  9:19 AM     Collaborating Physician:  Dr. Rohan Guthrie

## 2022-08-23 NOTE — PROGRESS NOTES
Upon arriving in the room patient was seen with right foot in chair beside bed backwards, left leg on floor and body on bed. Assisted right foot to floor with the help of PCT Sheldon. Attempted to assist patient to bed with help of PCT however patient force herself to the floor. The patient was educated once again in regards to her lowering herself to the floor after education on smoking in the hospital room and the dangers due to oxygen and other patient's care. RN  charge nurse also educated the patients  family to assist staff during this process.

## 2022-08-23 NOTE — PROGRESS NOTES
Progress Note  Date:2022       Room:[facility]  Patient Cecelia Burciaga     YOB: 1970     Age:52 y.o. Subjective    Pt feeling tired. Weakness in legs and arms same according to pt. Denies any bleeding. Objective         Vitals Last 24 Hours:  TEMPERATURE:  Temp  Av.5 °F (36.4 °C)  Min: 97.3 °F (36.3 °C)  Max: 97.6 °F (36.4 °C)  RESPIRATIONS RANGE: Resp  Av  Min: 16  Max: 16  PULSE OXIMETRY RANGE: SpO2  Av.5 %  Min: 99 %  Max: 100 %  PULSE RANGE: Pulse  Av.5  Min: 78  Max: 85  BLOOD PRESSURE RANGE: Systolic (55GVU), QGU:894 , Min:109 , DXM:625   ; Diastolic (94WGM), VMN:91, Min:72, Max:83    I/O (24Hr): No intake or output data in the 24 hours ending 22 1348    Objective:  Vital signs: (most recent): Blood pressure 121/83, pulse 85, temperature 97.6 °F (36.4 °C), resp. rate 16, height 5' 11\" (1.803 m), weight 119.3 kg (263 lb), SpO2 100 %, not currently breastfeeding. Labs/Imaging/Diagnostics    Labs:  CBC:  Recent Labs     22  1338 22  0729   WBC 6.7 2.5*   RBC 2.67* 2.52*   HGB 8.7* 8.1*   HCT 26.3* 25.2*   MCV 98.5 100.0*   RDW 15.1* 15.2*    134*       CHEMISTRIES:  Recent Labs     22  1338 22  0729    137   K 3.2* 3.4*    101   CO2 29 30   BUN 8 8   CA 9.1 8.5   MG 1.8 1.8     PT/INR:No results for input(s): INR, INREXT, INREXT in the last 72 hours. No lab exists for component: PROTIME  APTT:No results for input(s): APTT in the last 72 hours. LIVER PROFILE:  Recent Labs     22  1338 22  0729   AST 24 26   ALT 23 20       Lab Results   Component Value Date/Time    ALT (SGPT) 23 2022 01:38 PM    AST (SGOT) 24 2022 01:38 PM    Alk. phosphatase 91 2022 01:38 PM    Bilirubin, total 0.8 2022 01:38 PM       Imaging Last 24 Hours:  No results found.   Assessment//Plan   Principal Problem:    Gait disturbance (2022)    Assessment & Plan  Pancytopenia:Better. WBC and platelets normal.Hb improving.most likely Folate deff and  pt also has low normal L11  -Continue folic acid and T66 supplementation  iron/copper/zinc levels pending  -MMA and intrinsic factor Abs pending  -denies alcohol use since few months as contributing factor  -likely cause of or contributing to the mild pancytopenia given the concurrrent macrocytosis seen on RBCs  Check peripheral smear and SPEP. 2. Weakness of both arms and legs:  -active with neurology for this. MRI head negative. plans for LP noted. R/o GBS. pt on steroids. 3. Declining gait and functional status  -felt to be related to #2   Rhematology consult noted. 4. CAD     5. Anxiety      6.  Tobacco abuse:pt advised to quit smoking     Electronically signed by Monserrat Pichardo MD on 8/23/2022 at 2:44 PM

## 2022-08-23 NOTE — PROGRESS NOTES
While charting she continues to try and get out the bed; and continues to state that she wants her percocet 5/325mg instead of the Ultram 50mg.

## 2022-08-23 NOTE — PROGRESS NOTES
Dr. Huong Medraon the patient had Endoscopy done today; off going nurse stated that patient has become  angry and demanding to leave; Emergency contact called and she also smocked a cigarette/  She has been convinced thus far but just placing you on notice. May I have a one time dose of Ativan  Thanks in advance. Full Code and only allergy to Codeine ,  ( Here because she has become weaker with her walker.

## 2022-08-23 NOTE — PROGRESS NOTES
Problem: Self Care Deficits Care Plan (Adult)  Goal: *Acute Goals and Plan of Care (Insert Text)  Description: Patient stated goal: \"Move around and do things I used to know how to do. \"    1. Pt will be IND sup <> sit in prep for EOB ADLs  2. Pt will be IND grooming sitting EOB  3. Pt will be IND LE dressing sitting EOB/long sit  4. Pt will be IND sit <>  prep for toileting LRAD  5. Pt will be IND toileting/toilet transfer/cloth mgmt LRAD  6. Pt will be IND following UE HEP in prep for self care tasks      Outcome: Progressing Towards Goal   OCCUPATIONAL THERAPY TREATMENT  Patient: Pat Luna (32 y.o. female)  Date: 8/23/2022  Diagnosis: Gait disturbance [R26.9] Gait disturbance  Procedure(s) (LRB):  ESOPHAGOGASTRODUODENOSCOPY (EGD) (N/A)  ESOPHAGOGASTRODUODENAL (EGD) BIOPSY (N/A) 1 Day Post-Op  Precautions: FALL  Chart, occupational therapy assessment, plan of care, and goals were reviewed. ASSESSMENT  Patient continues with skilled OT services and is progressing towards goals. Patient received semi supine in bed upon PONCE/PTA arrival and agreeable to OT session. Long sitting, pt CGA/set-up for LB dressing (kaity socks) using crossing leg method with mod A for sitting balance. Patient completed bed mobility, supine to sit, sit to supine and scooting to EOB with SBA using bed rail. EOB, pt req'd CGA/set-up for UB dressing (new gown) d//t decreased dynamic sitting balance. Pt's IV came out during pt donning jacket. Nursing notified as he was just walking out of room. Nursing reported they would replace after therapy session. Pt req'd max A x2 for STS using RW/gait belt with vc's for hand/foot placement for safety (see PTA note for further details). Pt's bedding replaced d/t bladder leakage. Pt left resting in bed with call bell, bed alarm set and all needs met.  Pt continues to present with deficits in generalized strength/AROM, sitting and standing balance, functional activity tolerance,and FM and GM coordination impacting overall performance of ADLs and functional transfers/mobility. Patient would benefit from continued skilled Occupational services while at [facility] in order to address current impairments and improve IND and safety with self care and functional transfer/mobility. Current OT d/c recommendation to IRF once medically appropriate. Current Level of Function Impacting Discharge (ADLs): CGA/set-up for LB/UB dressing and sit-up simple grooming    Other factors to consider for discharge: Time of onset, medical prognosis/diagnosis, severity of deficits, PLOF, functional baseline, home environment, and family support          PLAN :  Patient continues to benefit from skilled intervention to address the above impairments. Continue treatment per established plan of care. to address goals. Recommend with staff: Bed mobility to relieve pressure sores     Recommend next OT session: EOB grooming    Recommendation for discharge: (in order for the patient to meet his/her long term goals)  Inpatient Rehabilitation Facility     This discharge recommendation:  Has been made in collaboration with the attending provider and/or case management    IF patient discharges home will need the following DME: TBD       SUBJECTIVE:   Patient stated my feet hurt, I'm not going to be able to do it.     OBJECTIVE DATA SUMMARY:   Cognitive/Behavioral Status:  Neurologic State: Alert  Orientation Level: Oriented X4  Cognition: Follows commands             Functional Mobility and Transfers for ADLs:  Bed Mobility:  Rolling: Stand-by assistance  Supine to Sit: Stand-by assistance  Sit to Supine: Stand-by assistance  Scooting: Stand-by assistance    Transfers:  Sit to Stand: Maximum assistance;Assist x2; Additional time          Balance:  Sitting: Impaired; With support  Sitting - Static: Fair (occasional)  Sitting - Dynamic: Fair (occasional)  Standing: Impaired; With support  Standing - Static: Poor;Constant support  Standing - Dynamic : Poor;Constant support    ADL Intervention:       Grooming  Grooming Assistance: Set-up  Position Performed: Long sitting on bed  Washing Face: Set-up         Upper Body Dressing Assistance  Dressing Assistance: Contact guard assistance;Set-up  Hospital Gown: Contact guard assistance;Set-up    Lower Body Dressing Assistance  Dressing Assistance: Contact guard assistance;Set-up  Socks: Contact guard assistance;Set-up  Leg Crossed Method Used: Yes  Position Performed: Long sitting on bed  Cues: Verbal cues provided       Pain:  7/10 kaity feet    Activity Tolerance:   Fair and requires frequent rest breaks  Please refer to the flowsheet for vital signs taken during this treatment. After treatment patient left in no apparent distress:   Bed locked and in lowest position, Supine in bed, Call bell within reach, Bed / chair alarm activated, and Side rails x 3    COMMUNICATION/COLLABORATION:   The patients plan of care was discussed with: Physical therapy assistant and Registered nurse.  Co-tx with PTA for increased pt/clinician safety with OOB activity     ROSARIO Steinberg  Time Calculation: 28 mins

## 2022-08-23 NOTE — PROGRESS NOTES
DC Plan: Home vs IRF    Cm spoke with attending during morning IDR. Pt is interested in a hospital bed. Cm was informed pt will not qualify for a hospital bed. Cm met with pt at the bedside to f/up on her dc plan. Cm asked pt if she has decided if she wanted to go to IRF or not. Pt indicated she had a lot on her mind and did not have time to think about her dc plan. Cm explained to pt that if she chooses to go to rehab we will need to obtain auth from her insurance which could take a couple days. CM informed pt that she is getting close to discharge and we need to know her dc plan. Cm informed pt Cm will f/up with her at 2:30pm for a decision. ___________________________________________________    Cm attempted to meet with pt. Pt is receiving bedside assistance. [place] to f/up.     ___________________________________________________    Cm met with pt at the bedside. Pt asked about outpatient therapy. Cm informed pt she would have to f/up with her PCP to obtain an order for outpatient therapy. Cm encouraged pt to consider inpatient rehab. Pt declined inpatient rehab. Cm discussed and educated home health. Pt is okay with home health coming out to her home. Choice obtained for a home health agency in network with her insurance. Cm followed up with pt regarding a hospital bed. Cm explained to her she would have to meet certain criteria for a hospital bed and at this time she does not qualify for one. Referral made via [place] [id number].

## 2022-08-23 NOTE — PROGRESS NOTES
Hospitalist Progress Note         NYDIA Farrell, FNP-C    Daily Progress Note: 8/23/2022      Subjective:   Subjective   Patient examined alert and oriented sitting in bed. Staff reports overnight that patient was caught smoking in her room x 2. When confronted and asked for the lighter and cigarettes patient refused to give to staff. She denies pain on examination. No acute distress noted on examination. Review of Systems:   Review of Systems   Constitutional:  Negative for chills and fever. Eyes:  Negative for blurred vision, double vision and photophobia. Respiratory:  Negative for cough, sputum production and shortness of breath. Cardiovascular:  Negative for chest pain, palpitations, orthopnea and leg swelling. Gastrointestinal:  Negative for heartburn, nausea and vomiting. Genitourinary:  Negative for dysuria, flank pain and hematuria. Neurological:  Positive for tingling, sensory change and weakness. Objective:   Objective      Vitals:  Patient Vitals for the past 12 hrs:   Temp Pulse Resp BP SpO2   08/23/22 0746 97.6 °F (36.4 °C) 85 16 121/83 100 %          Physical Exam:  Physical Exam  Vitals and nursing note reviewed. Constitutional:       Appearance: Normal appearance. HENT:      Mouth/Throat:      Comments: Teeth decay  Eyes:      Extraocular Movements: Extraocular movements intact. Cardiovascular:      Rate and Rhythm: Normal rate. Heart sounds: Normal heart sounds. Pulmonary:      Breath sounds: Normal breath sounds. Abdominal:      General: Bowel sounds are normal.   Musculoskeletal:      Comments: 4/5 in proximal and distal muscles of both arms, 3/5 bilateral dorsi flexion. Bilateral hand  3/5       Skin:     General: Skin is warm and dry. Neurological:      Mental Status: She is alert and oriented to person, place, and time.       Comments: Decrease sensation in bilateral legs/feet and arms        Lab Results:  Recent Results (from the past 24 hour(s))   METABOLIC PANEL, COMPREHENSIVE    Collection Time: 08/22/22  1:38 PM   Result Value Ref Range    Sodium 137 136 - 145 mmol/L    Potassium 3.2 (L) 3.5 - 5.1 mmol/L    Chloride 101 97 - 108 mmol/L    CO2 29 21 - 32 mmol/L    Anion gap 7 5 - 15 mmol/L    Glucose 136 (H) 65 - 100 mg/dL    BUN 8 6 - 20 mg/dL    Creatinine 0.66 0.55 - 1.02 mg/dL    BUN/Creatinine ratio 12 12 - 20      GFR est AA >60 >60 ml/min/1.73m2    GFR est non-AA >60 >60 ml/min/1.73m2    Calcium 9.1 8.5 - 10.1 mg/dL    Bilirubin, total 0.8 0.2 - 1.0 mg/dL    AST (SGOT) 24 15 - 37 U/L    ALT (SGPT) 23 12 - 78 U/L    Alk. phosphatase 91 45 - 117 U/L    Protein, total 7.4 6.4 - 8.2 g/dL    Albumin 3.2 (L) 3.5 - 5.0 g/dL    Globulin 4.2 (H) 2.0 - 4.0 g/dL    A-G Ratio 0.8 (L) 1.1 - 2.2     CBC WITH AUTOMATED DIFF    Collection Time: 08/22/22  1:38 PM   Result Value Ref Range    WBC 6.7 3.6 - 11.0 K/uL    RBC 2.67 (L) 3.80 - 5.20 M/uL    HGB 8.7 (L) 11.5 - 16.0 g/dL    HCT 26.3 (L) 35.0 - 47.0 %    MCV 98.5 80.0 - 99.0 FL    MCH 32.6 26.0 - 34.0 PG    MCHC 33.1 30.0 - 36.5 g/dL    RDW 15.1 (H) 11.5 - 14.5 %    PLATELET 399 212 - 980 K/uL    MPV 9.3 8.9 - 12.9 FL    NRBC 0.0 0.0  WBC    ABSOLUTE NRBC 0.00 0.00 - 0.01 K/uL    NEUTROPHILS 92 (H) 32 - 75 %    LYMPHOCYTES 7 (L) 12 - 49 %    MONOCYTES 1 (L) 5 - 13 %    EOSINOPHILS 0 0 - 7 %    BASOPHILS 0 0 - 1 %    IMMATURE GRANULOCYTES 0 0 - 0.5 %    ABS. NEUTROPHILS 6.1 1.8 - 8.0 K/UL    ABS. LYMPHOCYTES 0.5 (L) 0.8 - 3.5 K/UL    ABS. MONOCYTES 0.0 0.0 - 1.0 K/UL    ABS. EOSINOPHILS 0.0 0.0 - 0.4 K/UL    ABS. BASOPHILS 0.0 0.0 - 0.1 K/UL    ABS. IMM.  GRANS. 0.0 0.00 - 0.04 K/UL    DF AUTOMATED     MAGNESIUM    Collection Time: 08/22/22  1:38 PM   Result Value Ref Range    Magnesium 1.8 1.6 - 2.4 mg/dL   IRON PROFILE    Collection Time: 08/22/22  1:38 PM   Result Value Ref Range    Iron 30 (L) 35 - 150 ug/dL    TIBC 172 (L) 250 - 450 ug/dL    Iron % saturation 17 (L) 20 - 50 %   TSH 3RD GENERATION    Collection Time: 08/22/22  1:38 PM   Result Value Ref Range    TSH 0.57 0.36 - 3.74 uIU/mL      Results       Procedure Component Value Units Date/Time    COVID-19 RAPID TEST [[id number]] Collected: 08/19/22 2334    Order Status: Completed Specimen: Nasopharyngeal Updated: 08/20/22 0022     COVID-19 rapid test Not Detected        Comment: Rapid Abbott ID Now   Rapid NAAT:  The specimen is NEGATIVE for SARS-CoV-2, the novel coronavirus associated with COVID-19. Negative results should be treated as presumptive and, if inconsistent with clinical signs and symptoms or necessary for patient management, should be tested with an alternative molecular assay. Negative results do not preclude SARS-CoV-2 infection and should not be used as the sole basis for patient management decisions. This test has been authorized by the FDA under   an Emergency Use Authorization (EUA) for use by authorized laboratories.  Fact sheet for Healthcare Providers: ConventionTutellusdate.co.nz Fact sheet for Patients: WiWidete.co.nz   Methodology: Isothermal Nucleic Acid Amplification                  Diagnostic Images:  CT Results  (Last 48 hours)      None                Current Medications:    Current Facility-Administered Medications:     polyethylene glycol (MIRALAX) packet 17 g, 17 g, Oral, DAILY, Chou, Jammie F, NP, 17 g at 08/23/22 0909    docusate sodium (COLACE) capsule 100 mg, 100 mg, Oral, QHS, Chou, Jammie F, NP, 100 mg at 08/22/22 2223    traMADoL (ULTRAM) tablet 50 mg, 50 mg, Oral, Q8H PRN, Alhaji Whitney NP, 50 mg at 08/22/22 2223    famotidine (PF) (PEPCID) 20 mg in 0.9% sodium chloride 10 mL injection, 20 mg, IntraVENous, Q12H, [facility], Selam Andrade MD, 20 mg at [birth year] 1516    folic acid (FOLVITE) tablet 1 mg, 1 mg, Oral, DAILY, Jamila Lowery MD, 1 mg at 08/23/22 7623    cyanocobalamin (VITAMIN B12) injection 1,000 mcg, 1,000 mcg, IntraMUSCular, DAILY, Masoud Tejada, Will Mercedes MD, 1,000 mcg at 08/22/22 1429    amLODIPine (NORVASC) tablet 5 mg, 5 mg, Oral, DAILY, Johanny Templeton MD, 5 mg at 08/23/22 6737    metoprolol succinate (TOPROL-XL) XL tablet 25 mg, 25 mg, Oral, DAILY, Johanny Templeton MD, 25 mg at 08/23/22 0909    pregabalin (LYRICA) capsule 100 mg, 100 mg, Oral, TID, Johanny Templeton MD, 100 mg at 08/23/22 0910    sodium chloride (NS) flush 5-40 mL, 5-40 mL, IntraVENous, Q8H, Johanny Templeton MD, 10 mL at 08/23/22 0545    sodium chloride (NS) flush 5-40 mL, 5-40 mL, IntraVENous, PRN, Johanny Templeton MD    acetaminophen (TYLENOL) tablet 650 mg, 650 mg, Oral, Q6H PRN **OR** acetaminophen (TYLENOL) suppository 650 mg, 650 mg, Rectal, Q6H PRN, Johanny Templeton MD    ondansetron (ZOFRAN ODT) tablet 4 mg, 4 mg, Oral, Q6H PRN **OR** ondansetron (ZOFRAN) injection 4 mg, 4 mg, IntraVENous, Q6H PRN, Johanny Templeton MD    enoxaparin (LOVENOX) injection 40 mg, 40 mg, SubCUTAneous, DAILY, Johanny Templeton MD, 40 mg at 08/23/22 7683    baclofen (LIORESAL) tablet 10 mg, 10 mg, Oral, BID PRN, Yaneli Adam MD, 10 mg at 08/20/22 1630    methylPREDNISolone (Solu-MEDROL) 1 g in 100 mL NS MBP, 1,000 mg, IntraVENous, DAILY, Johanny Templeton MD, Last Rate: 100 mL/hr at 08/23/22 0910, 1,000 mg at 08/23/22 4905       ASSESSMENT:  47 y.o. female with a history of peripheral arterial disease, coronary artery disease, anxiety, hypertension presents to the emergency room complaining of progressively worsening weakness that now she is not able to ambulate. Affecting upper and lower extremities with weakness and numbness, started 2 months ago, gradually worsening. She had several emergency room visits since then, following with neurologist outpatient. Was given medications, but no improvement and gradually worsening. She was using her walker before now she is using wheelchair as she is not able to ambulate.   Denies any headache, vision disturbance or speech disturbance. The most of the problem is more numbness and weakness, but able to move both upper and lower extremities. Denies any joint swelling or pain. Patient had LP attempt 8/20/22 by Dr. Antonia Singh. Unable to complete due to pain. MRI brain negative. EGD showed gastritis. Patient for LP via Fluoro Monday 8/22/22. # 1 Progressive Weakness/Neuropathy  - Etiology? CIDP, Autoimmune, Demyelinating process? - Neuro-->Dr. Antonia Singh   - Rheum-->Dr. Lata Arechiga    - For LP via Fluoro   - For MRI w/ contrast  - Receiving IV Solu-Medrol 1gm daily     # 2 Multi-level Cervical Spinal Stenoses  - Recent MRI did not reveal myelopathy  - Repeat MRI negative acute findings     # 3 Pancytopenia  - Consult Heme  - Obtain iron profile     # 4 Dysphagia w/ N/V  - continue pepcid and prn antiemetics  - GI--> Nair-->EGD showed gastritis     # 5 Hx of HTN  - BP currently acceptable  - Continue norvasc 5mg    # 6 Fibromyalgia  - Managed with lyrica 100mg tid    #7 Hx PAD/CAD  - Currently not on any medications    #8 Tobacco dependency  Cessation counseling provided  Informed that if she continues to smoke in the hospital we will need to discharge her due to noncompliance and increased safety risk towards others      Full Code  Dvt Prophylaxis Lovenox  GI Prophylaxis Pepcid  Discharge barriers:  - LUMBAR PUNCTURE WITH FLUORO  - CM FOR PLACEMENT      Above treatment plan reviewed and discussed with patient in detail at bedside, all questions answered. Care Plan discussed with: Patient/Family    Total time spent with patient: 30 minutes.     Dianne Meade NP

## 2022-08-23 NOTE — PROGRESS NOTES
Progress Note  Date:2022       Room:[facility]  Patient Jesu Benoit     YOB: 1970     Age:52 y.o. Subjective    Subjective:  Symptoms:  She reports weakness. The patient attempted getting out of bed overnight. Imaging guided lumbar puncture is planned, however the patient has been refusing the procedure. EGD was done that shows Schatzki rings. Biopsy is pending. The patient is receiving IV solumedrol under the control of neurology. Review of Systems   Constitutional:  Positive for activity change and fatigue. HENT: Negative. Eyes: Negative. Respiratory: Negative. Cardiovascular: Negative. Gastrointestinal: Negative. Endocrine: Negative. Genitourinary:         Incontinence. Musculoskeletal:  Positive for myalgias. Skin: Negative. Allergic/Immunologic: Negative. Neurological:  Positive for weakness and numbness. Hematological: Negative. Psychiatric/Behavioral: Negative. Objective         Vitals Last 24 Hours:  TEMPERATURE:  Temp  Av.6 °F (36.4 °C)  Min: 97.3 °F (36.3 °C)  Max: 97.8 °F (36.6 °C)  RESPIRATIONS RANGE: Resp  Av.7  Min: 16  Max: 18  PULSE OXIMETRY RANGE: SpO2  Av.7 %  Min: 99 %  Max: 100 %  PULSE RANGE: Pulse  Av.7  Min: 78  Max: 88  BLOOD PRESSURE RANGE: Systolic (46TDB), RQY:300 , Min:109 , DHX:058   ; Diastolic (88OPN), RPV:09, Min:71, Max:83    I/O (24Hr): No intake or output data in the 24 hours ending 22 9908    Objective:  General Appearance:  Comfortable. Vital signs: (most recent): Blood pressure 129/71, pulse 88, temperature 97.8 °F (36.6 °C), resp. rate 18, height 5' 11\" (1.803 m), weight 119.3 kg (263 lb), SpO2 100 %, not currently breastfeeding. Vital signs are normal.    HEENT: Normal HEENT exam.    Lungs:  Normal effort. Heart: Normal rate. Abdomen: Abdomen is soft. Extremities: Normal range of motion.     Neurological: Patient is alert and oriented to person, place and time. Pupils:  Pupils are equal, round, and reactive to light. Skin:  Warm. Labs/Imaging/Diagnostics    Labs:  CBC:  Recent Labs     08/22/22  1338 08/21/22  0729   WBC 6.7 2.5*   RBC 2.67* 2.52*   HGB 8.7* 8.1*   HCT 26.3* 25.2*   MCV 98.5 100.0*   RDW 15.1* 15.2*    134*       CHEMISTRIES:  Recent Labs     08/22/22  1338 08/21/22  0729    137   K 3.2* 3.4*    101   CO2 29 30   BUN 8 8   CA 9.1 8.5   MG 1.8 1.8     PT/INR:No results for input(s): INR, INREXT, INREXT in the last 72 hours. No lab exists for component: PROTIME  APTT:No results for input(s): APTT in the last 72 hours. LIVER PROFILE:  Recent Labs     08/22/22  1338 08/21/22  0729   AST 24 26   ALT 23 20       Lab Results   Component Value Date/Time    ALT (SGPT) 23 08/22/2022 01:38 PM    AST (SGOT) 24 08/22/2022 01:38 PM    Alk. phosphatase 91 08/22/2022 01:38 PM    Bilirubin, total 0.8 08/22/2022 01:38 PM       Imaging Last 24 Hours:    MRI Cervical spine 8/12/2022:  IMPRESSION  1. T1-2: Central focal protrusion. 2. C6-7: Central focal protrusion, mild to moderate bilateral foraminal  stenosis. 3. C5-6: Broad and left posterolateral bulge. Severe left and mild right  foraminal stenosis. 3. C5-6: Broad and left posterolateral bulge. Severe left and mild right  foraminal stenosis. 4. C4-5: Moderate bilateral foraminal stenosis left greater than right. 5. C3-4, C2-3: Mild left foraminal stenosis. MRI Brain 8/20/2022:  IMPRESSION     1. No acute or significant intracranial abnormality. Assessment//Plan   Principal Problem:    Gait disturbance (8/20/2022)    Assessment:   (This is a 46 yo female with 2 months of progressive weakness of the extremities with pain and episodes of falling. Neurology is also following the patient and are conducting and additional work up. 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 LENIN is still pending.   Muscle enzyme levels are normal.   Myomarker panel is still pending. ). Plan:    (-We will monitor for the remainder of the antibody testing that is still pending.  -We appreciate the assistance of neurology.   -Agree with solumedrol under control of neurology. Thank you for the consult. We will continue to follow the patient. Please feel free to contact us with any questions. ).      Electronically signed by Vera Quiroz MD on 8/23/2022 at 8:33 AM

## 2022-08-23 NOTE — PROGRESS NOTES
Progress Note    Patient: Teofilo Rodríguez MRN: [id number]  SSN: [id number]    YOB: 1970  Age: 47 y.o. Sex: female      Admit Date: 8/19/2022    LOS: 0 days     Subjective:   GI in consultation for difficulty swallowing    8/23/22. Patient denies abdominal pain, denies  nausea and vomiting. She does report epigastric discomfort. She is on pepcid twice daily which she reports does help. She states she has not had a BM today but has just started the stool softeners. EGD  8/22/22: showing Schatzki's ring distal third of esophagus. Biopsy was taken. Gastritis chronic without bleeding. History of Present Illness: Teofilo Rodríguez is a 47 y.o. female who is seen in consultation for difficulty swallowing. Patient was admitted to the hospital with recurrent falls and has history of peripheral neuropathy and is being worked up by neurology. She also complains of anorexia, difficulty swallowing and vomiting. She does not have any history of gallstone disease but she has history of excessive alcohol consumption up until 2 months ago she says that she has cut down her drinking. She also smokes about a pack of cigarettes a day. She does not have any fever or chills she does not have any history of diabetes but have history of peripheral vascular disease and anxiety. Objective:     Vitals:    08/22/22 1302 08/22/22 2017 08/23/22 0746 08/23/22 1440   BP: (!) 142/97 109/72 121/83 129/71   Pulse: 85 78 85 88   Resp: 16 16 16 18   Temp: 97.9 °F (36.6 °C) 97.3 °F (36.3 °C) 97.6 °F (36.4 °C) 97.8 °F (36.6 °C)   SpO2: 99% 99% 100% 100%   Weight:       Height:            Intake and Output:  Current Shift: No intake/output data recorded. Last three shifts: 08/21 1901 - 08/23 0700  In: 170 [P.O.:120; I.V.:50]  Out: 1025 [Urine:1025]    Physical Exam:   Skin:  Extremities and face reveal no rashes. No jack erythema. HEENT: Sclerae anicteric. Extra-occular muscles are intact.  No abnormal pigmentation of the lips. The neck is supple. Cardiovascular: Regular rate and rhythm. Respiratory:  Comfortable breathing with no accessory muscle use. GI:  Abdomen nondistended, soft, and nontender. No enlargement of the liver or spleen. No masses palpable. Rectal:  Deferred  Musculoskeletal: Extremities have good range of motion. Neurological:  Gross memory appears intact. Patient is alert and oriented. Psychiatric:  Mood appears appropriate with judgement intact. Lymphatic:  No visible adenopathy      Lab/Data Review:  No results found for this or any previous visit (from the past 24 hour(s)). MRI BRAIN W WO CONT   Final Result      1. No acute or significant intracranial abnormality. XR SPINAL PUNC LUMB DX    (Results Pending)       Assessment:     Principal Problem:    Gait disturbance (8/20/2022)        Plan:   Dysphagia      EGD  8/22/22: showing Schatzki's ring distal third of esophagus. Biopsy was taken. Gastritis chronic without bleeding. Advance diet as tolerated  2. Constipation      Miralax daily       Colace at bedtime     Thank you for allowing me to participate in this patients care. Plan discussed with Dr. Tarik Shahid and he approves.     Signed By: Bola Quinones NP     August 23, 2022

## 2022-08-23 NOTE — PROGRESS NOTES
PHYSICAL THERAPY TREATMENT  Patient: Smith Andrew (73 y.o. female)  Date: 8/23/2022  Diagnosis: Gait disturbance [R26.9] Gait disturbance  Procedure(s) (LRB):  ESOPHAGOGASTRODUODENOSCOPY (EGD) (N/A)  ESOPHAGOGASTRODUODENAL (EGD) BIOPSY (N/A) 1 Day Post-Op  Precautions:    Chart, physical therapy assessment, plan of care and goals were reviewed. ASSESSMENT  Patient continues with skilled PT services and is progressing towards goals. Patient supine in bed upon approach and agreed to therapy session. Patient was SBA for bed mobility, supine<>sit and scooting EOB. Patient demonstrated good unsupported static sitting balance but required mod A to maintain dynamic sitting balance while donning socks. While sitting EOB patients IV came out nursing was alerted and after bleeding stopped on it own nursing stated that they would replace IV after therapists were done with patient. Patient then performed STS to RW at max Ax2 with additional time. Patient demonstrated poor standing balance with patient leaning heavily forward with GEOVANY knee buckling and unsteady weight sway during standing. Patient ginger to tolerated 10-15 seconds of standing before returning to seated EOB at max Ax2. Patient then returned to supine in bed at [facility] for sit>supine transfer. Patient then left supine in bed with call bell within reach and all needs meet. Current Level of Function Impacting Discharge (mobility/balance): impaired balance, general weakness, poor control of movements    Other factors to consider for discharge: PLOF, assistance at home, level of deficits, acute medical state         PLAN :  Patient continues to benefit from skilled intervention to address the above impairments. Continue treatment per established plan of care. to address goals.     Recommendation for discharge: (in order for the patient to meet his/her long term goals)  [facility]     This discharge recommendation:  Has been made in collaboration with the attending provider and/or case management    IF patient discharges home will need the following DME: gait belt and rolling walker       SUBJECTIVE:   Patient stated I did this to myself with all the drinking.     OBJECTIVE DATA SUMMARY:   Critical Behavior:  Neurologic State: Alert  Orientation Level: Oriented X4  Cognition: Follows commands     Functional Mobility Training:  Bed Mobility:  Rolling: Stand-by assistance  Supine to Sit: Stand-by assistance  Sit to Supine: Stand-by assistance  Scooting: Stand-by assistance  Transfers:  Sit to Stand: Maximum assistance;Assist x2; Additional time  Stand to Sit: Maximum assistance;Assist x2  Balance:  Sitting: Impaired; With support  Sitting - Static: Fair (occasional)  Sitting - Dynamic: Fair (occasional)  Standing: Impaired; With support  Standing - Static: Poor;Constant support  Standing - Dynamic : Poor;Constant support  Pain Ratin/10 in GEOVANY feet    Activity Tolerance:   Bed locked and in lowest position Fair  Please refer to the flowsheet for vital signs taken during this treatment. After treatment patient left in no apparent distress:   Bed returned to lowest position, Supine in bed, Call bell within reach, and Side rails x 3    COMMUNICATION/COLLABORATION:   The patients plan of care was discussed with: Occupational therapy assistant and Registered nurse. Treatment occurred with OT due to patient requiring Ax2 for mobility and safety.       Problem: Mobility Impaired (Adult and Pediatric)  Goal: *Acute Goals and Plan of Care (Insert Text)  Description: I with LE HEP x7 days  Supine to sit EOB with CGAx1 x7 days  Sit EOB to perform LE exercises without LOB x 8-10min x 7 days  Sit to stand with min Ax2 x 7 days  Stand pivot from bed to chair with min Ax2 x 7 days    Future goals TBD as pt progresses with PT    Pt stated goal: for balance to improve  Outcome: Progressing Towards Goal       Brianna Robertson PTA   Time Calculation: 28 mins

## 2022-08-24 PROBLEM — R53.1 WEAKNESS GENERALIZED: Status: ACTIVE | Noted: 2022-08-24

## 2022-08-24 LAB
ALBUMIN SERPL ELPH-MCNC: 3.4 G/DL (ref 2.9–4.4)
ALBUMIN/GLOB SERPL: 1 {RATIO} (ref 0.7–1.7)
ALPHA1 GLOB SERPL ELPH-MCNC: 0.3 G/DL (ref 0–0.4)
ALPHA2 GLOB SERPL ELPH-MCNC: 0.7 G/DL (ref 0.4–1)
ANION GAP SERPL CALC-SCNC: 8 MMOL/L (ref 5–15)
B-GLOBULIN SERPL ELPH-MCNC: 1.5 G/DL (ref 0.7–1.3)
BASOPHILS # BLD: 0 K/UL (ref 0–0.1)
BASOPHILS NFR BLD: 0 % (ref 0–1)
BUN SERPL-MCNC: 22 MG/DL (ref 6–20)
BUN/CREAT SERPL: 35 (ref 12–20)
CA-I BLD-MCNC: 8.5 MG/DL (ref 8.5–10.1)
CHLORIDE SERPL-SCNC: 103 MMOL/L (ref 97–108)
CO2 SERPL-SCNC: 27 MMOL/L (ref 21–32)
CREAT SERPL-MCNC: 0.62 MG/DL (ref 0.55–1.02)
DIFFERENTIAL METHOD BLD: ABNORMAL
EOSINOPHIL # BLD: 0 K/UL (ref 0–0.4)
EOSINOPHIL NFR BLD: 0 % (ref 0–7)
ERYTHROCYTE [DISTWIDTH] IN BLOOD BY AUTOMATED COUNT: 14.9 % (ref 11.5–14.5)
GAMMA GLOB SERPL ELPH-MCNC: 1 G/DL (ref 0.4–1.8)
GLOBULIN SER CALC-MCNC: 3.5 G/DL (ref 2.2–3.9)
GLUCOSE SERPL-MCNC: 135 MG/DL (ref 65–100)
HCT VFR BLD AUTO: 25.8 % (ref 35–47)
HGB BLD-MCNC: 8.2 G/DL (ref 11.5–16)
IMM GRANULOCYTES # BLD AUTO: 0.1 K/UL (ref 0–0.04)
IMM GRANULOCYTES NFR BLD AUTO: 1 % (ref 0–0.5)
LYMPHOCYTES # BLD: 0.5 K/UL (ref 0.8–3.5)
LYMPHOCYTES NFR BLD: 8 % (ref 12–49)
M PROTEIN SERPL ELPH-MCNC: 0.6 G/DL
MCH RBC QN AUTO: 31.4 PG (ref 26–34)
MCHC RBC AUTO-ENTMCNC: 31.8 G/DL (ref 30–36.5)
MCV RBC AUTO: 98.9 FL (ref 80–99)
METHYLMALONATE SERPL-SCNC: 129 NMOL/L (ref 0–378)
MONOCYTES # BLD: 0.2 K/UL (ref 0–1)
MONOCYTES NFR BLD: 4 % (ref 5–13)
NEUTS SEG # BLD: 5.8 K/UL (ref 1.8–8)
NEUTS SEG NFR BLD: 87 % (ref 32–75)
NRBC # BLD: 0 K/UL (ref 0–0.01)
NRBC BLD-RTO: 0 PER 100 WBC
PLATELET # BLD AUTO: 136 K/UL (ref 150–400)
PMV BLD AUTO: 9.4 FL (ref 8.9–12.9)
POTASSIUM SERPL-SCNC: 3.5 MMOL/L (ref 3.5–5.1)
PROT SERPL-MCNC: 6.9 G/DL (ref 6–8.5)
RBC # BLD AUTO: 2.61 M/UL (ref 3.8–5.2)
SODIUM SERPL-SCNC: 138 MMOL/L (ref 136–145)
WBC # BLD AUTO: 6.6 K/UL (ref 3.6–11)

## 2022-08-24 PROCEDURE — 85025 COMPLETE CBC W/AUTO DIFF WBC: CPT

## 2022-08-24 PROCEDURE — 74011250636 HC RX REV CODE- 250/636: Performed by: INTERNAL MEDICINE

## 2022-08-24 PROCEDURE — 97530 THERAPEUTIC ACTIVITIES: CPT

## 2022-08-24 PROCEDURE — 74011000250 HC RX REV CODE- 250: Performed by: INTERNAL MEDICINE

## 2022-08-24 PROCEDURE — 96372 THER/PROPH/DIAG INJ SC/IM: CPT

## 2022-08-24 PROCEDURE — G0378 HOSPITAL OBSERVATION PER HR: HCPCS

## 2022-08-24 PROCEDURE — 74011250637 HC RX REV CODE- 250/637: Performed by: NURSE PRACTITIONER

## 2022-08-24 PROCEDURE — 74011250637 HC RX REV CODE- 250/637: Performed by: INTERNAL MEDICINE

## 2022-08-24 PROCEDURE — 74011250637 HC RX REV CODE- 250/637: Performed by: HOSPITALIST

## 2022-08-24 PROCEDURE — 65270000029 HC RM PRIVATE

## 2022-08-24 PROCEDURE — 74011250636 HC RX REV CODE- 250/636: Performed by: HOSPITALIST

## 2022-08-24 PROCEDURE — 74011000258 HC RX REV CODE- 258: Performed by: HOSPITALIST

## 2022-08-24 PROCEDURE — 96376 TX/PRO/DX INJ SAME DRUG ADON: CPT

## 2022-08-24 PROCEDURE — 80048 BASIC METABOLIC PNL TOTAL CA: CPT

## 2022-08-24 PROCEDURE — 36415 COLL VENOUS BLD VENIPUNCTURE: CPT

## 2022-08-24 PROCEDURE — 74011000250 HC RX REV CODE- 250: Performed by: HOSPITALIST

## 2022-08-24 RX ORDER — FAMOTIDINE 20 MG/1
20 TABLET, FILM COATED ORAL 2 TIMES DAILY
Status: DISCONTINUED | OUTPATIENT
Start: 2022-08-24 | End: 2022-08-25 | Stop reason: HOSPADM

## 2022-08-24 RX ORDER — DOCUSATE SODIUM 100 MG/1
200 CAPSULE, LIQUID FILLED ORAL
Status: DISCONTINUED | OUTPATIENT
Start: 2022-08-24 | End: 2022-08-25 | Stop reason: HOSPADM

## 2022-08-24 RX ADMIN — PREGABALIN 100 MG: 25 CAPSULE ORAL at 08:38

## 2022-08-24 RX ADMIN — SODIUM CHLORIDE, PRESERVATIVE FREE 20 MG: 5 INJECTION INTRAVENOUS at 08:39

## 2022-08-24 RX ADMIN — SODIUM CHLORIDE, PRESERVATIVE FREE 10 ML: 5 INJECTION INTRAVENOUS at 15:22

## 2022-08-24 RX ADMIN — POLYETHYLENE GLYCOL 3350 17 G: 17 POWDER, FOR SOLUTION ORAL at 08:38

## 2022-08-24 RX ADMIN — ENOXAPARIN SODIUM 40 MG: 100 INJECTION SUBCUTANEOUS at 08:38

## 2022-08-24 RX ADMIN — METHYLPREDNISOLONE SODIUM SUCCINATE 1000 MG: 1 INJECTION, POWDER, LYOPHILIZED, FOR SOLUTION INTRAMUSCULAR; INTRAVENOUS at 09:01

## 2022-08-24 RX ADMIN — DOCUSATE SODIUM 200 MG: 100 CAPSULE, LIQUID FILLED ORAL at 21:06

## 2022-08-24 RX ADMIN — PREGABALIN 100 MG: 25 CAPSULE ORAL at 21:06

## 2022-08-24 RX ADMIN — PREGABALIN 100 MG: 25 CAPSULE ORAL at 15:22

## 2022-08-24 RX ADMIN — CYANOCOBALAMIN 1000 MCG: 1000 INJECTION, SOLUTION INTRAMUSCULAR at 08:38

## 2022-08-24 RX ADMIN — METOPROLOL SUCCINATE 25 MG: 25 TABLET, EXTENDED RELEASE ORAL at 08:38

## 2022-08-24 RX ADMIN — FOLIC ACID 1 MG: 1 TABLET ORAL at 08:38

## 2022-08-24 RX ADMIN — AMLODIPINE BESYLATE 5 MG: 5 TABLET ORAL at 08:38

## 2022-08-24 RX ADMIN — SODIUM CHLORIDE, PRESERVATIVE FREE 10 ML: 5 INJECTION INTRAVENOUS at 21:05

## 2022-08-24 RX ADMIN — FAMOTIDINE 20 MG: 20 TABLET ORAL at 21:06

## 2022-08-24 RX ADMIN — SODIUM CHLORIDE, PRESERVATIVE FREE 10 ML: 5 INJECTION INTRAVENOUS at 05:47

## 2022-08-24 RX ADMIN — TRAMADOL HYDROCHLORIDE 50 MG: 50 TABLET, COATED ORAL at 21:06

## 2022-08-24 NOTE — PROGRESS NOTES
Progress Note    Patient: Lorenza Lim MRN: [id number]  SSN: [id number]    YOB: 1970  Age: 47 y.o. Sex: female      Admit Date: 8/19/2022    LOS: 0 days     Subjective:   GI in consultation for difficulty swallowing     8/24/22. Patient denies abdominal pain, denies  nausea and vomiting. She reports she has not had a bowel movement yet. Abdomen is soft and non-tender. She had EGD on 8/22/22 biopsies pending. EGD  8/22/22: showing Schatzki's ring distal third of esophagus. Biopsy was taken. Gastritis chronic without bleeding. History of Present Illness: Lorenza Lim is a 47 y.o. female who is seen in consultation for difficulty swallowing. Patient was admitted to the hospital with recurrent falls and has history of peripheral neuropathy and is being worked up by neurology. She also complains of anorexia, difficulty swallowing and vomiting. She does not have any history of gallstone disease but she has history of excessive alcohol consumption up until 2 months ago she says that she has cut down her drinking. She also smokes about a pack of cigarettes a day. She does not have any fever or chills she does not have any history of diabetes but have history of peripheral vascular disease and anxiety. Objective:     Vitals:    08/23/22 1440 08/23/22 2051 08/24/22 0838 08/24/22 1535   BP: 129/71 121/81 122/81 (!) 137/94   Pulse: 88 77 77 74   Resp: 18 17 18 18   Temp: 97.8 °F (36.6 °C) 97.6 °F (36.4 °C) 97.7 °F (36.5 °C) 97.5 °F (36.4 °C)   SpO2: 100% 100% 100% 100%   Weight:       Height:            Intake and Output:  Current Shift: No intake/output data recorded. Last three shifts: 08/22 1901 - 08/24 0700  In: -   Out: 300 [Urine:300]    Physical Exam:   Skin:  Extremities and face reveal no rashes. No jack erythema. HEENT: Sclerae anicteric. Extra-occular muscles are intact. No abnormal pigmentation of the lips. The neck is supple.   Cardiovascular: Regular rate and rhythm. Respiratory:  Comfortable breathing with no accessory muscle use. GI:  Abdomen nondistended, soft, and nontender. No enlargement of the liver or spleen. No masses palpable. Rectal:  Deferred  Musculoskeletal: Generalized weakness  Neurological:  Gross memory appears intact. Patient is alert and oriented. Psychiatric:  Mood appears appropriate with judgement intact. Lymphatic:  No visible adenopathy      Lab/Data Review:  Recent Results (from the past 24 hour(s))   CBC WITH AUTOMATED DIFF    Collection Time: 08/24/22  1:02 PM   Result Value Ref Range    WBC 6.6 3.6 - 11.0 K/uL    RBC 2.61 (L) 3.80 - 5.20 M/uL    HGB 8.2 (L) 11.5 - 16.0 g/dL    HCT 25.8 (L) 35.0 - 47.0 %    MCV 98.9 80.0 - 99.0 FL    MCH 31.4 26.0 - 34.0 PG    MCHC 31.8 30.0 - 36.5 g/dL    RDW 14.9 (H) 11.5 - 14.5 %    PLATELET 409 (L) 627 - 400 K/uL    MPV 9.4 8.9 - 12.9 FL    NRBC 0.0 0.0  WBC    ABSOLUTE NRBC 0.00 0.00 - 0.01 K/uL    NEUTROPHILS 87 (H) 32 - 75 %    LYMPHOCYTES 8 (L) 12 - 49 %    MONOCYTES 4 (L) 5 - 13 %    EOSINOPHILS 0 0 - 7 %    BASOPHILS 0 0 - 1 %    IMMATURE GRANULOCYTES 1 (H) 0 - 0.5 %    ABS. NEUTROPHILS 5.8 1.8 - 8.0 K/UL    ABS. LYMPHOCYTES 0.5 (L) 0.8 - 3.5 K/UL    ABS. MONOCYTES 0.2 0.0 - 1.0 K/UL    ABS. EOSINOPHILS 0.0 0.0 - 0.4 K/UL    ABS. BASOPHILS 0.0 0.0 - 0.1 K/UL    ABS. IMM. GRANS. 0.1 (H) 0.00 - 0.04 K/UL    DF AUTOMATED     METABOLIC PANEL, BASIC    Collection Time: 08/24/22  1:02 PM   Result Value Ref Range    Sodium 138 136 - 145 mmol/L    Potassium 3.5 3.5 - 5.1 mmol/L    Chloride 103 97 - 108 mmol/L    CO2 27 21 - 32 mmol/L    Anion gap 8 5 - 15 mmol/L    Glucose 135 (H) 65 - 100 mg/dL    BUN 22 (H) 6 - 20 mg/dL    Creatinine 0.62 0.55 - 1.02 mg/dL    BUN/Creatinine ratio 35 (H) 12 - 20      GFR est AA >60 >60 ml/min/1.73m2    GFR est non-AA >60 >60 ml/min/1.73m2    Calcium 8.5 8.5 - 10.1 mg/dL              MRI BRAIN W WO CONT   Final Result      1.  No acute or significant intracranial abnormality. XR SPINAL PUNC LUMB DX    (Results Pending)       Assessment:     Principal Problem:    Gait disturbance (8/20/2022)    Active Problems:    Weakness generalized (8/24/2022)        Plan:   Dysphagia      EGD  8/22/22: showing Schatzki's ring distal third of esophagus. Biopsy was taken. Gastritis chronic without bleeding. Advance diet as tolerated  2. Constipation      Miralax daily       Colace 200 mg at bedtime       Lactulose  daily as needed    Thank you for allowing me to participate in this patients care. Plan discussed with Dr. Kate Pritchett and he approves.     Signed By: Genesis Luna NP     August 24, 2022

## 2022-08-24 NOTE — PROGRESS NOTES
Problem: Self Care Deficits Care Plan (Adult)  Goal: *Acute Goals and Plan of Care (Insert Text)  Description: Patient stated goal: \"Move around and do things I used to know how to do. \"    1. Pt will be IND sup <> sit in prep for EOB ADLs  2. Pt will be IND grooming sitting EOB  3. Pt will be IND LE dressing sitting EOB/long sit  4. Pt will be IND sit <>  prep for toileting LRAD  5. Pt will be IND toileting/toilet transfer/cloth mgmt LRAD  6. Pt will be IND following UE HEP in prep for self care tasks    OCCUPATIONAL THERAPY TREATMENT  Patient: Angélica Moreno (47 y.o. female)  Date: 8/24/2022  Diagnosis: Gait disturbance [R26.9]  Weakness generalized [R53.1] Gait disturbance  Procedure(s) (LRB):  ESOPHAGOGASTRODUODENOSCOPY (EGD) (N/A)  ESOPHAGOGASTRODUODENAL (EGD) BIOPSY (N/A) 2 Days Post-Op  Precautions: FALL  Chart, occupational therapy assessment, plan of care, and goals were reviewed. ASSESSMENT  Patient continues with skilled OT services and is progressing towards goals. Patient received semi supine in bed upon PONCE/PTA arrival and agreeable to OT session with additional time for pt to compose self d/t decreased activity tolerance. In long sitting, pt req'd max a for LB dressing (kaity socks), pt able to don R sock over heel, however unable to complete task d/t UE weakness. Patient completed bed mobility, supine to sit with SBA, sit to supine with min A x1 and SBA for scooting to EOB with additional time d/t decreased activity tolerance. Pt req'd max A for STS using RW/gait belt for balance and vc's for correct technique in prep for toileting tf. EOB, patient educated on and completed bilateral UE HEP to increase strength/endurance needed for ADL'S and functional transfers, see grid below. Pt req'd min A for sitting balance.  Pt continues to present with deficits in generalized strength/AROM, sitting and standing balance, functional activity tolerance,and FM and GM coordination impacting overall performance of ADLs and functional transfers/mobility. Pt returned to bed, with call bell, nursing, bed alarm set and all needs met. Patient would benefit from continued skilled Occupational services while at [facility] in order to address current impairments and improve IND and safety with self care and functional transfer/mobility. Current OT d/c recommendation to IRF once medically appropriate. Current Level of Function Impacting Discharge (ADLs): max A for LB dressing    Other factors to consider for discharge: Time of onset, medical prognosis/diagnosis, severity of deficits, PLOF, functional baseline, home environment, and family support          PLAN :  Patient continues to benefit from skilled intervention to address the above impairments. Continue treatment per established plan of care. to address goals. Recommend with staff: encourage UE exercises    Recommend next OT session: EOB grooming    Recommendation for discharge: (in order for the patient to meet his/her long term goals)  [facility]     This discharge recommendation:  Has been made in collaboration with the attending provider and/or case management    IF patient discharges home will need the following DME: TBD       SUBJECTIVE:   Patient stated My hands and feet are numb.     OBJECTIVE DATA SUMMARY:   Cognitive/Behavioral Status:  Neurologic State: Alert  Orientation Level: Appropriate for age  Cognition: Decreased attention/concentration; Follows commands             Functional Mobility and Transfers for ADLs:  Bed Mobility:  Rolling: Stand-by assistance; Additional time  Supine to Sit: Stand-by assistance; Additional time  Sit to Supine: Minimum assistance;Assist x1;Additional time  Scooting: Stand-by assistance; Additional time    Transfers:  Sit to Stand: Maximum assistance;Assist x2          Balance:  Sitting: Impaired; With support  Sitting - Static: Fair (occasional)  Sitting - Dynamic: Fair (occasional)  Standing: Impaired  Standing - Static: Constant support;Poor  Standing - Dynamic : Constant support;Poor    ADL Intervention:           Lower Body Dressing Assistance  Dressing Assistance: Maximum assistance  Socks: Maximum assistance  Leg Crossed Method Used: No  Position Performed: Long sitting on bed  Cues: Verbal cues provided    Therapeutic Exercises:   Exercise Sets Reps AROM AAROM PROM Self PROM Comments   Elbow flex/ext 1 10 [x] [] [] [] Eob, vc's for technique and posture, min A with sitting balance   Ceiling punches 1 10 [x] [] [] []         Pain:  3/10 kaity feet    Activity Tolerance:   Fair and requires rest breaks  Please refer to the flowsheet for vital signs taken during this treatment. After treatment patient left in no apparent distress:   Bed locked and in lowest position, Supine in bed, Call bell within reach, Bed / chair alarm activated, and Side rails x 3    COMMUNICATION/COLLABORATION:   The patients plan of care was discussed with: Physical therapy assistant and Registered nurse.  Co-tx with PTA for increased pt/clinician safety with OOB activity     ROSARIO Velez  Time Calculation: 28 mins

## 2022-08-24 NOTE — PROGRESS NOTES
Progress Note  Date:2022       Room:[facility]  Patient Colin Dunn     YOB: 1970     Age:52 y.o. Subjective    Pt weakness of legs,arms and leg pain same. Objective         Vitals Last 24 Hours:  TEMPERATURE:  Temp  Av.6 °F (36.4 °C)  Min: 97.5 °F (36.4 °C)  Max: 97.7 °F (36.5 °C)  RESPIRATIONS RANGE: Resp  Av.7  Min: 17  Max: 18  PULSE OXIMETRY RANGE: SpO2  Av %  Min: 100 %  Max: 100 %  PULSE RANGE: Pulse  Av  Min: 74  Max: 77  BLOOD PRESSURE RANGE: Systolic (44SRR), NJL:134 , Min:121 , KJU:847   ; Diastolic (49XDA), YGV:12, Min:81, Max:94    I/O (24Hr): Intake/Output Summary (Last 24 hours) at 2022 1651  Last data filed at 2022 3836  Gross per 24 hour   Intake --   Output 300 ml   Net -300 ml     Pt lert and orientedx3    HEENT: Normal HEENT exam.    Lungs:  Normal effort. Heart: Normal rate. Abdomen: Abdomen is soft. Extremities: Normal range of motion. Neurological: Patient is alert and oriented to person, place and time. Pupils:  Pupils are equal, round, and reactive to light. Skin:  Warm. Labs/Imaging/Diagnostics    Labs:  CBC:  Recent Labs     22  1302 22  1338   WBC 6.6 6.7   RBC 2.61* 2.67*   HGB 8.2* 8.7*   HCT 25.8* 26.3*   MCV 98.9 98.5   RDW 14.9* 15.1*   * 155       CHEMISTRIES:  Recent Labs     22  1302 22  1338    137   K 3.5 3.2*    101   CO2 27 29   BUN 22* 8   CA 8.5 9.1   MG  --  1.8     PT/INR:No results for input(s): INR, INREXT, INREXT in the last 72 hours. No lab exists for component: PROTIME  APTT:No results for input(s): APTT in the last 72 hours. LIVER PROFILE:  Recent Labs     22  1338   AST 24   ALT 23       Lab Results   Component Value Date/Time    ALT (SGPT) 23 2022 01:38 PM    AST (SGOT) 24 2022 01:38 PM    Alk.  phosphatase 91 2022 01:38 PM    Bilirubin, total 0.8 2022 01:38 PM       Imaging Last 24 Hours:  No results found. Assessment//Plan   Principal Problem:    Gait disturbance (8/20/2022)    Active Problems:    Weakness generalized (8/24/2022)    Assessment & Plan  Pancytopenia:Better. WBC Nl and platelets come down slightly today. Hb also coming down. most likely due to Folate deff and pt also has low normal U17  -Continue folic acid po and G58 shots. iron/copper/zinc levels pending  -MMA and intrinsic factor Abs pending  -denies alcohol use since few months as contributing factor  -likely cause of or contributing to the mild pancytopenia given the concurrrent macrocytosis seen on RBCs  SPEP pending. 2. Weakness of both arms and legs:  -active with neurology for this. MRI head negative. plans for LP noted. R/o GBS. pt on steroids. 3. Declining gait and functional status  -felt to be related to #2   Rhematology consult noted. 4. CAD     5. Anxiety      6.  Tobacco abuse:pt advised to quit smoking     Electronically signed by Alondra Mayberry MD on 8/24/2022 at 2:44 PM

## 2022-08-24 NOTE — PROGRESS NOTES
Problem: Pain  Goal: *Control of Pain  Outcome: Progressing Towards Goal     Problem: Patient Education: Go to Patient Education Activity  Goal: Patient/Family Education  Outcome: Progressing Towards Goal     Problem: Falls - Risk of  Goal: *Absence of Falls  Description: Document Desmond Kellogg Fall Risk and appropriate interventions in the flowsheet. Outcome: Progressing Towards Goal  Note: Fall Risk Interventions:            Medication Interventions: Bed/chair exit alarm    Elimination Interventions: Bed/chair exit alarm, Call light in reach, Patient to call for help with toileting needs    History of Falls Interventions: Bed/chair exit alarm         Problem: Patient Education: Go to Patient Education Activity  Goal: Patient/Family Education  Outcome: Progressing Towards Goal     Problem: Patient Education: Go to Patient Education Activity  Goal: Patient/Family Education  Outcome: Progressing Towards Goal     Problem: Patient Education: Go to Patient Education Activity  Goal: Patient/Family Education  Outcome: Progressing Towards Goal     Problem: Pressure Injury - Risk of  Goal: *Prevention of pressure injury  Description: Document Efrem Scale and appropriate interventions in the flowsheet. Outcome: Progressing Towards Goal  Note: Pressure Injury Interventions:             Activity Interventions: PT/OT evaluation    Mobility Interventions: Float heels, HOB 30 degrees or less, Pressure redistribution bed/mattress (bed type)    Nutrition Interventions: Document food/fluid/supplement intake                     Problem: Patient Education: Go to Patient Education Activity  Goal: Patient/Family Education  Outcome: Progressing Towards Goal

## 2022-08-24 NOTE — PROGRESS NOTES
PHYSICAL THERAPY TREATMENT  Patient: Sophia Martines (24 y.o. female)  Date: 8/24/2022  Diagnosis: Gait disturbance [R26.9]  Weakness generalized [R53.1] Gait disturbance  Procedure(s) (LRB):  ESOPHAGOGASTRODUODENOSCOPY (EGD) (N/A)  ESOPHAGOGASTRODUODENAL (EGD) BIOPSY (N/A) 2 Days Post-Op  Precautions:    Chart, physical therapy assessment, plan of care and goals were reviewed. ASSESSMENT  Patient continues with skilled PT services and is progressing towards goals. Patient supine in bed upon approach and agreed to therapy session today Patient ws SBA for bed mobility supine>sit with min A for sit>supine(raising legs legs up into bed) and SBA for scooting to EOB where patient demonstrated fair balance requiring occasional support to remain sitting upright. Patient then performed STS to RW at max Ax2. Patient able to tolerate standing for 30 seconds to 1 minute before requiring to sit down again. Patient had poor balance while standing and was unable to take any steps with patient slouching hip posteriorly and to the Rt side which she was not able to correct despite VC and Tc to do so. Patient then returned to sitting at max Ax2. Patient then performed seated TE ( see details below). Patient then returned to supine in bed and left supine  with nurse entering room, call bell within reach and all needs meet. Current Level of Function Impacting Discharge (mobility/balance): impaired balance, general weakness, poor activity tolerance    Other factors to consider for discharge: PLOF, assistance at home, level of deficits, acute medical state         PLAN :  Patient continues to benefit from skilled intervention to address the above impairments. Continue treatment per established plan of care. to address goals.     Recommendation for discharge: (in order for the patient to meet his/her long term goals)  1 [facility],[address]     This discharge recommendation:  Has been made in collaboration with the attending provider and/or case management    IF patient discharges home will need the following DME: rolling walker       SUBJECTIVE:   Patient stated my hands and feet are numb.     OBJECTIVE DATA SUMMARY:   Critical Behavior:  Neurologic State: Alert  Orientation Level: Appropriate for age  Cognition: Decreased attention/concentration, Follows commands     Functional Mobility Training:  Bed Mobility:  Rolling: Stand-by assistance; Additional time  Supine to Sit: Stand-by assistance; Additional time  Sit to Supine: Minimum assistance;Assist x1;Additional time  Scooting: Stand-by assistance; Additional time  Transfers:  Sit to Stand: Maximum assistance;Assist x2  Stand to Sit: Maximum assistance;Assist x2  Balance:  Sitting: Impaired; With support  Sitting - Static: Fair (occasional)  Sitting - Dynamic: Fair (occasional)  Standing: Impaired  Standing - Static: Constant support;Poor  Standing - Dynamic : Constant support;Poor    Therapeutic Exercises:   1x12 AP  1x15 LAQ  Pain Rating:  3/10 in GEOVANY feet    Activity Tolerance:   Bed locked and in lowest position Fair and requires rest breaks  Please refer to the flowsheet for vital signs taken during this treatment. After treatment patient left in no apparent distress:   Bed returned to lowest position, Supine in bed, Call bell within reach, and Side rails x 3    COMMUNICATION/COLLABORATION:   The patients plan of care was discussed with: Occupational therapy assistant and Registered nurse.  Treatment occurred with OT due to patient requiring Ax2 for mobility and safety       Problem: Mobility Impaired (Adult and Pediatric)  Goal: *Acute Goals and Plan of Care (Insert Text)  Description: I with LE HEP x7 days  Supine to sit EOB with CGAx1 x7 days  Sit EOB to perform LE exercises without LOB x 8-10min x 7 days  Sit to stand with min Ax2 x 7 days  Stand pivot from bed to chair with min Ax2 x 7 days    Future goals TBD as pt progresses with PT    Pt stated goal: for balance to improve  Outcome: Progressing Towards Goal       Conrad Alves, KEM   Time Calculation: 29 mins

## 2022-08-24 NOTE — PROGRESS NOTES
Hospitalist Progress Note         Rock Hodgkins, APRN, FNP-C    Daily Progress Note: 8/24/2022      Subjective:   Subjective   Patient examined alert and oriented sitting in bed. She denies pain on examination. No acute distress noted on examination. Review of Systems:   Review of Systems   Constitutional:  Negative for chills and fever. Eyes:  Negative for blurred vision, double vision and photophobia. Respiratory:  Negative for cough, sputum production and shortness of breath. Cardiovascular:  Negative for chest pain, palpitations, orthopnea and leg swelling. Gastrointestinal:  Negative for heartburn, nausea and vomiting. Genitourinary:  Negative for dysuria, flank pain and hematuria. Neurological:  Positive for tingling, sensory change and weakness. Objective:   Objective      Vitals:  Patient Vitals for the past 12 hrs:   Temp Pulse Resp BP SpO2   08/24/22 0838 97.7 °F (36.5 °C) 77 18 122/81 100 %          Physical Exam:  Physical Exam  Vitals and nursing note reviewed. Constitutional:       Appearance: Normal appearance. HENT:      Mouth/Throat:      Comments: Teeth decay  Eyes:      Extraocular Movements: Extraocular movements intact. Cardiovascular:      Rate and Rhythm: Normal rate. Heart sounds: Normal heart sounds. Pulmonary:      Breath sounds: Normal breath sounds. Abdominal:      General: Bowel sounds are normal.   Musculoskeletal:      Comments: 4/5 in proximal and distal muscles of both arms, 3/5 bilateral dorsi flexion. Bilateral hand  3/5       Skin:     General: Skin is warm and dry. Neurological:      Mental Status: She is alert and oriented to person, place, and time. Comments: Decrease sensation in bilateral legs/feet and arms        Lab Results:  No results found for this or any previous visit (from the past 24 hour(s)).      Results       Procedure Component Value Units Date/Time    COVID-19 RAPID TEST [[id number]] Collected: 08/19/22 0733 Order Status: Completed Specimen: Nasopharyngeal Updated: 08/20/22 0022     COVID-19 rapid test Not Detected        Comment: Rapid Abbott ID Now   Rapid NAAT:  The specimen is NEGATIVE for SARS-CoV-2, the novel coronavirus associated with COVID-19. Negative results should be treated as presumptive and, if inconsistent with clinical signs and symptoms or necessary for patient management, should be tested with an alternative molecular assay. Negative results do not preclude SARS-CoV-2 infection and should not be used as the sole basis for patient management decisions. This test has been authorized by the FDA under   an Emergency Use Authorization (EUA) for use by authorized laboratories.  Fact sheet for Healthcare Providers: [facility] [employer] [facility] Fact sheet for Patients: [employer]   Methodology: Isothermal Nucleic Acid Amplification                  Diagnostic Images:  CT Results  (Last 48 hours)      None                Current Medications:    Current Facility-Administered Medications:     polyethylene glycol (MIRALAX) packet 17 g, 17 g, Oral, DAILY, Chou, Jammie F, NP, 17 g at 08/24/22 2161    docusate sodium (COLACE) capsule 100 mg, 100 mg, Oral, QHS, Chou, Jammie F, NP, 100 mg at 08/22/22 2223    traMADoL (ULTRAM) tablet 50 mg, 50 mg, Oral, Q8H PRN, Jaimee Frye NP, 50 mg at 08/23/22 2103    famotidine (PF) (PEPCID) 20 mg in 0.9% sodium chloride 10 mL injection, 20 mg, IntraVENous, Q12H, Sandro Farfan MD, 20 mg at 58/84/77 4379    folic acid (FOLVITE) tablet 1 mg, 1 mg, Oral, DAILY, Mitali Cook MD, 1 mg at 08/24/22 1483    cyanocobalamin (VITAMIN B12) injection 1,000 mcg, 1,000 mcg, IntraMUSCular, DAILY, Mitali Cook MD, 1,000 mcg at 08/24/22 0838    amLODIPine (NORVASC) tablet 5 mg, 5 mg, Oral, DAILY, Mariangel Alicea MD, 5 mg at 08/24/22 9803    metoprolol succinate (TOPROL-XL) XL tablet 25 mg, 25 mg, Oral, DAILY, Jacqueline Isa Monte MD, 25 mg at 08/24/22 0838    pregabalin (LYRICA) capsule 100 mg, 100 mg, Oral, TID, Willa Schafer MD, 100 mg at 08/24/22 0838    sodium chloride (NS) flush 5-40 mL, 5-40 mL, IntraVENous, Q8H, Willa Schafer MD, 10 mL at 08/24/22 0547    sodium chloride (NS) flush 5-40 mL, 5-40 mL, IntraVENous, PRN, Willa Schafer MD    acetaminophen (TYLENOL) tablet 650 mg, 650 mg, Oral, Q6H PRN **OR** acetaminophen (TYLENOL) suppository 650 mg, 650 mg, Rectal, Q6H PRN, Willa Schafer MD    ondansetron (ZOFRAN ODT) tablet 4 mg, 4 mg, Oral, Q6H PRN **OR** ondansetron (ZOFRAN) injection 4 mg, 4 mg, IntraVENous, Q6H PRN, Willa Schafer MD    enoxaparin (LOVENOX) injection 40 mg, 40 mg, SubCUTAneous, DAILY, Willa Schafer MD, 40 mg at 08/24/22 5382    baclofen (LIORESAL) tablet 10 mg, 10 mg, Oral, BID PRN, Feliberto Lacey MD, 10 mg at 08/20/22 1630    methylPREDNISolone (Solu-MEDROL) 1 g in 100 mL NS MBP, 1,000 mg, IntraVENous, DAILY, Willa Schafer MD, Last Rate: 100 mL/hr at 08/24/22 0901, 1,000 mg at 08/24/22 0901       ASSESSMENT:  47 y.o. female with a history of peripheral arterial disease, coronary artery disease, anxiety, hypertension presents to the emergency room complaining of progressively worsening weakness that now she is not able to ambulate. Affecting upper and lower extremities with weakness and numbness, started 2 months ago, gradually worsening. She had several emergency room visits since then, following with neurologist outpatient. Was given medications, but no improvement and gradually worsening. She was using her walker before now she is using wheelchair as she is not able to ambulate. Denies any headache, vision disturbance or speech disturbance. The most of the problem is more numbness and weakness, but able to move both upper and lower extremities. Denies any joint swelling or pain. Patient had LP attempt 8/20/22 by Dr. Christiana Tran.  Unable to complete due to pain. MRI brain negative. EGD showed gastritis. Patient for LP via Fluoro Monday 8/22/22. # 1 Progressive Weakness/Neuropathy  - Etiology? CIDP, Autoimmune, Demyelinating process? - Neuro-->Dr. Christiana Tran   - Rheum-->Dr. Heidi Suarez    - For LP via Fluoro   - For MRI w/ contrast  - Receiving IV Solu-Medrol 1gm daily, Folic acid, N85, and Lyrica  - Intrinsic factor AB elevated, copper and zinc levels wnl     # 2 Multi-level Cervical Spinal Stenoses  - Recent MRI did not reveal myelopathy  - Repeat MRI negative acute findings  - Continue IV Solu-Medrol 1gm daily, Folic acid, S48, and Lyrica     # 3 Pancytopenia  - Heme following  - Iron profile is low     # 4 Dysphagia w/ N/V  - continue pepcid and prn antiemetics  - GI--> Nair-->EGD showed gastritis     # 5 Hx of HTN  - BP currently acceptable  - Continue norvasc 5mg    # 6 Fibromyalgia  - Managed with lyrica 100mg tid    #7 Hx PAD/CAD  - Currently not on any medications    #8 Tobacco dependency  Cessation counseling provided  Informed that if she continues to smoke in the hospital we will need to discharge her due to noncompliance and increased safety risk towards others      Full Code  Dvt Prophylaxis Lovenox  GI Prophylaxis Pepcid  Discharge barriers:  - LUMBAR PUNCTURE WITH FLUORO  - CM FOR PLACEMENT  - FOLLOW [facility]      Above treatment plan reviewed and discussed with patient in detail at bedside, all questions answered. Care Plan discussed with: Patient/Family    Total time spent with patient: 30 minutes.     Horace Centeno NP

## 2022-08-24 NOTE — PROGRESS NOTES
Progress Note  Date:2022       Room:[facility]  Patient Yordan Kovacs     YOB: 1970     Age:52 y.o. Subjective    Subjective:  Symptoms:  She reports weakness. There were no acute events overnight. Imaging guided lumbar puncture is planned for Friday, the patient is amenable to proceed with the procedure. EGD was done that shows Schatzki rings. Biopsy is pending. The patient is receiving IV solumedrol under the control of neurology. Review of Systems   Constitutional:  Positive for activity change and fatigue. HENT: Negative. Eyes: Negative. Respiratory: Negative. Cardiovascular: Negative. Gastrointestinal: Negative. Endocrine: Negative. Genitourinary:         Incontinence. Musculoskeletal:  Positive for myalgias. Skin: Negative. Allergic/Immunologic: Negative. Neurological:  Positive for weakness and numbness. Hematological: Negative. Psychiatric/Behavioral: Negative. Objective         Vitals Last 24 Hours:  TEMPERATURE:  Temp  Av.6 °F (36.4 °C)  Min: 97.5 °F (36.4 °C)  Max: 97.7 °F (36.5 °C)  RESPIRATIONS RANGE: Resp  Av.7  Min: 17  Max: 18  PULSE OXIMETRY RANGE: SpO2  Av %  Min: 100 %  Max: 100 %  PULSE RANGE: Pulse  Av  Min: 74  Max: 77  BLOOD PRESSURE RANGE: Systolic (26TLF), WOM:704 , Min:121 , DND:947   ; Diastolic (35ACR), ZOB:50, Min:81, Max:94    I/O (24Hr): Intake/Output Summary (Last 24 hours) at 2022 1754  Last data filed at 2022 8171  Gross per 24 hour   Intake --   Output 300 ml   Net -300 ml       Objective:  General Appearance:  Comfortable. Vital signs: (most recent): Blood pressure (!) 137/94, pulse 74, temperature 97.5 °F (36.4 °C), resp. rate 18, height 5' 11\" (1.803 m), weight 119.3 kg (263 lb), SpO2 100 %, not currently breastfeeding. Vital signs are normal.    HEENT: Normal HEENT exam.    Lungs:  Normal effort. Heart: Normal rate.     Abdomen: Abdomen is soft. Extremities: Normal range of motion. Neurological: Patient is alert and oriented to person, place and time. Pupils:  Pupils are equal, round, and reactive to light. Skin:  Warm. Labs/Imaging/Diagnostics    Labs:  CBC:  Recent Labs     08/24/22  1302 08/22/22  1338   WBC 6.6 6.7   RBC 2.61* 2.67*   HGB 8.2* 8.7*   HCT 25.8* 26.3*   MCV 98.9 98.5   RDW 14.9* 15.1*   * 155       CHEMISTRIES:  Recent Labs     08/24/22  1302 08/22/22  1338    137   K 3.5 3.2*    101   CO2 27 29   BUN 22* 8   CA 8.5 9.1   MG  --  1.8     PT/INR:No results for input(s): INR, INREXT, INREXT in the last 72 hours. No lab exists for component: PROTIME  APTT:No results for input(s): APTT in the last 72 hours. LIVER PROFILE:  Recent Labs     08/22/22  1338   AST 24   ALT 23       Lab Results   Component Value Date/Time    ALT (SGPT) 23 08/22/2022 01:38 PM    AST (SGOT) 24 08/22/2022 01:38 PM    Alk. phosphatase 91 08/22/2022 01:38 PM    Bilirubin, total 0.8 08/22/2022 01:38 PM       Imaging Last 24 Hours:    MRI Cervical spine 8/12/2022:  IMPRESSION  1. T1-2: Central focal protrusion. 2. C6-7: Central focal protrusion, mild to moderate bilateral foraminal  stenosis. 3. C5-6: Broad and left posterolateral bulge. Severe left and mild right  foraminal stenosis. 3. C5-6: Broad and left posterolateral bulge. Severe left and mild right  foraminal stenosis. 4. C4-5: Moderate bilateral foraminal stenosis left greater than right. 5. C3-4, C2-3: Mild left foraminal stenosis. MRI Brain 8/20/2022:  IMPRESSION     1. No acute or significant intracranial abnormality. Assessment//Plan   Principal Problem:    Gait disturbance (8/20/2022)    Active Problems:    Weakness generalized (8/24/2022)    Assessment:   (This is a 46 yo female with 2 months of progressive weakness of the extremities with pain and episodes of falling.   Neurology is also following the patient and are conducting and additional work up. 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 Muscle enzyme levels are normal.   Myomarker panel is still pending. ). Plan:    (-We will monitor for the remainder of the antibody testing that is still pending.  -We appreciate the assistance of neurology.   -Agree with solumedrol under control of neurology. Thank you for the consult. We will continue to follow the patient. Please feel free to contact us with any questions. ).      Electronically signed by Dilshad Matthew MD on 8/24/2022 at 8:33 AM

## 2022-08-24 NOTE — PROGRESS NOTES
Neurology Progress Note    Patient ID:  Lorenza Lim  [id number]  13 y.o.  1970    Subjective: I talked to Ms. [place] this morning. She was awake and alert. She verbalized she is feeling better than yesterday and she is in better mood. She will get LP done on Friday. Ms. Lorenza Lim is a 69-year-old woman with history of fibromyalgia, hypertension who has been followed in the clinic by Dr. Nicho Venegas for ascending pain and dysesthesias as well as weakness for the past 2 months. She said it started in her feet and has ascended upwards to the point that now she is falling much more frequently even with a walker. At that clinic appointment, cervical myelopathy was suspected. MRI of the cervical spine was ordered which came back as unrevealing with no evidence of myelopathy. She is clinically becoming worse and called the office and was advised to come to the ER. She has severe pain in her legs as well as cramping and continues to get weaker.     Current Facility-Administered Medications   Medication Dose Route Frequency    polyethylene glycol (MIRALAX) packet 17 g  17 g Oral DAILY    docusate sodium (COLACE) capsule 100 mg  100 mg Oral QHS    traMADoL (ULTRAM) tablet 50 mg  50 mg Oral Q8H PRN    famotidine (PF) (PEPCID) 20 mg in 0.9% sodium chloride 10 mL injection  20 mg IntraVENous H09H    folic acid (FOLVITE) tablet 1 mg  1 mg Oral DAILY    cyanocobalamin (VITAMIN B12) injection 1,000 mcg  1,000 mcg IntraMUSCular DAILY    amLODIPine (NORVASC) tablet 5 mg  5 mg Oral DAILY    metoprolol succinate (TOPROL-XL) XL tablet 25 mg  25 mg Oral DAILY    pregabalin (LYRICA) capsule 100 mg  100 mg Oral TID    sodium chloride (NS) flush 5-40 mL  5-40 mL IntraVENous Q8H    sodium chloride (NS) flush 5-40 mL  5-40 mL IntraVENous PRN    acetaminophen (TYLENOL) tablet 650 mg  650 mg Oral Q6H PRN    Or    acetaminophen (TYLENOL) suppository 650 mg  650 mg Rectal Q6H PRN    ondansetron (ZOFRAN ODT) tablet 4 mg  4 mg Oral Q6H PRN Or    ondansetron (ZOFRAN) injection 4 mg  4 mg IntraVENous Q6H PRN    enoxaparin (LOVENOX) injection 40 mg  40 mg SubCUTAneous DAILY    baclofen (LIORESAL) tablet 10 mg  10 mg Oral BID PRN    methylPREDNISolone (Solu-MEDROL) 1 g in 100 mL NS MBP  1,000 mg IntraVENous DAILY          Objective: Ms. Alfa Vasquez remained alert and oriented x 4. Obeys commands on all upper and lower extremity bilaterally. Minimal weakness noted on the upper extremity symmetrically but more so on the left lower extremity. Patient Vitals for the past 8 hrs:   BP Temp Pulse Resp SpO2   08/24/22 0838 122/81 97.7 °F (36.5 °C) 77 18 100 %       No intake/output data recorded. 08/22 1901 - 08/24 0700  In: -   Out: 300 [Urine:300]    Lab Review   No results found for this or any previous visit (from the past 24 hour(s)). NEUROLOGICAL EXAM:    Appearance: The patient is well developed, well nourished, provides a coherent history and is in no acute distress. Mental Status: Oriented to time, place and person. Mood and affect appropriate. Cranial Nerves:   Intact visual fields. MEGHNA, EOM's full, no nystagmus, no ptosis. Facial sensation is normal.  Facial movement is symmetric. Hearing is normal bilaterally. Palate is midline with normal sternocleidomastoid and trapezius muscles are normal. Tongue is midline. Motor:  4/5 strength in upper proxima and distal muscles. 3/5 in lower proximal and distal muscles. Normal bulk and tone. Reflexes:   Deep tendon reflexes 1+ bilateral brachioradialis, 0 in patellar and achilles bilaterally. Sensory:   Decreased sensation in both legs and arms. Absent proprioception in foot bilaterally. Gait:  Deferred   Tremor:   No tremor noted. Cerebellar:  No cerebellar signs present. Babinski:  Down b/l         Assessment: Ms. Alfa Vasquez is a 47year old female with ascending dysesthesias ans weakness for the past 2 months.    - Cervical Myelopathy rule out with cervical MRI.   - Lumbar puncture was attempted on 8/21/22 and patient couldn't tolerate procedure. LP was scheduled for 8/22/22 but patient refused at that time. - Differential diagnosis is CIDP vs. Demyelinating vs. Inflammatory disorder of CNS due to leg weakness and diminished reflexes. - Brain MRI showed no acute or significant intracranial abnormality. Principal Problem:    Gait disturbance (8/20/2022)      Plan:  - LP fluoroscopy guided to look for oligoclonal banding. Pending.   - Continue Vitamin B12 1000 mcg IM. - Continue 1 mg Folic acid daily. - Continue Solu medrol 1g daily. - Continue Lyrica 200 mg BID , and baclofen 10 mg BID as needed. - Will continue to follow patient clinically.      Thank you for the consult,    Signed:  Bea Isabel  8/24/2022  9:19 AM     Collaborating Physician:  Dr. Sulma Hollis

## 2022-08-25 VITALS
OXYGEN SATURATION: 100 % | BODY MASS INDEX: 36.82 KG/M2 | HEART RATE: 74 BPM | RESPIRATION RATE: 20 BRPM | SYSTOLIC BLOOD PRESSURE: 120 MMHG | HEIGHT: 71 IN | DIASTOLIC BLOOD PRESSURE: 80 MMHG | WEIGHT: 263 LBS | TEMPERATURE: 98.1 F

## 2022-08-25 PROCEDURE — 74011000250 HC RX REV CODE- 250: Performed by: HOSPITALIST

## 2022-08-25 PROCEDURE — 74011250636 HC RX REV CODE- 250/636: Performed by: HOSPITALIST

## 2022-08-25 PROCEDURE — 74011250637 HC RX REV CODE- 250/637: Performed by: NURSE PRACTITIONER

## 2022-08-25 PROCEDURE — 74011250637 HC RX REV CODE- 250/637: Performed by: HOSPITALIST

## 2022-08-25 PROCEDURE — 74011250637 HC RX REV CODE- 250/637: Performed by: INTERNAL MEDICINE

## 2022-08-25 PROCEDURE — 74011250636 HC RX REV CODE- 250/636: Performed by: INTERNAL MEDICINE

## 2022-08-25 PROCEDURE — 74011000258 HC RX REV CODE- 258: Performed by: HOSPITALIST

## 2022-08-25 RX ORDER — METOPROLOL SUCCINATE 25 MG/1
25 TABLET, EXTENDED RELEASE ORAL DAILY
Qty: 30 TABLET | Refills: 0 | Status: SHIPPED | OUTPATIENT
Start: 2022-08-25 | End: 2022-09-24

## 2022-08-25 RX ORDER — PANTOPRAZOLE SODIUM 40 MG/1
40 TABLET, DELAYED RELEASE ORAL DAILY
Qty: 90 TABLET | Refills: 2 | Status: ON HOLD | OUTPATIENT
Start: 2022-08-25 | End: 2022-09-02 | Stop reason: SDUPTHER

## 2022-08-25 RX ORDER — TETRACYCLINE HYDROCHLORIDE 500 MG/1
500 CAPSULE ORAL
Qty: 28 CAPSULE | Refills: 0 | Status: SHIPPED | OUTPATIENT
Start: 2022-08-25 | End: 2022-09-02

## 2022-08-25 RX ORDER — METRONIDAZOLE 500 MG/1
500 TABLET ORAL 2 TIMES DAILY
Qty: 28 TABLET | Refills: 0 | Status: ON HOLD | OUTPATIENT
Start: 2022-08-25 | End: 2022-09-02 | Stop reason: SDUPTHER

## 2022-08-25 RX ORDER — AMLODIPINE BESYLATE 5 MG/1
5 TABLET ORAL DAILY
Qty: 30 TABLET | Refills: 0 | Status: SHIPPED | OUTPATIENT
Start: 2022-08-25 | End: 2022-09-24

## 2022-08-25 RX ORDER — DOCUSATE SODIUM 100 MG/1
200 CAPSULE, LIQUID FILLED ORAL
Qty: 60 CAPSULE | Refills: 0 | Status: SHIPPED | OUTPATIENT
Start: 2022-08-25 | End: 2022-09-24

## 2022-08-25 RX ORDER — POTASSIUM CHLORIDE 20 MEQ/1
40 TABLET, EXTENDED RELEASE ORAL
Status: COMPLETED | OUTPATIENT
Start: 2022-08-25 | End: 2022-08-25

## 2022-08-25 RX ORDER — BACLOFEN 10 MG/1
10 TABLET ORAL
Qty: 10 TABLET | Refills: 0 | Status: SHIPPED | OUTPATIENT
Start: 2022-08-25 | End: 2022-09-02

## 2022-08-25 RX ADMIN — POTASSIUM CHLORIDE 40 MEQ: 1500 TABLET, EXTENDED RELEASE ORAL at 12:17

## 2022-08-25 RX ADMIN — FAMOTIDINE 20 MG: 20 TABLET ORAL at 09:20

## 2022-08-25 RX ADMIN — FOLIC ACID 1 MG: 1 TABLET ORAL at 09:20

## 2022-08-25 RX ADMIN — PREGABALIN 100 MG: 25 CAPSULE ORAL at 09:20

## 2022-08-25 RX ADMIN — AMLODIPINE BESYLATE 5 MG: 5 TABLET ORAL at 09:20

## 2022-08-25 RX ADMIN — SODIUM CHLORIDE, PRESERVATIVE FREE 10 ML: 5 INJECTION INTRAVENOUS at 05:18

## 2022-08-25 RX ADMIN — TRAMADOL HYDROCHLORIDE 50 MG: 50 TABLET, COATED ORAL at 09:22

## 2022-08-25 RX ADMIN — ENOXAPARIN SODIUM 40 MG: 100 INJECTION SUBCUTANEOUS at 09:21

## 2022-08-25 RX ADMIN — METHYLPREDNISOLONE SODIUM SUCCINATE 1000 MG: 1 INJECTION, POWDER, LYOPHILIZED, FOR SOLUTION INTRAMUSCULAR; INTRAVENOUS at 09:24

## 2022-08-25 RX ADMIN — CYANOCOBALAMIN 1000 MCG: 1000 INJECTION, SOLUTION INTRAMUSCULAR at 09:21

## 2022-08-25 RX ADMIN — METOPROLOL SUCCINATE 25 MG: 25 TABLET, EXTENDED RELEASE ORAL at 09:20

## 2022-08-25 NOTE — PROGRESS NOTES
DC Plan: [facility]  ([address])       Transportation: stretcher    BERNARD met with pt at the bedside to f/up on her dc plan. Cm discussed rehab again. Pt continued to decline going anywhere for short term rehab. Pt wants to go home. Cm informed pt [address] will be coming out to her home to provide therapy. Pt was agreeable with plan. Pt reports she has a rolling walker at home. Cm discussed transportation. Pt indicated she will need transportation home. Pt has four steps to the entrance of her home. Discharge plan of care/case management plan validated with provider's discharge order.

## 2022-08-25 NOTE — PROGRESS NOTES
Problem: Pain  Goal: *Control of Pain  Outcome: Progressing Towards Goal     Problem: Falls - Risk of  Goal: *Absence of Falls  Description: Document [place] Fall Risk and appropriate interventions in the flowsheet.   Outcome: Progressing Towards Goal  Note: Fall Risk Interventions:            Medication Interventions: Bed/chair exit alarm    Elimination Interventions: Bed/chair exit alarm, Call light in reach, Patient to call for help with toileting needs    History of Falls Interventions: Bed/chair exit alarm         Problem: Patient Education: Go to Patient Education Activity  Goal: Patient/Family Education  Outcome: Progressing Towards Goal

## 2022-08-25 NOTE — PROGRESS NOTES
Progress Note  Date:2022       Room:[facility]  Patient Gardenia Loja     YOB: 1970     Age:52 y.o. Subjective    Subjective:  Symptoms:  She reports weakness. There were no acute events overnight. Imaging guided lumbar puncture is planned for tomorrow, the patient is amenable to proceed with the procedure. EGD was done that shows Schatzki rings. Biopsy is pending. The patient is receiving IV solumedrol under the control of neurology. Review of Systems   Constitutional:  Positive for activity change and fatigue. HENT: Negative. Eyes: Negative. Respiratory: Negative. Cardiovascular: Negative. Gastrointestinal: Negative. Endocrine: Negative. Genitourinary:         Incontinence. Musculoskeletal:  Positive for myalgias. Skin: Negative. Allergic/Immunologic: Negative. Neurological:  Positive for weakness and numbness. Hematological: Negative. Psychiatric/Behavioral: Negative. Objective         Vitals Last 24 Hours:  TEMPERATURE:  Temp  Av.8 °F (36.6 °C)  Min: 97.5 °F (36.4 °C)  Max: 98.1 °F (36.7 °C)  RESPIRATIONS RANGE: Resp  Av  Min: 18  Max: 20  PULSE OXIMETRY RANGE: SpO2  Av %  Min: 100 %  Max: 100 %  PULSE RANGE: Pulse  Av  Min: 74  Max: 74  BLOOD PRESSURE RANGE: Systolic (16ULM), YGN:154 , Min:120 , JFW:570   ; Diastolic (99IFN), ZHJ:89, Min:80, Max:94    I/O (24Hr): Intake/Output Summary (Last 24 hours) at 2022 1403  Last data filed at 2022 0437  Gross per 24 hour   Intake --   Output 700 ml   Net -700 ml       Objective:  General Appearance:  Comfortable. Vital signs: (most recent): Blood pressure 120/80, pulse 74, temperature 98.1 °F (36.7 °C), resp. rate 20, height 5' 11\" (1.803 m), weight 119.3 kg (263 lb), SpO2 100 %, not currently breastfeeding. Vital signs are normal.    HEENT: Normal HEENT exam.    Lungs:  Normal effort. Heart: Normal rate.     Abdomen: Abdomen is soft. Extremities: Normal range of motion. Neurological: Patient is alert and oriented to person, place and time. Pupils:  Pupils are equal, round, and reactive to light. Skin:  Warm. Labs/Imaging/Diagnostics    Labs:  CBC:  Recent Labs     08/24/22  1302   WBC 6.6   RBC 2.61*   HGB 8.2*   HCT 25.8*   MCV 98.9   RDW 14.9*   *       CHEMISTRIES:  Recent Labs     08/24/22  1302      K 3.5      CO2 27   BUN 22*   CA 8.5     PT/INR:No results for input(s): INR, INREXT, INREXT in the last 72 hours. No lab exists for component: PROTIME  APTT:No results for input(s): APTT in the last 72 hours. LIVER PROFILE:  No results for input(s): AST, ALT in the last 72 hours. No lab exists for component: Meldana Gong, ALKPHOS    Lab Results   Component Value Date/Time    ALT (SGPT) 23 08/22/2022 01:38 PM    AST (SGOT) 24 08/22/2022 01:38 PM    Alk. phosphatase 91 08/22/2022 01:38 PM    Bilirubin, total 0.8 08/22/2022 01:38 PM       Imaging Last 24 Hours:    MRI Cervical spine 8/12/2022:  IMPRESSION  1. T1-2: Central focal protrusion. 2. C6-7: Central focal protrusion, mild to moderate bilateral foraminal  stenosis. 3. C5-6: Broad and left posterolateral bulge. Severe left and mild right  foraminal stenosis. 3. C5-6: Broad and left posterolateral bulge. Severe left and mild right  foraminal stenosis. 4. C4-5: Moderate bilateral foraminal stenosis left greater than right. 5. C3-4, C2-3: Mild left foraminal stenosis. MRI Brain 8/20/2022:  IMPRESSION     1. No acute or significant intracranial abnormality. Assessment//Plan   Principal Problem:    Gait disturbance (8/20/2022)    Active Problems:    Weakness generalized (8/24/2022)    Assessment:   (This is a 46 yo female with 2 months of progressive weakness of the extremities with pain and episodes of falling. Neurology is also following the patient and are conducting and additional work up.   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  Muscle enzyme levels are normal.   Myomarker panel is still pending. We will plan on follow up with the patient in the outpatient setting to review all results. ). Plan:    (-We will monitor for the remainder of the antibody testing that is still pending.  -We appreciate the assistance of neurology.   -Agree with solumedrol under control of neurology. Thank you for the consult. We will continue to follow the patient. Please feel free to contact us with any questions. ).      Electronically signed by Jan Loza MD on 8/25/2022 at 8:33 AM

## 2022-08-25 NOTE — DISCHARGE SUMMARY
Admit date: 8/19/2022   Admitting Provider: Bev Easton MD    Discharge date: 8/25/2022  Discharging Provider: Alistair Pina NP      * Admission Diagnoses: Gait disturbance [R26.9]  Weakness generalized [R53.1]    * Discharge Diagnoses:    Hospital Problems as of 8/25/2022 Never Reviewed            Codes Class Noted - Resolved POA    Weakness generalized ICD-10-CM: R53.1  ICD-9-CM: 780.79  8/24/2022 - Present Unknown        * (Principal) Gait disturbance ICD-10-CM: R26.9  ICD-9-CM: 781.2  8/20/2022 - Present Unknown           * Hospital Course: 47 y.o. female with a history of peripheral arterial disease, coronary artery disease, anxiety, hypertension presents to the emergency room complaining of progressively worsening weakness that now she is not able to ambulate. Affecting upper and lower extremities with weakness and numbness, started 2 months ago, gradually worsening. She had several emergency room visits since then, following with neurologist outpatient. Was given medications, but no improvement and gradually worsening. She was using her walker before now she is using wheelchair as she is not able to ambulate. Denies any headache, vision disturbance or speech disturbance. The most of the problem is more numbness and weakness, but able to move both upper and lower extremities. Denies any joint swelling or pain. Patient had LP attempt 8/20/22 by Dr. Eduarda Venegas. Unable to complete due to pain. MRI brain negative. EGD showed gastritis. Patient continued to refuse her lumbar puncture procedure. S/p treatment with 1gm IV solu medrol daily. Intrinsic factor AB elevated, copper and zinc levels wnl. EGD showed Schatzki's ring distal third of esophagus. Biopsy was taken. Gastritis chronic without bleeding. Patient medically stable for discharge home with . She declined rehab placement. Diagnosed with H pylori started on tetracycline and flagyl x 14 days.        * Procedures: Procedure(s):  ESOPHAGOGASTRODUODENOSCOPY (EGD)  ESOPHAGOGASTRODUODENAL (EGD) BIOPSY      Consults: GI, Neurology, and rheumatology    Significant Diagnostic Studies:   Results       Procedure Component Value Units Date/Time    COVID-19 RAPID TEST [[id number]] Collected: 08/19/22 2334    Order Status: Completed Specimen: Nasopharyngeal Updated: 08/20/22 0022     COVID-19 rapid test Not Detected        Comment: Rapid Abbott ID Now   Rapid NAAT:  The specimen is NEGATIVE for SARS-CoV-2, the novel coronavirus associated with COVID-19. Negative results should be treated as presumptive and, if inconsistent with clinical signs and symptoms or necessary for patient management, should be tested with an alternative molecular assay. Negative results do not preclude SARS-CoV-2 infection and should not be used as the sole basis for patient management decisions. This test has been authorized by the FDA under   an Emergency Use Authorization (EUA) for use by authorized laboratories. Fact sheet for Healthcare Providers: [employer]date.co.nz Fact sheet for Patients: [employer]   Methodology: Isothermal Nucleic Acid Amplification                 Discharge Exam:  Vitals and nursing note reviewed. Constitutional:       Appearance: Normal appearance. HENT:      Mouth/Throat:      Comments: Teeth decay  Eyes:      Extraocular Movements: Extraocular movements intact. Cardiovascular:      Rate and Rhythm: Normal rate. Heart sounds: Normal heart sounds. Pulmonary:      Breath sounds: Normal breath sounds. Abdominal:      General: Bowel sounds are normal.   Musculoskeletal:      Comments: 4/5 in proximal and distal muscles of both arms, 3/5 bilateral dorsi flexion.  Bilateral hand  3/5    * Discharge Condition: improved  * Disposition: Home    Discharge Medications:  Current Discharge Medication List        START taking these medications    Details   baclofen (LIORESAL) 10 mg tablet Take 1 Tablet by mouth two (2) times daily as needed for Muscle Spasm(s) for up to 5 days. Qty: 10 Tablet, Refills: 0  Start date: 8/25/2022, End date: 8/30/2022      docusate sodium (COLACE) 100 mg capsule Take 2 Capsules by mouth nightly for 30 days. Qty: 60 Capsule, Refills: 0  Start date: 8/25/2022, End date: 9/24/2022           CONTINUE these medications which have CHANGED    Details   amLODIPine (NORVASC) 5 mg tablet Take 1 Tablet by mouth daily for 30 days. Qty: 30 Tablet, Refills: 0  Start date: 8/25/2022, End date: 9/24/2022      metoprolol succinate (TOPROL-XL) 25 mg XL tablet Take 1 Tablet by mouth daily for 30 days. Qty: 30 Tablet, Refills: 0  Start date: 8/25/2022, End date: 9/24/2022           CONTINUE these medications which have NOT CHANGED    Details   pregabalin (LYRICA) 100 mg capsule Take 100 mg by mouth three (3) times daily. STOP taking these medications       meloxicam (MOBIC) 15 mg tablet Comments:   Reason for Stopping:         naproxen (Naprosyn) 500 mg tablet Comments:   Reason for Stopping:         acetaminophen (TYLENOL) 325 mg tablet Comments:   Reason for Stopping:               * Follow-up Care/Patient Instructions:   Activity: Activity as tolerated, PT/OT Eval and Treat, and PT/OT per Home Health  Diet: Regular Diet  Wound Care: None needed    Follow-up Information       Follow up With Specialties Details Why Contact Info    Consuelo Salinas NP Nurse Practitioner Follow up in 1 week(s)  [address] [facility] [address] [facility] 16      Krystal Leonardo MD Neurology Follow up Only if unable to follow up with current neurologist. [address] [facility] [address]  [phone]      Flaquita Pelletier MD Gastroenterology Follow up in 1 month(s)  [facility] [address] [address]      Lázaro Victoria MD Rheumatology Internal Medicine Follow up in 2 week(s)  [facility] [address] [place] [address]  [phone]                Time Spent: > 35 minutes    CC: Geoff Bower NP      Signed:  Jin Hickman NP  8/25/2022  9:23 AM

## 2022-08-27 ENCOUNTER — APPOINTMENT (OUTPATIENT)
Dept: CT IMAGING | Age: 52
DRG: 043 | End: 2022-08-27
Payer: COMMERCIAL

## 2022-08-27 ENCOUNTER — HOSPITAL ENCOUNTER (INPATIENT)
Age: 52
LOS: 2 days | Discharge: NURSING, REHAB OR LONG-TERM CARE | DRG: 043 | End: 2022-09-02
Attending: EMERGENCY MEDICINE | Admitting: INTERNAL MEDICINE
Payer: COMMERCIAL

## 2022-08-27 DIAGNOSIS — D64.9 ANEMIA, UNSPECIFIED TYPE: ICD-10-CM

## 2022-08-27 DIAGNOSIS — R53.1 WEAKNESS: Primary | ICD-10-CM

## 2022-08-27 LAB
ALBUMIN SERPL-MCNC: 3.2 G/DL (ref 3.5–5)
ALBUMIN/GLOB SERPL: 1 {RATIO} (ref 1.1–2.2)
ALP SERPL-CCNC: 68 U/L (ref 45–117)
ALT SERPL-CCNC: 37 U/L (ref 12–78)
ANION GAP SERPL CALC-SCNC: 6 MMOL/L (ref 5–15)
AST SERPL W P-5'-P-CCNC: 28 U/L (ref 15–37)
BASOPHILS # BLD: 0 K/UL (ref 0–0.1)
BASOPHILS NFR BLD: 0 % (ref 0–1)
BILIRUB SERPL-MCNC: 0.6 MG/DL (ref 0.2–1)
BUN SERPL-MCNC: 14 MG/DL (ref 6–20)
BUN/CREAT SERPL: 25 (ref 12–20)
CA-I BLD-MCNC: 8.2 MG/DL (ref 8.5–10.1)
CHLORIDE SERPL-SCNC: 106 MMOL/L (ref 97–108)
CO2 SERPL-SCNC: 30 MMOL/L (ref 21–32)
CREAT SERPL-MCNC: 0.57 MG/DL (ref 0.55–1.02)
DIFFERENTIAL METHOD BLD: ABNORMAL
EOSINOPHIL # BLD: 0 K/UL (ref 0–0.4)
EOSINOPHIL NFR BLD: 0 % (ref 0–7)
ERYTHROCYTE [DISTWIDTH] IN BLOOD BY AUTOMATED COUNT: 15.1 % (ref 11.5–14.5)
GLOBULIN SER CALC-MCNC: 3.1 G/DL (ref 2–4)
GLUCOSE SERPL-MCNC: 80 MG/DL (ref 65–100)
HCT VFR BLD AUTO: 25.8 % (ref 35–47)
HGB BLD-MCNC: 8.2 G/DL (ref 11.5–16)
IMM GRANULOCYTES # BLD AUTO: 0.1 K/UL (ref 0–0.04)
IMM GRANULOCYTES NFR BLD AUTO: 2 % (ref 0–0.5)
LYMPHOCYTES # BLD: 1.2 K/UL (ref 0.8–3.5)
LYMPHOCYTES NFR BLD: 18 % (ref 12–49)
MCH RBC QN AUTO: 31.7 PG (ref 26–34)
MCHC RBC AUTO-ENTMCNC: 31.8 G/DL (ref 30–36.5)
MCV RBC AUTO: 99.6 FL (ref 80–99)
MONOCYTES # BLD: 0.5 K/UL (ref 0–1)
MONOCYTES NFR BLD: 8 % (ref 5–13)
NEUTS SEG # BLD: 4.7 K/UL (ref 1.8–8)
NEUTS SEG NFR BLD: 72 % (ref 32–75)
NRBC # BLD: 0 K/UL (ref 0–0.01)
NRBC BLD-RTO: 0 PER 100 WBC
PLATELET # BLD AUTO: 165 K/UL (ref 150–400)
PMV BLD AUTO: 9.4 FL (ref 8.9–12.9)
POTASSIUM SERPL-SCNC: 3.5 MMOL/L (ref 3.5–5.1)
PROT SERPL-MCNC: 6.3 G/DL (ref 6.4–8.2)
RBC # BLD AUTO: 2.59 M/UL (ref 3.8–5.2)
SODIUM SERPL-SCNC: 142 MMOL/L (ref 136–145)
TROPONIN-HIGH SENSITIVITY: 5 NG/L (ref 0–51)
WBC # BLD AUTO: 6.5 K/UL (ref 3.6–11)

## 2022-08-27 PROCEDURE — 74011250637 HC RX REV CODE- 250/637: Performed by: NURSE PRACTITIONER

## 2022-08-27 PROCEDURE — 85025 COMPLETE CBC W/AUTO DIFF WBC: CPT

## 2022-08-27 PROCEDURE — 84484 ASSAY OF TROPONIN QUANT: CPT

## 2022-08-27 PROCEDURE — 74011250637 HC RX REV CODE- 250/637: Performed by: INTERNAL MEDICINE

## 2022-08-27 PROCEDURE — 74011000250 HC RX REV CODE- 250: Performed by: NURSE PRACTITIONER

## 2022-08-27 PROCEDURE — 74011250636 HC RX REV CODE- 250/636: Performed by: INTERNAL MEDICINE

## 2022-08-27 PROCEDURE — 93005 ELECTROCARDIOGRAM TRACING: CPT

## 2022-08-27 PROCEDURE — 80053 COMPREHEN METABOLIC PANEL: CPT

## 2022-08-27 PROCEDURE — 99285 EMERGENCY DEPT VISIT HI MDM: CPT

## 2022-08-27 PROCEDURE — 96374 THER/PROPH/DIAG INJ IV PUSH: CPT

## 2022-08-27 PROCEDURE — 36415 COLL VENOUS BLD VENIPUNCTURE: CPT

## 2022-08-27 PROCEDURE — G0378 HOSPITAL OBSERVATION PER HR: HCPCS

## 2022-08-27 PROCEDURE — 70450 CT HEAD/BRAIN W/O DYE: CPT

## 2022-08-27 RX ORDER — ENOXAPARIN SODIUM 100 MG/ML
40 INJECTION SUBCUTANEOUS DAILY
Status: DISCONTINUED | OUTPATIENT
Start: 2022-08-28 | End: 2022-09-02 | Stop reason: HOSPADM

## 2022-08-27 RX ORDER — SODIUM CHLORIDE 0.9 % (FLUSH) 0.9 %
5-40 SYRINGE (ML) INJECTION EVERY 8 HOURS
Status: DISCONTINUED | OUTPATIENT
Start: 2022-08-27 | End: 2022-08-28

## 2022-08-27 RX ORDER — TETRACYCLINE HYDROCHLORIDE 250 MG/1
500 CAPSULE ORAL
Status: DISCONTINUED | OUTPATIENT
Start: 2022-08-27 | End: 2022-08-27

## 2022-08-27 RX ORDER — ACETAMINOPHEN 650 MG/1
650 SUPPOSITORY RECTAL
Status: DISCONTINUED | OUTPATIENT
Start: 2022-08-27 | End: 2022-09-02 | Stop reason: HOSPADM

## 2022-08-27 RX ORDER — ACETAMINOPHEN 325 MG/1
650 TABLET ORAL
Status: DISCONTINUED | OUTPATIENT
Start: 2022-08-27 | End: 2022-09-02 | Stop reason: HOSPADM

## 2022-08-27 RX ORDER — PREGABALIN 50 MG/1
100 CAPSULE ORAL 3 TIMES DAILY
Status: DISCONTINUED | OUTPATIENT
Start: 2022-08-27 | End: 2022-09-02 | Stop reason: HOSPADM

## 2022-08-27 RX ORDER — DOCUSATE SODIUM 100 MG/1
200 CAPSULE, LIQUID FILLED ORAL
Status: DISCONTINUED | OUTPATIENT
Start: 2022-08-27 | End: 2022-09-02 | Stop reason: HOSPADM

## 2022-08-27 RX ORDER — METRONIDAZOLE 500 MG/1
500 TABLET ORAL 2 TIMES DAILY
Status: DISCONTINUED | OUTPATIENT
Start: 2022-08-27 | End: 2022-09-02 | Stop reason: HOSPADM

## 2022-08-27 RX ORDER — AMLODIPINE BESYLATE 5 MG/1
5 TABLET ORAL DAILY
Status: DISCONTINUED | OUTPATIENT
Start: 2022-08-28 | End: 2022-09-02 | Stop reason: HOSPADM

## 2022-08-27 RX ORDER — MORPHINE SULFATE 2 MG/ML
2 INJECTION, SOLUTION INTRAMUSCULAR; INTRAVENOUS ONCE
Status: COMPLETED | OUTPATIENT
Start: 2022-08-27 | End: 2022-08-27

## 2022-08-27 RX ORDER — BACLOFEN 10 MG/1
10 TABLET ORAL
Status: DISCONTINUED | OUTPATIENT
Start: 2022-08-27 | End: 2022-09-02 | Stop reason: HOSPADM

## 2022-08-27 RX ORDER — POLYETHYLENE GLYCOL 3350 17 G/17G
17 POWDER, FOR SOLUTION ORAL DAILY PRN
Status: DISCONTINUED | OUTPATIENT
Start: 2022-08-27 | End: 2022-09-02 | Stop reason: HOSPADM

## 2022-08-27 RX ORDER — PANTOPRAZOLE SODIUM 40 MG/1
40 TABLET, DELAYED RELEASE ORAL DAILY
Status: DISCONTINUED | OUTPATIENT
Start: 2022-08-28 | End: 2022-09-02 | Stop reason: HOSPADM

## 2022-08-27 RX ORDER — POTASSIUM CHLORIDE 20 MEQ/1
40 TABLET, EXTENDED RELEASE ORAL
Status: COMPLETED | OUTPATIENT
Start: 2022-08-27 | End: 2022-08-27

## 2022-08-27 RX ORDER — SODIUM CHLORIDE 0.9 % (FLUSH) 0.9 %
5-40 SYRINGE (ML) INJECTION AS NEEDED
Status: DISCONTINUED | OUTPATIENT
Start: 2022-08-27 | End: 2022-09-02 | Stop reason: HOSPADM

## 2022-08-27 RX ORDER — DOXYCYCLINE 100 MG/1
100 CAPSULE ORAL EVERY 12 HOURS
Status: DISCONTINUED | OUTPATIENT
Start: 2022-08-27 | End: 2022-09-02 | Stop reason: HOSPADM

## 2022-08-27 RX ORDER — NITROGLYCERIN 0.4 MG/1
0.4 TABLET SUBLINGUAL AS NEEDED
Status: DISCONTINUED | OUTPATIENT
Start: 2022-08-27 | End: 2022-09-02 | Stop reason: HOSPADM

## 2022-08-27 RX ORDER — ONDANSETRON 4 MG/1
4 TABLET, ORALLY DISINTEGRATING ORAL
Status: DISCONTINUED | OUTPATIENT
Start: 2022-08-27 | End: 2022-09-02 | Stop reason: HOSPADM

## 2022-08-27 RX ORDER — METOPROLOL SUCCINATE 50 MG/1
25 TABLET, EXTENDED RELEASE ORAL DAILY
Status: DISCONTINUED | OUTPATIENT
Start: 2022-08-28 | End: 2022-09-02 | Stop reason: HOSPADM

## 2022-08-27 RX ORDER — ONDANSETRON 2 MG/ML
4 INJECTION INTRAMUSCULAR; INTRAVENOUS
Status: DISCONTINUED | OUTPATIENT
Start: 2022-08-27 | End: 2022-09-02 | Stop reason: HOSPADM

## 2022-08-27 RX ADMIN — SODIUM CHLORIDE, PRESERVATIVE FREE 10 ML: 5 INJECTION INTRAVENOUS at 20:54

## 2022-08-27 RX ADMIN — MORPHINE SULFATE 2 MG: 2 INJECTION, SOLUTION INTRAMUSCULAR; INTRAVENOUS at 21:02

## 2022-08-27 RX ADMIN — SODIUM CHLORIDE, PRESERVATIVE FREE 10 ML: 5 INJECTION INTRAVENOUS at 15:06

## 2022-08-27 RX ADMIN — PREGABALIN 100 MG: 50 CAPSULE ORAL at 17:55

## 2022-08-27 RX ADMIN — NITROGLYCERIN 0.4 MG: 0.4 TABLET, ORALLY DISINTEGRATING SUBLINGUAL at 21:02

## 2022-08-27 RX ADMIN — DOXYCYCLINE HYCLATE 100 MG: 100 CAPSULE ORAL at 17:55

## 2022-08-27 RX ADMIN — POTASSIUM CHLORIDE 40 MEQ: 1500 TABLET, EXTENDED RELEASE ORAL at 17:54

## 2022-08-27 NOTE — ED TRIAGE NOTES
Pt BIB EMS for c/o bilateral lower extremity weakness and pain that has been ongoing for the past \"2 months\". Pt reports that she was recently discharged from the hospital for the same. EMS reports that pt's home is not livable and that she will need an evaluation by case management.

## 2022-08-27 NOTE — Clinical Note
Patient Class[de-identified] OBSERVATION [625]   Type of Bed: Medical [8]   Reason for Observation: generaliazed weakness   Admitting Diagnosis: Weakness generalized [858122]   Admitting Physician: Jenifer Kamara [[id number]]   Attending Physician: Karla Trevino [[id number]]

## 2022-08-27 NOTE — H&P
Isiah STAFFORD, FNP-C    History and Physical      Patient: Radha Caruso MRN: [id number]  SSN: [id number]    YOB: 1970  Age: 47 y.o. Sex: female        Chief Complaint   Patient presents with    Extremity Weakness       Subjective:    8/25/22 Hospital course summary:  47 y.o. female with a history of peripheral arterial disease, coronary artery disease, anxiety, hypertension presents to the emergency room complaining of progressively worsening weakness that now she is not able to ambulate. Affecting upper and lower extremities with weakness and numbness, started 2 months ago, gradually worsening. She had several emergency room visits since then, following with neurologist outpatient. Was given medications, but no improvement and gradually worsening. She was using her walker before now she is using wheelchair as she is not able to ambulate. Denies any headache, vision disturbance or speech disturbance. The most of the problem is more numbness and weakness, but able to move both upper and lower extremities. Denies any joint swelling or pain. Patient had LP attempt 8/20/22 by Dr. Eduarda Venegas. Unable to complete due to pain. MRI brain negative. EGD showed gastritis. Patient continued to refuse her lumbar puncture procedure. S/p treatment with 1gm IV solu medrol daily. Intrinsic factor AB elevated, copper and zinc levels wnl. EGD showed Schatzki's ring distal third of esophagus. Biopsy was taken. Gastritis chronic without bleeding. Patient medically stable for discharge home with . Diagnosed with H pylori started on tetracycline and flagyl x 14 days. She declined rehab placement. Radha Caruso is a 47 y.o. female who presents to the ED with cc of difficulty ambulating. Previously discharged ~ 2 days ago and refused rehab. Now admitted for rehab placement. Will admit to observation serviced.  Obtain PT/OT eval.    Past Medical History:   Diagnosis Date    Anxiety     CAD (coronary artery disease) Fibromyalgia     Hypertension     Neuropathy     Peripheral artery disease ([facility] [address]     Psychiatric disorder     anxiety     Past Surgical History:   Procedure Laterality Date    HX ANKLE FRACTURE TX Left     surgical implants    HX  SECTION        No family history on file. Social History     Tobacco Use    Smoking status: Every Day     Packs/day: 0.50     Types: Cigarettes    Smokeless tobacco: Never   Substance Use Topics    Alcohol use: Yes     Comment: occosionally       Prior to Admission medications    Medication Sig Start Date End Date Taking? Authorizing Provider   amLODIPine (NORVASC) 5 mg tablet Take 1 Tablet by mouth daily for 30 days. 22  Nehal Siddiqui NP   metoprolol succinate (TOPROL-XL) 25 mg XL tablet Take 1 Tablet by mouth daily for 30 days. 22  Nehal Siddiqui NP   baclofen (LIORESAL) 10 mg tablet Take 1 Tablet by mouth two (2) times daily as needed for Muscle Spasm(s) for up to 5 days. 22  Nehal Siddiqui NP   docusate sodium (COLACE) 100 mg capsule Take 2 Capsules by mouth nightly for 30 days. 22  Nehal Siddiqui NP   metroNIDAZOLE (FLAGYL) 500 mg tablet Take 1 Tablet by mouth two (2) times a day for 14 days. Indications: H-pylori 22  Joshua Szymanski NP   tetracycline (ACHROMYCIN, SUMYCIN) 500 mg capsule Take 1 Capsule by mouth Before breakfast and dinner for 14 days. Indications: H-pylori 22  Joshua Szymanski NP   pantoprazole (PROTONIX) 40 mg tablet Take 1 Tablet by mouth daily for 14 days. Indications: H-pylori triple treatment 22  Joshua Szymanski NP   pregabalin (LYRICA) 100 mg capsule Take 100 mg by mouth three (3) times daily. Rustam, MD William        Allergies   Allergen Reactions    Codeine Nausea Only       Review of Systems:  Review of Systems   Constitutional:  Negative for chills and fever. HENT:  Negative for congestion, ear pain and hearing loss.     Respiratory: Negative for cough, shortness of breath and wheezing. Cardiovascular:  Negative for chest pain and leg swelling. Gastrointestinal:  Negative for abdominal pain, nausea and vomiting. Genitourinary:  Negative for dysuria. Neurological:  Positive for weakness. Objective:     Vitals:    08/27/22 1020 08/27/22 1132 08/27/22 1205   BP: (!) 135/95 (!) 133/106 135/85   Pulse: 96 81 80   Resp: 16 23 11   Temp: 98.6 °F (37 °C)     SpO2: 100% 100% 100%   Weight: 119.3 kg (263 lb)     Height: 5' 11\" (1.803 m)          Physical Exam  Vitals and nursing note reviewed. Constitutional:       Appearance: Normal appearance. HENT:      Mouth/Throat:      Comments: Oral decay  Cardiovascular:      Rate and Rhythm: Normal rate. Pulmonary:      Effort: Pulmonary effort is normal.   Abdominal:      General: Bowel sounds are normal. There is no distension. Musculoskeletal:      Comments: 4/5 in proximal and distal muscles of both arms, 3/5 bilateral dorsi flexion. Bilateral hand  3/5. Overall generalized weakness   Neurological:      Mental Status: She is alert and oriented to person, place, and time. Motor: Weakness present. Gait: Gait abnormal.        Assessment:     Hospital Problems  Never Reviewed            Codes Class Noted POA    Weakness generalized ICD-10-CM: R53.1  ICD-9-CM: 780.79  8/24/2022 Unknown           # 1 Progressive Weakness/Neuropathy  - Etiology?  CIDP, Autoimmune, Demyelinating process?  - further follow up with neurology and rheumatology outpatient  - Continue Folic acid, H99, and Lyrica  - Intrinsic factor AB elevated, copper and zinc levels wnl  - Consult neuro     # 2 Multi-level Cervical Spinal Stenoses  - Recent MRI did not reveal myelopathy  - Repeat MRI negative acute findings  - Obtain therapy evaluation     # 3 H pylori  - managed with tetracycline and flagyl x 14 days  - will obtain GI evaluation  - she reports not taking medication upon discharge    # 4 Hx of HTN  - BP currently acceptable  - Continue norvasc 5mg     # 5 Fibromyalgia  - Managed with lyrica 100mg tid     #6 Hx PAD/CAD  - Currently not on any medications     #7 Tobacco dependency  - Cessation counseling provided x 10 minutes  - Nicotine patch      Full Code  GI PROPHYLAXIS protonix   DVT PROPHYLAXIS scd's  Home medications reviewed and reconciled      Above treatment plan reviewed and discussed with patient in detail at bedside, all questions answered.        Time Spent > 65 minutes    Signed By: Elin Leonard NP     August 27, 2022

## 2022-08-27 NOTE — ED NOTES
Pt awake and alert, speaking in full complete sentences, vss, nad. Pt denies any needs. Call light within reach.

## 2022-08-27 NOTE — ED PROVIDER NOTES
EMERGENCY DEPARTMENT HISTORY AND PHYSICAL EXAM      Date: 8/27/2022  Patient Name: Sophia Martines    History of Presenting Illness     Chief Complaint   Patient presents with    Extremity Weakness       History Provided By: Patient    HPI: Sophia Martines, 47 y.o. female with a history of anxiety, CAD, HTN, peripheral neuropathy, and fibromyalgia presents with weakness in all extremities. Patient states her symptoms have been ongoing for over 2 months. She was recently discharged on the 19th of this month to home. She has had home health for rehabilitation. She states today her home health aide called 911 to have her transported back to the hospital for ongoing weakness. She reports having trouble dressing herself this morning, requiring assistance from her . Patient denies drug or alcohol use. States she quit drinking 1 month ago. She says, \"I always feel like this when I take the Lyrica. \"  She denies fevers, syncope, difficulty breathing, chest pain, abdominal pain, dysuria, rash, confusion, visual disturbance, headache, back pain, difficulty swallowing, or incontinence. There are no other complaints, changes, or physical findings at this time. PCP: Janay Marie NP    No current facility-administered medications on file prior to encounter. Current Outpatient Medications on File Prior to Encounter   Medication Sig Dispense Refill    amLODIPine (NORVASC) 5 mg tablet Take 1 Tablet by mouth daily for 30 days. 30 Tablet 0    metoprolol succinate (TOPROL-XL) 25 mg XL tablet Take 1 Tablet by mouth daily for 30 days. 30 Tablet 0    baclofen (LIORESAL) 10 mg tablet Take 1 Tablet by mouth two (2) times daily as needed for Muscle Spasm(s) for up to 5 days. 10 Tablet 0    docusate sodium (COLACE) 100 mg capsule Take 2 Capsules by mouth nightly for 30 days. 60 Capsule 0    metroNIDAZOLE (FLAGYL) 500 mg tablet Take 1 Tablet by mouth two (2) times a day for 14 days.  Indications: H-pylori 28 Tablet 0    tetracycline (ACHROMYCIN, SUMYCIN) 500 mg capsule Take 1 Capsule by mouth Before breakfast and dinner for 14 days. Indications: H-pylori 28 Capsule 0    pantoprazole (PROTONIX) 40 mg tablet Take 1 Tablet by mouth daily for 14 days. Indications: H-pylori triple treatment 90 Tablet 2    pregabalin (LYRICA) 100 mg capsule Take 100 mg by mouth three (3) times daily. Past History     Past Medical History:  Past Medical History:   Diagnosis Date    Anxiety     CAD (coronary artery disease)     Fibromyalgia     Hypertension     Neuropathy     Peripheral artery disease ([facility] [address]     Psychiatric disorder     anxiety       Past Surgical History:  Past Surgical History:   Procedure Laterality Date    HX ANKLE FRACTURE TX Left     surgical implants    HX  SECTION         Family History:  No family history on file. Social History:  Social History     Tobacco Use    Smoking status: Every Day     Packs/day: 0.50     Types: Cigarettes    Smokeless tobacco: Never   Substance Use Topics    Alcohol use: Yes     Comment: occosionally     Drug use: Not Currently       Allergies: Allergies   Allergen Reactions    Codeine Nausea Only       Review of Systems   Review of Systems   Constitutional:  Positive for fatigue. Negative for appetite change, chills and fever. HENT:  Positive for rhinorrhea and sore throat. Negative for congestion and trouble swallowing. Eyes: Negative. Respiratory: Negative. Cardiovascular: Negative. Gastrointestinal: Negative. Negative for abdominal pain, diarrhea, nausea and vomiting. Endocrine: Negative. Genitourinary: Negative. Negative for dysuria, flank pain, hematuria and vaginal bleeding. Musculoskeletal:  Positive for gait problem and myalgias. Negative for arthralgias and back pain. Muscle aches in lower extremities. Skin: Negative. Negative for rash and wound. Allergic/Immunologic: Negative. Neurological:  Positive for weakness. Negative for dizziness, syncope, facial asymmetry, numbness and headaches. Hematological: Negative. Psychiatric/Behavioral: Negative. Negative for confusion. Physical Exam   Physical Exam  Vitals and nursing note reviewed. Constitutional:       General: She is not in acute distress. Appearance: She is not ill-appearing. HENT:      Head: Normocephalic. Nose: Nose normal. No congestion or rhinorrhea. Mouth/Throat:      Mouth: Mucous membranes are moist.   Eyes:      Extraocular Movements: Extraocular movements intact. Pupils: Pupils are equal, round, and reactive to light. Cardiovascular:      Rate and Rhythm: Normal rate and regular rhythm. Pulses: Normal pulses. Heart sounds: Normal heart sounds. Pulmonary:      Effort: Pulmonary effort is normal. No respiratory distress. Breath sounds: Normal breath sounds. No wheezing. Abdominal:      General: Bowel sounds are normal. There is no distension. Palpations: Abdomen is soft. Tenderness: There is no abdominal tenderness. There is no guarding. Musculoskeletal:         General: Normal range of motion. Cervical back: Normal range of motion. Lymphadenopathy:      Cervical: No cervical adenopathy. Skin:     General: Skin is warm and dry. Neurological:      General: No focal deficit present. Mental Status: She is alert. Cranial Nerves: No cranial nerve deficit. Sensory: Sensory deficit present. Motor: Weakness present.    Psychiatric:         Mood and Affect: Mood normal.       Lab and Diagnostic Study Results   Labs -     Recent Results (from the past 12 hour(s))   CBC WITH AUTOMATED DIFF    Collection Time: 08/27/22 10:45 AM   Result Value Ref Range    WBC 6.5 3.6 - 11.0 K/uL    RBC 2.59 (L) 3.80 - 5.20 M/uL    HGB 8.2 (L) 11.5 - 16.0 g/dL    HCT 25.8 (L) 35.0 - 47.0 %    MCV 99.6 (H) 80.0 - 99.0 FL    MCH 31.7 26.0 - 34.0 PG    MCHC 31.8 30.0 - 36.5 g/dL    RDW 15.1 (H) 11.5 - 14.5 %    PLATELET 133 524 - 375 K/uL    MPV 9.4 8.9 - 12.9 FL    NRBC 0.0 0.0  WBC    ABSOLUTE NRBC 0.00 0.00 - 0.01 K/uL    NEUTROPHILS 72 32 - 75 %    LYMPHOCYTES 18 12 - 49 %    MONOCYTES 8 5 - 13 %    EOSINOPHILS 0 0 - 7 %    BASOPHILS 0 0 - 1 %    IMMATURE GRANULOCYTES 2 (H) 0 - 0.5 %    ABS. NEUTROPHILS 4.7 1.8 - 8.0 K/UL    ABS. LYMPHOCYTES 1.2 0.8 - 3.5 K/UL    ABS. MONOCYTES 0.5 0.0 - 1.0 K/UL    ABS. EOSINOPHILS 0.0 0.0 - 0.4 K/UL    ABS. BASOPHILS 0.0 0.0 - 0.1 K/UL    ABS. IMM. GRANS. 0.1 (H) 0.00 - 0.04 K/UL    DF AUTOMATED     METABOLIC PANEL, COMPREHENSIVE    Collection Time: 08/27/22 10:45 AM   Result Value Ref Range    Sodium 142 136 - 145 mmol/L    Potassium 3.5 3.5 - 5.1 mmol/L    Chloride 106 97 - 108 mmol/L    CO2 30 21 - 32 mmol/L    Anion gap 6 5 - 15 mmol/L    Glucose 80 65 - 100 mg/dL    BUN 14 6 - 20 mg/dL    Creatinine 0.57 0.55 - 1.02 mg/dL    BUN/Creatinine ratio 25 (H) 12 - 20      GFR est AA >60 >60 ml/min/1.73m2    GFR est non-AA >60 >60 ml/min/1.73m2    Calcium 8.2 (L) 8.5 - 10.1 mg/dL    Bilirubin, total 0.6 0.2 - 1.0 mg/dL    AST (SGOT) 28 15 - 37 U/L    ALT (SGPT) 37 12 - 78 U/L    Alk. phosphatase 68 45 - 117 U/L    Protein, total 6.3 (L) 6.4 - 8.2 g/dL    Albumin 3.2 (L) 3.5 - 5.0 g/dL    Globulin 3.1 2.0 - 4.0 g/dL    A-G Ratio 1.0 (L) 1.1 - 2.2     TROPONIN-HIGH SENSITIVITY    Collection Time: 08/27/22 10:50 AM   Result Value Ref Range    Troponin-High Sensitivity 5 0 - 51 ng/L       Radiologic Studies -   @[email or web address]@  CT Results  (Last 48 hours)      None          CXR Results  (Last 48 hours)      None            Medical Decision Making and ED Course   Differential Diagnosis & Medical Decision Making Provider Note:     - I am the first provider for this patient. I reviewed the vital signs, available nursing notes, past medical history, past surgical history, family history and social history.  The patients presenting problems have been discussed, and they are in agreement with the care plan formulated and outlined with them. I have encouraged them to ask questions as they arise throughout their visit. Vital Signs-Reviewed the patient's vital signs. Patient Vitals for the past 12 hrs:   Temp Pulse Resp BP SpO2   08/27/22 1205 -- 80 11 135/85 100 %   08/27/22 1132 -- 81 23 (!) 133/106 100 %   08/27/22 1020 98.6 °F (37 °C) 96 16 (!) 135/95 100 %       ED Course:   ED Course as of 08/27/22 1913   Sat Aug 27, 2022   1132 Troponin negative. [KW]   1401 CT without acute abnormality. [KW]   [address] [facility] [address] Carolina Conner regarding admission. Will accept patient. [KW]      ED Course User Index  [KW] Curtis Sommers NP   Patient will likely require admission due to inability to care for self at home. She states she is nonambulatory and unable to participate in rehabilitation. Procedures   Performed by: Ashanti Matson NP  Procedures      Disposition   Disposition: Admitted to Floor Medical Floor the case was discussed with the admitting physician Jules        Diagnosis/Clinical Impression     Clinical Impression: No diagnosis found. Attestations: Valarie Ibarra NP, am the primary clinician of record. Please note that this dictation was completed with [employer], the computer voice recognition software. Quite often unanticipated grammatical, syntax, homophones, and other interpretive errors are inadvertently transcribed by the computer software. Please disregard these errors. Please excuse any errors that have escaped final proofreading. Thank you.

## 2022-08-28 LAB
ATRIAL RATE: 106 BPM
ATRIAL RATE: 83 BPM
ATRIAL RATE: 85 BPM
ATRIAL RATE: 96 BPM
CALCULATED P AXIS, ECG09: -5 DEGREES
CALCULATED P AXIS, ECG09: -9 DEGREES
CALCULATED P AXIS, ECG09: 54 DEGREES
CALCULATED P AXIS, ECG09: 67 DEGREES
CALCULATED R AXIS, ECG10: 11 DEGREES
CALCULATED R AXIS, ECG10: 44 DEGREES
CALCULATED R AXIS, ECG10: 46 DEGREES
CALCULATED T AXIS, ECG11: -6 DEGREES
CALCULATED T AXIS, ECG11: 3 DEGREES
CALCULATED T AXIS, ECG11: 50 DEGREES
CALCULATED T AXIS, ECG11: 59 DEGREES
DIAGNOSIS, 93000: NORMAL
P-R INTERVAL, ECG05: 124 MS
P-R INTERVAL, ECG05: 152 MS
P-R INTERVAL, ECG05: 154 MS
P-R INTERVAL, ECG05: 160 MS
Q-T INTERVAL, ECG07: 356 MS
Q-T INTERVAL, ECG07: 392 MS
Q-T INTERVAL, ECG07: 404 MS
Q-T INTERVAL, ECG07: 408 MS
QRS DURATION, ECG06: 84 MS
QTC CALCULATION (BEZET), ECG08: 460 MS
QTC CALCULATION (BEZET), ECG08: 472 MS
QTC CALCULATION (BEZET), ECG08: 485 MS
QTC CALCULATION (BEZET), ECG08: 510 MS
TROPONIN-HIGH SENSITIVITY: 5 NG/L (ref 0–51)
TROPONIN-HIGH SENSITIVITY: 6 NG/L (ref 0–51)
VENTRICULAR RATE, ECG03: 106 BPM
VENTRICULAR RATE, ECG03: 83 BPM
VENTRICULAR RATE, ECG03: 85 BPM
VENTRICULAR RATE, ECG03: 96 BPM

## 2022-08-28 PROCEDURE — 74011250636 HC RX REV CODE- 250/636: Performed by: INTERNAL MEDICINE

## 2022-08-28 PROCEDURE — 74011250637 HC RX REV CODE- 250/637: Performed by: INTERNAL MEDICINE

## 2022-08-28 PROCEDURE — 74011000250 HC RX REV CODE- 250: Performed by: INTERNAL MEDICINE

## 2022-08-28 PROCEDURE — 84484 ASSAY OF TROPONIN QUANT: CPT

## 2022-08-28 PROCEDURE — 74011250636 HC RX REV CODE- 250/636: Performed by: PSYCHIATRY & NEUROLOGY

## 2022-08-28 PROCEDURE — 51798 US URINE CAPACITY MEASURE: CPT

## 2022-08-28 PROCEDURE — 74011250637 HC RX REV CODE- 250/637: Performed by: STUDENT IN AN ORGANIZED HEALTH CARE EDUCATION/TRAINING PROGRAM

## 2022-08-28 PROCEDURE — 74011250636 HC RX REV CODE- 250/636: Performed by: NURSE PRACTITIONER

## 2022-08-28 PROCEDURE — 36415 COLL VENOUS BLD VENIPUNCTURE: CPT

## 2022-08-28 PROCEDURE — 97165 OT EVAL LOW COMPLEX 30 MIN: CPT

## 2022-08-28 PROCEDURE — 74011000250 HC RX REV CODE- 250: Performed by: NURSE PRACTITIONER

## 2022-08-28 PROCEDURE — 96372 THER/PROPH/DIAG INJ SC/IM: CPT

## 2022-08-28 PROCEDURE — 74011250637 HC RX REV CODE- 250/637: Performed by: NURSE PRACTITIONER

## 2022-08-28 PROCEDURE — G0378 HOSPITAL OBSERVATION PER HR: HCPCS

## 2022-08-28 PROCEDURE — 96375 TX/PRO/DX INJ NEW DRUG ADDON: CPT

## 2022-08-28 PROCEDURE — 97530 THERAPEUTIC ACTIVITIES: CPT

## 2022-08-28 RX ORDER — CALCIUM CARBONATE 200(500)MG
200 TABLET,CHEWABLE ORAL
Status: DISCONTINUED | OUTPATIENT
Start: 2022-08-28 | End: 2022-09-02 | Stop reason: HOSPADM

## 2022-08-28 RX ORDER — FOLIC ACID 1 MG/1
1 TABLET ORAL DAILY
Status: DISCONTINUED | OUTPATIENT
Start: 2022-08-29 | End: 2022-09-02 | Stop reason: HOSPADM

## 2022-08-28 RX ORDER — CYANOCOBALAMIN 1000 UG/ML
1000 INJECTION, SOLUTION INTRAMUSCULAR; SUBCUTANEOUS ONCE
Status: COMPLETED | OUTPATIENT
Start: 2022-08-28 | End: 2022-08-28

## 2022-08-28 RX ORDER — LANOLIN ALCOHOL/MO/W.PET/CERES
1 CREAM (GRAM) TOPICAL
Status: DISCONTINUED | OUTPATIENT
Start: 2022-08-29 | End: 2022-09-02 | Stop reason: HOSPADM

## 2022-08-28 RX ADMIN — DOXYCYCLINE HYCLATE 100 MG: 100 CAPSULE ORAL at 21:37

## 2022-08-28 RX ADMIN — PREGABALIN 100 MG: 50 CAPSULE ORAL at 21:36

## 2022-08-28 RX ADMIN — DOXYCYCLINE HYCLATE 100 MG: 100 CAPSULE ORAL at 09:50

## 2022-08-28 RX ADMIN — DOCUSATE SODIUM 200 MG: 100 CAPSULE, LIQUID FILLED ORAL at 21:35

## 2022-08-28 RX ADMIN — SODIUM CHLORIDE, PRESERVATIVE FREE 20 MG: 5 INJECTION INTRAVENOUS at 01:16

## 2022-08-28 RX ADMIN — PREGABALIN 100 MG: 50 CAPSULE ORAL at 09:50

## 2022-08-28 RX ADMIN — AMLODIPINE BESYLATE 5 MG: 5 TABLET ORAL at 09:51

## 2022-08-28 RX ADMIN — PREGABALIN 100 MG: 50 CAPSULE ORAL at 15:28

## 2022-08-28 RX ADMIN — CYANOCOBALAMIN 1000 MCG: 1000 INJECTION, SOLUTION INTRAMUSCULAR at 23:11

## 2022-08-28 RX ADMIN — METOPROLOL SUCCINATE 25 MG: 50 TABLET, EXTENDED RELEASE ORAL at 09:51

## 2022-08-28 RX ADMIN — ENOXAPARIN SODIUM 40 MG: 100 INJECTION SUBCUTANEOUS at 09:51

## 2022-08-28 RX ADMIN — SODIUM CHLORIDE, PRESERVATIVE FREE 10 ML: 5 INJECTION INTRAVENOUS at 05:56

## 2022-08-28 RX ADMIN — METRONIDAZOLE 500 MG: 500 TABLET ORAL at 21:35

## 2022-08-28 RX ADMIN — BACLOFEN 10 MG: 10 TABLET ORAL at 01:16

## 2022-08-28 RX ADMIN — PANTOPRAZOLE SODIUM 40 MG: 40 TABLET, DELAYED RELEASE ORAL at 09:50

## 2022-08-28 RX ADMIN — METRONIDAZOLE 500 MG: 500 TABLET ORAL at 09:50

## 2022-08-28 RX ADMIN — BACLOFEN 10 MG: 10 TABLET ORAL at 11:33

## 2022-08-28 RX ADMIN — NITROGLYCERIN 0.4 MG: 0.4 TABLET, ORALLY DISINTEGRATING SUBLINGUAL at 07:11

## 2022-08-28 RX ADMIN — CALCIUM CARBONATE (ANTACID) CHEW TAB 500 MG 200 MG: 500 CHEW TAB at 14:35

## 2022-08-28 NOTE — PROGRESS NOTES
Bedside shift change report given to Dulce Steward (oncoming nurse) by Felecia Cotter (offgoing nurse). Report included the following information SBAR.

## 2022-08-28 NOTE — PROGRESS NOTES
Problem: Pressure Injury - Risk of  Goal: *Prevention of pressure injury  Description: Document Efrem Scale and appropriate interventions in the flowsheet. Outcome: Progressing Towards Goal  Note: Pressure Injury Interventions:  Sensory Interventions: Minimize linen layers    Moisture Interventions: Absorbent underpads    Activity Interventions: PT/OT evaluation    Mobility Interventions: HOB 30 degrees or less    Nutrition Interventions: Document food/fluid/supplement intake                     Problem: Patient Education: Go to Patient Education Activity  Goal: Patient/Family Education  Outcome: Progressing Towards Goal     Problem: Falls - Risk of  Goal: *Absence of Falls  Description: Document Remington Fall Risk and appropriate interventions in the flowsheet.   Outcome: Progressing Towards Goal  Note: Fall Risk Interventions:            Medication Interventions: Bed/chair exit alarm    Elimination Interventions: Bed/chair exit alarm    History of Falls Interventions: Bed/chair exit alarm         Problem: Patient Education: Go to Patient Education Activity  Goal: Patient/Family Education  Outcome: Progressing Towards Goal

## 2022-08-28 NOTE — PROGRESS NOTES
Problem: Self Care Deficits Care Plan (Adult)  Goal: *Acute Goals and Plan of Care (Insert Text)  Description: Pt will be supervision/set-up self feeding  Pt will be mod A sup<->sit in prep for EOB ADL's  Pt will be mod A  LB dressing EOB level  Pt will be supervision/set-up  sit EOB 10 minutes in prep for EOB ADL's  Pt will be supervision/set-up  grooming EOB level  Pt will be mod A  sit<-> prep for toilet transfer  Pt will be MI kaity UE HEP in prep for self care tasks  Outcome: Not Met     OCCUPATIONAL THERAPY EVALUATION  Patient: Patricio Cantor (84 y.o. female)  Date: 8/28/2022  Primary Diagnosis: Weakness generalized [R53.1]       Precautions: falls       ASSESSMENT  Pt is 46 y/o F came to [facility] with generalized weakness and adm 8/27/22 for neuropathy, spinal stenosis, weakness. Patient was admitted last week for similar presentation. Recommended rehab, patient declined and agreed to [facility]. Had not began yet. Since discharge she has been [place] ridden\" and requiring total A for all ADLs and  mobility w/ several falls. See H&P below. Per pt report, pt lives alone with her mom next door in duplex apartment with 4 NEPTALI and 0 HR, was IND with ADLs and Mod I using RW for mobility at [facility]. Other DME owned includes: rollator and wheelchair    Pt currently presents with grossly decreased strength/AROM, endurance, mobility, functional transfers, insight, safety, activity tolerance, and requiring increased level of assistance from her baseline. Last week in hospital she was min A for bed mobility and Max A toilet transfer. Currently she is unable to progress her BLE off the bedside requiring max/total A x2 for bed mobility. Total A LB dressing/bathing in bed. Decreased functional reach and strength to obtain water bottle from tray table. Set up with min A to hold and grasp bottle to drink from. Poor overhead reach for UB dressing/bathing.  Patient is well below her baseline and will require post acute rehab to address the above deficits. Pt would benefit from skilled OT services while at [facility] in order to increase safety and independence with self care and functional transfers/mobility. Recommend discharge to SNF when medically appropriate. Other factors to consider for discharge: DME, severity of symptoms         PLAN :  Recommendations and Planned Interventions: self care training, functional mobility training, therapeutic exercise, balance training, therapeutic activities, endurance activities, patient education, and home safety training    Frequency/Duration: Patient will be followed by occupational therapy 3-5x/week to address goals. Recommendation for discharge: (in order for the patient to meet his/her long term goals)  Richie Morrison    This discharge recommendation:  Has been made in collaboration with the attending provider and/or case management    IF patient discharges home will need the following DME: bedside commode, gait belt, hospital bed, shower chair, walker: rolling, wheelchair, and sliding/transfer board       SUBJECTIVE:   Patient stated I can't do nothing. My  had to lift me up.     OBJECTIVE DATA SUMMARY:   HISTORY:   Past Medical History:   Diagnosis Date    Anxiety     CAD (coronary artery disease)     Fibromyalgia     Hypertension     Neuropathy     Peripheral artery disease ([facility] [address]     Psychiatric disorder     anxiety     Past Surgical History:   Procedure Laterality Date    HX ANKLE FRACTURE TX Left     surgical implants    HX  SECTION         Expanded or extensive additional review of patient history:     Home Situation  Home Environment: Private residence  # Steps to Enter: 4  One/Two Story Residence: One story  Living Alone: Yes  Support Systems: No Support Systems  Patient Expects to be Discharged to[de-identified] Skilled nursing facility  Current DME Used/Available at Home: Walker    PLOF: Pt total A for ADLS/IADLS, total A with mobility prior to admission.      Hand dominance: Right    EXAMINATION OF PERFORMANCE DEFICITS:  Cognitive/Behavioral Status:  Neurologic State: Alert  Orientation Level: Appropriate for age  Cognition: Follows commands  Perception: Appears intact  Perseveration: No perseveration noted  Safety/Judgement: Home safety    Hearing: Auditory  Auditory Impairment: None    Range of Motion:  AROM: Grossly decreased, non-functional  PROM: Grossly decreased, non-functional    Strength:  Strength: Grossly decreased, non-functional    Coordination:  Coordination: Grossly decreased, non-functional    Tone & Sensation:  Sensation: Intact    Functional Mobility and Transfers for ADLs:  Bed Mobility:  Rolling: Maximum assistance;Assist x2    ADL Assessment:  Feeding: Moderate assistance    Oral Facial Hygiene/Grooming: Minimum assistance    Bathing: Maximum assistance; Total assistance; Additional time    Upper Body Dressing: Minimum assistance    Lower Body Dressing: Total assistance    Toileting: Total assistance    ADL Intervention and task modifications:  Feeding  Drink to Mouth: Moderate assistance    Grooming  Position Performed: Long sitting on bed  Washing Face: Set-up; Minimum assistance    Lower Body Dressing Assistance  Socks: Total assistance (dependent)  Position Performed: Long sitting on bed    Toileting  Bladder Hygiene: Total assistance (dependent)  Bowel Hygiene: Total assistance (dependent)    Cognitive Retraining  Safety/Judgement: [address] [facility] [address] AM-PACTM \"6 Clicks\"                                                       Daily Activity Inpatient Short Form  How much help from another person does the patient currently need. .. Total; A Lot A Little None   1. Putting on and taking off regular lower body clothing? [x]  1 []  2 []  3 []  4   2. Bathing (including washing, rinsing, drying)? []  1 [x]  2 []  3 []  4   3. Toileting, which includes using toilet, bedpan or urinal? [] 1 [x]  2 []  3 []  4   4.   Putting on and taking off regular upper body clothing? []  1 [x]  2 []  3 []  4   5. Taking care of personal grooming such as brushing teeth? []  1 [x]  2 []  3 []  4   6. Eating meals? []  1 [x]  2 []  3 []  4   © 2007, Trustees of [facility] [employer], under license to [employer]. All rights reserved     Score: 11/24     Interpretation of Tool:  Represents clinically-significant functional categories (i.e. Activities of daily living). Percentage of Impairment CH    0%   CI    1-19% CJ    20-39% CK    40-59% CL    60-79% CM    80-99% CN     100%   [facility]  Score 6-24 24 23 20-22 15-19 10-14 7-9 6        Occupational Therapy Evaluation Charge Determination   History Examination Decision-Making   LOW Complexity : Brief history review  LOW Complexity : 1-3 performance deficits relating to physical, cognitive , or psychosocial skils that result in activity limitations and / or participation restrictions  LOW Complexity : No comorbidities that affect functional and no verbal or physical assistance needed to complete eval tasks       Based on the above components, the patient evaluation is determined to be of the following complexity level: LOW   Pain Rating:  10/10    Activity Tolerance:   Poor  Please refer to the flowsheet for vital signs taken during this treatment. After treatment patient left in no apparent distress:    Supine in bed, Call bell within reach, and Bed / chair alarm activated    COMMUNICATION/EDUCATION:   The patients plan of care was discussed with: Registered nurse. Home safety education was provided and the patient/caregiver indicated understanding. This patients plan of care is appropriate for delegation to [facility].     Thank you for this referral.  Ugo Merino OT  Time Calculation: 25 mins

## 2022-08-28 NOTE — PROGRESS NOTES
NEUROLOGY CONSULT    Name Anjel Driscoll Age 47 y.o. MRN [id number]  1970     Referring Physician: Shannan Bolton NP      Chief Complaint: Inability to ambulate     This is a 47 y.o. -American female who was recently discharged from the hospital after being evaluated for the same complaint. She had an MRI of the brain and the C-spine which were unremarkable to explain her weakness. The recommendation was to do an LP and attempt was made to do the LP at the floor which was failed and then she was scheduled to do the LP under fluoroscopy but she kept refusing and after several days of refusal she was discharged for rehab however she came back again yesterday and admitted. She is now willing to undergo the spinal tap. Assessment and Plan:  Difficulty ambulating: The patient continues to have difficulty walking. She has decree sensations in both the lower extremities from groins down to level of T10 or higher with no reflexes in the lower extremities or the upper either. Previously she had an MRI of the brain and C-spine which did not reveal any pathology. We will order an MRI of the T-spine with contrast and if negative will order the LP under fluoroscopy. Peripheral arterial disease  Hypertension  Coronary artery disease  Fibromyalgia  Psychiatric disorders including anxiety.     Thank you for the consult    Allergies   Allergen Reactions    Codeine Nausea Only           Current Facility-Administered Medications   Medication Dose Route Frequency    [START ON 2022] ferrous sulfate tablet 325 mg  1 Tablet Oral DAILY WITH BREAKFAST    sodium chloride (NS) flush 5-40 mL  5-40 mL IntraVENous PRN    acetaminophen (TYLENOL) tablet 650 mg  650 mg Oral Q6H PRN    Or    acetaminophen (TYLENOL) suppository 650 mg  650 mg Rectal Q6H PRN    polyethylene glycol (MIRALAX) packet 17 g  17 g Oral DAILY PRN    ondansetron (ZOFRAN ODT) tablet 4 mg  4 mg Oral Q8H PRN    Or    ondansetron (ZOFRAN) injection 4 mg 4 mg IntraVENous Q6H PRN    enoxaparin (LOVENOX) injection 40 mg  40 mg SubCUTAneous DAILY    amLODIPine (NORVASC) tablet 5 mg  5 mg Oral DAILY    baclofen (LIORESAL) tablet 10 mg  10 mg Oral BID PRN    docusate sodium (COLACE) capsule 200 mg  200 mg Oral QHS    metoprolol succinate (TOPROL-XL) XL tablet 25 mg  25 mg Oral DAILY    metroNIDAZOLE (FLAGYL) tablet 500 mg  500 mg Oral BID    pregabalin (LYRICA) capsule 100 mg  100 mg Oral TID    pantoprazole (PROTONIX) tablet 40 mg  40 mg Oral DAILY    doxycycline (VIBRAMYCIN) capsule 100 mg  100 mg Oral Q12H    nitroglycerin (NITROSTAT) tablet 0.4 mg  0.4 mg SubLINGual PRN       Past Medical History:   Diagnosis Date    Anxiety     CAD (coronary artery disease)     Fibromyalgia     Hypertension     Neuropathy     Peripheral artery disease (HCC)     Psychiatric disorder     anxiety        Social History     Tobacco Use    Smoking status: Every Day     Packs/day: 0.50     Types: Cigarettes    Smokeless tobacco: Never   Substance Use Topics    Alcohol use: Yes     Comment: occosionally     Drug use: Not Currently            Exam  Visit Vitals  /83 (BP 1 Location: Right upper arm, BP Patient Position: At rest)   Pulse 78   Temp 98.1 °F (36.7 °C)   Resp 18   Ht 5' 11\" (1.803 m)   Wt 119.3 kg (263 lb)   SpO2 100%   BMI 36.68 kg/m²         General: Well developed, well nourished. Patient in no distress   Head: Normocephalic, atraumatic, anicteric sclera   Neck Normal ROM, No thyromegally   Lungs:  Clear to auscultation    Cardiac: Regular rate and rhythm with no murmurs. Abd: Bowel sounds were audible   Ext: No pedal edema   Skin: Supple no rash     NeurologicExam:  Mental Status: Alert and oriented to person place and time   Speech: Fluent no aphasia or dysarthria.    Cranial Nerves:   Pupils are equal round and reactive to light and accommodation, extraocular movements are intact and full, visual fields are intact by confrontation, no nystagmus noted, face is symmetric, sensation in face is intact and symmetric, hearing is intact and symmetric, tongue and uvula are in midline with normal movements, palate is elevating equally, shoulder shrug is intact and symmetric. Motor:  Full and symmetric strength of upper extremities is 4+/5 and lower ext, proximally 4/5 distally 4+/5. Normal bulk and tone. Reflexes:   Deep tendon reflexes 0/4 and symmetric. Sensory:   Decrease sensation from groin down   Gait:  Gait is deferred   Tremor:   No tremor noted. Cerebellar:  Coordination intact for finger-nose-finger. Neurovascular: No carotid bruits.  No JVD      Lab Review  Lab Results   Component Value Date/Time    WBC 6.5 08/27/2022 10:45 AM    HCT 25.8 (L) 08/27/2022 10:45 AM    HGB 8.2 (L) 08/27/2022 10:45 AM    PLATELET 277 39/37/8957 10:45 AM     Lab Results   Component Value Date/Time    Sodium 142 08/27/2022 10:45 AM    Potassium 3.5 08/27/2022 10:45 AM    Chloride 106 08/27/2022 10:45 AM    CO2 30 08/27/2022 10:45 AM    Glucose 80 08/27/2022 10:45 AM    BUN 14 08/27/2022 10:45 AM    Creatinine 0.57 08/27/2022 10:45 AM    Calcium 8.2 (L) 08/27/2022 10:45 AM     Lab Results   Component Value Date/Time    Vitamin B12 198 08/20/2022 11:46 AM    Vitamin B12 246 08/20/2022 11:46 AM    Folate 1.7 (L) 08/20/2022 11:46 AM    Folate 2.1 (L) 08/20/2022 11:46 AM     No results found for: LDL, LDLC, DLDLP  No results found for: HBA1C, [id number], [id number]  No components found for: TROPQUANT  No results found for: LENIN

## 2022-08-28 NOTE — PROGRESS NOTES
Hospitalist Progress Note    Subjective:   Daily Progress Note: 8/28/2022 12:06 PM    Hospital Course:  Pat Luna is a 67-year-old female with a history of PAD, CAD, anxiety, and HTN who presents to the ED complaining of progressively worsening weakness and now she is not able to ambulate. Affecting upper and lower extremities with weakness and numbness, started 2 months ago, gradually worsening. She had several emergency room visits since then, following with neurologist outpatient. Was given medications, but no improvement and gradually worsening. She was using her walker before, now she is using wheelchair as she is not able to ambulate. Denies any headache, vision disturbance or speech disturbance. The most of the problem is more numbness and weakness, but able to move both upper and lower extremities. Denies any joint swelling or pain. Patient had LP attempt 8/20/22 by Dr. Christiana Tran. Unable to complete due to pain. MRI brain negative. EGD showed gastritis. Patient continued to refuse her lumbar puncture procedure. S/p treatment with 1gm IV solu medrol daily. Intrinsic factor AB elevated, copper and zinc levels wnl. EGD showed Schatzki's ring distal third of esophagus. Biopsy was taken. Gastritis chronic without bleeding. Patient medically stable for discharge home with . Diagnosed with H pylori started on tetracycline and flagyl x 14 days. She declined rehab placement. In the ED, hemodynamically stable. CBC and CMP mostly unremarkable. CT head negative. Neuro consult, Dr. Mariya Byrd. Patient now agreeable to lumbar puncture. MRI T-spine per neuro. Subjective:    Patient seen and examined at bedside. She is complaining of chest tightness. Lungs clear. Troponin negative. Will get CXR. Otherwise, weakness upper and lower extremities unchanged.      Current Facility-Administered Medications   Medication Dose Route Frequency    sodium chloride (NS) flush 5-40 mL  5-40 mL IntraVENous PRN    acetaminophen (TYLENOL) tablet 650 mg  650 mg Oral Q6H PRN    Or    acetaminophen (TYLENOL) suppository 650 mg  650 mg Rectal Q6H PRN    polyethylene glycol (MIRALAX) packet 17 g  17 g Oral DAILY PRN    ondansetron (ZOFRAN ODT) tablet 4 mg  4 mg Oral Q8H PRN    Or    ondansetron (ZOFRAN) injection 4 mg  4 mg IntraVENous Q6H PRN    enoxaparin (LOVENOX) injection 40 mg  40 mg SubCUTAneous DAILY    amLODIPine (NORVASC) tablet 5 mg  5 mg Oral DAILY    baclofen (LIORESAL) tablet 10 mg  10 mg Oral BID PRN    docusate sodium (COLACE) capsule 200 mg  200 mg Oral QHS    metoprolol succinate (TOPROL-XL) XL tablet 25 mg  25 mg Oral DAILY    metroNIDAZOLE (FLAGYL) tablet 500 mg  500 mg Oral BID    pregabalin (LYRICA) capsule 100 mg  100 mg Oral TID    pantoprazole (PROTONIX) tablet 40 mg  40 mg Oral DAILY    doxycycline (VIBRAMYCIN) capsule 100 mg  100 mg Oral Q12H    nitroglycerin (NITROSTAT) tablet 0.4 mg  0.4 mg SubLINGual PRN        Review of Systems  Constitutional: No fevers, No chills, No sweats  Eyes: No redness  Ears, nose, mouth, throat, and face: No nasal congestion, No sore throat, No voice change  Respiratory: No Shortness of Breath, No cough, No wheezing  Cardiovascular: No chest pain, No palpitations, No extremity edema  Gastrointestinal: No nausea, No vomiting, No diarrhea, No abdominal pain  Genitourinary: No frequency, No dysuria, No hematuria  Integument/breast: No skin lesion(s)   Neurological: No Confusion, No headaches, No dizziness, + weakness      Objective:     Visit Vitals  /83 (BP 1 Location: Right upper arm, BP Patient Position: At rest)   Pulse 78   Temp 98.1 °F (36.7 °C)   Resp 18   Ht 5' 11\" (1.803 m)   Wt 119.3 kg (263 lb)   SpO2 100%   BMI 36.68 kg/m²      O2 Device: None (Room air)    Temp (24hrs), Av.8 °F (36.6 °C), Min:97.2 °F (36.2 °C), Max:98.2 °F (36.8 °C)      No intake/output data recorded.    1901 -  0700  In: -   Out: 900 [Urine:900]    PHYSICAL EXAM:  Constitutional: No acute distress  Skin: Extremities and face reveal no rashes. HEENT: Sclerae anicteric. Extra-occular muscles are intact. Poor oral hygiene, dental decay. The neck is supple and no masses. Cardiovascular: Regular rate and rhythm. Respiratory:  Clear breath sounds bilaterally with no wheezes, rales, or rhonchi. GI: Abdomen nondistended, soft, and nontender. Normal active bowel sounds. Rectal: Deferred   Musculoskeletal: No pitting edema of the lower legs. Able to move all ext  Neurological:  Patient is alert and oriented. Strength 4/5 bilateral upper and lower extremities. Hand  3+/5. Abnormal sensation to pinprick.    Psychiatric: Mood appears appropriate       Data Review    Recent Results (from the past 24 hour(s))   EKG, 12 LEAD, INITIAL    Collection Time: 08/27/22  7:25 PM   Result Value Ref Range    Ventricular Rate 106 BPM    Atrial Rate 106 BPM    P-R Interval 124 ms    QRS Duration 84 ms    Q-T Interval 356 ms    QTC Calculation (Bezet) 472 ms    Calculated P Axis 54 degrees    Calculated T Axis 59 degrees    Diagnosis       Sinus tachycardia  Inferior infarct (cited on or before 01-JUN-2022)  Abnormal ECG  When compared with ECG of 27-AUG-2022 10:20, (Unconfirmed)  Nonspecific T wave abnormality no longer evident in Anterior leads  Confirmed by [facility], [address] ([id number]) on 8/28/2022 10:24:43 AM     EKG, 12 LEAD, SUBSEQUENT    Collection Time: 08/27/22  9:18 PM   Result Value Ref Range    Ventricular Rate 96 BPM    Atrial Rate 96 BPM    P-R Interval 154 ms    QRS Duration 84 ms    Q-T Interval 404 ms    QTC Calculation (Bezet) 510 ms    Calculated P Axis -5 degrees    Calculated R Axis 44 degrees    Calculated T Axis -6 degrees    Diagnosis       Normal sinus rhythm  Prolonged QT  Abnormal ECG  No previous ECGs available  Confirmed by [facility], [address] ([id number]) on 8/28/2022 10:25:10 AM     EKG, 12 LEAD, INITIAL    Collection Time: 08/27/22  9:44 PM   Result Value Ref Range    Ventricular Rate 85 BPM    Atrial Rate 85 BPM    P-R Interval 160 ms    QRS Duration 84 ms    Q-T Interval 408 ms    QTC Calculation (Bezet) 485 ms    Calculated P Axis -9 degrees    Calculated R Axis 46 degrees    Calculated T Axis 3 degrees    Diagnosis       Normal sinus rhythm  Nonspecific ST abnormality  Prolonged QT  Abnormal ECG  When compared with ECG of 27-AUG-2022 21:18, (Unconfirmed)  No significant change was found  Confirmed by [facility], [address] ([id number]) on 8/28/2022 10:26:24 AM     TROPONIN-HIGH SENSITIVITY    Collection Time: 08/28/22 12:05 AM   Result Value Ref Range    Troponin-High Sensitivity 5 0 - 51 ng/L   TROPONIN-HIGH SENSITIVITY    Collection Time: 08/28/22  4:50 AM   Result Value Ref Range    Troponin-High Sensitivity 6 0 - 51 ng/L       CT HEAD WO CONT   Final Result   No acute intracranial abnormality. Active Problems:    Weakness generalized (8/24/2022)        Assessment/Plan:     1. Progressive Weakness/Neuropathy  - Etiology? CIDP, Autoimmune, Demyelinating process? - Further follow up with neurology and rheumatology outpatient  - Continue Folic acid, A82, and Lyrica  - Intrinsic factor AB elevated, copper and zinc levels wnl  - Consult neuro  - MRI T-spine with contrast  - LP if negative MRI     2. Multi-level Cervical Spinal Stenoses  - Recent MRI did not reveal myelopathy  - Repeat MRI negative acute findings  - Obtain therapy evaluation     3. H pylori  - Managed with tetracycline and flagyl x 14 days  - GI evaluation  - She reports not taking medication upon discharge     4. HTN  - BP currently stable  - Continue norvasc 5mg     5. Fibromyalgia  - Managed with lyrica 100mg tid     6. Hx PAD/CAD  - Currently not on any medications     7. Tobacco dependency  - Cessation counseling provided x 10 minutes  - Nicotine patch    8.  Chest tightness  - Lung clears on exam  - Troponin WNL  - Chest x-ray pending      DVT Prophylaxis: Lovenox  GI ppx: Protonix   Code Status: Full  POA:    Discharge Barriers:    - MRI T-spine   - Possible LP   - GI consult    - PT/OT. Patient agreeable to rehab. Care Plan discussed with: patient and nursing    Total time spent with patient: >35 minutes.

## 2022-08-28 NOTE — PROGRESS NOTES
Tele nocturnist note  -Patient complaining of chest pain, 10/10 with out radiation  -Obtain, EKG, Troponin, place on telemetry  -Sublingual nitro, morphine  -H/O H pylori, give a dose of IV Pepcid

## 2022-08-28 NOTE — PROGRESS NOTES
[facility] team consulted for PIV needs overnight. Upon assessment, client reported another PIV was restarted in her hand. 22g 1inch PIV present as IVL in right hand. Primary bag/tubing hanging on pump; discarded for primary care nurse to hand new with next dose due.

## 2022-08-28 NOTE — PROGRESS NOTES
Medicare Outpatient Observation Notice (MOON)/ [place] Outpatient Observation Notice (Amanda Todd) provided to patient/representative with verbal explanation of the notice. Time allotted for questions regarding the notice. Patient /representative provided a completed copy of the MOON/VOON notice. Copy placed on bedside chart. Patient unable to sign. CM explained status. Left a copy for patient and one on file.

## 2022-08-28 NOTE — PROGRESS NOTES
Patient complaining of chest pain. VS obtained and WNL, oxygen at 2LPM via NC. STAT EKG ordered. Dr. Man Home paged. Morphine, Nitroglycerin, Pepcid are ordered along with telemetry and one time dose Morphine.

## 2022-08-29 ENCOUNTER — APPOINTMENT (OUTPATIENT)
Dept: GENERAL RADIOLOGY | Age: 52
DRG: 043 | End: 2022-08-29
Attending: STUDENT IN AN ORGANIZED HEALTH CARE EDUCATION/TRAINING PROGRAM
Payer: COMMERCIAL

## 2022-08-29 ENCOUNTER — APPOINTMENT (OUTPATIENT)
Dept: MRI IMAGING | Age: 52
DRG: 043 | End: 2022-08-29
Attending: PSYCHIATRY & NEUROLOGY
Payer: COMMERCIAL

## 2022-08-29 ENCOUNTER — APPOINTMENT (OUTPATIENT)
Dept: NON INVASIVE DIAGNOSTICS | Age: 52
DRG: 043 | End: 2022-08-29
Attending: INTERNAL MEDICINE
Payer: COMMERCIAL

## 2022-08-29 PROBLEM — E43 SEVERE PROTEIN-CALORIE MALNUTRITION (HCC): Status: ACTIVE | Noted: 2022-08-29

## 2022-08-29 LAB
MRSA DNA SPEC QL NAA+PROBE: NOT DETECTED
TROPONIN-HIGH SENSITIVITY: 6 NG/L (ref 0–51)

## 2022-08-29 PROCEDURE — 36415 COLL VENOUS BLD VENIPUNCTURE: CPT

## 2022-08-29 PROCEDURE — 74011250637 HC RX REV CODE- 250/637: Performed by: PSYCHIATRY & NEUROLOGY

## 2022-08-29 PROCEDURE — 96372 THER/PROPH/DIAG INJ SC/IM: CPT

## 2022-08-29 PROCEDURE — 00JU3ZZ INSPECTION OF SPINAL CANAL, PERCUTANEOUS APPROACH: ICD-10-PCS | Performed by: STUDENT IN AN ORGANIZED HEALTH CARE EDUCATION/TRAINING PROGRAM

## 2022-08-29 PROCEDURE — 97530 THERAPEUTIC ACTIVITIES: CPT

## 2022-08-29 PROCEDURE — G0378 HOSPITAL OBSERVATION PER HR: HCPCS

## 2022-08-29 PROCEDURE — 71045 X-RAY EXAM CHEST 1 VIEW: CPT

## 2022-08-29 PROCEDURE — 93306 TTE W/DOPPLER COMPLETE: CPT

## 2022-08-29 PROCEDURE — 74011250636 HC RX REV CODE- 250/636: Performed by: INTERNAL MEDICINE

## 2022-08-29 PROCEDURE — 74011250637 HC RX REV CODE- 250/637: Performed by: NURSE PRACTITIONER

## 2022-08-29 PROCEDURE — 72157 MRI CHEST SPINE W/O & W/DYE: CPT

## 2022-08-29 PROCEDURE — 51798 US URINE CAPACITY MEASURE: CPT

## 2022-08-29 PROCEDURE — A9577 INJ MULTIHANCE: HCPCS | Performed by: INTERNAL MEDICINE

## 2022-08-29 PROCEDURE — 97161 PT EVAL LOW COMPLEX 20 MIN: CPT

## 2022-08-29 PROCEDURE — 87641 MR-STAPH DNA AMP PROBE: CPT

## 2022-08-29 PROCEDURE — 74011250637 HC RX REV CODE- 250/637: Performed by: INTERNAL MEDICINE

## 2022-08-29 PROCEDURE — 74011250637 HC RX REV CODE- 250/637: Performed by: HOSPITALIST

## 2022-08-29 PROCEDURE — 84484 ASSAY OF TROPONIN QUANT: CPT

## 2022-08-29 PROCEDURE — 74011250637 HC RX REV CODE- 250/637: Performed by: STUDENT IN AN ORGANIZED HEALTH CARE EDUCATION/TRAINING PROGRAM

## 2022-08-29 PROCEDURE — 97602 WOUND(S) CARE NON-SELECTIVE: CPT

## 2022-08-29 RX ORDER — OXYCODONE AND ACETAMINOPHEN 5; 325 MG/1; MG/1
1 TABLET ORAL
Status: DISCONTINUED | OUTPATIENT
Start: 2022-08-29 | End: 2022-09-02 | Stop reason: HOSPADM

## 2022-08-29 RX ORDER — HYDRALAZINE HYDROCHLORIDE 20 MG/ML
10 INJECTION INTRAMUSCULAR; INTRAVENOUS
Status: DISCONTINUED | OUTPATIENT
Start: 2022-08-29 | End: 2022-09-02 | Stop reason: HOSPADM

## 2022-08-29 RX ADMIN — GADOBENATE DIMEGLUMINE 20 ML: 529 INJECTION, SOLUTION INTRAVENOUS at 13:51

## 2022-08-29 RX ADMIN — OXYCODONE HYDROCHLORIDE AND ACETAMINOPHEN 1 TABLET: 5; 325 TABLET ORAL at 21:03

## 2022-08-29 RX ADMIN — AMLODIPINE BESYLATE 5 MG: 5 TABLET ORAL at 09:46

## 2022-08-29 RX ADMIN — PREGABALIN 100 MG: 50 CAPSULE ORAL at 16:53

## 2022-08-29 RX ADMIN — BACLOFEN 10 MG: 10 TABLET ORAL at 05:27

## 2022-08-29 RX ADMIN — METRONIDAZOLE 500 MG: 500 TABLET ORAL at 21:03

## 2022-08-29 RX ADMIN — METRONIDAZOLE 500 MG: 500 TABLET ORAL at 09:46

## 2022-08-29 RX ADMIN — DOCUSATE SODIUM 200 MG: 100 CAPSULE, LIQUID FILLED ORAL at 21:03

## 2022-08-29 RX ADMIN — PANTOPRAZOLE SODIUM 40 MG: 40 TABLET, DELAYED RELEASE ORAL at 09:46

## 2022-08-29 RX ADMIN — NITROGLYCERIN 0.4 MG: 0.4 TABLET, ORALLY DISINTEGRATING SUBLINGUAL at 09:44

## 2022-08-29 RX ADMIN — PREGABALIN 100 MG: 50 CAPSULE ORAL at 09:45

## 2022-08-29 RX ADMIN — METOPROLOL SUCCINATE 25 MG: 50 TABLET, EXTENDED RELEASE ORAL at 09:45

## 2022-08-29 RX ADMIN — DOXYCYCLINE HYCLATE 100 MG: 100 CAPSULE ORAL at 21:03

## 2022-08-29 RX ADMIN — PREGABALIN 100 MG: 50 CAPSULE ORAL at 21:10

## 2022-08-29 RX ADMIN — FERROUS SULFATE TAB 325 MG (65 MG ELEMENTAL FE) 325 MG: 325 (65 FE) TAB at 09:45

## 2022-08-29 RX ADMIN — FOLIC ACID 1 MG: 1 TABLET ORAL at 09:45

## 2022-08-29 RX ADMIN — DOXYCYCLINE HYCLATE 100 MG: 100 CAPSULE ORAL at 09:46

## 2022-08-29 NOTE — PROGRESS NOTES
Bedside shift change report given to ROCIO Cloorado (oncoming nurse) by China Torres RN (offgoing nurse). Report included the following information SBAR.

## 2022-08-29 NOTE — PROGRESS NOTES
Received phone call from [employer] and talked to Staff [place]; Lumbar Puncture needs to be attempted first again on the Floor Unit prior to IR. Discussed last LP attempt from a previous admission but as policy, it still needs to be attempted on this new admission. Tried calling Dr Ed Lopez via phone call and left a message regarding this matter.

## 2022-08-29 NOTE — PROGRESS NOTES
Comprehensive Nutrition Assessment    Type and Reason for Visit: Initial, Positive nutrition screen (MST 2)    Nutrition Recommendations/Plan:   Modify diet to MM5/Thin Liq diet or as desired by Pt. Continue ONS- Ensure compact x3/d (660 kcal, 27 gm PRO). Please obtain current weight. Please monitor and document PO and ONS intake in I/Os. Malnutrition Assessment:  Malnutrition Status:  Severe malnutrition (08/29/22 1516)    Context:  Chronic illness     Findings of the 6 clinical characteristics of malnutrition:   Energy Intake:  75% or less est energy requirements for 1 month or longer  Weight Loss:  Unable to assess     Body Fat Loss:  Severe body fat loss, Triceps   Muscle Mass Loss:  No significant muscle mass loss,    Fluid Accumulation:  No significant fluid accumulation,     Strength:  Not performed     Nutrition Assessment:    Admitted for generalized weakness. Noted MST 2 d/t unsure wt loss. Pt endorsed HRJ=697# 8 mths ago, unable to verify accuracy of CBW at this time d/t bed scale error. Poor intake reported d/t swallowing difficulty. Swallowing discomfort >1 week r/t pain w/ swallowing- meds taken w/ minimal bites of applesauce. RD consulted SLP- swallowing discomfort likely r/t dx of Schatzki's ring and chronic Gastritis. Pt also endorsed small intakes at baseline- unspecified as to time frame. RD to modify diet to encourage intakes and add ONS. Labs unremarkable. Meds: B9, B12, FerrouSul, PPI, colace, lopressor, norvasc, doxycycline. Nutrition Related Findings:    Brief NFPE w/ severe fat loss. No N/V per Pt. +edentulous. No noted h/o dysphagia; GI following for esophageal dysphagia. Last BM PTA. No edema- resolved.  Wound Type: Stage II (L. Buttock.)    Current Nutrition Intake & Therapies:  Average Meal Intake: 1-25%  Average Supplement Intake: None ordered  ADULT ORAL NUTRITION SUPPLEMENT Breakfast; Standard High Calorie/High Protein  ADULT DIET Dysphagia - Soft & Bite Sized    Anthropometric Measures:  Height: 5' 11\" (180.3 cm)  Ideal Body Weight (IBW): 155 lbs (70 kg)  Current Body Wt:  119.3 kg (263 lb), 169.7 % IBW. Stated  Current BMI (kg/m2): 36.7  Usual Body Weight: 119.3 kg (263 lb)  % Weight Change (Calculated): 0  BMI Category: Obese class 2 (BMI 35.0-39. 9)    Estimated Daily Nutrient Needs:  Energy Requirements Based On: Kcal/kg  Weight Used for Energy Requirements: Adjusted  Energy (kcal/day): 2520 kcal  (St. II PI)  Weight Used for Protein Requirements: Adjusted  Protein (g/day): 108 gm/d  Method Used for Fluid Requirements: 1 ml/kcal  Fluid (ml/day): 2500 mL/d    Nutrition Diagnosis:   Inadequate oral intake related to altered GI structure, swallowing difficulty as evidenced by swallowing study results, poor intake prior to admission, intake 0-25%, severe loss of subcutaneous fat, Criteria as identified in malnutrition assessment, poor dentition, weight loss greater than or equal to 20% in 1 year  Severe malnutrition, In context of chronic illness related to swallowing difficulty as evidenced by swallowing study results, weight loss    Nutrition Interventions:   Food and/or Nutrient Delivery: Start oral nutrition supplement, Modify current diet  Nutrition Education/Counseling: Education completed, No recommendations at this time  Coordination of Nutrition Care: Continue to monitor while inpatient, Speech therapy  Plan of Care discussed with: RN, Pt, SLP    Goals:  Goals: PO intake 50% or greater, Meet at least 75% of estimated needs, by next RD assessment    Nutrition Monitoring and Evaluation:   Behavioral-Environmental Outcomes: None identified  Food/Nutrient Intake Outcomes: Diet advancement/tolerance, Food and nutrient intake, Supplement intake  Physical Signs/Symptoms Outcomes: Biochemical data, Meal time behavior, Chewing or swallowing, Weight    Discharge Planning: Too soon to determine    Landon Dumont RD  Contact: ext. [phone] or [employer]

## 2022-08-29 NOTE — CONSULTS
Gastroenterology Consult     Referring Physician: Annita Cee NP     Consult Date: 8/29/2022     Subjective:     Chief Complaint: Extremity Weakness    History of Present Illness: Urbano Shukla is a 47 y.o. female who is seen in consultation for H-pylori. Ms. Charma Hodgkins has a past medical history significant for anxiety, CAD, HTN, peripheral neuropathy, H-pylori, and fibromyalgia who presented to the ED on 8/27 with complaints of increased weakness in all extremities. She was recently admitted on 8/19 for recurrent falls and worsening weakness. She was discharged on 8/25. She denied rehab at that time and was followed by neurology. She declined lumbar puncture on her last admission. She was seen by GI for difficulty swallowing on her last admission she underwent EGD on 8/22 showing Schatzki's ring in the distal third of esophagus. Biopsy was taken. Gastritis chronic without bleeding. Pathology at that time showed no active duodenitis or dysplasia, no evidence of active gastritis, metaplasia or dysplasia, but Positive for H-pylori. She was started on flagyl 500 mg twice daily, tetracycline 100 mg twice daily and Protonix 40 mg daily for fourteen days. She will need to be rechecked for H-pylori 8 weeks after completion of antibiotic therapy to ensure resolution of bacteria. This was discussed with the patient  at that time. She was to start antibiotics on 8/26/22 with an end date of 9/9/22. She is consulted by neurology and is scheduled for lumbar puncture later today. Hematology following for monoclonal gammopathy. She is followed by Cardiology for chest pain. She is scheduled for ECHO, and MRI of thoracic spine. Results pending at time of exam.    EGD  8/22/22: showing Schatzki's ring distal third of esophagus. Biopsy was taken. Gastritis chronic without bleeding.       Past Medical History:   Diagnosis Date    Anxiety     CAD (coronary artery disease)     Fibromyalgia     Hypertension     Neuropathy     Peripheral artery disease [facility])     Psychiatric disorder     anxiety     Past Surgical History:   Procedure Laterality Date    HX ANKLE FRACTURE TX Left     surgical implants    HX  SECTION        No family history on file.   Social History     Tobacco Use    Smoking status: Every Day     Packs/day: 0.50     Types: Cigarettes    Smokeless tobacco: Never   Substance Use Topics    Alcohol use: Yes     Comment: occosionally       Allergies   Allergen Reactions    Codeine Nausea Only     Current Facility-Administered Medications   Medication Dose Route Frequency    hydrALAZINE (APRESOLINE) 20 mg/mL injection 10 mg  10 mg IntraVENous QID PRN    zinc oxide-white petrolatum 20-75 % topical paste   Topical PRN    ferrous sulfate tablet 325 mg  1 Tablet Oral DAILY WITH BREAKFAST    calcium carbonate (TUMS) chewable tablet 200 mg [elemental]  200 mg Oral TID PRN    folic acid (FOLVITE) tablet 1 mg  1 mg Oral DAILY    sodium chloride (NS) flush 5-40 mL  5-40 mL IntraVENous PRN    acetaminophen (TYLENOL) tablet 650 mg  650 mg Oral Q6H PRN    Or    acetaminophen (TYLENOL) suppository 650 mg  650 mg Rectal Q6H PRN    polyethylene glycol (MIRALAX) packet 17 g  17 g Oral DAILY PRN    ondansetron (ZOFRAN ODT) tablet 4 mg  4 mg Oral Q8H PRN    Or    ondansetron (ZOFRAN) injection 4 mg  4 mg IntraVENous Q6H PRN    enoxaparin (LOVENOX) injection 40 mg  40 mg SubCUTAneous DAILY    amLODIPine (NORVASC) tablet 5 mg  5 mg Oral DAILY    baclofen (LIORESAL) tablet 10 mg  10 mg Oral BID PRN    docusate sodium (COLACE) capsule 200 mg  200 mg Oral QHS    metoprolol succinate (TOPROL-XL) XL tablet 25 mg  25 mg Oral DAILY    metroNIDAZOLE (FLAGYL) tablet 500 mg  500 mg Oral BID    pregabalin (LYRICA) capsule 100 mg  100 mg Oral TID    pantoprazole (PROTONIX) tablet 40 mg  40 mg Oral DAILY    doxycycline (VIBRAMYCIN) capsule 100 mg  100 mg Oral Q12H    nitroglycerin (NITROSTAT) tablet 0.4 mg  0.4 mg SubLINGual PRN        Review of Systems:  A detailed 10 organ review of systems is obtained with pertinent positives as listed in the History of Present Illness and Past Medical History. All others are negative. Objective:     Physical Exam:  Visit Vitals  /84 (BP 1 Location: Left upper arm, BP Patient Position: At rest)   Pulse 89   Temp 98.9 °F (37.2 °C)   Resp 18   Ht 5' 11\" (1.803 m)   Wt 119.3 kg (263 lb)   SpO2 100%   BMI 36.68 kg/m²        Skin:  Extremities and face reveal no rashes. No jack erythema. No telangiectasias on the chest wall. HEENT: Sclerae anicteric. Extra-occular muscles are intact. No oral ulcers. No abnormal pigmentation of the lips. The neck is supple. Cardiovascular: Regular rate and rhythm. Respiratory:  Comfortable breathing with no accessory muscle use. GI:  Abdomen nondistended, soft, and nontender. Normal active bowel sounds. No enlargement of the liver or spleen. No masses palpable. Rectal:  Deferred  Musculoskeletal: Generalized weakness  Neurological:  Gross memory appears intact. Patient is alert and oriented. Psychiatric:  Mood appears appropriate with judgement intact. Lymphatic:  No cervical or supraclavicular adenopathy. Lab/Data Review:  CMP: No results found for: NA, K, CL, CO2, AGAP, GLU, BUN, CREA, GFRAA, GFRNA, CA, MG, PHOS, ALB, TBIL, TP, ALB, GLOB, AGRAT, ALT  CBC: No results found for: WBC, HGB, HGBEXT, HCT, HCTEXT, PLT, PLTEXT, HGBEXT, HCTEXT, PLTEXT      Assessment/Plan:   H-pylori      Pathology positive on 8/24 following EGD on 8/22/22      Continue Flagyl 500 mg BID x 14 days      Tetracycline 100 mg BID x 14 days       Pantoprazole 40 mg daily x 14 days       H-pylori re-check 8 weeks post completion of antibiotic therapy. 2. Dysphagia      2/2 Schatzki's ring      Dysphagia diet      Speech Pathology input appreciated  3.  Difficulty ambulating      Neurology input appreciated      Lumbar puncture scheduled for 8/29/22          Thank you for allowing me to participate in this patients care.   Plan discussed with Dr. Derick Mills and he approves.

## 2022-08-29 NOTE — CONSULTS
Consult    Patient: Nba Joseph MRN: [id number]  SSN: [id number]    YOB: 1970  Age: 47 y.o. Sex: female      Subjective:      Nba Joseph is a 47 y.o. female who is being seen for CP. Patient with history of anxiety, CAD, fibromyalgia, hypertension, peripheral vascular disease, and anxiety. She has been staying on her own after her son was incarcerated about him week ago. Patient presented on the  complaining of worsening weakness and that she cannot ambulate. She has been having some intermittent retrosternal chest pressure, nonradiating without nausea vomiting or excessive sweating. She took nitroglycerin and the pain subsided. Denied recent change in her weight or lower extremity swelling. Her lower extremities swelling has completely resolved and she has been bedbound. She is scheduled for lumbar puncture. Past Medical History:   Diagnosis Date    Anxiety     CAD (coronary artery disease)     Fibromyalgia     Hypertension     Neuropathy     Peripheral artery disease ([address]     Psychiatric disorder     anxiety     Past Surgical History:   Procedure Laterality Date    HX ANKLE FRACTURE TX Left     surgical implants    HX  SECTION        No family history on file.   Social History     Tobacco Use    Smoking status: Every Day     Packs/day: 0.50     Types: Cigarettes    Smokeless tobacco: Never   Substance Use Topics    Alcohol use: Yes     Comment: occosionally       Current Facility-Administered Medications   Medication Dose Route Frequency Provider Last Rate Last Admin    hydrALAZINE (APRESOLINE) 20 mg/mL injection 10 mg  10 mg IntraVENous QID PRN Coty Bravo PA        zinc oxide-white petrolatum 20-75 % topical paste   Topical TID Patti Montoya MD        ferrous sulfate tablet 325 mg  1 Tablet Oral DAILY WITH BREAKFAST Clayton Garay PA-C   325 mg at 22 0945    calcium carbonate (TUMS) chewable tablet 200 mg [elemental]  200 mg Oral TID PRN Darshan Hayes PA-C   200 mg at 45/97/98 1616    folic acid (FOLVITE) tablet 1 mg  1 mg Oral DAILY Domonique Temple MD   1 mg at 08/29/22 0945    sodium chloride (NS) flush 5-40 mL  5-40 mL IntraVENous PRN Anita Cousins, NP        acetaminophen (TYLENOL) tablet 650 mg  650 mg Oral Q6H PRN Anita Cousins, NP        Or    acetaminophen (TYLENOL) suppository 650 mg  650 mg Rectal Q6H PRN Anita Cousins, NP        polyethylene glycol (MIRALAX) packet 17 g  17 g Oral DAILY PRN Anita Cousins, NP        ondansetron (ZOFRAN ODT) tablet 4 mg  4 mg Oral Q8H PRN Anita Cousins, NP        Or    ondansetron (ZOFRAN) injection 4 mg  4 mg IntraVENous Q6H PRN Anita Cousins, NP        enoxaparin (LOVENOX) injection 40 mg  40 mg SubCUTAneous DAILY Anita Cousins, NP   40 mg at 08/28/22 0951    amLODIPine (NORVASC) tablet 5 mg  5 mg Oral DAILY Anita Cousins, NP   5 mg at 08/29/22 0946    baclofen (LIORESAL) tablet 10 mg  10 mg Oral BID PRN Anita Cousins, NP   10 mg at 08/29/22 4042    docusate sodium (COLACE) capsule 200 mg  200 mg Oral QHS Anita Cousins, NP   200 mg at 08/28/22 2135    metoprolol succinate (TOPROL-XL) XL tablet 25 mg  25 mg Oral DAILY Anita Cousins, NP   25 mg at 08/29/22 0945    metroNIDAZOLE (FLAGYL) tablet 500 mg  500 mg Oral BID Anita Cousins, NP   500 mg at 08/29/22 0946    pregabalin (LYRICA) capsule 100 mg  100 mg Oral TID Anita Cousins, NP   100 mg at 08/29/22 0945    pantoprazole (PROTONIX) tablet 40 mg  40 mg Oral DAILY Anita Cousins, NP   40 mg at 08/29/22 0946    doxycycline (VIBRAMYCIN) capsule 100 mg  100 mg Oral Q12H Anita Cousins, NP   100 mg at 08/29/22 0946    nitroglycerin (NITROSTAT) tablet 0.4 mg  0.4 mg SubLINGual PRN Shakila Pederson MD   0.4 mg at 08/29/22 0944        Allergies   Allergen Reactions    Codeine Nausea Only       Review of Systems:  A comprehensive review of systems was negative except for that written in the History of Present Illness.     Objective:     Vitals: 08/28/22 2026 08/29/22 0000 08/29/22 0302 08/29/22 0806   BP: 114/74  (!) 144/71 130/84   Pulse: 98 86 96 89   Resp: 18  18 18   Temp: 99.5 °F (37.5 °C)  98.1 °F (36.7 °C) 98.9 °F (37.2 °C)   SpO2: 100%   100%   Weight:       Height:            Physical Exam:  General:  Alert, cooperative, no distress, appears stated age. Eyes:  Conjunctivae/corneas clear. PERRL, EOMs intact. Fundi benign   Ears:  Normal TMs and external ear canals both ears. Nose: Nares normal. Septum midline. Mucosa normal. No drainage or sinus tenderness. Mouth/Throat: Lips, mucosa, and tongue normal. Teeth and gums normal.   Neck: Supple, symmetrical, trachea midline, no adenopathy, thyroid: no enlargment/tenderness/nodules, no carotid bruit and no JVD. Back:   Symmetric, no curvature. ROM normal. No CVA tenderness. Lungs:   Clear to auscultation bilaterally. Heart:  Regular rate and rhythm, S1, S2 normal, no murmur, click, rub or gallop. Abdomen:   Soft, non-tender. Bowel sounds normal. No masses,  No organomegaly. Extremities: Extremities normal, atraumatic, no cyanosis or edema. Pulses: 2+ and symmetric all extremities. Skin: Skin color, texture, turgor normal. No rashes or lesions   Lymph nodes: Cervical, supraclavicular, and axillary nodes normal.   Neurologic: CNII-XII intact. Normal strength, sensation and reflexes throughout. Assessment:     Hospital Problems  Never Reviewed            Codes Class Noted POA    Weakness generalized ICD-10-CM: R53.1  ICD-9-CM: 780.79  8/24/2022 Unknown         Patient is a 48-year-old -American female with:  1. Chest pain  2. CAD  3. Peripheral vascular disease  4. Hyperlipidemia  5. Debility  6. Elevated M protein  7. Nicotine dependence  8. Fibromyalgia  9.  H. pylori  10. Multilevel cervical spinal stenosis. Plan:     White count is normal, hemoglobin 8.2. Platelet is normal.  Kidney function test within normal limit.   Liver function within normal limits as well. BMP is normal.  She tested negative for COVID. M spike was elevated and beta globulin as well. EKG was unremarkable. Brain MRI was unremarkable on the 22nd. She was noticed to have multilevel cervical spine stenosis. Low    Blood pressure is normal.  Currently on amlodipine and subcutaneous Lovenox, pantoprazole. Currently on metoprolol succinate    Continue pantoprazole. Check echocardiogram and trend cardiac markers. She was counseled regarding importance of smoking cessation. Thank you  For involving me in Mrs. Jenifer Cevallos care. I will follow.   Signed By: Eduardo Powell MD     August 29, 2022

## 2022-08-29 NOTE — PROGRESS NOTES
Problem: Impaired Skin Integrity/Pressure Injury Treatment  Goal: *Improvement of Existing Pressure Injury  Outcome: Progressing Towards Goal  Goal: *Prevention of pressure injury  Description: Document Efrem Scale and appropriate interventions in the flowsheet. Outcome: Progressing Towards Goal  Note: Pressure Injury Interventions:  Sensory Interventions: Assess changes in LOC, Float heels, Minimize linen layers    Moisture Interventions: Absorbent underpads, Internal/External urinary devices, Minimize layers    Activity Interventions: Assess need for specialty bed    Mobility Interventions: Assess need for specialty bed, HOB 30 degrees or less, Float heels    Nutrition Interventions: Offer support with meals,snacks and hydration    Friction and Shear Interventions: Apply protective barrier, creams and emollients, Lift sheet, Minimize layers                Problem: Falls - Risk of  Goal: *Absence of Falls  Description: Document Remington Fall Risk and appropriate interventions in the flowsheet.   Outcome: Progressing Towards Goal  Note: Fall Risk Interventions:            Medication Interventions: Bed/chair exit alarm    Elimination Interventions: Bed/chair exit alarm    History of Falls Interventions: Bed/chair exit alarm

## 2022-08-29 NOTE — CONSULTS
NEUROLOGY CONSULT    Name Hellen Duque Age 47 y.o. MRN [id number]  1970     Referring Physician: Consuelo Salinas NP      Chief Complaint: Inability to ambulate     This is a 47 y.o. -American female who was recently discharged from the hospital after being evaluated for the same complaint. She had an MRI of the brain and the C-spine which were unremarkable to explain her weakness. The recommendation was to do an LP and attempt was made to do the LP at the floor which was failed and then she was scheduled to do the LP under fluoroscopy but she kept refusing and after several days of refusal she was discharged home and even refused to go to rehab. She was unable to take care of herself at home and came back here for readmission. Assessment and Plan:  Difficulty ambulating: The patient continues to have difficulty walking. She has decree sensations in both the lower extremities from groins down to level of T10 or higher with no reflexes in the lower extremities or the upper either. Previously she had an MRI of the brain and C-spine which did not reveal any pathology. We will order an MRI of the T-spine with contrast and if negative will order the LP under fluoroscopy. She has agreed to lumbar puncture at this time. Fluoroscopy guided lumbar puncture ordered as previously she has failed bedside. Looking for albumin no cytologic dissociation to support diagnosis of CIDP versus any infectious or inflammatory etiology for her symptoms. Next dose of subcu vitamin B12 injection today and continue folate 0.8 mcg daily. Will also check vitamin B 1  Consultation for hematology for elevated M protein on labs.     Thank you for the consult    Allergies   Allergen Reactions    Codeine Nausea Only           Current Facility-Administered Medications   Medication Dose Route Frequency    ferrous sulfate tablet 325 mg  1 Tablet Oral DAILY WITH BREAKFAST    calcium carbonate (TUMS) chewable tablet 200 mg [elemental]  200 mg Oral TID PRN    folic acid (FOLVITE) tablet 1 mg  1 mg Oral DAILY    sodium chloride (NS) flush 5-40 mL  5-40 mL IntraVENous PRN    acetaminophen (TYLENOL) tablet 650 mg  650 mg Oral Q6H PRN    Or    acetaminophen (TYLENOL) suppository 650 mg  650 mg Rectal Q6H PRN    polyethylene glycol (MIRALAX) packet 17 g  17 g Oral DAILY PRN    ondansetron (ZOFRAN ODT) tablet 4 mg  4 mg Oral Q8H PRN    Or    ondansetron (ZOFRAN) injection 4 mg  4 mg IntraVENous Q6H PRN    enoxaparin (LOVENOX) injection 40 mg  40 mg SubCUTAneous DAILY    amLODIPine (NORVASC) tablet 5 mg  5 mg Oral DAILY    baclofen (LIORESAL) tablet 10 mg  10 mg Oral BID PRN    docusate sodium (COLACE) capsule 200 mg  200 mg Oral QHS    metoprolol succinate (TOPROL-XL) XL tablet 25 mg  25 mg Oral DAILY    metroNIDAZOLE (FLAGYL) tablet 500 mg  500 mg Oral BID    pregabalin (LYRICA) capsule 100 mg  100 mg Oral TID    pantoprazole (PROTONIX) tablet 40 mg  40 mg Oral DAILY    doxycycline (VIBRAMYCIN) capsule 100 mg  100 mg Oral Q12H    nitroglycerin (NITROSTAT) tablet 0.4 mg  0.4 mg SubLINGual PRN       Past Medical History:   Diagnosis Date    Anxiety     CAD (coronary artery disease)     Fibromyalgia     Hypertension     Neuropathy     Peripheral artery disease ([facility])     Psychiatric disorder     anxiety        Social History     Tobacco Use    Smoking status: Every Day     Packs/day: 0.50     Types: Cigarettes    Smokeless tobacco: Never   Substance Use Topics    Alcohol use: Yes     Comment: occosionally     Drug use: Not Currently            Exam  Visit Vitals  /84 (BP 1 Location: Left upper arm, BP Patient Position: At rest)   Pulse 89   Temp 98.9 °F (37.2 °C)   Resp 18   Ht 5' 11\" (1.803 m)   Wt 119.3 kg (263 lb)   SpO2 100%   BMI 36.68 kg/m²         General: Well developed, well nourished.  Patient in no distress   Head: Normocephalic, atraumatic, anicteric sclera   Neck Normal ROM, No thyromegally   Lungs:  Clear to auscultation Cardiac: Regular rate and rhythm with no murmurs. Abd: Bowel sounds were audible   Ext: No pedal edema   Skin: Supple no rash     NeurologicExam:  Mental Status: Alert and oriented to person place and time   Speech: Fluent no aphasia or dysarthria. Cranial Nerves:   Pupils are equal round and reactive to light and accommodation, extraocular movements are intact and full, visual fields are intact by confrontation, no nystagmus noted, face is symmetric, sensation in face is intact and symmetric, hearing is intact and symmetric, tongue and uvula are in midline with normal movements, palate is elevating equally, shoulder shrug is intact and symmetric. Motor:  Right upper extremity: 4-/5 throughout, left upper extremity 4+/5throughout, 3 out of 5 throughout in lower extremities   Reflexes:   Deep tendon reflexes 0/4 and symmetric. Sensory:   Decrease sensation from groin down   Gait:  Gait is deferred   Tremor:   No tremor noted. Cerebellar:  Coordination intact for finger-nose-finger.            Lab Review  Lab Results   Component Value Date/Time    WBC 6.5 08/27/2022 10:45 AM    HCT 25.8 (L) 08/27/2022 10:45 AM    HGB 8.2 (L) 08/27/2022 10:45 AM    PLATELET 297 77/50/5114 10:45 AM     Lab Results   Component Value Date/Time    Sodium 142 08/27/2022 10:45 AM    Potassium 3.5 08/27/2022 10:45 AM    Chloride 106 08/27/2022 10:45 AM    CO2 30 08/27/2022 10:45 AM    Glucose 80 08/27/2022 10:45 AM    BUN 14 08/27/2022 10:45 AM    Creatinine 0.57 08/27/2022 10:45 AM    Calcium 8.2 (L) 08/27/2022 10:45 AM     Lab Results   Component Value Date/Time    Vitamin B12 198 08/20/2022 11:46 AM    Vitamin B12 246 08/20/2022 11:46 AM    Folate 1.7 (L) 08/20/2022 11:46 AM    Folate 2.1 (L) 08/20/2022 11:46 AM     No results found for: LDL, LDLC, DLDLP  No results found for: HBA1C, NAR1IAUC, GBO5OFOI, JGG6UJGP  No components found for: TROPQUANT  No results found for: LENIN

## 2022-08-29 NOTE — PROGRESS NOTES
OCCUPATIONAL THERAPY TREATMENT  Patient: Tracey Pascal (1 y.o. female)  Date: 8/29/2022  Diagnosis: Weakness generalized [R53.1] <principal problem not specified>      Precautions: fall risk   Chart, occupational therapy assessment, plan of care, and goals were reviewed. ASSESSMENT  Pt continues with skilled OT services and is progressing towards goals. Pt received semi-supine in bed upon arrival, AXO x4 and agreeable to OT/PT co-tx at this time for increased pt/clinician safety with mobility. Functional Mobility and Transfers for ADLs:  Bed Mobility:  Rolling: Maximum assistance  Supine to Sit: Maximum assistance;Assist x2  Sit to Supine: Maximum assistance;Assist x2  Scooting: Maximum assistance;Assist x2     Balance:  Sitting: Impaired; With support  Sitting - Static: Fair (occasional)  Sitting - Dynamic: Poor (constant support)    ADL Intervention:  Feeding  Feeding Assistance: Minimum assistance  Container Management: Total assistance (dependent)  Drink to Mouth: Modified independent    Grooming  Grooming Assistance: Set-up; Stand-by assistance  Position Performed: Long sitting on bed  Washing Face: Set-up; Stand-by assistance    Upper Body [address]: Moderate assistance    Toileting  Toileting Assistance: Total assistance(dependent)  Bladder Hygiene: Total assistance (dependent)  Bowel Hygiene: Total assistance (dependent)    Overall, pt continues to present with deficits in generalized strength/AROM, coordination, bed mobility, static/dynamic sitting balance and functional activity tolerance during performance of ADLs/mobility; c/o 8/10 pain to kaity LE with reported paraesthesias during session today, however is making steady progress towards noted OT goals. Pt with improved performance in ADL/mobility today tolerating approx 8 min seated EOB for ADL; fair-poor sitting balance with support required posteriorly s/t decreased core strength and noted lean.  Unable to progress to standing on this date due to reported pain and fear of falling, however pt is motivated to work with therapy and regain functional independence. Will continue to progress. Changing OT recommendation to IRF at this time due to improved participation and overall decline from functional baseline. Other factors to consider for discharge: family support, DME, time since onset, severity of deficits          PLAN :  Patient continues to benefit from skilled intervention to address the above impairments. Continue treatment per established plan of care. to address goals. Recommend with staff: Encourage bed level exercise in prep for ADL activity     Recommend next OT session: UB bathing    Recommendation for discharge: (in order for the patient to meet his/her long term goals)  [facility]     This discharge recommendation:  Has been made in collaboration with the attending provider and/or case management       SUBJECTIVE:   Patient stated If I let go of the edge of the bed, I fall backwards.     OBJECTIVE DATA SUMMARY:   Cognitive/Behavioral Status:  Neurologic State: Alert  Orientation Level: Oriented X4  Cognition: Follows commands             Functional Mobility and Transfers for ADLs:  Bed Mobility:  Rolling: Maximum assistance  Supine to Sit: Maximum assistance;Assist x2  Sit to Supine: Maximum assistance;Assist x2  Scooting: Maximum assistance;Assist x2    Balance:  Sitting: Impaired; With support  Sitting - Static: Fair (occasional)  Sitting - Dynamic: Poor (constant support)    ADL Intervention:  Feeding  Feeding Assistance: Minimum assistance  Container Management: Total assistance (dependent)  Drink to Mouth: Modified independent    Grooming  Grooming Assistance: Set-up; Stand-by assistance  Position Performed: Long sitting on bed  Washing Face: Set-up; Stand-by assistance    Upper Body [address]: Moderate assistance    Toileting  Toileting Assistance:  Total assistance(dependent)  Bladder Hygiene: Total assistance (dependent)  Bowel Hygiene: Total assistance (dependent)    Therapeutic Exercises:   Pt would benefit from UE HEP to improve overall UE AROM/strength and can be further educated in next treatment session. Pain:  8/10 kaity LE    Activity Tolerance:   Fair    After treatment patient left in no apparent distress:   Supine in bed, Heels elevated for pressure relief, Call bell within reach, and Side rails x 3, bed locked and in lowest position    COMMUNICATION/COLLABORATION:   The patients plan of care was discussed with: Physical therapist and Registered nurse. OT/PT sessions occurred together for increased patient and clinician safety as pt with decreased activity tolerance and requires A of 2 for mobility at this time.      Grecia Randle  Time Calculation: 31 mins    Problem: Self Care Deficits Care Plan (Adult)  Goal: *Acute Goals and Plan of Care (Insert Text)  Description: Pt will be supervision/set-up self feeding  Pt will be mod A sup<->sit in prep for EOB ADL's  Pt will be mod A  LB dressing EOB level  Pt will be supervision/set-up  sit EOB 10 minutes in prep for EOB ADL's  Pt will be supervision/set-up  grooming EOB level  Pt will be mod A  sit<-> prep for toilet transfer  Pt will be MI kaity UE HEP in prep for self care tasks  Outcome: Progressing Towards Goal

## 2022-08-29 NOTE — PROGRESS NOTES
Hospitalist Progress Note    Subjective:   Daily Progress Note: 8/29/2022 9:14 AM    Patient having intermittent non-radiating chest pain but she says she has been having for several months but has worsened since she been in the hospital.  Medication review reveals that patient has been having 1 Nitrostat every day for the past 3 days. It appears that she had a stress test or cardiac intervention scheduled but never followed up back in 2021. Patient is also complaining of lower back pain and being uncomfortable in the bed she currently is in.     Current Facility-Administered Medications   Medication Dose Route Frequency    hydrALAZINE (APRESOLINE) 20 mg/mL injection 10 mg  10 mg IntraVENous QID PRN    zinc oxide-white petrolatum 20-75 % topical paste   Topical TID    ferrous sulfate tablet 325 mg  1 Tablet Oral DAILY WITH BREAKFAST    calcium carbonate (TUMS) chewable tablet 200 mg [elemental]  200 mg Oral TID PRN    folic acid (FOLVITE) tablet 1 mg  1 mg Oral DAILY    sodium chloride (NS) flush 5-40 mL  5-40 mL IntraVENous PRN    acetaminophen (TYLENOL) tablet 650 mg  650 mg Oral Q6H PRN    Or    acetaminophen (TYLENOL) suppository 650 mg  650 mg Rectal Q6H PRN    polyethylene glycol (MIRALAX) packet 17 g  17 g Oral DAILY PRN    ondansetron (ZOFRAN ODT) tablet 4 mg  4 mg Oral Q8H PRN    Or    ondansetron (ZOFRAN) injection 4 mg  4 mg IntraVENous Q6H PRN    enoxaparin (LOVENOX) injection 40 mg  40 mg SubCUTAneous DAILY    amLODIPine (NORVASC) tablet 5 mg  5 mg Oral DAILY    baclofen (LIORESAL) tablet 10 mg  10 mg Oral BID PRN    docusate sodium (COLACE) capsule 200 mg  200 mg Oral QHS    metoprolol succinate (TOPROL-XL) XL tablet 25 mg  25 mg Oral DAILY    metroNIDAZOLE (FLAGYL) tablet 500 mg  500 mg Oral BID    pregabalin (LYRICA) capsule 100 mg  100 mg Oral TID    pantoprazole (PROTONIX) tablet 40 mg  40 mg Oral DAILY    doxycycline (VIBRAMYCIN) capsule 100 mg  100 mg Oral Q12H    nitroglycerin (NITROSTAT) tablet 0.4 mg  0.4 mg SubLINGual PRN        Review of Systems  Review of Systems   Constitutional: Negative. Respiratory: Negative. Cardiovascular:  Positive for chest pain. Negative for palpitations and leg swelling.        + VELÁSQUEZ   Gastrointestinal: Negative. Neurological:  Positive for tingling and weakness. All other systems reviewed and are negative. Objective:     Visit Vitals  /84 (BP 1 Location: Left upper arm, BP Patient Position: At rest)   Pulse 89   Temp 98.9 °F (37.2 °C)   Resp 18   Ht 5' 11\" (1.803 m)   Wt 119.3 kg (263 lb)   SpO2 100%   BMI 36.68 kg/m²      O2 Device: None (Room air)    Temp (24hrs), Av.8 °F (37.1 °C), Min:98.1 °F (36.7 °C), Max:99.5 °F (37.5 °C)      No intake/output data recorded.  1901 -  0700  In: -   Out: 4794 [Urine:1650]    Recent Results (from the past 24 hour(s))   MRSA SCREEN - PCR (NASAL)    Collection Time: 22 12:00 AM   Result Value Ref Range    MRSA by PCR, Nasal Not Detected Not Detected          CT HEAD WO CONT   Final Result   No acute intracranial abnormality. MRI [facility] SPINE W WO CONT    (Results Pending)   IR FLUORO GUIDED NEEDLE PLACEMENT    (Results Pending)   XR CHEST PORT    (Results Pending)        PHYSICAL EXAM:    Physical Exam  Vitals and nursing note reviewed. Constitutional:       Appearance: She is obese. She is ill-appearing. HENT:      Head: Normocephalic and atraumatic. Neck:      Vascular: No carotid bruit. Cardiovascular:      Rate and Rhythm: Normal rate and regular rhythm. Pulmonary:      Effort: No respiratory distress. Breath sounds: No wheezing. Chest:      Chest wall: No tenderness. Abdominal:      General: Bowel sounds are normal.      Palpations: Abdomen is soft. Musculoskeletal:      Right lower leg: No edema. Left lower leg: No edema. Comments: Bilateral LE: 3/5 strength  Billateral UE: 3/5 strength   Skin:     General: Skin is warm.       Capillary Refill: Capillary refill takes less than 2 seconds. Neurological:      Mental Status: She is alert and oriented to person, place, and time. Motor: Weakness present. Gait: Gait abnormal.   Psychiatric:      Comments: Subdued affect        Data Review    Recent Results (from the past 24 hour(s))   MRSA SCREEN - PCR (NASAL)    Collection Time: 08/29/22 12:00 AM   Result Value Ref Range    MRSA by PCR, Nasal Not Detected Not Detected          Assessment/Plan:     Active Problems:    Weakness generalized (8/24/2022)      Hospital Course:    Sarah Suresh is a 47year old female with a PMH of anxiety, CAD, fibromyalgia, hypertension, PVD and weakness who presented with worsening weakness and inability to ambulate. Of note outpatient and inpatient follow-up most recently discharged on 8/19 EGD showed Schatzki's ring distal third of esophagus and chronic gastritis without bleeding biopsy revealed H. pylori was started on tetracycline and Flagyl for 14 days. In ED vital signs unremarkable. Initial labs significant for hemoglobin of 8.2. CT of the head negative for acute intracranial abnormality. 8/22 MRI of the brain with no acute or significant intracranial abnormality. MRI of thoracic spine pending. Patient admitted for further work-up and placement. Neurology, GI, and hematology consulted. Patient with persistent chest tightness requiring nitrostat, cardiology consulted. Labs pending. Progressive weakness/neuropathy  Unclear etiology, CIDP, autoimmune, demyelinating process? Intrinsic factor AB elevated, copper and zinc levels within normal limits  Continue on folic acid, H63, and Lyrica  Last hospitalization was evaluated by neurology and rheumatology    Multilevel cervical spinal stenosis  MRI of the thoracic spine pending  LP pending  PT/OT recommending SNF  Neurology following    Chest pain  EKG unremarkable, troponin negative x3  Cardiology recommending to have outpatient follow-up    H.  Pylori  Continue on doxycyline and flagyl   EGD showed Schatzki's ring distal third of esophagus and chronic gastritis without bleeding  GI consulted    Hypertension  Continue on amlodipine    Monoclonal gammopathy  SPEP done last week with small M spike  Hematology consulted    History of PAD/CAD  Lipid panel pending    Tobacco dependency  Consult on importance of smoking cessation    DVT Prophylaxis: VTE low risk  GI Prophylaxis: protonix  Discharge and disposition barriers: neuro clearance/LP/MRI thoracic spine, 48hr    Care Plan discussed with: Patient/Family, Nurse, and     Total time spent with patient: 35 minutes.

## 2022-08-29 NOTE — PROGRESS NOTES
PHYSICAL THERAPY EVALUATION  Patient: Anjel Driscoll (16 y.o. female)  Date: 8/29/2022  Primary Diagnosis: Weakness generalized [R53.1]       Precautions: falls      ASSESSMENT  Pt is a 47 y.o. female admitted on 8/27/2022 for bilateral UE and LE weakness with paresthesia, worsening x 2 months; pt currently being treated for progressive weakness/neuropathy, multi-level c-spine stenosis, h pylori, fibromyalgia . Pt semi-supine in bed upon PT arrival, agreeable to evaluation. Pt A&O x 4. Per pt report:   Home Situation  Home Environment: Private residence  # Steps to Enter: 4  One/Two Story Residence: One story  Living Alone: Yes  Support Systems: No Support Systems  Patient Expects to be Discharged to[de-identified] Skilled nursing facility  Current DME Used/Available at Home: Walker, rolling, Walker  PLOF: I ADLs/IADLs, no Ad for mobility, reports \"multiple\" falls in the last 3 months. Pt reports she resides alone    Based on the objective data described, the patient presents with generalized weakness, impaired functional mobility, impaired amb, impaired balance, and decreased activity tolerance. Pt performed Rolling: Maximum assistance, Supine to Sit: Maximum assistance;Assist x2, Sit to Stand:  (pt deferred) due to fatigue while seated EOB and fear of falling. Pt then performed Sit to Supine: Maximum assistance;Assist x2. Pt demonstrates 2+ to 3-/5 bilateral LE MMT. Pt did fair with session today with increased pain 8/10 with bilateral LE, fear of falling with mobility, but good motivation and willingness to participate with therapy. Pt will benefit from continued skilled PT to address above deficits and return to PLOF. Current PT DC recommendation IRF due to pt being I at home, limited social support, and current functional deficits.      Current Level of Function Impacting Discharge (mobility/balance): max A x 2    Other factors to consider for discharge: PMH, acute medical state, limited social support      PLAN :  Recommendations and Planned Interventions: bed mobility training, transfer training, gait training, therapeutic exercises, patient and family training/education, and therapeutic activities      Frequency/Duration: Patient will be followed by physical therapy:  3-5x/week to address goals. Recommendation for discharge: (in order for the patient to meet his/her long term goals)  [address]     This discharge recommendation:  Has been made in collaboration with the attending provider and/or case management         SUBJECTIVE:   Patient stated I fall all the time.     OBJECTIVE DATA SUMMARY:   HISTORY:    Past Medical History:   Diagnosis Date    Anxiety     CAD (coronary artery disease)     Fibromyalgia     Hypertension     Neuropathy     Peripheral artery disease ([facility] [address]     Psychiatric disorder     anxiety     Past Surgical History:   Procedure Laterality Date    HX ANKLE FRACTURE TX Left     surgical implants    HX  SECTION         Home Situation  Home Environment: Private residence  # Steps to Enter: 4  One/Two Story Residence: One story  Living Alone: Yes  Support Systems: No Support Systems  Patient Expects to be Discharged to[de-identified] Skilled nursing facility  Current DME Used/Available at Home: [place], rolling, [place] [place]    EXAMINATION/PRESENTATION/DECISION MAKING:   Critical Behavior:  Neurologic State: Alert  Orientation Level: Oriented X4  Cognition: Follows commands  Safety/Judgement: Home safety  Hearing:   Auditory  Auditory Impairment: None  Skin:  pencil eraser size open area noted in gluteal fold  Edema: none noted   Range Of Motion:  AROM: Generally decreased, functional           PROM: Within functional limits           Strength:    Strength: Generally decreased, functional                    Tone & Sensation:                  Sensation: Impaired               Coordination:  Coordination: Generally decreased, functional  Vision:      Functional Mobility:  Bed Mobility:  Rolling: Maximum assistance  Supine to Sit: Maximum assistance;Assist x2  Sit to Supine: Maximum assistance;Assist x2  Scooting: Maximum assistance;Assist x2  Transfers:  Sit to Stand:  (pt deferred)                          Balance:   Sitting: Impaired; With support  Sitting - Static: Fair (occasional)  Sitting - Dynamic: Poor (constant support)  Ambulation/Gait Training:   NA         Therapeutic Exercises:   Pt would benefit from LE HEP to improve overall LE AROM/strength and can be further educated in next treatment session. Functional Measure:  [facility] AM-PAC 6 Clicks         Basic Mobility Inpatient Short Form  How much difficulty does the patient currently have. .. Unable A Lot A Little None   1. Turning over in bed (including adjusting bedclothes, sheets and blankets)? [] 1   [x] 2   [] 3   [] 4   2. Sitting down on and standing up from a chair with arms ( e.g., wheelchair, bedside commode, etc.)   [] 1   [x] 2   [] 3   [] 4   3. Moving from lying on back to sitting on the side of the bed? [] 1   [x] 2   [] 3   [] 4          How much help from another person does the patient currently need. .. Total A Lot A Little None   4. Moving to and from a bed to a chair (including a wheelchair)? [] 1   [x] 2   [] 3   [] 4   5. Need to walk in hospital room? [] 1   [x] 2   [] 3   [] 4   6. Climbing 3-5 steps with a railing? [] 1   [x] 2   [] 3   [] 4   © 2007, Trustees of [facility], under license to [employer]. All rights reserved     Score:  Initial: 12/24 Most Recent: X (Date: 8/29/22 )   Interpretation of Tool:  Represents activities that are increasingly more difficult (i.e. Bed mobility, Transfers, Gait).   Score 24 23 22-20 19-15 14-10 9-7 6   Modifier CH CI CJ CK CL CM CN         Physical Therapy Evaluation Charge Determination   History Examination Presentation Decision-Making   HIGH Complexity :3+ comorbidities / personal factors will impact the outcome/ POC  HIGH Complexity : 4+ Standardized tests and measures addressing body structure, function, activity limitation and / or participation in recreation  LOW Complexity : Stable, uncomplicated  Other outcome measures ampac 6  mod      Based on the above components, the patient evaluation is determined to be of the following complexity level: LOW     Pain Ratin/10 bilateral LE    Activity Tolerance:   Fair and requires rest breaks    After treatment patient left in no apparent distress:   Bed locked and in lowest position Supine in bed, Call bell within reach, Bed / chair alarm activated, and Side rails x 3 and nsg updated. GOALS:    Problem: Mobility Impaired (Adult and Pediatric)  Goal: *Acute Goals and Plan of Care (Insert Text)  Description: Pt stated goal: to get stronger  Pt will be I with LE HEP in 7 days. Pt will perform bed mobility with mod I in 7 days. Pt will perform transfers with mod I in 7 days. Pt will amb 25-50 feet with LRAD safely with mod I in 7 days. Outcome: Not Met       COMMUNICATION/EDUCATION:   The patients plan of care was discussed with: Occupational therapist, Registered nurse, and Case management. Fall prevention education was provided and the patient/caregiver indicated understanding., Patient/family have participated as able in goal setting and plan of care. , and Patient/family agree to work toward stated goals and plan of care. PT/OT sessions occurred together for increased safety of pt and clinician. Tashi Lewis, MONTRELL, under direct supervision of An Gordon, PT, DPT provided care and treatment of pt with verbal consent from pt received.       Thank you for this referral.  Samuel Maguire, PT, DPT   Time Calculation: 40 mins

## 2022-08-29 NOTE — CONSULTS
Hematology/Oncology Consult    Patient: Tk Blair MRN: [id number]     YOB: 1970  Age: 47 y.o. Sex: female      HPI      Tk Blair is a 47 y.o. female who is being seen for monoclonal gammopathy. Ms. Omar Vallejo is a 50-year-old female patient with progressive limb weakness who was recently discharged from the hospital last week who presents with worsening weakness after she declined rehab stay and went home instead after discharge last week. She is being worked up by neurology and is scheduled for a lumbar puncture later today. During the last admission the patient was seen by Dr. Mayra Garcia and Dr. Liz Guido for anemia and had work-up done which is now resulted showing a small M spike of 0.6 on SPEP. Patient continues to have worsening weakness and neuropathy symptoms. Past Medical History:   Diagnosis Date    Anxiety     CAD (coronary artery disease)     Fibromyalgia     Hypertension     Neuropathy     Peripheral artery disease ([address]     Psychiatric disorder     anxiety     Past Surgical History:   Procedure Laterality Date    HX ANKLE FRACTURE TX Left     surgical implants    HX  SECTION        No family history on file.   Social History     Tobacco Use    Smoking status: Every Day     Packs/day: 0.50     Types: Cigarettes    Smokeless tobacco: Never   Substance Use Topics    Alcohol use: Yes     Comment: occosionally       Current Facility-Administered Medications   Medication Dose Route Frequency Provider Last Rate Last Admin    hydrALAZINE (APRESOLINE) 20 mg/mL injection 10 mg  10 mg IntraVENous QID PRN Coty Bravo PA        zinc oxide-white petrolatum 20-75 % topical paste   Topical TID Jaime Green MD        ferrous sulfate tablet 325 mg  1 Tablet Oral DAILY WITH BREAKFAST Sukhdev Garcia PA-C   325 mg at 22 0945    calcium carbonate (TUMS) chewable tablet 200 mg [elemental]  200 mg Oral TID PRN Sukhdev Garcia PA-C   200 mg at  1639    folic acid (FOLVITE) tablet 1 mg  1 mg Oral DAILY Rio Briceno MD   1 mg at 08/29/22 0945    sodium chloride (NS) flush 5-40 mL  5-40 mL IntraVENous PRN Alix Mccray, NP        acetaminophen (TYLENOL) tablet 650 mg  650 mg Oral Q6H PRN Sandiia Dove, NP        Or    acetaminophen (TYLENOL) suppository 650 mg  650 mg Rectal Q6H PRN Sandiia ve, NP        polyethylene glycol (MIRALAX) packet 17 g  17 g Oral DAILY PRN Sandiia Dove, NP        ondansetron (ZOFRAN ODT) tablet 4 mg  4 mg Oral Q8H PRN Sandiia ve, NP        Or    ondansetron (ZOFRAN) injection 4 mg  4 mg IntraVENous Q6H PRN Sandiia Dove, NP        enoxaparin (LOVENOX) injection 40 mg  40 mg SubCUTAneous DAILY Ardelia Dove, NP   40 mg at 08/28/22 0951    amLODIPine (NORVASC) tablet 5 mg  5 mg Oral DAILY Ardelia Dove, NP   5 mg at 08/29/22 0946    baclofen (LIORESAL) tablet 10 mg  10 mg Oral BID PRN Sandiia Dove, NP   10 mg at 08/29/22 5049    docusate sodium (COLACE) capsule 200 mg  200 mg Oral QHS Ardelia Dove, NP   200 mg at 08/28/22 2135    metoprolol succinate (TOPROL-XL) XL tablet 25 mg  25 mg Oral DAILY Ardelia Dove, NP   25 mg at 08/29/22 0945    metroNIDAZOLE (FLAGYL) tablet 500 mg  500 mg Oral BID Ardelia Dove, NP   500 mg at 08/29/22 0946    pregabalin (LYRICA) capsule 100 mg  100 mg Oral TID Ardelia Dove, NP   100 mg at 08/29/22 0945    pantoprazole (PROTONIX) tablet 40 mg  40 mg Oral DAILY Ardelia Dove, NP   40 mg at 08/29/22 0946    doxycycline (VIBRAMYCIN) capsule 100 mg  100 mg Oral Q12H Ardelia Dove, NP   100 mg at 08/29/22 0946    nitroglycerin (NITROSTAT) tablet 0.4 mg  0.4 mg SubLINGual PRN Robina Granados MD   0.4 mg at 08/29/22 0944        Allergies   Allergen Reactions    Codeine Nausea Only       Review of Systems:  Constitutional No fevers, chills, night sweats, excessive fatigue or weight loss. Allergic/Immunologic No recent allergic reactions   Eyes No significant visual difficulties. No diplopia.    ENMT No problems with hearing, no sore throat, no sinus drainage. Endocrine No hot flashes or night sweats. No cold intolerance, polyuria, or polydipsia   Hematologic/Lymphatic No easy bruising or bleeding. The patient denies any tender or palpable lymph nodes   Breasts No abnormal masses of breast, nipple discharge or pain. Respiratory No dyspnea on exertion, orthopnea, chest pain, cough or hemoptysis. Cardiovascular No anginal chest pain, irregular heart beat, tachycardia, palpitations or orthopnea. Gastrointestinal No nausea, vomiting, diarrhea, constipation, cramping, dysphagia, reflux, heartburn, GI bleeding, or early satiety. No change in bowel habits. Genitourinary (M) No hematuria, dysuria, increased frequency, urgency, hesitancy or incontinence. Musculoskeletal No joint pain, swelling or redness. No decreased range of motion. Integumentary No chronic rashes, inflammation, ulcerations, pruritus, petechiae, purpura, ecchymoses, or skin changes. Neurologic No headache, blurred vision, and no areas of focal weakness or numbness. Normal gait. No sensory problems. Psychiatric No insomnia, depression, laurie or mood swings. No psychotropic drugs. Objective:     Vitals:    08/28/22 2026 08/29/22 0000 08/29/22 0302 08/29/22 0806   BP: 114/74  (!) 144/71 130/84   Pulse: 98 86 96 89   Resp: 18  18 18   Temp: 99.5 °F (37.5 °C)  98.1 °F (36.7 °C) 98.9 °F (37.2 °C)   SpO2: 100%   100%   Weight:       Height:            Physical Exam:  Constitutional Alert, cooperative, oriented. Mood and affect appropriate. Appears close to chronological age. Well nourished. Well developed. Head Normocephalic; no scars   Eyes Conjunctivae and sclerae are clear and without icterus. Pupils are reactive and equal.   ENMT Sinuses are nontender. No oral exudates, ulcers, masses, thrush or mucositis. Oropharynx clear. Tongue normal.   Neck Supple without masses or thyromegaly. No jugular venous distension. Hematologic/Lymphatic No petechiae or purpura. No tender or palpable lymph nodes in the cervical, supraclavicular, axillary or inguinal area. Respiratory Lungs are clear to auscultation without rhonchi or wheezing. Cardiovascular Regular rate and rhythm of heart without murmurs, gallops or rubs. Chest / Line Site Chest is symmetric with no chest wall deformities. Abdomen Non-tender, non-distended, no masses, ascites or hepatosplenomegaly. Good bowel sounds. No guarding or rebound tenderness. No pulsatile masses. Musculoskeletal No tenderness or swelling, normal range of motion without obvious weakness. Extremities No visible deformities, no cyanosis, clubbing or edema. Skin No rashes, scars, or lesions suggestive of malignancy. No petechiae, purpura, or ecchymoses. No excoriations. Neurologic Bilateral upper and lower extremity weakness   Psychiatric Alert and oriented times three. Coherent speech. Verbalizes understanding of our discussions today. Lab/Data Review:  Recent Labs     08/27/22  1045   WBC 6.5   HGB 8.2*   HCT 25.8*        Recent Labs     08/27/22  1045      K 3.5      CO2 30   GLU 80   BUN 14   CREA 0.57   CA 8.2*   ALB 3.2*   TBILI 0.6   ALT 37     No results for input(s): PH, PCO2, PO2, HCO3, FIO2 in the last 72 hours. Recent Results (from the past 24 hour(s))   MRSA SCREEN - PCR (NASAL)    Collection Time: 08/29/22 12:00 AM   Result Value Ref Range    MRSA by PCR, Nasal Not Detected Not Detected          CT HEAD WO CONT    Result Date: 8/27/2022  No acute intracranial abnormality. Assessment and Plan:     Hospital Problems  Never Reviewed            Codes Class Noted POA    Weakness generalized ICD-10-CM: R53.1  ICD-9-CM: 780.79  8/24/2022 Unknown           Monoclonal gammopathy:  -SPEP done last week showed a small M spike of 0.6  -most likely indicates monoclonal gammopathy.   However, we will proceed with further further work-up including immunofixation as well as serum light chains to complete work-up      Anemia:  -Hemoglobin is low but stable at 8.2  -Work-up done at the last visit showed a low B12 level as well as elevated intrinsic factor antibody suggesting pernicious anemia  -Agree with parenteral B12 supplementation  -CRP was elevated at last visit suggesting possible inflammatory etiology for anemia as well  -Work-up will be sent as above for further evaluation    Polyneuropathy:  -Neurology following  -For lumbar puncture later today. Thank you for the consult.     Signed By: Zana Castillo MD     August 29, 2022

## 2022-08-29 NOTE — WOUND CARE
IP WOUND CONSULT    Lesley Montemayor 41 RECORD NUMBER:  [id number]  AGE: 47 y.o. GENDER: female  : 1970  TODAY'S DATE:  2022  Places; hospital units: [address] [place] [address]    GENERAL     [] Follow-up   [x] New Consult          PAST MEDICAL HISTORY    Past Medical History:   Diagnosis Date    Anxiety     CAD (coronary artery disease)     Fibromyalgia     Hypertension     Neuropathy     Peripheral artery disease (HCC)     Psychiatric disorder     anxiety        ALLERGIES    Allergies   Allergen Reactions    Codeine Nausea Only               Continent: incontinent  barrier: Pure Wick    Efrem 14    Support Surface: [place] bed, Centrella Max low air loss bed ordered for increased pressure reduction. Contributing Factors: anticoagulation therapy, edema, chronic pressure, decreased mobility, shear force, incontinence of stool, incontinence of urine, and obesity      Assessment      1) WOUND: Pressure Injury Stage II: to left buttocks  Wound bed is pink/red with non-blanchable erythema to claude-wound. Wound cleansed with NS and zinc paste applied to area. Sacrum is hyperpigmented and non-blanchable. Patient states that she is not able to walk, she is incontinent of bowel and bladder. Recommend for wound care: Cleanse with NS, apply opticel ag to wound bed and cover with foam dressing. Dressing to be changed every other day and PRN for soiling. Patient is to be turned approximately every 2 hours with wedge/pillows at an angle of 30 degrees or more if can be tolerated and not contraindicated. FOB should be elevated to prevent friction/shear from patient sliding down in bed. HOB should be elevated to 30 degrees or less to assist with offloading and pressure reduction to sacrum/coccyx. Use foam body alignment wedge to turn patient q2h at 30 degree angle to offload sacrum to prevent pressure related skin injury. Do not position directly on greater trochanter.        Float heels on pillow at all times while in bed, place pillow long ways under patient leg so knee is supported and heel is hanging off edge of pillow.       Skin Care & Pressure Relief Recommendations  Minimize layers of linen  Pads under patient to optimize support surface  Turn/reposition approximately every 2 hours  Pillow wedges  Manage incontinence   Promote continence; Skin Protective lotion/cream to buttocks and sacrum daily and as needed with incontinence care  Offload heels pillows      Discharge Wound Care Needs:    Teaching completed with:   [] Patient           [] Family member       [] Caregiver          [] Nursing  [] Other    Patient/Caregiver Teaching:  Level of patient/caregiver understanding able to:   [] Indicates understanding       [] Needs reinforcement  [] Unsuccessful      [] Verbal Understanding  [] Demonstrated understanding       [] No evidence of learning  [] Refused teaching         [] N/A       Ray Casarez RN, BSN  [facility] [place]   08/29/22  10:31 AM

## 2022-08-29 NOTE — PROGRESS NOTES
Notified by RD, patient complaining of globus sensation. Chart review completed, patient followed by GI for Schatzki's ring and chronic Gastritis. Defer diet to GI, as patient w/ esophageal dysphagia.

## 2022-08-29 NOTE — PROGRESS NOTES
Problem: Pressure Injury - Risk of  Goal: *Prevention of pressure injury  Description: Document Efrem Scale and appropriate interventions in the flowsheet. Outcome: Progressing Towards Goal  Note: Pressure Injury Interventions:  Sensory Interventions: Minimize linen layers    Moisture Interventions: Absorbent underpads    Activity Interventions: Assess need for specialty bed    Mobility Interventions: PT/OT evaluation    Nutrition Interventions: Document food/fluid/supplement intake    Friction and Shear Interventions: Apply protective barrier, creams and emollients                Problem: Patient Education: Go to Patient Education Activity  Goal: Patient/Family Education  Outcome: Progressing Towards Goal     Problem: Falls - Risk of  Goal: *Absence of Falls  Description: Document Remington Fall Risk and appropriate interventions in the flowsheet.   Outcome: Progressing Towards Goal  Note: Fall Risk Interventions:            Medication Interventions: Bed/chair exit alarm    Elimination Interventions: Bed/chair exit alarm    History of Falls Interventions: Bed/chair exit alarm         Problem: Patient Education: Go to Patient Education Activity  Goal: Patient/Family Education  Outcome: Progressing Towards Goal     Problem: Patient Education: Go to Patient Education Activity  Goal: Patient/Family Education  Outcome: Progressing Towards Goal

## 2022-08-30 LAB
ALBUMIN SERPL-MCNC: 2.8 G/DL (ref 3.5–5)
ALBUMIN/GLOB SERPL: 0.9 {RATIO} (ref 1.1–2.2)
ALP SERPL-CCNC: 101 U/L (ref 45–117)
ALT SERPL-CCNC: 28 U/L (ref 12–78)
ANION GAP SERPL CALC-SCNC: 7 MMOL/L (ref 5–15)
AST SERPL W P-5'-P-CCNC: 17 U/L (ref 15–37)
BASOPHILS # BLD: 0 K/UL (ref 0–0.1)
BASOPHILS NFR BLD: 0 % (ref 0–1)
BILIRUB SERPL-MCNC: 0.9 MG/DL (ref 0.2–1)
BUN SERPL-MCNC: 7 MG/DL (ref 6–20)
BUN/CREAT SERPL: 14 (ref 12–20)
CA-I BLD-MCNC: 7.9 MG/DL (ref 8.5–10.1)
CHLORIDE SERPL-SCNC: 103 MMOL/L (ref 97–108)
CHOLEST SERPL-MCNC: 182 MG/DL
CO2 SERPL-SCNC: 28 MMOL/L (ref 21–32)
CREAT SERPL-MCNC: 0.51 MG/DL (ref 0.55–1.02)
DIFFERENTIAL METHOD BLD: ABNORMAL
ECHO AV AREA PEAK VELOCITY: 3.1 CM2
ECHO AV AREA VTI: 3 CM2
ECHO AV AREA/BSA PEAK VELOCITY: 1.3 CM2/M2
ECHO AV AREA/BSA VTI: 1.3 CM2/M2
ECHO AV MEAN GRADIENT: 6 MMHG
ECHO AV MEAN VELOCITY: 1.2 M/S
ECHO AV PEAK GRADIENT: 11 MMHG
ECHO AV PEAK VELOCITY: 1.7 M/S
ECHO AV VELOCITY RATIO: 0.82
ECHO AV VTI: 29.9 CM
ECHO EST RA PRESSURE: 3 MMHG
ECHO LA AREA 2C: 10.9 CM2
ECHO LA DIAMETER INDEX: 1.22 CM/M2
ECHO LA DIAMETER: 2.9 CM
ECHO LA MINOR AXIS: 4.8 CM
ECHO LV E' SEPTAL VELOCITY: 8 CM/S
ECHO LV EDV A2C: 35 ML
ECHO LV EDV A4C: 56 ML
ECHO LV EDV INDEX A4C: 24 ML/M2
ECHO LV EDV NDEX A2C: 15 ML/M2
ECHO LV EJECTION FRACTION A2C: 85 %
ECHO LV EJECTION FRACTION A4C: 69 %
ECHO LV EJECTION FRACTION BIPLANE: 79 % (ref 55–100)
ECHO LV ESV A2C: 5 ML
ECHO LV ESV A4C: 17 ML
ECHO LV ESV INDEX A2C: 2 ML/M2
ECHO LV ESV INDEX A4C: 7 ML/M2
ECHO LV FRACTIONAL SHORTENING: 24 % (ref 28–44)
ECHO LV INTERNAL DIMENSION DIASTOLE INDEX: 1.77 CM/M2
ECHO LV INTERNAL DIMENSION DIASTOLIC: 4.2 CM (ref 3.9–5.3)
ECHO LV INTERNAL DIMENSION SYSTOLIC INDEX: 1.35 CM/M2
ECHO LV INTERNAL DIMENSION SYSTOLIC: 3.2 CM
ECHO LV IVSD: 0.8 CM (ref 0.6–0.9)
ECHO LV MASS 2D: 127.8 G (ref 67–162)
ECHO LV MASS INDEX 2D: 53.9 G/M2 (ref 43–95)
ECHO LV POSTERIOR WALL DIASTOLIC: 1.1 CM (ref 0.6–0.9)
ECHO LV RELATIVE WALL THICKNESS RATIO: 0.52
ECHO LVOT AREA: 3.8 CM2
ECHO LVOT AV VTI INDEX: 0.78
ECHO LVOT DIAM: 2.2 CM
ECHO LVOT MEAN GRADIENT: 4 MMHG
ECHO LVOT PEAK GRADIENT: 8 MMHG
ECHO LVOT PEAK VELOCITY: 1.4 M/S
ECHO LVOT STROKE VOLUME INDEX: 37.5 ML/M2
ECHO LVOT SV: 88.9 ML
ECHO LVOT VTI: 23.4 CM
ECHO MV REGURGITANT PEAK GRADIENT: 10 MMHG
ECHO MV REGURGITANT PEAK VELOCITY: 1.6 M/S
ECHO PV MAX VELOCITY: 1.2 M/S
ECHO PV PEAK GRADIENT: 5 MMHG
ECHO RIGHT VENTRICULAR SYSTOLIC PRESSURE (RVSP): 12 MMHG
ECHO RV BASAL DIMENSION: 3.1 CM
ECHO RV FREE WALL PEAK S': 13 CM/S
ECHO RV MID DIMENSION: 2.7 CM
ECHO RV TAPSE: 2.7 CM (ref 1.7–?)
ECHO TV REGURGITANT MAX VELOCITY: 1.52 M/S
ECHO TV REGURGITANT PEAK GRADIENT: 9 MMHG
EOSINOPHIL # BLD: 0 K/UL (ref 0–0.4)
EOSINOPHIL NFR BLD: 1 % (ref 0–7)
ERYTHROCYTE [DISTWIDTH] IN BLOOD BY AUTOMATED COUNT: 15.1 % (ref 11.5–14.5)
FERRITIN SERPL-MCNC: 614 NG/ML (ref 8–252)
FOLATE SERPL-MCNC: 3.7 NG/ML (ref 5–21)
GLOBULIN SER CALC-MCNC: 3.2 G/DL (ref 2–4)
GLUCOSE SERPL-MCNC: 83 MG/DL (ref 65–100)
HAPTOGLOB SERPL-MCNC: 290 MG/DL (ref 30–200)
HCT VFR BLD AUTO: 26.5 % (ref 35–47)
HDLC SERPL-MCNC: 53 MG/DL
HDLC SERPL: 3.4 {RATIO} (ref 0–5)
HGB BLD-MCNC: 8.2 G/DL (ref 11.5–16)
IMM GRANULOCYTES # BLD AUTO: 0.1 K/UL (ref 0–0.04)
IMM GRANULOCYTES NFR BLD AUTO: 1 % (ref 0–0.5)
IRON SATN MFR SERPL: 49 % (ref 20–50)
IRON SERPL-MCNC: 81 UG/DL (ref 35–150)
LDH SERPL L TO P-CCNC: 170 U/L (ref 81–246)
LDLC SERPL CALC-MCNC: 101.4 MG/DL (ref 0–100)
LIPID PROFILE,FLP: ABNORMAL
LYMPHOCYTES # BLD: 1.1 K/UL (ref 0.8–3.5)
LYMPHOCYTES NFR BLD: 18 % (ref 12–49)
MCH RBC QN AUTO: 30.6 PG (ref 26–34)
MCHC RBC AUTO-ENTMCNC: 30.9 G/DL (ref 30–36.5)
MCV RBC AUTO: 98.9 FL (ref 80–99)
MONOCYTES # BLD: 0.5 K/UL (ref 0–1)
MONOCYTES NFR BLD: 7 % (ref 5–13)
NEUTS SEG # BLD: 4.6 K/UL (ref 1.8–8)
NEUTS SEG NFR BLD: 73 % (ref 32–75)
NRBC # BLD: 0 K/UL (ref 0–0.01)
NRBC BLD-RTO: 0 PER 100 WBC
PLATELET # BLD AUTO: 194 K/UL (ref 150–400)
PMV BLD AUTO: 9.6 FL (ref 8.9–12.9)
POTASSIUM SERPL-SCNC: 3.1 MMOL/L (ref 3.5–5.1)
PROT SERPL-MCNC: 6 G/DL (ref 6.4–8.2)
RBC # BLD AUTO: 2.68 M/UL (ref 3.8–5.2)
SODIUM SERPL-SCNC: 138 MMOL/L (ref 136–145)
TIBC SERPL-MCNC: 166 UG/DL (ref 250–450)
TRIGL SERPL-MCNC: 138 MG/DL (ref ?–150)
TROPONIN-HIGH SENSITIVITY: 6 NG/L (ref 0–51)
VIT B12 SERPL-MCNC: >2000 PG/ML (ref 193–986)
VLDLC SERPL CALC-MCNC: 27.6 MG/DL
WBC # BLD AUTO: 6.3 K/UL (ref 3.6–11)

## 2022-08-30 PROCEDURE — 83615 LACTATE (LD) (LDH) ENZYME: CPT

## 2022-08-30 PROCEDURE — 84165 PROTEIN E-PHORESIS SERUM: CPT

## 2022-08-30 PROCEDURE — 82728 ASSAY OF FERRITIN: CPT

## 2022-08-30 PROCEDURE — 82607 VITAMIN B-12: CPT

## 2022-08-30 PROCEDURE — 84484 ASSAY OF TROPONIN QUANT: CPT

## 2022-08-30 PROCEDURE — 74011250637 HC RX REV CODE- 250/637: Performed by: NURSE PRACTITIONER

## 2022-08-30 PROCEDURE — 83540 ASSAY OF IRON: CPT

## 2022-08-30 PROCEDURE — G0378 HOSPITAL OBSERVATION PER HR: HCPCS

## 2022-08-30 PROCEDURE — 74011250637 HC RX REV CODE- 250/637: Performed by: STUDENT IN AN ORGANIZED HEALTH CARE EDUCATION/TRAINING PROGRAM

## 2022-08-30 PROCEDURE — 74011250637 HC RX REV CODE- 250/637: Performed by: PSYCHIATRY & NEUROLOGY

## 2022-08-30 PROCEDURE — 80061 LIPID PANEL: CPT

## 2022-08-30 PROCEDURE — 80053 COMPREHEN METABOLIC PANEL: CPT

## 2022-08-30 PROCEDURE — 74011250636 HC RX REV CODE- 250/636: Performed by: NURSE PRACTITIONER

## 2022-08-30 PROCEDURE — 74011250637 HC RX REV CODE- 250/637: Performed by: HOSPITALIST

## 2022-08-30 PROCEDURE — 83521 IG LIGHT CHAINS FREE EACH: CPT

## 2022-08-30 PROCEDURE — 36415 COLL VENOUS BLD VENIPUNCTURE: CPT

## 2022-08-30 PROCEDURE — 85025 COMPLETE CBC W/AUTO DIFF WBC: CPT

## 2022-08-30 PROCEDURE — 82784 ASSAY IGA/IGD/IGG/IGM EACH: CPT

## 2022-08-30 PROCEDURE — 83010 ASSAY OF HAPTOGLOBIN QUANT: CPT

## 2022-08-30 RX ORDER — POTASSIUM CHLORIDE 750 MG/1
40 TABLET, FILM COATED, EXTENDED RELEASE ORAL
Status: COMPLETED | OUTPATIENT
Start: 2022-08-30 | End: 2022-08-30

## 2022-08-30 RX ORDER — LIDOCAINE HYDROCHLORIDE 20 MG/ML
5 INJECTION, SOLUTION INFILTRATION; PERINEURAL ONCE
Status: DISCONTINUED | OUTPATIENT
Start: 2022-08-30 | End: 2022-08-30 | Stop reason: SDUPTHER

## 2022-08-30 RX ORDER — LIDOCAINE HYDROCHLORIDE 20 MG/ML
100 INJECTION, SOLUTION EPIDURAL; INFILTRATION; INTRACAUDAL; PERINEURAL ONCE
Status: DISPENSED | OUTPATIENT
Start: 2022-08-30 | End: 2022-08-30

## 2022-08-30 RX ADMIN — DOXYCYCLINE HYCLATE 100 MG: 100 CAPSULE ORAL at 09:19

## 2022-08-30 RX ADMIN — AMLODIPINE BESYLATE 5 MG: 5 TABLET ORAL at 09:19

## 2022-08-30 RX ADMIN — PREGABALIN 100 MG: 50 CAPSULE ORAL at 21:27

## 2022-08-30 RX ADMIN — METRONIDAZOLE 500 MG: 500 TABLET ORAL at 09:19

## 2022-08-30 RX ADMIN — DOXYCYCLINE HYCLATE 100 MG: 100 CAPSULE ORAL at 21:26

## 2022-08-30 RX ADMIN — OXYCODONE HYDROCHLORIDE AND ACETAMINOPHEN 1 TABLET: 5; 325 TABLET ORAL at 09:18

## 2022-08-30 RX ADMIN — PREGABALIN 100 MG: 50 CAPSULE ORAL at 17:49

## 2022-08-30 RX ADMIN — METOPROLOL SUCCINATE 25 MG: 50 TABLET, EXTENDED RELEASE ORAL at 09:19

## 2022-08-30 RX ADMIN — FOLIC ACID 1 MG: 1 TABLET ORAL at 09:19

## 2022-08-30 RX ADMIN — FERROUS SULFATE TAB 325 MG (65 MG ELEMENTAL FE) 325 MG: 325 (65 FE) TAB at 09:19

## 2022-08-30 RX ADMIN — DOCUSATE SODIUM 200 MG: 100 CAPSULE, LIQUID FILLED ORAL at 21:27

## 2022-08-30 RX ADMIN — ENOXAPARIN SODIUM 40 MG: 100 INJECTION SUBCUTANEOUS at 09:19

## 2022-08-30 RX ADMIN — OXYCODONE HYDROCHLORIDE AND ACETAMINOPHEN 1 TABLET: 5; 325 TABLET ORAL at 21:27

## 2022-08-30 RX ADMIN — OXYCODONE HYDROCHLORIDE AND ACETAMINOPHEN 1 TABLET: 5; 325 TABLET ORAL at 03:57

## 2022-08-30 RX ADMIN — METRONIDAZOLE 500 MG: 500 TABLET ORAL at 21:27

## 2022-08-30 RX ADMIN — PREGABALIN 100 MG: 50 CAPSULE ORAL at 09:19

## 2022-08-30 RX ADMIN — OXYCODONE HYDROCHLORIDE AND ACETAMINOPHEN 1 TABLET: 5; 325 TABLET ORAL at 13:21

## 2022-08-30 RX ADMIN — PANTOPRAZOLE SODIUM 40 MG: 40 TABLET, DELAYED RELEASE ORAL at 09:19

## 2022-08-30 RX ADMIN — POTASSIUM CHLORIDE 40 MEQ: 750 TABLET, FILM COATED, EXTENDED RELEASE ORAL at 17:49

## 2022-08-30 NOTE — PROGRESS NOTES
Hematology/Oncology   Progress Note    Patient: Luly Kellogg MRN: [id number]     YOB: 1970  Age: 47 y.o. Sex: female      Admit Date: 8/27/2022    LOS: 0 days     Chief Complaint: Admitted with bilateral upper and lower extremity weakness    Subjective:     Generalized weakness persists. LP attempt was unsuccessful earlier today. Constitutional No fevers, chills, night sweats, excessive fatigue or weight loss. Allergic/Immunologic No recent allergic reactions   Eyes No significant visual difficulties. No diplopia. ENMT No problems with hearing, no sore throat, no sinus drainage. Endocrine No hot flashes or night sweats. No cold intolerance, polyuria, or polydipsia   Hematologic/Lymphatic No easy bruising or bleeding. The patient denies any tender or palpable lymph nodes   Breasts No abnormal masses of breast, nipple discharge or pain. Respiratory No dyspnea on exertion, orthopnea, chest pain, cough or hemoptysis. Cardiovascular No anginal chest pain, irregular heart beat, tachycardia, palpitations or orthopnea. Gastrointestinal No nausea, vomiting, diarrhea, constipation, cramping, dysphagia, reflux, heartburn, GI bleeding, or early satiety. No change in bowel habits. Genitourinary (M) No hematuria, dysuria, increased frequency, urgency, hesitancy or incontinence. Musculoskeletal No joint pain, swelling or redness. No decreased range of motion. Integumentary No chronic rashes, inflammation, ulcerations, pruritus, petechiae, purpura, ecchymoses, or skin changes. Neurologic No headache, blurred vision, and no areas of focal weakness or numbness. Normal gait. No sensory problems. Psychiatric No insomnia, depression, laurie or mood swings. No psychotropic drugs.         Current Facility-Administered Medications:     lidocaine (PF) (XYLOCAINE) 20 mg/mL (2 %) injection 100 mg, 100 mg, IntraVENous, ONCE, Glyn Bence, MD    potassium chloride SR (KLOR-CON 10) tablet 40 mEq, 40 mEq, Oral, NOW, Coty Bravo PA    hydrALAZINE (APRESOLINE) 20 mg/mL injection 10 mg, 10 mg, IntraVENous, QID PRN, Coty Bravo PA    zinc oxide-white petrolatum 20-75 % topical paste, , Topical, PRN, Jaime Green MD    oxyCODONE-acetaminophen (PERCOCET) 5-325 mg per tablet 1 Tablet, 1 Tablet, Oral, Q4H PRN, Markie Ware MD, 1 Tablet at 08/30/22 1321    ferrous sulfate tablet 325 mg, 1 Tablet, Oral, DAILY WITH BREAKFAST, Sukhdev Garcia PA-C, 325 mg at 08/30/22 6956    calcium carbonate (TUMS) chewable tablet 200 mg [elemental], 200 mg, Oral, TID PRN, Marc Cano MD, 200 mg at 44/65/69 2074    folic acid (FOLVITE) tablet 1 mg, 1 mg, Oral, DAILY, Laila Lakhani MD, 1 mg at 08/30/22 0919    sodium chloride (NS) flush 5-40 mL, 5-40 mL, IntraVENous, PRN, [place] Bonny, NP    acetaminophen (TYLENOL) tablet 650 mg, 650 mg, Oral, Q6H PRN **OR** acetaminophen (TYLENOL) suppository 650 mg, 650 mg, Rectal, Q6H PRN, [place] Draper, NP    polyethylene glycol (MIRALAX) packet 17 g, 17 g, Oral, DAILY PRN, Dory Bonny, NP    ondansetron (ZOFRAN ODT) tablet 4 mg, 4 mg, Oral, Q8H PRN **OR** ondansetron (ZOFRAN) injection 4 mg, 4 mg, IntraVENous, Q6H PRN, [place] Bonny, NP    [Held by provider] enoxaparin (LOVENOX) injection 40 mg, 40 mg, SubCUTAneous, DAILY, Paola Davison NP, 40 mg at 08/30/22 0919    amLODIPine (NORVASC) tablet 5 mg, 5 mg, Oral, DAILY, Joann Davison, NP, 5 mg at 08/30/22 0919    baclofen (LIORESAL) tablet 10 mg, 10 mg, Oral, BID PRN, Dory Draper, NP, 10 mg at 08/29/22 2773    docusate sodium (COLACE) capsule 200 mg, 200 mg, Oral, QHS, Joann Davison, NP, 200 mg at 08/29/22 2103    metoprolol succinate (TOPROL-XL) XL tablet 25 mg, 25 mg, Oral, DAILY, Paola Davison NP, 25 mg at 08/30/22 0919    metroNIDAZOLE (FLAGYL) tablet 500 mg, 500 mg, Oral, BID, Dory Bonny, NP, 500 mg at 08/30/22 0919    pregabalin (LYRICA) capsule 100 mg, 100 mg, Oral, TID, [place] Cherry, NP, 100 mg at 08/30/22 0919    pantoprazole (PROTONIX) tablet 40 mg, 40 mg, Oral, DAILY, Leonel Harrington NP, 40 mg at 08/30/22 0919    doxycycline (VIBRAMYCIN) capsule 100 mg, 100 mg, Oral, Q12H, Ronen Hickey MD, 100 mg at 08/30/22 0919    nitroglycerin (NITROSTAT) tablet 0.4 mg, 0.4 mg, SubLINGual, PRN, TegJignesh grajeda MD, 0.4 mg at 08/29/22 0944     Objective:     Vitals:    08/30/22 0136 08/30/22 0806 08/30/22 1329 08/30/22 1521   BP: 115/79 126/82 (!) 142/88 129/89   Pulse: 89 83 79 95   Resp: 18 18 18 17   Temp: 98 °F (36.7 °C) 98.3 °F (36.8 °C) 97.7 °F (36.5 °C) 98.5 °F (36.9 °C)   SpO2: 99% 99% 92% 99%   Weight:       Height:              Physical Exam:   Constitutional Alert, cooperative, oriented. Mood and affect appropriate. Appears close to chronological age. Well nourished. Well developed. Head Normocephalic; no scars   Eyes Conjunctivae and sclerae are clear and without icterus. Pupils are reactive and equal.   ENMT Sinuses are nontender. No oral exudates, ulcers, masses, thrush or mucositis. Oropharynx clear. Tongue normal.   Neck Supple without masses or thyromegaly. No jugular venous distension. Hematologic/Lymphatic No petechiae or purpura. No tender or palpable lymph nodes in the cervical, supraclavicular, axillary or inguinal area. Respiratory Lungs are clear to auscultation without rhonchi or wheezing. Cardiovascular Regular rate and rhythm of heart without murmurs, gallops or rubs. Chest / Line Site Chest is symmetric with no chest wall deformities. Abdomen Non-tender, non-distended, no masses, ascites or hepatosplenomegaly. Good bowel sounds. No guarding or rebound tenderness. No pulsatile masses. Musculoskeletal No tenderness or swelling, normal range of motion without obvious weakness. Extremities No visible deformities, no cyanosis, clubbing or edema. Skin No rashes, scars, or lesions suggestive of malignancy. No petechiae, purpura, or ecchymoses. No excoriations. Neurologic No sensory or motor deficits, normal cerebellar function, normal gait, cranial nerves intact. Psychiatric Alert and oriented times three. Coherent speech. Verbalizes understanding of our discussions today. Lab/Data Review:  Recent Labs     08/30/22 0924   WBC 6.3   HGB 8.2*   HCT 26.5*        Recent Labs     08/30/22 0924      K 3.1*      CO2 28   GLU 83   BUN 7   CREA 0.51*   CA 7.9*   ALB 2.8*   TBILI 0.9   ALT 28     No results for input(s): PH, PCO2, PO2, HCO3, FIO2 in the last 72 hours. Recent Results (from the past 24 hour(s))   TROPONIN-HIGH SENSITIVITY    Collection Time: 08/30/22  9:24 AM   Result Value Ref Range    Troponin-High Sensitivity 6 0 - 51 ng/L   METABOLIC PANEL, COMPREHENSIVE    Collection Time: 08/30/22  9:24 AM   Result Value Ref Range    Sodium 138 136 - 145 mmol/L    Potassium 3.1 (L) 3.5 - 5.1 mmol/L    Chloride 103 97 - 108 mmol/L    CO2 28 21 - 32 mmol/L    Anion gap 7 5 - 15 mmol/L    Glucose 83 65 - 100 mg/dL    BUN 7 6 - 20 mg/dL    Creatinine 0.51 (L) 0.55 - 1.02 mg/dL    BUN/Creatinine ratio 14 12 - 20      GFR est AA >60 >60 ml/min/1.73m2    GFR est non-AA >60 >60 ml/min/1.73m2    Calcium 7.9 (L) 8.5 - 10.1 mg/dL    Bilirubin, total 0.9 0.2 - 1.0 mg/dL    AST (SGOT) 17 15 - 37 U/L    ALT (SGPT) 28 12 - 78 U/L    Alk.  phosphatase 101 45 - 117 U/L    Protein, total 6.0 (L) 6.4 - 8.2 g/dL    Albumin 2.8 (L) 3.5 - 5.0 g/dL    Globulin 3.2 2.0 - 4.0 g/dL    A-G Ratio 0.9 (L) 1.1 - 2.2     CBC WITH AUTOMATED DIFF    Collection Time: 08/30/22  9:24 AM   Result Value Ref Range    WBC 6.3 3.6 - 11.0 K/uL    RBC 2.68 (L) 3.80 - 5.20 M/uL    HGB 8.2 (L) 11.5 - 16.0 g/dL    HCT 26.5 (L) 35.0 - 47.0 %    MCV 98.9 80.0 - 99.0 FL    MCH 30.6 26.0 - 34.0 PG    MCHC 30.9 30.0 - 36.5 g/dL    RDW 15.1 (H) 11.5 - 14.5 %    PLATELET 491 765 - 693 K/uL    MPV 9.6 8.9 - 12.9 FL    NRBC 0.0 0.0  WBC    ABSOLUTE NRBC 0.00 0.00 - 0.01 K/uL    NEUTROPHILS 73 32 - 75 %    LYMPHOCYTES 18 12 - 49 %    MONOCYTES 7 5 - 13 %    EOSINOPHILS 1 0 - 7 %    BASOPHILS 0 0 - 1 %    IMMATURE GRANULOCYTES 1 (H) 0 - 0.5 %    ABS. NEUTROPHILS 4.6 1.8 - 8.0 K/UL    ABS. LYMPHOCYTES 1.1 0.8 - 3.5 K/UL    ABS. MONOCYTES 0.5 0.0 - 1.0 K/UL    ABS. EOSINOPHILS 0.0 0.0 - 0.4 K/UL    ABS. BASOPHILS 0.0 0.0 - 0.1 K/UL    ABS. IMM. GRANS. 0.1 (H) 0.00 - 0.04 K/UL    DF AUTOMATED     LD    Collection Time: 08/30/22  9:24 AM   Result Value Ref Range     81 - 246 U/L   IRON PROFILE    Collection Time: 08/30/22  9:24 AM   Result Value Ref Range    Iron 81 35 - 150 ug/dL    TIBC 166 (L) 250 - 450 ug/dL    Iron % saturation 49 20 - 50 %   HAPTOGLOBIN    Collection Time: 08/30/22  9:24 AM   Result Value Ref Range    Haptoglobin 290 (H) 30 - 200 mg/dL        Radiology:   CT Results  (Last 48 hours)      None             Assessment and Plan: Active Problems:    Weakness generalized (8/24/2022)      Severe protein-calorie malnutrition (HCC) (8/29/2022)    Monoclonal gammopathy:  -SPEP done last week showed a small M spike of 0.6  -most likely indicates monoclonal gammopathy.   However, we will proceed with further further work-up including immunofixation as well as serum light chains to complete work-up: pending      Anemia:  -Hemoglobin is low but stable at 8.2  -Work-up done at the last visit showed a low B12 level as well as elevated intrinsic factor antibody suggesting pernicious anemia  -Agree with parenteral B12 supplementation  -CRP was elevated at last visit suggesting possible inflammatory etiology for anemia as well  -Work-up will be sent as above for further evaluation  -LDH was normal, haptoglobin was unremarkable    Polyneuropathy:  -Neurology following  -LP was unsuccessful today      Signed By: Pawel Hutton MD     August 30, 2022

## 2022-08-30 NOTE — PROGRESS NOTES
Progress Note    Patient: Sam Casas MRN: [id number]  SSN: [id number]    YOB: 1970  Age: 47 y.o. Sex: female      Admit Date: 8/27/2022    LOS: 0 days     Subjective:     No acute events overnight. Denied having any chest pain or shortness of breath. She is laying flat and appears to be comfortable. Objective:     Vitals:    08/29/22 1508 08/29/22 1959 08/30/22 0136 08/30/22 0806   BP:  123/84 115/79 126/82   Pulse:  91 89 83   Resp:  18 18 18   Temp:  97.7 °F (36.5 °C) 98 °F (36.7 °C) 98.3 °F (36.8 °C)   SpO2:  96% 99% 99%   Weight:       Height: 5' 11\" (1.803 m)           Intake and Output:  Current Shift: 08/30 0701 - 08/30 1900  In: 900 [P.O.:900]  Out: -   Last three shifts: 08/28 1901 - 08/30 0700  In: 250 [P.O.:250]  Out: 1100 [Urine:1100]    Physical Exam:   General:  Alert, cooperative, no distress, appears stated age. Eyes:  Conjunctivae/corneas clear. PERRL, EOMs intact. Fundi benign   Ears:  Normal TMs and external ear canals both ears. Nose: Nares normal. Septum midline. Mucosa normal. No drainage or sinus tenderness. Mouth/Throat: Lips, mucosa, and tongue normal. Teeth and gums normal.   Neck: Supple, symmetrical, trachea midline, no adenopathy, thyroid: no enlargment/tenderness/nodules, no carotid bruit and no JVD. Back:   Symmetric, no curvature. ROM normal. No CVA tenderness. Lungs:   Clear to auscultation bilaterally. Heart:  Regular rate and rhythm, S1, S2 normal, no murmur, click, rub or gallop. Abdomen:   Soft, non-tender. Bowel sounds normal. No masses,  No organomegaly. Extremities: Extremities normal, atraumatic, no cyanosis or edema. Pulses: 2+ and symmetric all extremities. Skin: Skin color, texture, turgor normal. No rashes or lesions   Lymph nodes: Cervical, supraclavicular, and axillary nodes normal.   Neurologic: CNII-XII intact. Normal strength, sensation and reflexes throughout. Lab/Data Review:   All lab results for the last 24 hours reviewed. Assessment:     Active Problems:    Weakness generalized (8/24/2022)      Severe protein-calorie malnutrition ([address] (8/29/2022)    Patient is a 69-year-old -American female with:  1. Chest pain  2. CAD  3. Peripheral vascular disease  4. Hyperlipidemia  5. Debility  6. Elevated M protein  7. Nicotine dependence  8. Fibromyalgia  9.  H. pylori  10. Multilevel cervical spinal stenosis. Plan: It is noted that attempted LP was unsuccessful. Denied having any chest pain. Rhythm has been stable overnight. Hemoglobin 8.2. Potassium 3.1. Replete potassium. Currently on amlodipine, metoprolol.   Echo pending  Signed By: Wilfred Carter MD     August 30, 2022

## 2022-08-30 NOTE — CONSULTS
NEUROLOGY CONSULT    Name Jeananne Hashimoto Age 47 y.o. MRN [id number]  1970     Referring Physician: Sherin Faust NP      Chief Complaint: Inability to ambulate     This is a 47 y.o. -American female who was recently discharged from the hospital after being evaluated for the same complaint. She had an MRI of the brain and the C-spine which were unremarkable to explain her weakness. The recommendation was to do an LP and attempt was made to do the LP at the floor which was failed and then she was scheduled to do the LP under fluoroscopy but she kept refusing and after several days of refusal she was discharged home and even refused to go to rehab. She was unable to take care of herself at home and came back here for readmission. Assessment and Plan:  Difficulty ambulating: The patient continues to have difficulty walking. She has decree sensations in both the lower extremities from groins down to level of T10 or higher with no reflexes in the lower extremities or the upper either. Previously she had an MRI of the brain and C-spine which did not reveal any pathology. MRI thoracic spine showed midthoracic spinal stenosis but no obvious cord compression and her symptoms are not consistent with myelopathy. She has agreed to lumbar puncture at this time. Fluoroscopy guided lumbar puncture ordered as previously she has failed bedside. Looking for albumin no cytologic dissociation to support diagnosis of CIDP versus any infectious or inflammatory etiology for her symptoms. We will order fluoroscopy guided. Next dose of subcu vitamin B12 injection on  and continue weekly for 2 more weeks and continue folate 0.8 mcg daily. Will also check vitamin B 1  Consultation for hematology for elevated M protein on labs. Seen by hematology  Elevated SSA antibodies.   Will consult with rheumatology outpatient    Thank you for the consult    Allergies   Allergen Reactions    Codeine Nausea Only Current Facility-Administered Medications   Medication Dose Route Frequency    lidocaine (PF) (XYLOCAINE) 20 mg/mL (2 %) injection 100 mg  100 mg IntraVENous ONCE    hydrALAZINE (APRESOLINE) 20 mg/mL injection 10 mg  10 mg IntraVENous QID PRN    zinc oxide-white petrolatum 20-75 % topical paste   Topical PRN    oxyCODONE-acetaminophen (PERCOCET) 5-325 mg per tablet 1 Tablet  1 Tablet Oral Q4H PRN    ferrous sulfate tablet 325 mg  1 Tablet Oral DAILY WITH BREAKFAST    calcium carbonate (TUMS) chewable tablet 200 mg [elemental]  200 mg Oral TID PRN    folic acid (FOLVITE) tablet 1 mg  1 mg Oral DAILY    sodium chloride (NS) flush 5-40 mL  5-40 mL IntraVENous PRN    acetaminophen (TYLENOL) tablet 650 mg  650 mg Oral Q6H PRN    Or    acetaminophen (TYLENOL) suppository 650 mg  650 mg Rectal Q6H PRN    polyethylene glycol (MIRALAX) packet 17 g  17 g Oral DAILY PRN    ondansetron (ZOFRAN ODT) tablet 4 mg  4 mg Oral Q8H PRN    Or    ondansetron (ZOFRAN) injection 4 mg  4 mg IntraVENous Q6H PRN    enoxaparin (LOVENOX) injection 40 mg  40 mg SubCUTAneous DAILY    amLODIPine (NORVASC) tablet 5 mg  5 mg Oral DAILY    baclofen (LIORESAL) tablet 10 mg  10 mg Oral BID PRN    docusate sodium (COLACE) capsule 200 mg  200 mg Oral QHS    metoprolol succinate (TOPROL-XL) XL tablet 25 mg  25 mg Oral DAILY    metroNIDAZOLE (FLAGYL) tablet 500 mg  500 mg Oral BID    pregabalin (LYRICA) capsule 100 mg  100 mg Oral TID    pantoprazole (PROTONIX) tablet 40 mg  40 mg Oral DAILY    doxycycline (VIBRAMYCIN) capsule 100 mg  100 mg Oral Q12H    nitroglycerin (NITROSTAT) tablet 0.4 mg  0.4 mg SubLINGual PRN       Past Medical History:   Diagnosis Date    Anxiety     CAD (coronary artery disease)     Fibromyalgia     Hypertension     Neuropathy     Peripheral artery disease (HCC)     Psychiatric disorder     anxiety        Social History     Tobacco Use    Smoking status: Every Day     Packs/day: 0.50     Types: Cigarettes    Smokeless tobacco: Never   Substance Use Topics    Alcohol use: Yes     Comment: occosionally     Drug use: Not Currently            Exam  Visit Vitals  /82 (BP 1 Location: Left upper arm, BP Patient Position: Lying)   Pulse 83   Temp 98.3 °F (36.8 °C)   Resp 18   Ht 5' 11\" (1.803 m)   Wt 119.3 kg (263 lb)   SpO2 99%   BMI 36.68 kg/m²         General: Well developed, well nourished. Patient in no distress   Head: Normocephalic, atraumatic, anicteric sclera   Neck Normal ROM, No thyromegally   Lungs:  Clear to auscultation    Cardiac: Regular rate and rhythm with no murmurs. Abd: Bowel sounds were audible   Ext: No pedal edema   Skin: Supple no rash     NeurologicExam:  Mental Status: Alert and oriented to person place and time   Speech: Fluent no aphasia or dysarthria. Cranial Nerves:   Pupils are equal round and reactive to light and accommodation, extraocular movements are intact and full, visual fields are intact by confrontation, no nystagmus noted, face is symmetric, sensation in face is intact and symmetric, hearing is intact and symmetric, tongue and uvula are in midline with normal movements, palate is elevating equally, shoulder shrug is intact and symmetric. Motor:  Right upper extremity: 4-/5 throughout, left upper extremity 4-/5throughout, 3 out of 5 throughout in lower extremities   Reflexes:   Deep tendon reflexes 0/4 and symmetric. Sensory:   Decrease sensation from groin down, absent proprioception in both legs   Gait:  Gait is deferred   Tremor:   No tremor noted.    Cerebellar:  Difficult to test finger-to-nose given upper extremity weakness          Lab Review  Lab Results   Component Value Date/Time    WBC 6.5 08/27/2022 10:45 AM    HCT 25.8 (L) 08/27/2022 10:45 AM    HGB 8.2 (L) 08/27/2022 10:45 AM    PLATELET 097 70/55/2490 10:45 AM     Lab Results   Component Value Date/Time    Sodium 142 08/27/2022 10:45 AM    Potassium 3.5 08/27/2022 10:45 AM    Chloride 106 08/27/2022 10:45 AM    CO2 30 08/27/2022 10:45 AM    Glucose 80 08/27/2022 10:45 AM    BUN 14 08/27/2022 10:45 AM    Creatinine 0.57 08/27/2022 10:45 AM    Calcium 8.2 (L) 08/27/2022 10:45 AM     Lab Results   Component Value Date/Time    Vitamin B12 198 08/20/2022 11:46 AM    Vitamin B12 246 08/20/2022 11:46 AM    Folate 1.7 (L) 08/20/2022 11:46 AM    Folate 2.1 (L) 08/20/2022 11:46 AM     No results found for: LDL, LDLC, DLDLP  No results found for: HBA1C, HFG9XAGH, BUC0FWLC, RMN3SPKO  No components found for: TROPQUANT  No results found for: LENIN

## 2022-08-30 NOTE — PROGRESS NOTES
PT treatment attempted at 8:36 however, patient being set up for LP at this time. Will continue to follow pt and will attempt treatment at a later time.  Thank you

## 2022-08-30 NOTE — PROGRESS NOTES
Problem: Falls - Risk of  Goal: *Absence of Falls  Description: Document Leafy Dixon Fall Risk and appropriate interventions in the flowsheet.   Outcome: Progressing Towards Goal  Note: Fall Risk Interventions:            Medication Interventions: Bed/chair exit alarm    Elimination Interventions: Bed/chair exit alarm    History of Falls Interventions: Bed/chair exit alarm

## 2022-08-30 NOTE — PROGRESS NOTES
OT attempted to see pt at 8:36 am, however pt receiving lumbar puncture and cannot be seen 1 hour after procedure. Will try again at a later time. Thanks!

## 2022-08-30 NOTE — PROGRESS NOTES
Hospitalist Progress Note    Subjective:   Daily Progress Note: 8/30/2022 9:14 AM    Patient strength has improved some. Bedside LP attempted but patient unable to tolerate 2/2 pain. Fluoroscopy LP cannot be performed secondary to not holding anticoagulation will be done tomorrow morning. No acute events noted overnight. Patient without chest pain.     Current Facility-Administered Medications   Medication Dose Route Frequency    lidocaine (PF) (XYLOCAINE) 20 mg/mL (2 %) injection 100 mg  100 mg IntraVENous ONCE    hydrALAZINE (APRESOLINE) 20 mg/mL injection 10 mg  10 mg IntraVENous QID PRN    zinc oxide-white petrolatum 20-75 % topical paste   Topical PRN    oxyCODONE-acetaminophen (PERCOCET) 5-325 mg per tablet 1 Tablet  1 Tablet Oral Q4H PRN    ferrous sulfate tablet 325 mg  1 Tablet Oral DAILY WITH BREAKFAST    calcium carbonate (TUMS) chewable tablet 200 mg [elemental]  200 mg Oral TID PRN    folic acid (FOLVITE) tablet 1 mg  1 mg Oral DAILY    sodium chloride (NS) flush 5-40 mL  5-40 mL IntraVENous PRN    acetaminophen (TYLENOL) tablet 650 mg  650 mg Oral Q6H PRN    Or    acetaminophen (TYLENOL) suppository 650 mg  650 mg Rectal Q6H PRN    polyethylene glycol (MIRALAX) packet 17 g  17 g Oral DAILY PRN    ondansetron (ZOFRAN ODT) tablet 4 mg  4 mg Oral Q8H PRN    Or    ondansetron (ZOFRAN) injection 4 mg  4 mg IntraVENous Q6H PRN    enoxaparin (LOVENOX) injection 40 mg  40 mg SubCUTAneous DAILY    amLODIPine (NORVASC) tablet 5 mg  5 mg Oral DAILY    baclofen (LIORESAL) tablet 10 mg  10 mg Oral BID PRN    docusate sodium (COLACE) capsule 200 mg  200 mg Oral QHS    metoprolol succinate (TOPROL-XL) XL tablet 25 mg  25 mg Oral DAILY    metroNIDAZOLE (FLAGYL) tablet 500 mg  500 mg Oral BID    pregabalin (LYRICA) capsule 100 mg  100 mg Oral TID    pantoprazole (PROTONIX) tablet 40 mg  40 mg Oral DAILY    doxycycline (VIBRAMYCIN) capsule 100 mg  100 mg Oral Q12H    nitroglycerin (NITROSTAT) tablet 0.4 mg  0.4 mg SubLINGual PRN        Review of Systems  Review of Systems   Constitutional: Negative. Respiratory: Negative. Cardiovascular: Negative. Gastrointestinal: Negative. Neurological:  Positive for tingling and weakness. All other systems reviewed and are negative.          Objective:     Visit Vitals  /82 (BP 1 Location: Left upper arm, BP Patient Position: Lying)   Pulse 83   Temp 98.3 °F (36.8 °C)   Resp 18   Ht 5' 11\" (1.803 m)   Wt 119.3 kg (263 lb)   SpO2 99%   BMI 36.68 kg/m²      O2 Device: None (Room air)    Temp (24hrs), Av.2 °F (36.8 °C), Min:97.7 °F (36.5 °C), Max:98.6 °F (37 °C)      701 -  1900  In: 900 [P.O.:900]  Out: -    190 -  0700  In: 250 [P.O.:250]  Out: 1100 [Urine:1100]    Recent Results (from the past 24 hour(s))   ECHO ADULT COMPLETE    Collection Time: 22 12:20 PM   Result Value Ref Range    LV EDV A2C 35 mL    LV EDV A4C 56 mL    LV ESV A2C 5 mL    LV ESV A4C 17 mL    IVSd 0.8 0.6 - 0.9 cm    LVIDd 4.2 3.9 - 5.3 cm    LVIDs 3.2 cm    LVOT Diameter 2.2 cm    LVOT Mean Gradient 4 mmHg    LVOT VTI 23.4 cm    LVOT Peak Velocity 1.4 m/s    LVOT Peak Gradient 8 mmHg    LVPWd 1.1 (A) 0.6 - 0.9 cm    LV E' Septal Velocity 8 cm/s    LV Ejection Fraction A2C 85 %    LV Ejection Fraction A4C 69 %    EF BP 79 55 - 100 %    LVOT Area 3.8 cm2    LVOT SV 88.9 ml    LA Minor Axis 4.8 cm    LA Area 2C 10.9 cm2    LA Diameter 2.9 cm    AV Mean Gradient 6 mmHg    AV VTI 29.9 cm    AV Mean Velocity 1.2 m/s    AV Peak Velocity 1.7 m/s    AV Peak Gradient 11 mmHg    AV Area by VTI 3.0 cm2    AV Area by Peak Velocity 3.1 cm2    MR Peak Velocity 1.6 m/s    MR Peak Gradient 10 mmHg    PV Max Velocity 1.2 m/s    PV Peak Gradient 5 mmHg    Est. RA Pressure 3 mmHg    RV Basal Dimension 3.1 cm    RV Mid Dimension 2.7 cm    TAPSE 2.7 1.7 cm    TR Max Velocity 1.52 m/s    TR Peak Gradient 9 mmHg    Fractional Shortening 2D 24 28 - 44 %    LV ESV Index A4C 7 mL/m2    LV EDV Index A4C 24 mL/m2    LV ESV Index A2C 2 mL/m2    LV EDV Index A2C 15 mL/m2    LVIDd Index 1.77 cm/m2    LVIDs Index 1.35 cm/m2    LV RWT Ratio 0.52     LV Mass 2D 127.8 67 - 162 g    LV Mass 2D Index 53.9 43 - 95 g/m2    LVOT Stroke Volume Index 37.5 mL/m2    LA Size Index 1.22 cm/m2    AV Velocity Ratio 0.82     LVOT:AV VTI Index 0.78     JT/BSA VTI 1.3 cm2/m2    JT/BSA Peak Velocity 1.3 cm2/m2    RVSP 12 mmHg    RV Free Wall Peak S' 13 cm/s   TROPONIN-HIGH SENSITIVITY    Collection Time: 08/29/22  4:03 PM   Result Value Ref Range    Troponin-High Sensitivity 6 0 - 51 ng/L        XR CHEST PORT   Final Result   Clear lungs. MRI [facility] SPINE W WO CONT   Final Result   Motion artifact limits examination. Multilevel disc degenerative change. Moderate to severe canal stenosis at T6-T7. Moderate to severe bilateral foraminal stenoses at T8-T9      CT HEAD WO CONT   Final Result   No acute intracranial abnormality. IR FLUORO GUIDED NEEDLE PLACEMENT    (Results Pending)   IR FLUORO GUIDED NEEDLE PLACEMENT    (Results Pending)        PHYSICAL EXAM:    Physical Exam  Vitals and nursing note reviewed. Constitutional:       Appearance: She is obese. She is ill-appearing. HENT:      Head: Normocephalic and atraumatic. Neck:      Vascular: No carotid bruit. Cardiovascular:      Rate and Rhythm: Normal rate and regular rhythm. Pulmonary:      Effort: No respiratory distress. Breath sounds: No wheezing. Chest:      Chest wall: No tenderness. Abdominal:      General: Bowel sounds are normal.      Palpations: Abdomen is soft. Musculoskeletal:      Right lower leg: No edema. Left lower leg: No edema. Comments: Bilateral LE: 3/5 strength  Billateral UE: 4/5 strength   Skin:     General: Skin is warm. Capillary Refill: Capillary refill takes less than 2 seconds. Neurological:      Mental Status: She is alert and oriented to person, place, and time.       Sensory: Sensory deficit present. Motor: Weakness present. Gait: Gait abnormal.      Comments: BLE deep tendon reflexes: 0/4 symmetrically decreased sensations from groin down to level of T10 or higher.    BLE sensation: decreased from groin down, absent proprioception in both legs   Psychiatric:      Comments: Subdued affect        Data Review    Recent Results (from the past 24 hour(s))   ECHO ADULT COMPLETE    Collection Time: 08/29/22 12:20 PM   Result Value Ref Range    LV EDV A2C 35 mL    LV EDV A4C 56 mL    LV ESV A2C 5 mL    LV ESV A4C 17 mL    IVSd 0.8 0.6 - 0.9 cm    LVIDd 4.2 3.9 - 5.3 cm    LVIDs 3.2 cm    LVOT Diameter 2.2 cm    LVOT Mean Gradient 4 mmHg    LVOT VTI 23.4 cm    LVOT Peak Velocity 1.4 m/s    LVOT Peak Gradient 8 mmHg    LVPWd 1.1 (A) 0.6 - 0.9 cm    LV E' Septal Velocity 8 cm/s    LV Ejection Fraction A2C 85 %    LV Ejection Fraction A4C 69 %    EF BP 79 55 - 100 %    LVOT Area 3.8 cm2    LVOT SV 88.9 ml    LA Minor Axis 4.8 cm    LA Area 2C 10.9 cm2    LA Diameter 2.9 cm    AV Mean Gradient 6 mmHg    AV VTI 29.9 cm    AV Mean Velocity 1.2 m/s    AV Peak Velocity 1.7 m/s    AV Peak Gradient 11 mmHg    AV Area by VTI 3.0 cm2    AV Area by Peak Velocity 3.1 cm2    MR Peak Velocity 1.6 m/s    MR Peak Gradient 10 mmHg    PV Max Velocity 1.2 m/s    PV Peak Gradient 5 mmHg    Est. RA Pressure 3 mmHg    RV Basal Dimension 3.1 cm    RV Mid Dimension 2.7 cm    TAPSE 2.7 1.7 cm    TR Max Velocity 1.52 m/s    TR Peak Gradient 9 mmHg    Fractional Shortening 2D 24 28 - 44 %    LV ESV Index A4C 7 mL/m2    LV EDV Index A4C 24 mL/m2    LV ESV Index A2C 2 mL/m2    LV EDV Index A2C 15 mL/m2    LVIDd Index 1.77 cm/m2    LVIDs Index 1.35 cm/m2    LV RWT Ratio 0.52     LV Mass 2D 127.8 67 - 162 g    LV Mass 2D Index 53.9 43 - 95 g/m2    LVOT Stroke Volume Index 37.5 mL/m2    LA Size Index 1.22 cm/m2    AV Velocity Ratio 0.82     LVOT:AV VTI Index 0.78     JT/BSA VTI 1.3 cm2/m2    JT/BSA Peak Velocity 1.3 cm2/m2    RVSP 12 mmHg    RV Free Wall Peak S' 13 cm/s   TROPONIN-HIGH SENSITIVITY    Collection Time: 08/29/22  4:03 PM   Result Value Ref Range    Troponin-High Sensitivity 6 0 - 51 ng/L        Assessment/Plan:     Active Problems:    Weakness generalized (8/24/2022)      Severe protein-calorie malnutrition ([address] (8/29/2022)      Hospital Course:    Alyse Coburn is a 47year old female with a PMH of anxiety, CAD, fibromyalgia, hypertension, PVD and weakness who presented with worsening weakness and inability to ambulate. Of note outpatient and inpatient follow-up most recently discharged on 8/19 EGD showed Schatzki's ring distal third of esophagus and chronic gastritis without bleeding biopsy revealed H. pylori was started on tetracycline and Flagyl for 14 days. In ED vital signs unremarkable. Initial labs significant for hemoglobin of 8.2. CT of the head negative for acute intracranial abnormality. 8/22 MRI of the brain with no acute or significant intracranial abnormality. MRI of the thoracic spine showed multilevel disc degenerative change, moderate to severe canal stenosis at T6-T7 and moderate to severe bilateral for minimal stenosis at T8-T9. Patient admitted for further work-up and placement. Neurology, GI, and hematology consulted. Patient with persistent chest tightness requiring nitrostat, cardiology consulted. Echo with EF of 55 to 60% with normal diastolic function. Cardiology recommending to have outpatient follow-up. Bedside LP attempts unsuccessful secondary to patient intolerance for pain. LP by fluoroscopy was delayed secondary to patient receiving Lovenox which is to be held for 24 hours prior. Progressive weakness/neuropathy  Unclear etiology, CIDP, autoimmune, demyelinating process?   Intrinsic factor AB elevated, copper and zinc levels within normal limits  Continue on folic acid, I52, and Lyrica  Last hospitalization was evaluated by neurology and rheumatology    Multilevel cervical spinal stenosis  MRI of the thoracic spine showed multilevel disc degenerative change, moderate to severe canal stenosis at T6-T7 and moderate to severe bilateral for minimal stenosis at T8-T9  LP failed at bedside x2 requested LP by fluroscopy pending (hold lovenox tomorrow AM)  PT/OT recommending SNF  Neurology following    H. Pylori  Continue on doxycyline, flagyl, and protonix x 14 days   EGD showed Schatzki's ring distal third of esophagus and chronic gastritis without bleeding  GI following    Esophageal dysphagia 2/2 schatzki's ring  Diet: dysphagia  Aspiration precautions  SLP and GI following    Severe protein calorie malnutrition  On supplements   Nutrition following    Monoclonal gammopathy  SPEP done last week with small M spike  Workup pending  Hematology following    History of PAD/CAD  Lipid panel pending    Chest pain  EKG unremarkable, troponin negative x3  Cardiology recommending to have outpatient follow-up    Hypertension  Continue on amlodipine    Tobacco dependency  Consulted on importance of smoking cessation    DVT Prophylaxis: hold for procedure  GI Prophylaxis: protonix  Discharge and disposition barriers: neuro clearance/LP, 24-48hr    Care Plan discussed with: Patient/Family, Nurse, and     Total time spent with patient: 35 minutes.

## 2022-08-30 NOTE — CONSULTS
Lumbar Puncture Procedure Note    Pre-operative Diagnosis: .  Tetraparesis    Post-operative Diagnosis: Tetraparesis    Indications: Diagnostic    Procedure Details     Consent: Informed consent was obtained. Risks of the procedure were discussed including: infection, bleeding, pain and headache. Under sterile conditions the patient was positioned. Betadine solution and sterile drapes were utilized. A spinal needle was inserted at the L4-5 interspace. Patient found the procedure to painful and it was aborted. 3 attempts made. Findings      Complications: None          Condition: Stable    Plan  Bed Rest for 1 hour. Head of bed elevated no higher than 45 degrees for 1 hour. If severe headache, nausea, vomiting, fever >100.5 F or changes call on call physician.

## 2022-08-30 NOTE — PROGRESS NOTES
8167: Chart reviewed. PT/OT evals for IRF noted. CM will discuss with patient. : Other Relative, Emmett Payer [phone]. CM will continue to follow patient and recs of medical team.    200: CM met with patient at bedside to discuss discharge recs. Patient agreeable to IRF recs. Choice letter received and placed on chart. Referral sent via [employer]. 1252: [facility] Liaison met with patient and will submit write up for insurance AUTH per [employer] message.

## 2022-08-31 ENCOUNTER — APPOINTMENT (OUTPATIENT)
Dept: GENERAL RADIOLOGY | Age: 52
DRG: 043 | End: 2022-08-31
Attending: PSYCHIATRY & NEUROLOGY
Payer: COMMERCIAL

## 2022-08-31 PROBLEM — R53.1 RAPIDLY PROGRESSIVE WEAKNESS: Status: ACTIVE | Noted: 2022-08-31

## 2022-08-31 LAB
ALBUMIN SERPL-MCNC: 2.6 G/DL (ref 3.5–5)
ALBUMIN/GLOB SERPL: 0.9 {RATIO} (ref 1.1–2.2)
ALP SERPL-CCNC: 99 U/L (ref 45–117)
ALT SERPL-CCNC: 21 U/L (ref 12–78)
ANION GAP SERPL CALC-SCNC: 9 MMOL/L (ref 5–15)
AST SERPL W P-5'-P-CCNC: 12 U/L (ref 15–37)
BASOPHILS # BLD: 0 K/UL (ref 0–0.1)
BASOPHILS NFR BLD: 0 % (ref 0–1)
BILIRUB SERPL-MCNC: 0.8 MG/DL (ref 0.2–1)
BUN SERPL-MCNC: 8 MG/DL (ref 6–20)
BUN/CREAT SERPL: 20 (ref 12–20)
CA-I BLD-MCNC: 7.9 MG/DL (ref 8.5–10.1)
CHLORIDE SERPL-SCNC: 105 MMOL/L (ref 97–108)
CO2 SERPL-SCNC: 26 MMOL/L (ref 21–32)
CREAT SERPL-MCNC: 0.4 MG/DL (ref 0.55–1.02)
DIFFERENTIAL METHOD BLD: ABNORMAL
EOSINOPHIL # BLD: 0 K/UL (ref 0–0.4)
EOSINOPHIL NFR BLD: 0 % (ref 0–7)
ERYTHROCYTE [DISTWIDTH] IN BLOOD BY AUTOMATED COUNT: 15.2 % (ref 11.5–14.5)
GLOBULIN SER CALC-MCNC: 2.8 G/DL (ref 2–4)
GLUCOSE SERPL-MCNC: 78 MG/DL (ref 65–100)
HCT VFR BLD AUTO: 24.9 % (ref 35–47)
HGB BLD-MCNC: 7.8 G/DL (ref 11.5–16)
IGA SERPL-MCNC: 370 MG/DL (ref 70–400)
IMM GRANULOCYTES # BLD AUTO: 0.1 K/UL (ref 0–0.04)
IMM GRANULOCYTES NFR BLD AUTO: 1 % (ref 0–0.5)
LYMPHOCYTES # BLD: 1 K/UL (ref 0.8–3.5)
LYMPHOCYTES NFR BLD: 17 % (ref 12–49)
MCH RBC QN AUTO: 31.5 PG (ref 26–34)
MCHC RBC AUTO-ENTMCNC: 31.3 G/DL (ref 30–36.5)
MCV RBC AUTO: 100.4 FL (ref 80–99)
MONOCYTES # BLD: 0.6 K/UL (ref 0–1)
MONOCYTES NFR BLD: 10 % (ref 5–13)
NEUTS SEG # BLD: 4.5 K/UL (ref 1.8–8)
NEUTS SEG NFR BLD: 72 % (ref 32–75)
NRBC # BLD: 0 K/UL (ref 0–0.01)
NRBC BLD-RTO: 0 PER 100 WBC
PLATELET # BLD AUTO: 186 K/UL (ref 150–400)
PMV BLD AUTO: 9.4 FL (ref 8.9–12.9)
POTASSIUM SERPL-SCNC: 3.7 MMOL/L (ref 3.5–5.1)
PROT SERPL-MCNC: 5.4 G/DL (ref 6.4–8.2)
RBC # BLD AUTO: 2.48 M/UL (ref 3.8–5.2)
SODIUM SERPL-SCNC: 140 MMOL/L (ref 136–145)
WBC # BLD AUTO: 6.2 K/UL (ref 3.6–11)

## 2022-08-31 PROCEDURE — 65270000029 HC RM PRIVATE

## 2022-08-31 PROCEDURE — 74011250637 HC RX REV CODE- 250/637: Performed by: HOSPITALIST

## 2022-08-31 PROCEDURE — 74011250637 HC RX REV CODE- 250/637: Performed by: NURSE PRACTITIONER

## 2022-08-31 PROCEDURE — 85025 COMPLETE CBC W/AUTO DIFF WBC: CPT

## 2022-08-31 PROCEDURE — 72100 X-RAY EXAM L-S SPINE 2/3 VWS: CPT

## 2022-08-31 PROCEDURE — 82784 ASSAY IGA/IGD/IGG/IGM EACH: CPT

## 2022-08-31 PROCEDURE — 74011250637 HC RX REV CODE- 250/637: Performed by: PSYCHIATRY & NEUROLOGY

## 2022-08-31 PROCEDURE — 80053 COMPREHEN METABOLIC PANEL: CPT

## 2022-08-31 PROCEDURE — 74011250637 HC RX REV CODE- 250/637: Performed by: STUDENT IN AN ORGANIZED HEALTH CARE EDUCATION/TRAINING PROGRAM

## 2022-08-31 PROCEDURE — 74011250636 HC RX REV CODE- 250/636: Performed by: STUDENT IN AN ORGANIZED HEALTH CARE EDUCATION/TRAINING PROGRAM

## 2022-08-31 PROCEDURE — 74011250636 HC RX REV CODE- 250/636: Performed by: PSYCHIATRY & NEUROLOGY

## 2022-08-31 PROCEDURE — G0378 HOSPITAL OBSERVATION PER HR: HCPCS

## 2022-08-31 PROCEDURE — 36415 COLL VENOUS BLD VENIPUNCTURE: CPT

## 2022-08-31 RX ORDER — LORAZEPAM 2 MG/ML
1 INJECTION INTRAMUSCULAR ONCE
Status: COMPLETED | OUTPATIENT
Start: 2022-08-31 | End: 2022-08-31

## 2022-08-31 RX ADMIN — METRONIDAZOLE 500 MG: 500 TABLET ORAL at 08:02

## 2022-08-31 RX ADMIN — LORAZEPAM 1 MG: 2 INJECTION INTRAMUSCULAR; INTRAVENOUS at 13:19

## 2022-08-31 RX ADMIN — PANTOPRAZOLE SODIUM 40 MG: 40 TABLET, DELAYED RELEASE ORAL at 08:02

## 2022-08-31 RX ADMIN — OXYCODONE HYDROCHLORIDE AND ACETAMINOPHEN 1 TABLET: 5; 325 TABLET ORAL at 21:10

## 2022-08-31 RX ADMIN — OXYCODONE HYDROCHLORIDE AND ACETAMINOPHEN 1 TABLET: 5; 325 TABLET ORAL at 07:57

## 2022-08-31 RX ADMIN — DOCUSATE SODIUM 200 MG: 100 CAPSULE, LIQUID FILLED ORAL at 21:06

## 2022-08-31 RX ADMIN — DOXYCYCLINE HYCLATE 100 MG: 100 CAPSULE ORAL at 08:01

## 2022-08-31 RX ADMIN — DOXYCYCLINE HYCLATE 100 MG: 100 CAPSULE ORAL at 21:06

## 2022-08-31 RX ADMIN — METRONIDAZOLE 500 MG: 500 TABLET ORAL at 21:06

## 2022-08-31 RX ADMIN — IMMUNE GLOBULIN (HUMAN) 35 G: 10 INJECTION INTRAVENOUS; SUBCUTANEOUS at 11:53

## 2022-08-31 RX ADMIN — METOPROLOL SUCCINATE 25 MG: 50 TABLET, EXTENDED RELEASE ORAL at 08:10

## 2022-08-31 RX ADMIN — FOLIC ACID 1 MG: 1 TABLET ORAL at 08:02

## 2022-08-31 RX ADMIN — FERROUS SULFATE TAB 325 MG (65 MG ELEMENTAL FE) 325 MG: 325 (65 FE) TAB at 08:02

## 2022-08-31 RX ADMIN — PREGABALIN 100 MG: 50 CAPSULE ORAL at 08:02

## 2022-08-31 RX ADMIN — AMLODIPINE BESYLATE 5 MG: 5 TABLET ORAL at 08:10

## 2022-08-31 RX ADMIN — PREGABALIN 100 MG: 50 CAPSULE ORAL at 21:06

## 2022-08-31 NOTE — PROGRESS NOTES
Hematology/Oncology   Progress Note    Patient: Tk Blair MRN: [id number]     YOB: 1970  Age: 47 y.o. Sex: female      Admit Date: 8/27/2022    LOS: 0 days     Chief Complaint: Admitted with bilateral upper and lower extremity weakness    Subjective:     Generalized weakness persists. For LP under fluoroscopy today. Constitutional No fevers, chills, night sweats, excessive fatigue or weight loss. Allergic/Immunologic No recent allergic reactions   Eyes No significant visual difficulties. No diplopia. ENMT No problems with hearing, no sore throat, no sinus drainage. Endocrine No hot flashes or night sweats. No cold intolerance, polyuria, or polydipsia   Hematologic/Lymphatic No easy bruising or bleeding. The patient denies any tender or palpable lymph nodes   Breasts No abnormal masses of breast, nipple discharge or pain. Respiratory No dyspnea on exertion, orthopnea, chest pain, cough or hemoptysis. Cardiovascular No anginal chest pain, irregular heart beat, tachycardia, palpitations or orthopnea. Gastrointestinal No nausea, vomiting, diarrhea, constipation, cramping, dysphagia, reflux, heartburn, GI bleeding, or early satiety. No change in bowel habits. Genitourinary (M) No hematuria, dysuria, increased frequency, urgency, hesitancy or incontinence. Musculoskeletal No joint pain, swelling or redness. No decreased range of motion. Integumentary No chronic rashes, inflammation, ulcerations, pruritus, petechiae, purpura, ecchymoses, or skin changes. Neurologic No headache, blurred vision, and no areas of focal weakness or numbness. Normal gait. No sensory problems. Psychiatric No insomnia, depression, laurie or mood swings. No psychotropic drugs.         Current Facility-Administered Medications:     immune globulin 10% (GAMUNEX-C) infusion 35 g, 35 g, IntraVENous, DAILY, Laila Lakhani MD, Last Rate: 55 mL/hr at 08/31/22 1153, 35 g at 08/31/22 1153    hydrALAZINE (APRESOLINE) 20 mg/mL injection 10 mg, 10 mg, IntraVENous, QID PRN, Coty Bravo PA    zinc oxide-white petrolatum 20-75 % topical paste, , Topical, PRN, Harsha Hollis MD    oxyCODONE-acetaminophen (PERCOCET) 5-325 mg per tablet 1 Tablet, 1 Tablet, Oral, Q4H PRN, Lorren Kanner, MD, 1 Tablet at 08/31/22 0757    ferrous sulfate tablet 325 mg, 1 Tablet, Oral, DAILY WITH BREAKFAST, Eusebio Conteh PA-C, 325 mg at 08/31/22 6330    calcium carbonate (TUMS) chewable tablet 200 mg [elemental], 200 mg, Oral, TID PRN, Alix Cavanaugh MD, 200 mg at 74/48/09 0915    folic acid (FOLVITE) tablet 1 mg, 1 mg, Oral, DAILY, Margert Dakin, MD, 1 mg at 08/31/22 0802    sodium chloride (NS) flush 5-40 mL, 5-40 mL, IntraVENous, PRN, Xin Cuevas, NP    acetaminophen (TYLENOL) tablet 650 mg, 650 mg, Oral, Q6H PRN **OR** acetaminophen (TYLENOL) suppository 650 mg, 650 mg, Rectal, Q6H PRN, Xin Cuevas, NP    polyethylene glycol (MIRALAX) packet 17 g, 17 g, Oral, DAILY PRN, Xin Cuevas, NP    ondansetron (ZOFRAN ODT) tablet 4 mg, 4 mg, Oral, Q8H PRN **OR** ondansetron (ZOFRAN) injection 4 mg, 4 mg, IntraVENous, Q6H PRN, Xin Cuevas, NP    [Held by provider] enoxaparin (LOVENOX) injection 40 mg, 40 mg, SubCUTAneous, DAILY, Paola Davison NP, 40 mg at 08/30/22 0919    amLODIPine (NORVASC) tablet 5 mg, 5 mg, Oral, DAILY, Robert Davison NP, 5 mg at 08/31/22 0810    baclofen (LIORESAL) tablet 10 mg, 10 mg, Oral, BID PRN, Xin Cuevas, NP, 10 mg at 08/29/22 8787    docusate sodium (COLACE) capsule 200 mg, 200 mg, Oral, QHS, LaneyRobert, NP, 200 mg at 08/30/22 2127    metoprolol succinate (TOPROL-XL) XL tablet 25 mg, 25 mg, Oral, DAILY, Paola Davison, NP, 25 mg at 08/31/22 0810    metroNIDAZOLE (FLAGYL) tablet 500 mg, 500 mg, Oral, BID, Xin Held, NP, 500 mg at 08/31/22 0802    pregabalin (LYRICA) capsule 100 mg, 100 mg, Oral, TID, Xin Held, NP, 100 mg at 08/31/22 0802    pantoprazole (PROTONIX) tablet 40 mg, 40 mg, Oral, DAILY, Anita Cousins, NP, 40 mg at 08/31/22 0802    doxycycline (VIBRAMYCIN) capsule 100 mg, 100 mg, Oral, Q12H, Nicolás Batista MD, 100 mg at 08/31/22 0801    nitroglycerin (NITROSTAT) tablet 0.4 mg, 0.4 mg, SubLINGual, PRN, Tegnicci, MD Jignesh, 0.4 mg at 08/29/22 0944     Objective:     Vitals:    08/31/22 1152 08/31/22 1209 08/31/22 1227 08/31/22 1242   BP: 126/87 126/86 124/85 130/88   Pulse: 100 95 92 (!) 102   Resp: 18 18 16 17   Temp: 98.8 °F (37.1 °C) 99.3 °F (37.4 °C) 99.2 °F (37.3 °C) 98.7 °F (37.1 °C)   SpO2: 100% 99% 100% 100%   Weight:       Height:              Physical Exam:   Constitutional Alert, cooperative, oriented. Mood and affect appropriate. Appears close to chronological age. Well nourished. Well developed. Head Normocephalic; no scars   Eyes Conjunctivae and sclerae are clear and without icterus. Pupils are reactive and equal.   ENMT Sinuses are nontender. No oral exudates, ulcers, masses, thrush or mucositis. Oropharynx clear. Tongue normal.   Neck Supple without masses or thyromegaly. No jugular venous distension. Hematologic/Lymphatic No petechiae or purpura. No tender or palpable lymph nodes in the cervical, supraclavicular, axillary or inguinal area. Respiratory Lungs are clear to auscultation without rhonchi or wheezing. Cardiovascular Regular rate and rhythm of heart without murmurs, gallops or rubs. Chest / Line Site Chest is symmetric with no chest wall deformities. Abdomen Non-tender, non-distended, no masses, ascites or hepatosplenomegaly. Good bowel sounds. No guarding or rebound tenderness. No pulsatile masses. Musculoskeletal No tenderness or swelling, normal range of motion without obvious weakness. Extremities No visible deformities, no cyanosis, clubbing or edema. Skin No rashes, scars, or lesions suggestive of malignancy. No petechiae, purpura, or ecchymoses. No excoriations.     Neurologic No sensory or motor deficits, normal cerebellar function, normal gait, cranial nerves intact. Psychiatric Alert and oriented times three. Coherent speech. Verbalizes understanding of our discussions today. Lab/Data Review:  Recent Labs     08/31/22 0523 08/30/22 0924   WBC 6.2 6.3   HGB 7.8* 8.2*   HCT 24.9* 26.5*    194     Recent Labs     08/31/22  0523 08/30/22 0924    138   K 3.7 3.1*    103   CO2 26 28   GLU 78 83   BUN 8 7   CREA 0.40* 0.51*   CA 7.9* 7.9*   ALB 2.6* 2.8*   TBILI 0.8 0.9   ALT 21 28     No results for input(s): PH, PCO2, PO2, HCO3, FIO2 in the last 72 hours. Recent Results (from the past 24 hour(s))   METABOLIC PANEL, COMPREHENSIVE    Collection Time: 08/31/22  5:23 AM   Result Value Ref Range    Sodium 140 136 - 145 mmol/L    Potassium 3.7 3.5 - 5.1 mmol/L    Chloride 105 97 - 108 mmol/L    CO2 26 21 - 32 mmol/L    Anion gap 9 5 - 15 mmol/L    Glucose 78 65 - 100 mg/dL    BUN 8 6 - 20 mg/dL    Creatinine 0.40 (L) 0.55 - 1.02 mg/dL    BUN/Creatinine ratio 20 12 - 20      GFR est AA >60 >60 ml/min/1.73m2    GFR est non-AA >60 >60 ml/min/1.73m2    Calcium 7.9 (L) 8.5 - 10.1 mg/dL    Bilirubin, total 0.8 0.2 - 1.0 mg/dL    AST (SGOT) 12 (L) 15 - 37 U/L    ALT (SGPT) 21 12 - 78 U/L    Alk.  phosphatase 99 45 - 117 U/L    Protein, total 5.4 (L) 6.4 - 8.2 g/dL    Albumin 2.6 (L) 3.5 - 5.0 g/dL    Globulin 2.8 2.0 - 4.0 g/dL    A-G Ratio 0.9 (L) 1.1 - 2.2     CBC WITH AUTOMATED DIFF    Collection Time: 08/31/22  5:23 AM   Result Value Ref Range    WBC 6.2 3.6 - 11.0 K/uL    RBC 2.48 (L) 3.80 - 5.20 M/uL    HGB 7.8 (L) 11.5 - 16.0 g/dL    HCT 24.9 (L) 35.0 - 47.0 %    .4 (H) 80.0 - 99.0 FL    MCH 31.5 26.0 - 34.0 PG    MCHC 31.3 30.0 - 36.5 g/dL    RDW 15.2 (H) 11.5 - 14.5 %    PLATELET 786 540 - 517 K/uL    MPV 9.4 8.9 - 12.9 FL    NRBC 0.0 0.0  WBC    ABSOLUTE NRBC 0.00 0.00 - 0.01 K/uL    NEUTROPHILS 72 32 - 75 %    LYMPHOCYTES 17 12 - 49 %    MONOCYTES 10 5 - 13 %    EOSINOPHILS 0 0 - 7 %    BASOPHILS 0 0 - 1 %    IMMATURE GRANULOCYTES 1 (H) 0 - 0.5 %    ABS. NEUTROPHILS 4.5 1.8 - 8.0 K/UL    ABS. LYMPHOCYTES 1.0 0.8 - 3.5 K/UL    ABS. MONOCYTES 0.6 0.0 - 1.0 K/UL    ABS. EOSINOPHILS 0.0 0.0 - 0.4 K/UL    ABS. BASOPHILS 0.0 0.0 - 0.1 K/UL    ABS. IMM. GRANS. 0.1 (H) 0.00 - 0.04 K/UL    DF AUTOMATED          Radiology:   CT Results  (Last 48 hours)      None             Assessment and Plan: Active Problems:    Weakness generalized (8/24/2022)      Severe protein-calorie malnutrition ([address] (8/29/2022)      Rapidly progressive weakness (8/31/2022)  Monoclonal gammopathy:  -SPEP done last week showed a small M spike of 0.6  -most likely indicates monoclonal gammopathy. However, we will proceed with further further work-up including immunofixation as well as serum light chains to complete work-up: pending      Anemia:  -Hemoglobin is lower at 7.8 today.  -Work-up done at the last visit showed a low B12 level as well as elevated intrinsic factor antibody suggesting pernicious anemia  -Agree with parenteral B12 supplementation. B12 level is now normal.  -CRP was elevated at last visit suggesting possible inflammatory etiology for anemia as well  -Work-up will be sent as above for further evaluation  -LDH was normal, haptoglobin was unremarkable    Polyneuropathy:  -Neurology following  -LP under fluoroscopy today.       Signed By: Godfrey Kwan MD     August 31, 2022

## 2022-08-31 NOTE — ROUTINE PROCESS
Bedside shift change report given to Chelsy Hanson RN (oncoming nurse) by Allen Canela RN (offgoing nurse). Report included the following information SBAR.

## 2022-08-31 NOTE — CONSULTS
NEUROLOGY CONSULT    Name Jayro Maldonado Age 47 y.o. MRN [id number]  1970     Referring Physician: Dianna Neil NP      Chief Complaint: Inability to ambulate     This is a 47 y.o. -American female who was recently discharged from the hospital after being evaluated for the same complaint. She had an MRI of the brain and the C-spine which were unremarkable to explain her weakness. The recommendation was to do an LP and attempt was made to do the LP at the floor which was failed and then she was scheduled to do the LP under fluoroscopy but she kept refusing and after several days of refusal she was discharged home and even refused to go to rehab. She was unable to take care of herself at home and came back here for readmission. Assessment and Plan:  Difficulty ambulating: The patient continues to have difficulty walking. She has decree sensations in both the lower extremities from groins down to level of T10 or higher with no reflexes in the lower extremities or the upper either. Previously she had an MRI of the brain and C-spine which did not reveal any pathology. MRI thoracic spine showed midthoracic spinal stenosis but no obvious cord compression and her symptoms are not consistent with myelopathy. She has agreed to lumbar puncture at this time. Fluoroscopy guided lumbar puncture ordered as previously she has failed bedside. Looking for albumin no cytologic dissociation to support diagnosis of CIDP versus any infectious or inflammatory etiology for her symptoms. We will order fluoroscopy guided. For suspected CIDP will start 2g/kg divided over 3 days of ivig. Next dose of subcu vitamin B12 injection on  and continue weekly for 2 more weeks and continue folate 0.8 mcg daily. Will also check vitamin B 1  Consultation for hematology for elevated M protein on labs. Seen by hematology  Elevated SSA antibodies. Will consult with rheumatology outpatient  7.  Pt/OT    Thank you for the consult    Allergies   Allergen Reactions    Codeine Nausea Only           Current Facility-Administered Medications   Medication Dose Route Frequency    lidocaine (PF) (XYLOCAINE) 20 mg/mL (2 %) injection 100 mg  100 mg IntraVENous ONCE    hydrALAZINE (APRESOLINE) 20 mg/mL injection 10 mg  10 mg IntraVENous QID PRN    zinc oxide-white petrolatum 20-75 % topical paste   Topical PRN    oxyCODONE-acetaminophen (PERCOCET) 5-325 mg per tablet 1 Tablet  1 Tablet Oral Q4H PRN    ferrous sulfate tablet 325 mg  1 Tablet Oral DAILY WITH BREAKFAST    calcium carbonate (TUMS) chewable tablet 200 mg [elemental]  200 mg Oral TID PRN    folic acid (FOLVITE) tablet 1 mg  1 mg Oral DAILY    sodium chloride (NS) flush 5-40 mL  5-40 mL IntraVENous PRN    acetaminophen (TYLENOL) tablet 650 mg  650 mg Oral Q6H PRN    Or    acetaminophen (TYLENOL) suppository 650 mg  650 mg Rectal Q6H PRN    polyethylene glycol (MIRALAX) packet 17 g  17 g Oral DAILY PRN    ondansetron (ZOFRAN ODT) tablet 4 mg  4 mg Oral Q8H PRN    Or    ondansetron (ZOFRAN) injection 4 mg  4 mg IntraVENous Q6H PRN    enoxaparin (LOVENOX) injection 40 mg  40 mg SubCUTAneous DAILY    amLODIPine (NORVASC) tablet 5 mg  5 mg Oral DAILY    baclofen (LIORESAL) tablet 10 mg  10 mg Oral BID PRN    docusate sodium (COLACE) capsule 200 mg  200 mg Oral QHS    metoprolol succinate (TOPROL-XL) XL tablet 25 mg  25 mg Oral DAILY    metroNIDAZOLE (FLAGYL) tablet 500 mg  500 mg Oral BID    pregabalin (LYRICA) capsule 100 mg  100 mg Oral TID    pantoprazole (PROTONIX) tablet 40 mg  40 mg Oral DAILY    doxycycline (VIBRAMYCIN) capsule 100 mg  100 mg Oral Q12H    nitroglycerin (NITROSTAT) tablet 0.4 mg  0.4 mg SubLINGual PRN       Past Medical History:   Diagnosis Date    Anxiety     CAD (coronary artery disease)     Fibromyalgia     Hypertension     Neuropathy     Peripheral artery disease (HCC)     Psychiatric disorder     anxiety        Social History     Tobacco Use    Smoking status: Every Day     Packs/day: 0.50     Types: Cigarettes    Smokeless tobacco: Never   Substance Use Topics    Alcohol use: Yes     Comment: occosionally     Drug use: Not Currently            Exam  Visit Vitals  /82 (BP 1 Location: Left upper arm, BP Patient Position: Lying)   Pulse 83   Temp 98.3 °F (36.8 °C)   Resp 18   Ht 5' 11\" (1.803 m)   Wt 119.3 kg (263 lb)   SpO2 99%   BMI 36.68 kg/m²         General: Well developed, well nourished. Patient in no distress   Head: Normocephalic, atraumatic, anicteric sclera   Neck Normal ROM, No thyromegally   Lungs:  Clear to auscultation    Cardiac: Regular rate and rhythm with no murmurs. Abd: Bowel sounds were audible   Ext: No pedal edema   Skin: Supple no rash     NeurologicExam:  Mental Status: Alert and oriented to person place and time   Speech: Fluent no aphasia or dysarthria. Cranial Nerves:   Pupils are equal round and reactive to light and accommodation, extraocular movements are intact and full, visual fields are intact by confrontation, no nystagmus noted, face is symmetric, sensation in face is intact and symmetric, hearing is intact and symmetric, tongue and uvula are in midline with normal movements, palate is elevating equally, shoulder shrug is intact and symmetric. Motor:  Right upper extremity: 4-/5 throughout, left upper extremity 4-/5throughout, 3 out of 5 throughout in lower extremities   Reflexes:   Deep tendon reflexes 0/4 and symmetric. Sensory:   Decrease sensation from groin down, absent proprioception in both legs   Gait:  Gait is deferred   Tremor:   No tremor noted.    Cerebellar:  Difficult to test finger-to-nose given upper extremity weakness          Lab Review  Lab Results   Component Value Date/Time    WBC 6.5 08/27/2022 10:45 AM    HCT 25.8 (L) 08/27/2022 10:45 AM    HGB 8.2 (L) 08/27/2022 10:45 AM    PLATELET 536 51/44/3035 10:45 AM     Lab Results   Component Value Date/Time    Sodium 142 08/27/2022 10:45 AM    Potassium 3.5 08/27/2022 10:45 AM    Chloride 106 08/27/2022 10:45 AM    CO2 30 08/27/2022 10:45 AM    Glucose 80 08/27/2022 10:45 AM    BUN 14 08/27/2022 10:45 AM    Creatinine 0.57 08/27/2022 10:45 AM    Calcium 8.2 (L) 08/27/2022 10:45 AM     Lab Results   Component Value Date/Time    Vitamin B12 198 08/20/2022 11:46 AM    Vitamin B12 246 08/20/2022 11:46 AM    Folate 1.7 (L) 08/20/2022 11:46 AM    Folate 2.1 (L) 08/20/2022 11:46 AM     No results found for: LDL, LDLC, DLDLP  No results found for: HBA1C, AVK8OJZG, QKR8BZID, UXS0QYUZ  No components found for: TROPQUANT  No results found for: LENIN

## 2022-08-31 NOTE — PROGRESS NOTES
OT treatment attempted at 8:25 am, however patient reports she is sick , with chest pain and declining therapy at this time. Pt educated on the importance of participating in therapy to improve overall wellbeing. Will continue to follow patient and attempt treatment at a later time. Thank you!

## 2022-08-31 NOTE — PROGRESS NOTES
0800: Chart reviewed. Per PA note, LP by fluroscopy requested. Jam Flores pending at [facility]. : Other Relative, Lele Miller [phone]. CM will continue to follow patient and recs of medical team.    9785: Per RUBÉN message from [facility], write-up was not yet completed (as they were waiting on LP test results) to submit Jam Flores and will be done at this time without results.

## 2022-08-31 NOTE — PROGRESS NOTES
Nutrition Assessment     Type and Reason for Visit: Reassess (Interim)    Nutrition Recommendations/Plan:   Modify diet to EC7, GI Williamson-- update food preferences. Ensure compact x3/d (480 kcal, 27 gm PRO). Please continue to monitor and document PO and ONS intake in I/Os. Nutrition Assessment:   Admitted for generalized weakness. Poor intake reported d/t swallowing difficulty. Swallowing discomfort >1 week r/t pain w/ swallowing- meds taken w/ minimal bites of applesauce. RD consulted SLP- swallowing discomfort likely r/t dx of Schatzki's ring and chronic Gastritis. Pt also endorsed small intakes at baseline- unspecified as to time frame. RD to modify diet to encourage intakes and add ONS. (8/31) Intake remains poor but good ONS acceptance. Poor intake d/t modified texture- food appeared to be MM5 vs SB6 on tray at Breakfast- requested diet changed and continue ONS. Pt prefers soft fruits and juices on tray- likely to benefit from ONS. Labs: H/H 7.8/24.9, .4, Cr 0.40, Ca 7.9, AST 12, TP 5.4, B9 3.7, B12 >2000, Ferritin 614. Meds: B9, FerrouSul, PPI, lopressor, flagyl, colace, norvasc, doxycycline, percocet. Malnutrition Assessment:  Malnutrition Status: Severe malnutrition     Estimated Daily Nutrient Needs:  Energy (kcal):  2520 kcal  (St. II PI)  Protein (g):  108 gm/d       Fluid (ml/day):  2500 mL/d    Nutrition Related Findings:  No N/V/D/C reported. +poor dentition. No chewing/swallowing difficulty w/ current meals per Pt. Last BM 8/29 per Pt. No edema.      Current Nutrition Therapies:  ADULT DIET Dysphagia - Soft & Bite Sized; GI Williamson (GERD/Peptic Ulcer)  ADULT ORAL NUTRITION SUPPLEMENT Breakfast, Lunch, Dinner; Standard 4 oz    Anthropometric Measures:  Height:  5' 11\" (180.3 cm)  Current Body Wt:  119.3 kg (263 lb)  BMI: 36.7    Nutrition Diagnosis:   Inadequate oral intake related to altered GI structure, swallowing difficulty as evidenced by swallowing study results, poor intake prior to admission, intake 0-25%, severe loss of subcutaneous fat, Criteria as identified in malnutrition assessment, poor dentition, weight loss greater than or equal to 20% in 1 year  Severe malnutrition, In context of chronic illness related to swallowing difficulty as evidenced by swallowing study results, weight loss    Nutrition Interventions:   Food and/or Nutrient Delivery: Modify current diet, Continue oral nutrition supplement  Nutrition Education/Counseling: No recommendations at this time  Coordination of Nutrition Care: Continue to monitor while inpatient  Plan of Care discussed with: Pt, RN. Goals:  Previous Goal Met: Progressing toward goal(s) (ONS intake)  Goals: Meet at least 75% of estimated needs, PO intake 50% or greater, by next RD assessment    Nutrition Monitoring and Evaluation:   Behavioral-Environmental Outcomes: None identified  Food/Nutrient Intake Outcomes: Diet advancement/tolerance, Food and nutrient intake, Supplement intake  Physical Signs/Symptoms Outcomes: Biochemical data, Chewing or swallowing, Meal time behavior    Discharge Planning: Too soon to determine    Amanda Kebede RD  Contact: ext. [phone] or [employer].

## 2022-08-31 NOTE — PROGRESS NOTES
Problem: Pressure Injury - Risk of  Goal: *Prevention of pressure injury  Description: Document Efrem Scale and appropriate interventions in the flowsheet.   Outcome: Progressing Towards Goal  Note: Pressure Injury Interventions:  Sensory Interventions: Assess changes in LOC    Moisture Interventions: Absorbent underpads    Activity Interventions: PT/OT evaluation    Mobility Interventions: Assess need for specialty bed    Nutrition Interventions: Document food/fluid/supplement intake    Friction and Shear Interventions: Apply protective barrier, creams and emollients

## 2022-08-31 NOTE — PROGRESS NOTES
Hospitalist Progress Note    Subjective:   Daily Progress Note: 8/31/2022 9:14 AM    Patient is nervous to get lumbar puncture today. She has been having some increased pain in her lower extremities. Addendum: Informed that patient was unable to tolerate LP under fluroscopy and would require moderate sedation per IR, if wanted to attempt tomorrow.      Current Facility-Administered Medications   Medication Dose Route Frequency    immune globulin 10% (GAMUNEX-C) infusion 28.32 g  400 mg/kg (Ideal) IntraVENous DAILY    hydrALAZINE (APRESOLINE) 20 mg/mL injection 10 mg  10 mg IntraVENous QID PRN    zinc oxide-white petrolatum 20-75 % topical paste   Topical PRN    oxyCODONE-acetaminophen (PERCOCET) 5-325 mg per tablet 1 Tablet  1 Tablet Oral Q4H PRN    ferrous sulfate tablet 325 mg  1 Tablet Oral DAILY WITH BREAKFAST    calcium carbonate (TUMS) chewable tablet 200 mg [elemental]  200 mg Oral TID PRN    folic acid (FOLVITE) tablet 1 mg  1 mg Oral DAILY    sodium chloride (NS) flush 5-40 mL  5-40 mL IntraVENous PRN    acetaminophen (TYLENOL) tablet 650 mg  650 mg Oral Q6H PRN    Or    acetaminophen (TYLENOL) suppository 650 mg  650 mg Rectal Q6H PRN    polyethylene glycol (MIRALAX) packet 17 g  17 g Oral DAILY PRN    ondansetron (ZOFRAN ODT) tablet 4 mg  4 mg Oral Q8H PRN    Or    ondansetron (ZOFRAN) injection 4 mg  4 mg IntraVENous Q6H PRN    [Held by provider] enoxaparin (LOVENOX) injection 40 mg  40 mg SubCUTAneous DAILY    amLODIPine (NORVASC) tablet 5 mg  5 mg Oral DAILY    baclofen (LIORESAL) tablet 10 mg  10 mg Oral BID PRN    docusate sodium (COLACE) capsule 200 mg  200 mg Oral QHS    metoprolol succinate (TOPROL-XL) XL tablet 25 mg  25 mg Oral DAILY    metroNIDAZOLE (FLAGYL) tablet 500 mg  500 mg Oral BID    pregabalin (LYRICA) capsule 100 mg  100 mg Oral TID    pantoprazole (PROTONIX) tablet 40 mg  40 mg Oral DAILY    doxycycline (VIBRAMYCIN) capsule 100 mg  100 mg Oral Q12H    nitroglycerin (NITROSTAT) tablet 0.4 mg  0.4 mg SubLINGual PRN        Review of Systems  Review of Systems   Constitutional: Negative. Respiratory: Negative. Cardiovascular: Negative. Gastrointestinal: Negative. Musculoskeletal:  Positive for myalgias. Neurological:  Positive for tingling and weakness. Psychiatric/Behavioral:  The patient is nervous/anxious. All other systems reviewed and are negative. Objective:     Visit Vitals  /88   Pulse 100   Temp 96.8 °F (36 °C)   Resp 18   Ht 5' 11\" (1.803 m)   Wt 119.3 kg (263 lb)   SpO2 100%   BMI 36.68 kg/m²      O2 Device: None (Room air)    Temp (24hrs), Av.8 °F (36 °C), Min:95.4 °F (35.2 °C), Max:98.5 °F (36.9 °C)      No intake/output data recorded.  1901 -  0700  In: 1450 [P.O.:1450]  Out: 1000 [Urine:1000]    Recent Results (from the past 24 hour(s))   TROPONIN-HIGH SENSITIVITY    Collection Time: 22  9:24 AM   Result Value Ref Range    Troponin-High Sensitivity 6 0 - 51 ng/L   METABOLIC PANEL, COMPREHENSIVE    Collection Time: 22  9:24 AM   Result Value Ref Range    Sodium 138 136 - 145 mmol/L    Potassium 3.1 (L) 3.5 - 5.1 mmol/L    Chloride 103 97 - 108 mmol/L    CO2 28 21 - 32 mmol/L    Anion gap 7 5 - 15 mmol/L    Glucose 83 65 - 100 mg/dL    BUN 7 6 - 20 mg/dL    Creatinine 0.51 (L) 0.55 - 1.02 mg/dL    BUN/Creatinine ratio 14 12 - 20      GFR est AA >60 >60 ml/min/1.73m2    GFR est non-AA >60 >60 ml/min/1.73m2    Calcium 7.9 (L) 8.5 - 10.1 mg/dL    Bilirubin, total 0.9 0.2 - 1.0 mg/dL    AST (SGOT) 17 15 - 37 U/L    ALT (SGPT) 28 12 - 78 U/L    Alk.  phosphatase 101 45 - 117 U/L    Protein, total 6.0 (L) 6.4 - 8.2 g/dL    Albumin 2.8 (L) 3.5 - 5.0 g/dL    Globulin 3.2 2.0 - 4.0 g/dL    A-G Ratio 0.9 (L) 1.1 - 2.2     CBC WITH AUTOMATED DIFF    Collection Time: 22  9:24 AM   Result Value Ref Range    WBC 6.3 3.6 - 11.0 K/uL    RBC 2.68 (L) 3.80 - 5.20 M/uL    HGB 8.2 (L) 11.5 - 16.0 g/dL    HCT 26.5 (L) 35.0 - 47.0 % MCV 98.9 80.0 - 99.0 FL    MCH 30.6 26.0 - 34.0 PG    MCHC 30.9 30.0 - 36.5 g/dL    RDW 15.1 (H) 11.5 - 14.5 %    PLATELET 445 511 - 002 K/uL    MPV 9.6 8.9 - 12.9 FL    NRBC 0.0 0.0  WBC    ABSOLUTE NRBC 0.00 0.00 - 0.01 K/uL    NEUTROPHILS 73 32 - 75 %    LYMPHOCYTES 18 12 - 49 %    MONOCYTES 7 5 - 13 %    EOSINOPHILS 1 0 - 7 %    BASOPHILS 0 0 - 1 %    IMMATURE GRANULOCYTES 1 (H) 0 - 0.5 %    ABS. NEUTROPHILS 4.6 1.8 - 8.0 K/UL    ABS. LYMPHOCYTES 1.1 0.8 - 3.5 K/UL    ABS. MONOCYTES 0.5 0.0 - 1.0 K/UL    ABS. EOSINOPHILS 0.0 0.0 - 0.4 K/UL    ABS. BASOPHILS 0.0 0.0 - 0.1 K/UL    ABS. IMM.  GRANS. 0.1 (H) 0.00 - 0.04 K/UL    DF AUTOMATED     LIPID PANEL    Collection Time: 08/30/22  9:24 AM   Result Value Ref Range    LIPID PROFILE        Cholesterol, total 182 <200 mg/dL    Triglyceride 138 <150 mg/dL    HDL Cholesterol 53 mg/dL    LDL, calculated 101.4 (H) 0 - 100 mg/dL    VLDL, calculated 27.6 mg/dL    CHOL/HDL Ratio 3.4 0.0 - 5.0     LD    Collection Time: 08/30/22  9:24 AM   Result Value Ref Range     81 - 246 U/L   FERRITIN    Collection Time: 08/30/22  9:24 AM   Result Value Ref Range    Ferritin 614 (H) 8 - 252 ng/mL   IRON PROFILE    Collection Time: 08/30/22  9:24 AM   Result Value Ref Range    Iron 81 35 - 150 ug/dL    TIBC 166 (L) 250 - 450 ug/dL    Iron % saturation 49 20 - 50 %   VITAMIN B12 & FOLATE    Collection Time: 08/30/22  9:24 AM   Result Value Ref Range    Vitamin B12 >2,000 (H) 193 - 986 pg/mL    Folate 3.7 (L) 5.0 - 21.0 ng/mL   HAPTOGLOBIN    Collection Time: 08/30/22  9:24 AM   Result Value Ref Range    Haptoglobin 290 (H) 30 - 881 mg/dL   METABOLIC PANEL, COMPREHENSIVE    Collection Time: 08/31/22  5:23 AM   Result Value Ref Range    Sodium 140 136 - 145 mmol/L    Potassium 3.7 3.5 - 5.1 mmol/L    Chloride 105 97 - 108 mmol/L    CO2 26 21 - 32 mmol/L    Anion gap 9 5 - 15 mmol/L    Glucose 78 65 - 100 mg/dL    BUN 8 6 - 20 mg/dL    Creatinine 0.40 (L) 0.55 - 1.02 mg/dL BUN/Creatinine ratio 20 12 - 20      GFR est AA >60 >60 ml/min/1.73m2    GFR est non-AA >60 >60 ml/min/1.73m2    Calcium 7.9 (L) 8.5 - 10.1 mg/dL    Bilirubin, total 0.8 0.2 - 1.0 mg/dL    AST (SGOT) 12 (L) 15 - 37 U/L    ALT (SGPT) 21 12 - 78 U/L    Alk. phosphatase 99 45 - 117 U/L    Protein, total 5.4 (L) 6.4 - 8.2 g/dL    Albumin 2.6 (L) 3.5 - 5.0 g/dL    Globulin 2.8 2.0 - 4.0 g/dL    A-G Ratio 0.9 (L) 1.1 - 2.2     CBC WITH AUTOMATED DIFF    Collection Time: 08/31/22  5:23 AM   Result Value Ref Range    WBC 6.2 3.6 - 11.0 K/uL    RBC 2.48 (L) 3.80 - 5.20 M/uL    HGB 7.8 (L) 11.5 - 16.0 g/dL    HCT 24.9 (L) 35.0 - 47.0 %    .4 (H) 80.0 - 99.0 FL    MCH 31.5 26.0 - 34.0 PG    MCHC 31.3 30.0 - 36.5 g/dL    RDW 15.2 (H) 11.5 - 14.5 %    PLATELET 302 397 - 463 K/uL    MPV 9.4 8.9 - 12.9 FL    NRBC 0.0 0.0  WBC    ABSOLUTE NRBC 0.00 0.00 - 0.01 K/uL    NEUTROPHILS 72 32 - 75 %    LYMPHOCYTES 17 12 - 49 %    MONOCYTES 10 5 - 13 %    EOSINOPHILS 0 0 - 7 %    BASOPHILS 0 0 - 1 %    IMMATURE GRANULOCYTES 1 (H) 0 - 0.5 %    ABS. NEUTROPHILS 4.5 1.8 - 8.0 K/UL    ABS. LYMPHOCYTES 1.0 0.8 - 3.5 K/UL    ABS. MONOCYTES 0.6 0.0 - 1.0 K/UL    ABS. EOSINOPHILS 0.0 0.0 - 0.4 K/UL    ABS. BASOPHILS 0.0 0.0 - 0.1 K/UL    ABS. IMM. GRANS. 0.1 (H) 0.00 - 0.04 K/UL    DF AUTOMATED          XR CHEST PORT   Final Result   Clear lungs. MRI [facility] SPINE W WO CONT   Final Result   Motion artifact limits examination. Multilevel disc degenerative change. Moderate to severe canal stenosis at T6-T7. Moderate to severe bilateral foraminal stenoses at T8-T9      CT HEAD WO CONT   Final Result   No acute intracranial abnormality. IR FLUORO GUIDED NEEDLE PLACEMENT    (Results Pending)   IR FLUORO GUIDED NEEDLE PLACEMENT    (Results Pending)   XR SPINAL PUNC LUMB DX    (Results Pending)        PHYSICAL EXAM:    Physical Exam  Vitals and nursing note reviewed.    Constitutional:       Appearance: She is obese. She is ill-appearing. HENT:      Head: Normocephalic and atraumatic. Neck:      Vascular: No carotid bruit. Cardiovascular:      Rate and Rhythm: Normal rate and regular rhythm. Pulmonary:      Effort: No respiratory distress. Breath sounds: No wheezing. Chest:      Chest wall: No tenderness. Abdominal:      General: Bowel sounds are normal.      Palpations: Abdomen is soft. Musculoskeletal:      Right lower leg: No edema. Left lower leg: No edema. Comments: Bilateral LE: 3/5 strength  Billateral UE: 4/5 strength   Skin:     General: Skin is warm. Capillary Refill: Capillary refill takes less than 2 seconds. Neurological:      Mental Status: She is alert and oriented to person, place, and time. Sensory: Sensory deficit present. Motor: Weakness present. Gait: Gait abnormal.      Comments: BLE deep tendon reflexes: 0/4 symmetrically decreased sensations from groin down to level of T10 or higher. BLE sensation: decreased from groin down, absent proprioception in both legs   Psychiatric:      Comments: Subdued affect        Assessment/Plan:     Active Problems:    Weakness generalized (8/24/2022)      Severe protein-calorie malnutrition ([address] (8/29/2022)      Hospital Course:    Sheila Reid is a 47year old female with a PMH of anxiety, CAD, fibromyalgia, hypertension, PVD and weakness who presented with worsening weakness and inability to ambulate. Of note outpatient and inpatient follow-up most recently discharged on 8/19 EGD showed Schatzki's ring distal third of esophagus and chronic gastritis without bleeding biopsy revealed H. pylori was started on tetracycline and Flagyl for 14 days. In ED vital signs unremarkable. Initial labs significant for hemoglobin of 8.2. CT of the head negative for acute intracranial abnormality. 8/22 MRI of the brain with no acute or significant intracranial abnormality.  MRI of the thoracic spine showed multilevel disc degenerative change, moderate to severe canal stenosis at T6-T7 and moderate to severe bilateral for minimal stenosis at T8-T9. Patient admitted for further work-up and placement. Neurology, GI, and hematology consulted. Patient with persistent chest tightness requiring nitrostat, cardiology consulted. Echo with EF of 55 to 60% with normal diastolic function. Cardiology recommending to have outpatient follow-up. Bedside LP attempts unsuccessful secondary to patient intolerance for pain. LP by fluoroscopy pending. For suspected CIDP will start 2g/kg divided over 3 days of IVIG per neurology. Progressive weakness/neuropathy  Unclear etiology, CIDP, autoimmune, demyelinating process?   Intrinsic factor AB elevated, copper and zinc levels within normal limits  Continue on folic acid, P61, and Lyrica    Multilevel cervical spinal stenosis  MRI of the thoracic spine showed multilevel disc degenerative change, moderate to severe canal stenosis at T6-T7 and moderate to severe bilateral for minimal stenosis at T8-T9  LP failed at bedside x2 requested LP by fluroscopy pending (hold lovenox tomorrow AM)  PT/OT recommending SNF  For suspected CIDP will start 2g/kg divided over 3 days of IVIG per neurology  Neurology following    H. Pylori  Continue on doxycyline, flagyl, and protonix x 14 days   EGD showed Schatzki's ring distal third of esophagus and chronic gastritis without bleeding  GI following    Esophageal dysphagia 2/2 schatzki's ring  Diet: dysphagia  Aspiration precautions  SLP and GI following    Severe protein calorie malnutrition  On supplements   Nutrition following    Monoclonal gammopathy  SPEP done last week with small M spike  Elevated ferritin, haptoglobin and B12, workup pending  Hematology following    History of PAD/CAD  Lipid panel pending    Chest pain  EKG unremarkable, troponin negative x3  Cardiology recommending to have outpatient follow-up    Hypertension  Continue on amlodipine    Tobacco dependency  Consulted on importance of smoking cessation    DVT Prophylaxis: hold for procedure  GI Prophylaxis: protonix  Discharge and disposition barriers: neuro clearance/LP, IVIG, +48hr    Care Plan discussed with: Patient/Family, Nurse, and     Total time spent with patient: 35 minutes.

## 2022-08-31 NOTE — PROGRESS NOTES
PT treatment attempted at 8:25 however, Patient declined therapy at this time stating that she was not feeling up to it and had pain in her chest ( nursing aware of chest pain). Will continue to follow pt and will attempt treatment at a later time.  Thank you

## 2022-08-31 NOTE — PROGRESS NOTES
Progress Note    Patient: Charla Sainz MRN: [id number]  SSN: [id number]    YOB: 1970  Age: 47 y.o. Sex: female      Admit Date: 8/27/2022    LOS: 0 days     Subjective:   GI in consultation for H-pylori    9/1: Patient seen following failed Lumbar puncture. She states she is disappointed that she was unable to tolerate. Discussed H-pylori with patient. Explained treatment and need to follow up with GI after completion of antibiotic therapy. History of Present Illness: Cahrla Sainz is a 47 y.o. female who is seen in consultation for H-pylori. Ms. Balta Jeffrey has a past medical history significant for anxiety, CAD, HTN, peripheral neuropathy, H-pylori, and fibromyalgia who presented to the ED on 8/27 with complaints of increased weakness in all extremities. She was recently admitted on 8/19 for recurrent falls and worsening weakness. She was discharged on 8/25. She denied rehab at that time and was followed by neurology. She declined lumbar puncture on her last admission. She was seen by GI for difficulty swallowing on her last admission she underwent EGD on 8/22 showing Schatzki's ring in the distal third of esophagus. Biopsy was taken. Gastritis chronic without bleeding. Pathology at that time showed no active duodenitis or dysplasia, no evidence of active gastritis, metaplasia or dysplasia, but Positive for H-pylori. She was started on flagyl 500 mg twice daily, tetracycline 100 mg twice daily and Protonix 40 mg daily for fourteen days. She will need to be rechecked for H-pylori 8 weeks after completion of antibiotic therapy to ensure resolution of bacteria. This was discussed with the patient  at that time. She was to start antibiotics on 8/26/22 with an end date of 9/9/22. She is consulted by neurology and is scheduled for lumbar puncture later today. Hematology following for monoclonal gammopathy. She is followed by Cardiology for chest pain. She is scheduled for ECHO, and MRI of thoracic spine. Results pending at time of exam.     EGD  8/22/22: showing Schatzki's ring distal third of esophagus. Biopsy was taken. Gastritis chronic without bleeding. Objective:     Vitals:    08/31/22 1209 08/31/22 1227 08/31/22 1242 08/31/22 1607   BP: 126/86 124/85 130/88 134/88   Pulse: 95 92 (!) 102 (!) 101   Resp: 18 16 17 16   Temp: 99.3 °F (37.4 °C) 99.2 °F (37.3 °C) 98.7 °F (37.1 °C) 98.4 °F (36.9 °C)   SpO2: 99% 100% 100% 99%   Weight:       Height:            Intake and Output:  Current Shift: No intake/output data recorded. Last three shifts: 08/30 0701 - 08/31 1900  In: 1300 [P.O.:1300]  Out: 800 [Urine:800]    Physical Exam:   Skin:  Extremities and face reveal no rashes. No jack erythema. HEENT: Sclerae anicteric. Extra-occular muscles are intact. No abnormal pigmentation of the lips. The neck is supple. Cardiovascular: Regular rate and rhythm. Respiratory:  Comfortable breathing with no accessory muscle use. GI:  Abdomen nondistended, soft, and nontender. No enlargement of the liver or spleen. No masses palpable. Rectal:  Deferred  Musculoskeletal:  Generalized weakness  Neurological:  Gross memory appears intact. Patient is alert and oriented. Psychiatric:  Mood appears appropriate with judgement intact. Lymphatic:  No visible adenopathy      Lab/Data Review:  Recent Results (from the past 24 hour(s))   METABOLIC PANEL, COMPREHENSIVE    Collection Time: 08/31/22  5:23 AM   Result Value Ref Range    Sodium 140 136 - 145 mmol/L    Potassium 3.7 3.5 - 5.1 mmol/L    Chloride 105 97 - 108 mmol/L    CO2 26 21 - 32 mmol/L    Anion gap 9 5 - 15 mmol/L    Glucose 78 65 - 100 mg/dL    BUN 8 6 - 20 mg/dL    Creatinine 0.40 (L) 0.55 - 1.02 mg/dL    BUN/Creatinine ratio 20 12 - 20      GFR est AA >60 >60 ml/min/1.73m2    GFR est non-AA >60 >60 ml/min/1.73m2    Calcium 7.9 (L) 8.5 - 10.1 mg/dL    Bilirubin, total 0.8 0.2 - 1.0 mg/dL    AST (SGOT) 12 (L) 15 - 37 U/L    ALT (SGPT) 21 12 - 78 U/L    Alk. phosphatase 99 45 - 117 U/L    Protein, total 5.4 (L) 6.4 - 8.2 g/dL    Albumin 2.6 (L) 3.5 - 5.0 g/dL    Globulin 2.8 2.0 - 4.0 g/dL    A-G Ratio 0.9 (L) 1.1 - 2.2     CBC WITH AUTOMATED DIFF    Collection Time: 08/31/22  5:23 AM   Result Value Ref Range    WBC 6.2 3.6 - 11.0 K/uL    RBC 2.48 (L) 3.80 - 5.20 M/uL    HGB 7.8 (L) 11.5 - 16.0 g/dL    HCT 24.9 (L) 35.0 - 47.0 %    .4 (H) 80.0 - 99.0 FL    MCH 31.5 26.0 - 34.0 PG    MCHC 31.3 30.0 - 36.5 g/dL    RDW 15.2 (H) 11.5 - 14.5 %    PLATELET 819 117 - 503 K/uL    MPV 9.4 8.9 - 12.9 FL    NRBC 0.0 0.0  WBC    ABSOLUTE NRBC 0.00 0.00 - 0.01 K/uL    NEUTROPHILS 72 32 - 75 %    LYMPHOCYTES 17 12 - 49 %    MONOCYTES 10 5 - 13 %    EOSINOPHILS 0 0 - 7 %    BASOPHILS 0 0 - 1 %    IMMATURE GRANULOCYTES 1 (H) 0 - 0.5 %    ABS. NEUTROPHILS 4.5 1.8 - 8.0 K/UL    ABS. LYMPHOCYTES 1.0 0.8 - 3.5 K/UL    ABS. MONOCYTES 0.6 0.0 - 1.0 K/UL    ABS. EOSINOPHILS 0.0 0.0 - 0.4 K/UL    ABS. BASOPHILS 0.0 0.0 - 0.1 K/UL    ABS. IMM. GRANS. 0.1 (H) 0.00 - 0.04 K/UL    DF AUTOMATED     IMMUNOGLOBULIN A    Collection Time: 08/31/22 10:20 AM   Result Value Ref Range    Immunoglobulin A 370 70 - 400 mg/dL              XR CHEST PORT   Final Result   Clear lungs. MRI [facility] SPINE W WO CONT   Final Result   Motion artifact limits examination. Multilevel disc degenerative change. Moderate to severe canal stenosis at T6-T7. Moderate to severe bilateral foraminal stenoses at T8-T9      CT HEAD WO CONT   Final Result   No acute intracranial abnormality.             IR FLUORO GUIDED NEEDLE PLACEMENT    (Results Pending)   IR FLUORO GUIDED NEEDLE PLACEMENT    (Results Pending)   XR SPINE LUMB 2 OR 3 V    (Results Pending)       Assessment:     Active Problems:    Weakness generalized (8/24/2022)      Severe protein-calorie malnutrition ([address] (8/29/2022)      Rapidly progressive weakness (8/31/2022)        Plan:   H-pylori      Pathology positive on 8/24 following EGD on 8/22/22      Continue Flagyl 500 mg BID x 14 days      Tetracycline 100 mg BID x 14 days       Pantoprazole 40 mg daily x 14 days       H-pylori re-check 8 weeks post completion of antibiotic therapy. 2. Dysphagia      2/2 Schatzki's ring      Dysphagia diet      Speech Pathology input appreciated  3. Difficulty ambulating      Neurology input appreciated      Unable to tolerate Lumbar puncture    GI signing off at this time. Please re consult if needed  Please follow up with GI as out patient for H-pylori monitoring. Thank you for allowing me to participate in this patients care. Plan discussed with Dr. Jennifer Hutchison and he approves.     Signed By: Diana Rai NP     August 31, 2022

## 2022-09-01 LAB
ALBUMIN SERPL-MCNC: 2.6 G/DL (ref 3.5–5)
ALBUMIN/GLOB SERPL: 0.7 {RATIO} (ref 1.1–2.2)
ALP SERPL-CCNC: 99 U/L (ref 45–117)
ALT SERPL-CCNC: 17 U/L (ref 12–78)
ANION GAP SERPL CALC-SCNC: 9 MMOL/L (ref 5–15)
AST SERPL W P-5'-P-CCNC: 11 U/L (ref 15–37)
BASOPHILS # BLD: 0 K/UL (ref 0–0.1)
BASOPHILS NFR BLD: 0 % (ref 0–1)
BILIRUB SERPL-MCNC: 0.8 MG/DL (ref 0.2–1)
BUN SERPL-MCNC: 6 MG/DL (ref 6–20)
BUN/CREAT SERPL: 19 (ref 12–20)
CA-I BLD-MCNC: 8 MG/DL (ref 8.5–10.1)
CHLORIDE SERPL-SCNC: 103 MMOL/L (ref 97–108)
CO2 SERPL-SCNC: 25 MMOL/L (ref 21–32)
CREAT SERPL-MCNC: 0.31 MG/DL (ref 0.55–1.02)
DIFFERENTIAL METHOD BLD: ABNORMAL
EOSINOPHIL # BLD: 0 K/UL (ref 0–0.4)
EOSINOPHIL NFR BLD: 0 % (ref 0–7)
ERYTHROCYTE [DISTWIDTH] IN BLOOD BY AUTOMATED COUNT: 15.2 % (ref 11.5–14.5)
GLOBULIN SER CALC-MCNC: 3.5 G/DL (ref 2–4)
GLUCOSE SERPL-MCNC: 75 MG/DL (ref 65–100)
HCT VFR BLD AUTO: 23.4 % (ref 35–47)
HGB BLD-MCNC: 7.4 G/DL (ref 11.5–16)
IMM GRANULOCYTES # BLD AUTO: 0.1 K/UL (ref 0–0.04)
IMM GRANULOCYTES NFR BLD AUTO: 1 % (ref 0–0.5)
LYMPHOCYTES # BLD: 1.1 K/UL (ref 0.8–3.5)
LYMPHOCYTES NFR BLD: 22 % (ref 12–49)
MCH RBC QN AUTO: 31.5 PG (ref 26–34)
MCHC RBC AUTO-ENTMCNC: 31.6 G/DL (ref 30–36.5)
MCV RBC AUTO: 99.6 FL (ref 80–99)
MONOCYTES # BLD: 0.5 K/UL (ref 0–1)
MONOCYTES NFR BLD: 10 % (ref 5–13)
NEUTS SEG # BLD: 3.5 K/UL (ref 1.8–8)
NEUTS SEG NFR BLD: 67 % (ref 32–75)
NRBC # BLD: 0 K/UL (ref 0–0.01)
NRBC BLD-RTO: 0 PER 100 WBC
PLATELET # BLD AUTO: 203 K/UL (ref 150–400)
PMV BLD AUTO: 9.2 FL (ref 8.9–12.9)
POTASSIUM SERPL-SCNC: 3.4 MMOL/L (ref 3.5–5.1)
PROT SERPL-MCNC: 6.1 G/DL (ref 6.4–8.2)
RBC # BLD AUTO: 2.35 M/UL (ref 3.8–5.2)
SODIUM SERPL-SCNC: 137 MMOL/L (ref 136–145)
WBC # BLD AUTO: 5.2 K/UL (ref 3.6–11)

## 2022-09-01 PROCEDURE — 74011250637 HC RX REV CODE- 250/637: Performed by: HOSPITALIST

## 2022-09-01 PROCEDURE — 74011250636 HC RX REV CODE- 250/636: Performed by: PSYCHIATRY & NEUROLOGY

## 2022-09-01 PROCEDURE — 74011250637 HC RX REV CODE- 250/637: Performed by: STUDENT IN AN ORGANIZED HEALTH CARE EDUCATION/TRAINING PROGRAM

## 2022-09-01 PROCEDURE — 97535 SELF CARE MNGMENT TRAINING: CPT

## 2022-09-01 PROCEDURE — 51798 US URINE CAPACITY MEASURE: CPT

## 2022-09-01 PROCEDURE — 85025 COMPLETE CBC W/AUTO DIFF WBC: CPT

## 2022-09-01 PROCEDURE — 84425 ASSAY OF VITAMIN B-1: CPT

## 2022-09-01 PROCEDURE — 74011250637 HC RX REV CODE- 250/637: Performed by: PSYCHIATRY & NEUROLOGY

## 2022-09-01 PROCEDURE — 74011250636 HC RX REV CODE- 250/636: Performed by: NURSE PRACTITIONER

## 2022-09-01 PROCEDURE — 74011250637 HC RX REV CODE- 250/637: Performed by: NURSE PRACTITIONER

## 2022-09-01 PROCEDURE — 65270000029 HC RM PRIVATE

## 2022-09-01 PROCEDURE — 97530 THERAPEUTIC ACTIVITIES: CPT

## 2022-09-01 PROCEDURE — 80053 COMPREHEN METABOLIC PANEL: CPT

## 2022-09-01 PROCEDURE — 36415 COLL VENOUS BLD VENIPUNCTURE: CPT

## 2022-09-01 RX ORDER — ATORVASTATIN CALCIUM 10 MG/1
10 TABLET, FILM COATED ORAL DAILY
Status: DISCONTINUED | OUTPATIENT
Start: 2022-09-01 | End: 2022-09-02 | Stop reason: HOSPADM

## 2022-09-01 RX ADMIN — PANTOPRAZOLE SODIUM 40 MG: 40 TABLET, DELAYED RELEASE ORAL at 08:25

## 2022-09-01 RX ADMIN — DOXYCYCLINE HYCLATE 100 MG: 100 CAPSULE ORAL at 08:25

## 2022-09-01 RX ADMIN — METRONIDAZOLE 500 MG: 500 TABLET ORAL at 20:54

## 2022-09-01 RX ADMIN — FOLIC ACID 1 MG: 1 TABLET ORAL at 09:00

## 2022-09-01 RX ADMIN — ATORVASTATIN CALCIUM 10 MG: 10 TABLET, FILM COATED ORAL at 08:26

## 2022-09-01 RX ADMIN — OXYCODONE HYDROCHLORIDE AND ACETAMINOPHEN 1 TABLET: 5; 325 TABLET ORAL at 20:54

## 2022-09-01 RX ADMIN — IMMUNE GLOBULIN (HUMAN) 35 G: 10 INJECTION INTRAVENOUS; SUBCUTANEOUS at 11:47

## 2022-09-01 RX ADMIN — PREGABALIN 100 MG: 50 CAPSULE ORAL at 16:21

## 2022-09-01 RX ADMIN — PREGABALIN 100 MG: 50 CAPSULE ORAL at 08:25

## 2022-09-01 RX ADMIN — FERROUS SULFATE TAB 325 MG (65 MG ELEMENTAL FE) 325 MG: 325 (65 FE) TAB at 08:25

## 2022-09-01 RX ADMIN — METRONIDAZOLE 500 MG: 500 TABLET ORAL at 08:25

## 2022-09-01 RX ADMIN — METOPROLOL SUCCINATE 25 MG: 50 TABLET, EXTENDED RELEASE ORAL at 08:25

## 2022-09-01 RX ADMIN — DOXYCYCLINE HYCLATE 100 MG: 100 CAPSULE ORAL at 20:53

## 2022-09-01 RX ADMIN — DOCUSATE SODIUM 200 MG: 100 CAPSULE, LIQUID FILLED ORAL at 20:54

## 2022-09-01 RX ADMIN — OXYCODONE HYDROCHLORIDE AND ACETAMINOPHEN 1 TABLET: 5; 325 TABLET ORAL at 05:06

## 2022-09-01 RX ADMIN — OXYCODONE HYDROCHLORIDE AND ACETAMINOPHEN 1 TABLET: 5; 325 TABLET ORAL at 16:21

## 2022-09-01 RX ADMIN — OXYCODONE HYDROCHLORIDE AND ACETAMINOPHEN 1 TABLET: 5; 325 TABLET ORAL at 11:03

## 2022-09-01 RX ADMIN — AMLODIPINE BESYLATE 5 MG: 5 TABLET ORAL at 08:25

## 2022-09-01 RX ADMIN — ENOXAPARIN SODIUM 40 MG: 100 INJECTION SUBCUTANEOUS at 08:26

## 2022-09-01 RX ADMIN — PREGABALIN 100 MG: 50 CAPSULE ORAL at 20:53

## 2022-09-01 NOTE — PROGRESS NOTES
Problem: Self Care Deficits Care Plan (Adult)  Goal: *Acute Goals and Plan of Care (Insert Text)  Description: Pt will be supervision/set-up self feeding  Pt will be mod A sup<->sit in prep for EOB ADL's  Pt will be mod A  LB dressing EOB level  Pt will be supervision/set-up  sit EOB 10 minutes in prep for EOB ADL's  Pt will be supervision/set-up  grooming EOB level  Pt will be mod A  sit<-> prep for toilet transfer  Pt will be MI kaity UE HEP in prep for self care tasks  Outcome: Progressing Towards Goal   OCCUPATIONAL THERAPY TREATMENT  Patient: Deisy Lima (32 y.o. female)  Date: 9/1/2022  Diagnosis: Weakness generalized [R53.1]  Rapidly progressive weakness [R53.1] <principal problem not specified>      Precautions: Fall  Chart, occupational therapy assessment, plan of care, and goals were reviewed. ASSESSMENT  Patient continues with skilled OT services and is progressing towards goals. Patient received semi supine in bed upon PONCE/PTA arrival and agreeable to OT session with much encouragement. Pt soiled self and req'd max A x2 to roll R/L using bed rail, TA for toileting hygiene (bladder leakage), lauren pad and pure wick. In long sitting, pt req'd max A for UB dressing (new gown) and  grooming (washed face) as pt unable to complete task d/t decreased UE motor planning and decreased activity tolerance. Patient completed bed mobility, supine <> sit, and scooting to EOB with max A x2 with additional time d/t decreased UB/LB function. Pt sat EOB for ~2 minutes with poor static/dynamic sitting balance. Pt continues to present with deficits in generalized strength/AROM, sitting balance, functional activity tolerance, decreased motor planning and FM and GM coordination impacting overall performance of ADLs and functional transfers/mobility. Pt requires increased assistance since returning from last hospital stay. Pt returned to bed, with call bell, nursing, bed alarm set and all needs met.  Patient would benefit from continued skilled Occupational services while at [facility] in order to address current impairments and improve IND and safety with self care and functional transfer/mobility. Current OT d/c recommendation to IRF once medically appropriate. Current Level of Function Impacting Discharge (ADLs): max A for UB dressing/TA toileting hygiene/max A grooming     Other factors to consider for discharge: Time of onset, medical prognosis/diagnosis, severity of deficits, PLOF, functional baseline, home environment, and family support              PLAN :  Patient continues to benefit from skilled intervention to address the above impairments. Continue treatment per established plan of care. to address goals. Recommend with staff: Bed mobility to relieve pressure sores     Recommend next OT session: EOB grooming    Recommendation for discharge: (in order for the patient to meet his/her long term goals)  Inpatient Rehabilitation Facility     This discharge recommendation:  Has been made in collaboration with the attending provider and/or case management    IF patient discharges home will need the following DME: TBD       SUBJECTIVE:   Patient stated My legs are burning.     OBJECTIVE DATA SUMMARY:   Cognitive/Behavioral Status:  Neurologic State: Alert;Irritable;Drowsy  Orientation Level: Oriented X4  Cognition: Follows commands             Functional Mobility and Transfers for ADLs:  Bed Mobility:  Rolling: Maximum assistance;Assist x2; Additional time  Supine to Sit: Maximum assistance;Assist x2; Additional time  Sit to Supine: Maximum assistance;Assist x2; Additional time  Scooting: Maximum assistance;Assist x2    Transfers:             Balance:  Sitting: Impaired; With support  Sitting - Static: Poor (constant support)  Sitting - Dynamic: Poor (constant support)    ADL Intervention:       Grooming  Grooming Assistance: Maximum assistance  Position Performed: Long sitting on bed  Washing Face: Maximum assistance       Upper Body Dressing Assistance  Dressing Assistance: Maximum assistance  Hospital Gown: Maximum assistance         Toileting  Toileting Assistance: Total assistance(dependent)  Bladder Hygiene: Total assistance (dependent)       Pain:  8/10    Activity Tolerance:   Fair and requires frequent rest breaks  Please refer to the flowsheet for vital signs taken during this treatment. After treatment patient left in no apparent distress:   Bed locked and in lowest position, Supine in bed, Call bell within reach, Bed / chair alarm activated, and Side rails x 3    COMMUNICATION/COLLABORATION:   The patients plan of care was discussed with: Physical therapy assistant and Registered nurse.  Co-tx with PTA for increased pt/clinician safety with OOB activity     ROSARIO Nielson  Time Calculation: 34 mins

## 2022-09-01 NOTE — PROGRESS NOTES
Hematology/Oncology   Progress Note    Patient: Sophia Martines MRN: [id number]     YOB: 1970  Age: 47 y.o. Sex: female      Admit Date: 8/27/2022    LOS: 1 day     Chief Complaint: Admitted with bilateral upper and lower extremity weakness    Subjective:     Generalized weakness persists. Constitutional No fevers, chills, night sweats, excessive fatigue or weight loss. Allergic/Immunologic No recent allergic reactions   Eyes No significant visual difficulties. No diplopia. ENMT No problems with hearing, no sore throat, no sinus drainage. Endocrine No hot flashes or night sweats. No cold intolerance, polyuria, or polydipsia   Hematologic/Lymphatic No easy bruising or bleeding. The patient denies any tender or palpable lymph nodes   Breasts No abnormal masses of breast, nipple discharge or pain. Respiratory No dyspnea on exertion, orthopnea, chest pain, cough or hemoptysis. Cardiovascular No anginal chest pain, irregular heart beat, tachycardia, palpitations or orthopnea. Gastrointestinal No nausea, vomiting, diarrhea, constipation, cramping, dysphagia, reflux, heartburn, GI bleeding, or early satiety. No change in bowel habits. Genitourinary (M) No hematuria, dysuria, increased frequency, urgency, hesitancy or incontinence. Musculoskeletal No joint pain, swelling or redness. No decreased range of motion. Integumentary No chronic rashes, inflammation, ulcerations, pruritus, petechiae, purpura, ecchymoses, or skin changes. Neurologic No headache, blurred vision, and no areas of focal weakness or numbness. Normal gait. No sensory problems. Psychiatric No insomnia, depression, laurie or mood swings. No psychotropic drugs.         Current Facility-Administered Medications:     atorvastatin (LIPITOR) tablet 10 mg, 10 mg, Oral, DAILY, Coty Bravo PA, 10 mg at 09/01/22 0826    immune globulin 10% (GAMUNEX-C) infusion 35 g, 35 g, IntraVENous, DAILY, Aleisha Dixon MD, Last Rate: 440 mL/hr at 09/01/22 1315, Rate Change at 09/01/22 1315    hydrALAZINE (APRESOLINE) 20 mg/mL injection 10 mg, 10 mg, IntraVENous, QID PRN, Coty Bravo PA    zinc oxide-white petrolatum 20-75 % topical paste, , Topical, PRN, Kayy Dove MD    oxyCODONE-acetaminophen (PERCOCET) 5-325 mg per tablet 1 Tablet, 1 Tablet, Oral, Q4H PRN, Esther Wong MD, 1 Tablet at 09/01/22 1103    ferrous sulfate tablet 325 mg, 1 Tablet, Oral, DAILY WITH BREAKFAST, Emelina Fernando PA-C, 325 mg at 09/01/22 0825    calcium carbonate (TUMS) chewable tablet 200 mg [elemental], 200 mg, Oral, TID PRN, Shayy Martinez MD, 200 mg at 47/22/55 2736    folic acid (FOLVITE) tablet 1 mg, 1 mg, Oral, DAILY, Suraj Arias MD, 1 mg at 09/01/22 0900    sodium chloride (NS) flush 5-40 mL, 5-40 mL, IntraVENous, PRN, Scott Olson NP    acetaminophen (TYLENOL) tablet 650 mg, 650 mg, Oral, Q6H PRN **OR** acetaminophen (TYLENOL) suppository 650 mg, 650 mg, Rectal, Q6H PRN, Scott Olson NP    polyethylene glycol (MIRALAX) packet 17 g, 17 g, Oral, DAILY PRN, Scott Olson NP    ondansetron (ZOFRAN ODT) tablet 4 mg, 4 mg, Oral, Q8H PRN **OR** ondansetron (ZOFRAN) injection 4 mg, 4 mg, IntraVENous, Q6H PRN, Scott Olson NP    enoxaparin (LOVENOX) injection 40 mg, 40 mg, SubCUTAneous, DAILY, Paola Davison NP, 40 mg at 09/01/22 0826    amLODIPine (NORVASC) tablet 5 mg, 5 mg, Oral, DAILY, Paola Davison NP, 5 mg at 09/01/22 0825    baclofen (LIORESAL) tablet 10 mg, 10 mg, Oral, BID PRN, Scott Olson, RENEE, 10 mg at 08/29/22 8837    docusate sodium (COLACE) capsule 200 mg, 200 mg, Oral, QHS, Bugg, Marylene Spice, NP, 200 mg at 08/31/22 2106    metoprolol succinate (TOPROL-XL) XL tablet 25 mg, 25 mg, Oral, DAILY, Paola Davison NP, 25 mg at 09/01/22 0825    metroNIDAZOLE (FLAGYL) tablet 500 mg, 500 mg, Oral, BID, Coty Bravo, PA, 500 mg at 09/01/22 0825    pregabalin (LYRICA) capsule 100 mg, 100 mg, Oral, TID, Laney, Nikole Summers NP, 100 mg at 09/01/22 0825    pantoprazole (PROTONIX) tablet 40 mg, 40 mg, Oral, DAILY, Coty Bravo PA, 40 mg at 09/01/22 0825    doxycycline (VIBRAMYCIN) capsule 100 mg, 100 mg, Oral, Q12H, Coty Bravo PA, 100 mg at 09/01/22 0825    nitroglycerin (NITROSTAT) tablet 0.4 mg, 0.4 mg, SubLINGual, PRN, Jignesh Yee MD, 0.4 mg at 08/29/22 0944     Objective:     Vitals:    08/31/22 2001 09/01/22 0234 09/01/22 0804 09/01/22 0810   BP: 110/71 (!) 134/95 131/87    Pulse: 98 100 100    Resp: 16 16 16    Temp: 97.7 °F (36.5 °C) 98 °F (36.7 °C) 97.6 °F (36.4 °C)    SpO2: 100% 96% 99% 99%   Weight:       Height:              Physical Exam:   Constitutional Alert, cooperative, oriented. Mood and affect appropriate. Appears close to chronological age. Well nourished. Well developed. Head Normocephalic; no scars   Eyes Conjunctivae and sclerae are clear and without icterus. Pupils are reactive and equal.   ENMT Sinuses are nontender. No oral exudates, ulcers, masses, thrush or mucositis. Oropharynx clear. Tongue normal.   Neck Supple without masses or thyromegaly. No jugular venous distension. Hematologic/Lymphatic No petechiae or purpura. No tender or palpable lymph nodes in the cervical, supraclavicular, axillary or inguinal area. Respiratory Lungs are clear to auscultation without rhonchi or wheezing. Cardiovascular Regular rate and rhythm of heart without murmurs, gallops or rubs. Chest / Line Site Chest is symmetric with no chest wall deformities. Abdomen Non-tender, non-distended, no masses, ascites or hepatosplenomegaly. Good bowel sounds. No guarding or rebound tenderness. No pulsatile masses. Musculoskeletal No tenderness or swelling, normal range of motion without obvious weakness. Extremities No visible deformities, no cyanosis, clubbing or edema. Skin No rashes, scars, or lesions suggestive of malignancy. No petechiae, purpura, or ecchymoses. No excoriations. Neurologic No sensory or motor deficits, normal cerebellar function, normal gait, cranial nerves intact. Psychiatric Alert and oriented times three. Coherent speech. Verbalizes understanding of our discussions today. Lab/Data Review:  Recent Labs     09/01/22 0452 08/31/22 0523 08/30/22 0924   WBC 5.2 6.2 6.3   HGB 7.4* 7.8* 8.2*   HCT 23.4* 24.9* 26.5*    186 194     Recent Labs     09/01/22 0452 08/31/22 0523 08/30/22 0924    140 138   K 3.4* 3.7 3.1*    105 103   CO2 25 26 28   GLU 75 78 83   BUN 6 8 7   CREA 0.31* 0.40* 0.51*   CA 8.0* 7.9* 7.9*   ALB 2.6* 2.6* 2.8*   TBILI 0.8 0.8 0.9   ALT 17 21 28     No results for input(s): PH, PCO2, PO2, HCO3, FIO2 in the last 72 hours. Recent Results (from the past 24 hour(s))   METABOLIC PANEL, COMPREHENSIVE    Collection Time: 09/01/22  4:52 AM   Result Value Ref Range    Sodium 137 136 - 145 mmol/L    Potassium 3.4 (L) 3.5 - 5.1 mmol/L    Chloride 103 97 - 108 mmol/L    CO2 25 21 - 32 mmol/L    Anion gap 9 5 - 15 mmol/L    Glucose 75 65 - 100 mg/dL    BUN 6 6 - 20 mg/dL    Creatinine 0.31 (L) 0.55 - 1.02 mg/dL    BUN/Creatinine ratio 19 12 - 20      GFR est AA >60 >60 ml/min/1.73m2    GFR est non-AA >60 >60 ml/min/1.73m2    Calcium 8.0 (L) 8.5 - 10.1 mg/dL    Bilirubin, total 0.8 0.2 - 1.0 mg/dL    AST (SGOT) 11 (L) 15 - 37 U/L    ALT (SGPT) 17 12 - 78 U/L    Alk.  phosphatase 99 45 - 117 U/L    Protein, total 6.1 (L) 6.4 - 8.2 g/dL    Albumin 2.6 (L) 3.5 - 5.0 g/dL    Globulin 3.5 2.0 - 4.0 g/dL    A-G Ratio 0.7 (L) 1.1 - 2.2     CBC WITH AUTOMATED DIFF    Collection Time: 09/01/22  4:52 AM   Result Value Ref Range    WBC 5.2 3.6 - 11.0 K/uL    RBC 2.35 (L) 3.80 - 5.20 M/uL    HGB 7.4 (L) 11.5 - 16.0 g/dL    HCT 23.4 (L) 35.0 - 47.0 %    MCV 99.6 (H) 80.0 - 99.0 FL    MCH 31.5 26.0 - 34.0 PG    MCHC 31.6 30.0 - 36.5 g/dL    RDW 15.2 (H) 11.5 - 14.5 %    PLATELET 108 015 - 455 K/uL    MPV 9.2 8.9 - 12.9 FL    NRBC 0.0 0.0  WBC ABSOLUTE NRBC 0.00 0.00 - 0.01 K/uL    NEUTROPHILS 67 32 - 75 %    LYMPHOCYTES 22 12 - 49 %    MONOCYTES 10 5 - 13 %    EOSINOPHILS 0 0 - 7 %    BASOPHILS 0 0 - 1 %    IMMATURE GRANULOCYTES 1 (H) 0 - 0.5 %    ABS. NEUTROPHILS 3.5 1.8 - 8.0 K/UL    ABS. LYMPHOCYTES 1.1 0.8 - 3.5 K/UL    ABS. MONOCYTES 0.5 0.0 - 1.0 K/UL    ABS. EOSINOPHILS 0.0 0.0 - 0.4 K/UL    ABS. BASOPHILS 0.0 0.0 - 0.1 K/UL    ABS. IMM. GRANS. 0.1 (H) 0.00 - 0.04 K/UL    DF AUTOMATED          Radiology:   CT Results  (Last 48 hours)      None             Assessment and Plan: Active Problems:    Weakness generalized (8/24/2022)      Severe protein-calorie malnutrition ([address] (8/29/2022)      Rapidly progressive weakness (8/31/2022)  Monoclonal gammopathy:  -SPEP done last week showed a small M spike of 0.6  -most likely indicates monoclonal gammopathy. However, we will proceed with further further work-up including immunofixation as well as serum light chains to complete work-up: pending      Anemia:  -Hemoglobin is lower at 7.4 today.  -Work-up done at the last visit showed a low B12 level as well as elevated intrinsic factor antibody suggesting pernicious anemia  -Agree with parenteral B12 supplementation. B12 level is now normal.  -CRP was elevated at last visit suggesting possible inflammatory etiology for anemia as well  -Work-up will be sent as above for further evaluation  -LDH was normal, haptoglobin was unremarkable    Polyneuropathy:  -Neurology following  -LP under fluoroscopy was unsuccessful as well.  -On IVIG per neurology.       Signed By: Deja Benoit MD     September 1, 2022

## 2022-09-01 NOTE — PROGRESS NOTES
NEUROLOGY CONSULT    Name Awilda Levine Age 47 y.o. MRN [id number]  1970     Referring Physician: Rachelle Meraz NP      Chief Complaint: Inability to ambulate     Ms. Getachew Rubio is a 47 y.o. -American female who was recently discharged from the hospital after being evaluated for the same complaint. She had an MRI of the brain and the C-spine which were unremarkable to explain her weakness. The recommendation was to do an LP and attempt was made to do the LP at the floor which was failed and then she was scheduled to do the LP under fluoroscopy but she kept refusing and after several days of refusal she was discharged home and even refused to go to rehab. She was unable to take care of herself at home and came back here for readmission. At the time of my evaluation, Ms. Getachew Rubio was awake and alert. No changes from yesterday noted. Remained weak in the extremities mostly in the lower extremity bilaterally. She was getting ready to work with physical therapy. Nurse at bedside. Assessment and Plan:  Difficulty ambulating: The patient continues to have difficulty walking. She has decree sensations in both the lower extremities from groins down to level of T10 or higher with no reflexes in the lower extremities or the upper either. Previously she had an MRI of the brain and C-spine which did not reveal any pathology. MRI thoracic spine showed midthoracic spinal stenosis but no obvious cord compression and her symptoms are not consistent with myelopathy. She has agreed to lumbar puncture at this time. Fluoroscopy guided lumbar puncture ordered as previously she has failed bedside. Looking for albumin no cytologic dissociation to support diagnosis of CIDP versus any infectious or inflammatory etiology for her symptoms. Lp via fluoroscopy guided was attempted and failed. Will followed up with that outpatient. 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  Patient is receiving 2nd dose today.  Next dose of subcu vitamin B12 injection on 8/29 and continue weekly for 2 more weeks and continue folate 0.8 mcg daily. Will also check vitamin B 1  Consultation for hematology for elevated M protein on labs. Seen by hematology  Elevated SSA antibodies. Will consult with rheumatology outpatient  7.  Pt/OT    Thank you for the consult,  Jamel Laird NP    Collaborating Physician  Dr. Ashley Sommers   Allergen Reactions    Codeine Nausea Only           Current Facility-Administered Medications   Medication Dose Route Frequency    lidocaine (PF) (XYLOCAINE) 20 mg/mL (2 %) injection 100 mg  100 mg IntraVENous ONCE    hydrALAZINE (APRESOLINE) 20 mg/mL injection 10 mg  10 mg IntraVENous QID PRN    zinc oxide-white petrolatum 20-75 % topical paste   Topical PRN    oxyCODONE-acetaminophen (PERCOCET) 5-325 mg per tablet 1 Tablet  1 Tablet Oral Q4H PRN    ferrous sulfate tablet 325 mg  1 Tablet Oral DAILY WITH BREAKFAST    calcium carbonate (TUMS) chewable tablet 200 mg [elemental]  200 mg Oral TID PRN    folic acid (FOLVITE) tablet 1 mg  1 mg Oral DAILY    sodium chloride (NS) flush 5-40 mL  5-40 mL IntraVENous PRN    acetaminophen (TYLENOL) tablet 650 mg  650 mg Oral Q6H PRN    Or    acetaminophen (TYLENOL) suppository 650 mg  650 mg Rectal Q6H PRN    polyethylene glycol (MIRALAX) packet 17 g  17 g Oral DAILY PRN    ondansetron (ZOFRAN ODT) tablet 4 mg  4 mg Oral Q8H PRN    Or    ondansetron (ZOFRAN) injection 4 mg  4 mg IntraVENous Q6H PRN    enoxaparin (LOVENOX) injection 40 mg  40 mg SubCUTAneous DAILY    amLODIPine (NORVASC) tablet 5 mg  5 mg Oral DAILY    baclofen (LIORESAL) tablet 10 mg  10 mg Oral BID PRN    docusate sodium (COLACE) capsule 200 mg  200 mg Oral QHS    metoprolol succinate (TOPROL-XL) XL tablet 25 mg  25 mg Oral DAILY    metroNIDAZOLE (FLAGYL) tablet 500 mg  500 mg Oral BID    pregabalin (LYRICA) capsule 100 mg  100 mg Oral TID    pantoprazole (PROTONIX) tablet 40 mg  40 mg Oral DAILY    doxycycline (VIBRAMYCIN) capsule 100 mg  100 mg Oral Q12H    nitroglycerin (NITROSTAT) tablet 0.4 mg  0.4 mg SubLINGual PRN       Past Medical History:   Diagnosis Date    Anxiety     CAD (coronary artery disease)     Fibromyalgia     Hypertension     Neuropathy     Peripheral artery disease (HCC)     Psychiatric disorder     anxiety        Social History     Tobacco Use    Smoking status: Every Day     Packs/day: 0.50     Types: Cigarettes    Smokeless tobacco: Never   Substance Use Topics    Alcohol use: Yes     Comment: occosionally     Drug use: Not Currently            Exam  Visit Vitals  /82 (BP 1 Location: Left upper arm, BP Patient Position: Lying)   Pulse 83   Temp 98.3 °F (36.8 °C)   Resp 18   Ht 5' 11\" (1.803 m)   Wt 119.3 kg (263 lb)   SpO2 99%   BMI 36.68 kg/m²         NeurologicExam:  Mental Status: Alert and oriented to person place and time   Speech: Fluent no aphasia or dysarthria. Cranial Nerves:   Pupils are equal round and reactive to light and accommodation, extraocular movements are intact and full, visual fields are intact by confrontation, no nystagmus noted, face is symmetric, sensation in face is intact and symmetric, hearing is intact and symmetric, tongue and uvula are in midline with normal movements, palate is elevating equally, shoulder shrug is intact and symmetric. Motor:  Right upper extremity: 4-/5 throughout, left upper extremity 4-/5throughout, 3 out of 5 throughout in lower extremities   Reflexes:   Deep tendon reflexes 0/4 and symmetric. Sensory:   Decrease sensation from groin down, absent proprioception in both legs   Gait:  Gait is deferred   Tremor:   No tremor noted.    Cerebellar:  Difficult to test finger-to-nose given upper extremity weakness          Lab Review  Lab Results   Component Value Date/Time    WBC 6.5 08/27/2022 10:45 AM    HCT 25.8 (L) 08/27/2022 10:45 AM    HGB 8.2 (L) 08/27/2022 10:45 AM    PLATELET 957 08/22/1125 10:45 AM     Lab Results   Component Value Date/Time    Sodium 142 08/27/2022 10:45 AM    Potassium 3.5 08/27/2022 10:45 AM    Chloride 106 08/27/2022 10:45 AM    CO2 30 08/27/2022 10:45 AM    Glucose 80 08/27/2022 10:45 AM    BUN 14 08/27/2022 10:45 AM    Creatinine 0.57 08/27/2022 10:45 AM    Calcium 8.2 (L) 08/27/2022 10:45 AM     Lab Results   Component Value Date/Time    Vitamin B12 198 08/20/2022 11:46 AM    Vitamin B12 246 08/20/2022 11:46 AM    Folate 1.7 (L) 08/20/2022 11:46 AM    Folate 2.1 (L) 08/20/2022 11:46 AM     No results found for: LDL, LDLC, DLDLP  No results found for: HBA1C, LGD6LIBH, VEH1EBNS, YUZ0EXPR  No components found for: TROPQUANT  No results found for: LENIN

## 2022-09-01 NOTE — PROGRESS NOTES
Hospitalist Progress Note    Subjective:   Daily Progress Note: 9/1/2022 9:14 AM    Patient admitted that arms sometimes moves without her wanting them to. Current Facility-Administered Medications   Medication Dose Route Frequency    immune globulin 10% (GAMUNEX-C) infusion 35 g  35 g IntraVENous DAILY    hydrALAZINE (APRESOLINE) 20 mg/mL injection 10 mg  10 mg IntraVENous QID PRN    zinc oxide-white petrolatum 20-75 % topical paste   Topical PRN    oxyCODONE-acetaminophen (PERCOCET) 5-325 mg per tablet 1 Tablet  1 Tablet Oral Q4H PRN    ferrous sulfate tablet 325 mg  1 Tablet Oral DAILY WITH BREAKFAST    calcium carbonate (TUMS) chewable tablet 200 mg [elemental]  200 mg Oral TID PRN    folic acid (FOLVITE) tablet 1 mg  1 mg Oral DAILY    sodium chloride (NS) flush 5-40 mL  5-40 mL IntraVENous PRN    acetaminophen (TYLENOL) tablet 650 mg  650 mg Oral Q6H PRN    Or    acetaminophen (TYLENOL) suppository 650 mg  650 mg Rectal Q6H PRN    polyethylene glycol (MIRALAX) packet 17 g  17 g Oral DAILY PRN    ondansetron (ZOFRAN ODT) tablet 4 mg  4 mg Oral Q8H PRN    Or    ondansetron (ZOFRAN) injection 4 mg  4 mg IntraVENous Q6H PRN    [Held by provider] enoxaparin (LOVENOX) injection 40 mg  40 mg SubCUTAneous DAILY    amLODIPine (NORVASC) tablet 5 mg  5 mg Oral DAILY    baclofen (LIORESAL) tablet 10 mg  10 mg Oral BID PRN    docusate sodium (COLACE) capsule 200 mg  200 mg Oral QHS    metoprolol succinate (TOPROL-XL) XL tablet 25 mg  25 mg Oral DAILY    metroNIDAZOLE (FLAGYL) tablet 500 mg  500 mg Oral BID    pregabalin (LYRICA) capsule 100 mg  100 mg Oral TID    pantoprazole (PROTONIX) tablet 40 mg  40 mg Oral DAILY    doxycycline (VIBRAMYCIN) capsule 100 mg  100 mg Oral Q12H    nitroglycerin (NITROSTAT) tablet 0.4 mg  0.4 mg SubLINGual PRN        Review of Systems  Review of Systems   Constitutional: Negative. Respiratory: Negative. Cardiovascular: Negative. Gastrointestinal: Negative.     Musculoskeletal: Positive for myalgias. Neurological:  Positive for tingling and weakness. Psychiatric/Behavioral:  The patient is nervous/anxious. All other systems reviewed and are negative. Objective:     Visit Vitals  BP (!) 134/95   Pulse 100   Temp 98 °F (36.7 °C)   Resp 16   Ht 5' 11\" (1.803 m)   Wt 119.3 kg (263 lb)   SpO2 96%   BMI 36.68 kg/m²      O2 Device: None (Room air)    Temp (24hrs), Av.6 °F (37 °C), Min:97.7 °F (36.5 °C), Max:99.3 °F (37.4 °C)      No intake/output data recorded.  1901 -  0700  In: 400 [P.O.:400]  Out: 450 [Urine:450]    Recent Results (from the past 24 hour(s))   IMMUNOGLOBULIN A    Collection Time: 22 10:20 AM   Result Value Ref Range    Immunoglobulin A 370 70 - 874 mg/dL   METABOLIC PANEL, COMPREHENSIVE    Collection Time: 22  4:52 AM   Result Value Ref Range    Sodium 137 136 - 145 mmol/L    Potassium 3.4 (L) 3.5 - 5.1 mmol/L    Chloride 103 97 - 108 mmol/L    CO2 25 21 - 32 mmol/L    Anion gap 9 5 - 15 mmol/L    Glucose 75 65 - 100 mg/dL    BUN 6 6 - 20 mg/dL    Creatinine 0.31 (L) 0.55 - 1.02 mg/dL    BUN/Creatinine ratio 19 12 - 20      GFR est AA >60 >60 ml/min/1.73m2    GFR est non-AA >60 >60 ml/min/1.73m2    Calcium 8.0 (L) 8.5 - 10.1 mg/dL    Bilirubin, total 0.8 0.2 - 1.0 mg/dL    AST (SGOT) 11 (L) 15 - 37 U/L    ALT (SGPT) 17 12 - 78 U/L    Alk.  phosphatase 99 45 - 117 U/L    Protein, total 6.1 (L) 6.4 - 8.2 g/dL    Albumin 2.6 (L) 3.5 - 5.0 g/dL    Globulin 3.5 2.0 - 4.0 g/dL    A-G Ratio 0.7 (L) 1.1 - 2.2     CBC WITH AUTOMATED DIFF    Collection Time: 22  4:52 AM   Result Value Ref Range    WBC 5.2 3.6 - 11.0 K/uL    RBC 2.35 (L) 3.80 - 5.20 M/uL    HGB 7.4 (L) 11.5 - 16.0 g/dL    HCT 23.4 (L) 35.0 - 47.0 %    MCV 99.6 (H) 80.0 - 99.0 FL    MCH 31.5 26.0 - 34.0 PG    MCHC 31.6 30.0 - 36.5 g/dL    RDW 15.2 (H) 11.5 - 14.5 %    PLATELET 575 153 - 486 K/uL    MPV 9.2 8.9 - 12.9 FL    NRBC 0.0 0.0  WBC    ABSOLUTE NRBC 0.00 0.00 - 0.01 K/uL    NEUTROPHILS 67 32 - 75 %    LYMPHOCYTES 22 12 - 49 %    MONOCYTES 10 5 - 13 %    EOSINOPHILS 0 0 - 7 %    BASOPHILS 0 0 - 1 %    IMMATURE GRANULOCYTES 1 (H) 0 - 0.5 %    ABS. NEUTROPHILS 3.5 1.8 - 8.0 K/UL    ABS. LYMPHOCYTES 1.1 0.8 - 3.5 K/UL    ABS. MONOCYTES 0.5 0.0 - 1.0 K/UL    ABS. EOSINOPHILS 0.0 0.0 - 0.4 K/UL    ABS. BASOPHILS 0.0 0.0 - 0.1 K/UL    ABS. IMM. GRANS. 0.1 (H) 0.00 - 0.04 K/UL    DF AUTOMATED          XR SPINE LUMB 2 OR 3 V   Final Result   Lumbar puncture aborted due to excessive patient movement. Hospitalist team   notified. XR CHEST PORT   Final Result   Clear lungs. MRI [address] SPINE W WO CONT   Final Result   Motion artifact limits examination. Multilevel disc degenerative change. Moderate to severe canal stenosis at T6-T7. Moderate to severe bilateral foraminal stenoses at T8-T9      CT HEAD WO CONT   Final Result   No acute intracranial abnormality. IR FLUORO GUIDED NEEDLE PLACEMENT    (Results Pending)   IR FLUORO GUIDED NEEDLE PLACEMENT    (Results Pending)        PHYSICAL EXAM:    Physical Exam  Vitals and nursing note reviewed. Constitutional:       Appearance: She is obese. She is ill-appearing. HENT:      Head: Normocephalic and atraumatic. Neck:      Vascular: No carotid bruit. Cardiovascular:      Rate and Rhythm: Normal rate and regular rhythm. Pulmonary:      Effort: No respiratory distress. Breath sounds: No wheezing. Chest:      Chest wall: No tenderness. Abdominal:      General: Bowel sounds are normal.      Palpations: Abdomen is soft. Musculoskeletal:      Right lower leg: No edema. Left lower leg: No edema. Comments: Bilateral LE: 3/5 strength  Billateral UE: 4/5 strength   Skin:     General: Skin is warm. Capillary Refill: Capillary refill takes less than 2 seconds. Neurological:      Mental Status: She is alert and oriented to person, place, and time.       Sensory: Sensory deficit present. Motor: Weakness present. Gait: Gait abnormal.      Comments: BLE deep tendon reflexes: 0/4 symmetrically decreased sensations from groin down to level of T10 or higher. BLE sensation: decreased from groin down, absent proprioception in both legs   Psychiatric:      Comments: Subdued affect        Assessment/Plan:     Active Problems:    Weakness generalized (8/24/2022)      Severe protein-calorie malnutrition ([address] (8/29/2022)      Rapidly progressive weakness (8/31/2022)      Hospital Course:    Sam Casas is a 47year old female with a PMH of anxiety, CAD, fibromyalgia, hypertension, PVD and weakness who presented with worsening weakness and inability to ambulate. Of note outpatient and inpatient follow-up most recently discharged on 8/19 EGD showed Schatzki's ring distal third of esophagus and chronic gastritis without bleeding biopsy revealed H. pylori was started on tetracycline and Flagyl for 14 days. In ED vital signs unremarkable. Initial labs significant for hemoglobin of 8.2. CT of the head negative for acute intracranial abnormality. 8/22 MRI of the brain with no acute or significant intracranial abnormality. MRI of the thoracic spine showed multilevel disc degenerative change, moderate to severe canal stenosis at T6-T7 and moderate to severe bilateral for minimal stenosis at T8-T9. Patient admitted for further work-up and placement. Neurology, GI, and hematology consulted. Patient with persistent chest tightness requiring nitrostat, cardiology consulted. Echo with EF of 55 to 60% with normal diastolic function. Cardiology recommending to have outpatient follow-up. Bedside LP attempts unsuccessful secondary to patient intolerance for pain. LP by fluoroscopy pending. For suspected CIDP will start 2g/kg divided over 3 days of IVIG per neurology. Progressive weakness/neuropathy  Unclear etiology, CIDP, autoimmune, demyelinating process?   Intrinsic factor AB elevated, copper and zinc levels within normal limits  Continue on folic acid, U49, and Lyrica    Multilevel cervical spinal stenosis  MRI of the thoracic spine showed multilevel disc degenerative change, moderate to severe canal stenosis at T6-T7 and moderate to severe bilateral for minimal stenosis at T8-T9  LP failed at bedside x2 and by fluroscopy will be followed up with outpatient  PT/OT recommending SNF  For suspected CIDP continue IVIG #2/3 per neurology  Neurology following    H. Pylori  Continue on doxycyline, flagyl, and protonix x 14 days   EGD showed Schatzki's ring distal third of esophagus and chronic gastritis without bleeding  GI following    Esophageal dysphagia 2/2 schatzki's ring  Diet: dysphagia  Aspiration precautions  SLP and GI following    Severe protein calorie malnutrition  On supplements   Nutrition following    Monoclonal gammopathy  SPEP done last week with small M spike  Elevated ferritin, haptoglobin and B12, workup pending  Hematology following    History of PAD/CAD  Lipid panel pending    Chest pain  EKG unremarkable, troponin negative x3  Cardiology recommending to have outpatient follow-up    Hypertension  Continue on amlodipine    Tobacco dependency  Consulted on importance of smoking cessation    DVT Prophylaxis: hold for procedure  GI Prophylaxis: protonix  Discharge and disposition barriers: IVIG, 24-48hr    Care Plan discussed with: Patient/Family, Nurse, and     Total time spent with patient: 35 minutes.

## 2022-09-01 NOTE — PROGRESS NOTES
Problem: Pressure Injury - Risk of  Goal: *Prevention of pressure injury  Description: Document Efrem Scale and appropriate interventions in the flowsheet.   Outcome: Progressing Towards Goal  Note: Pressure Injury Interventions:  Sensory Interventions: Assess changes in LOC    Moisture Interventions: Absorbent underpads    Activity Interventions: PT/OT evaluation    Mobility Interventions: HOB 30 degrees or less    Nutrition Interventions: Document food/fluid/supplement intake    Friction and Shear Interventions: Minimize layers                Problem: Patient Education: Go to Patient Education Activity  Goal: Patient/Family Education  Outcome: Progressing Towards Goal     Problem: Patient Education: Go to Patient Education Activity  Goal: Patient/Family Education  Outcome: Progressing Towards Goal

## 2022-09-01 NOTE — PROGRESS NOTES
PHYSICAL THERAPY TREATMENT  Patient: Shankar Cantrell (1 y.o. female)  Date: 9/1/2022  Diagnosis: Weakness generalized [R53.1]  Rapidly progressive weakness [R53.1] <principal problem not specified>      Precautions:    Chart, physical therapy assessment, plan of care and goals were reviewed. ASSESSMENT  Patient continues with skilled PT services and is progressing towards goals. Patient supine in bed upon approach and agreed to therapy session today after education and encouragement to partake in therapy. Patient found to be wet in bed and was max Ax2 with additional time for bed mobility to assist with claude care ( see OT note for more details). Patient during bed mobility patient required mod A for hand placement with patient demonstrating poor control of hands and arms. Patient the was max Ax2 for supine<>sit and scooting to EOB where patient demonstrated poor siting balance requiring constant assistance to remain sitting upright at EOB. Patient able to tolerate 2-3 minutes sitting EOB before returning to supine in bed at max Ax2 . Patient then was max Ax2 for repositioning in bed. Patient then left supine in bed with call bell within reach and all needs meet. Current Level of Function Impacting Discharge (mobility/balance): impaired balance, general weakness, poor activity tolerance    Other factors to consider for discharge: PLOF, assistance at home, level of deficits, acute medical state, participating in therapy         PLAN :  Patient continues to benefit from skilled intervention to address the above impairments. Continue treatment per established plan of care. to address goals.     Recommendation for discharge: (in order for the patient to meet his/her long term goals)  [facility]     This discharge recommendation:  Has been made in collaboration with the attending provider and/or case management    IF patient discharges home will need the following DME: to be determined (TBD) SUBJECTIVE:   Patient stated im trying to find my hand.     OBJECTIVE DATA SUMMARY:   Critical Behavior:  Neurologic State: Alert, Irritable, Drowsy  Orientation Level: Oriented X4  Cognition: Follows commands  Safety/Judgement: Home safety  Functional Mobility Training:  Bed Mobility:  Rolling: Maximum assistance;Assist x2; Additional time  Supine to Sit: Maximum assistance;Assist x2; Additional time  Sit to Supine: Maximum assistance;Assist x1;Additional time  Scooting: Maximum assistance;Assist x2  Balance:  Sitting: Impaired; With support  Sitting - Static: Poor (constant support)  Sitting - Dynamic: Poor (constant support)  Pain Ratin-9/10 in GEOVANY legs    Activity Tolerance:   Bed locked and in lowest position Fair and requires rest breaks  Please refer to the flowsheet for vital signs taken during this treatment. After treatment patient left in no apparent distress:   Bed returned to lowest position, Supine in bed, Call bell within reach, and Side rails x 3    COMMUNICATION/COLLABORATION:   The patients plan of care was discussed with: Occupational therapy assistant and Registered nurse. Treatment occurred with OT due to patient requiring AX2 for mobility and safety. Problem: Mobility Impaired (Adult and Pediatric)  Goal: *Acute Goals and Plan of Care (Insert Text)  Description: Pt stated goal: to get stronger  Pt will be I with LE HEP in 7 days. Pt will perform bed mobility with mod I in 7 days. Pt will perform transfers with mod I in 7 days. Pt will amb 25-50 feet with LRAD safely with mod I in 7 days.      Outcome: Progressing Towards Goal       Shasha Salcedo PTA   Time Calculation: 34 mins

## 2022-09-01 NOTE — PROGRESS NOTES
9545: Chart reviewed. Per RUBÉN message from [facility] Liaison, Graciela Lyle was not submitted due to patient starting a round of IVIG. Liaison to follow-up today. : Other Relative, Yunior Marcum [phone]. CM will continue to follow patient and recs of medical team.    1120: [facility] needs updated therapy notes to submit Graciela Lyle. Therapy department notified via phone. 1420: Updated therapy notes attached in [address].

## 2022-09-01 NOTE — ROUTINE PROCESS
Bedside shift change report given to Kev Collins RN (oncoming nurse) by Gary Guo RN (offgoing nurse). Report included the following information SBAR, Intake/Output, MAR, and Recent Results.

## 2022-09-02 VITALS
RESPIRATION RATE: 18 BRPM | BODY MASS INDEX: 36.82 KG/M2 | HEIGHT: 71 IN | WEIGHT: 263 LBS | SYSTOLIC BLOOD PRESSURE: 121 MMHG | TEMPERATURE: 99.4 F | HEART RATE: 96 BPM | DIASTOLIC BLOOD PRESSURE: 87 MMHG | OXYGEN SATURATION: 100 %

## 2022-09-02 LAB
ALBUMIN SERPL-MCNC: 2.5 G/DL (ref 3.5–5)
ALBUMIN/GLOB SERPL: 0.6 {RATIO} (ref 1.1–2.2)
ALP SERPL-CCNC: 91 U/L (ref 45–117)
ALT SERPL-CCNC: 15 U/L (ref 12–78)
ANION GAP SERPL CALC-SCNC: 9 MMOL/L (ref 5–15)
AST SERPL W P-5'-P-CCNC: 14 U/L (ref 15–37)
BASOPHILS # BLD: 0 K/UL (ref 0–0.1)
BASOPHILS NFR BLD: 0 % (ref 0–1)
BILIRUB SERPL-MCNC: 0.8 MG/DL (ref 0.2–1)
BUN SERPL-MCNC: 4 MG/DL (ref 6–20)
BUN/CREAT SERPL: 9 (ref 12–20)
CA-I BLD-MCNC: 8 MG/DL (ref 8.5–10.1)
CHLORIDE SERPL-SCNC: 102 MMOL/L (ref 97–108)
CO2 SERPL-SCNC: 26 MMOL/L (ref 21–32)
CREAT SERPL-MCNC: 0.43 MG/DL (ref 0.55–1.02)
DIFFERENTIAL METHOD BLD: ABNORMAL
EOSINOPHIL # BLD: 0 K/UL (ref 0–0.4)
EOSINOPHIL NFR BLD: 0 % (ref 0–7)
ERYTHROCYTE [DISTWIDTH] IN BLOOD BY AUTOMATED COUNT: 15.5 % (ref 11.5–14.5)
GLOBULIN SER CALC-MCNC: 3.9 G/DL (ref 2–4)
GLUCOSE SERPL-MCNC: 69 MG/DL (ref 65–100)
HCT VFR BLD AUTO: 23.3 % (ref 35–47)
HGB BLD-MCNC: 7.5 G/DL (ref 11.5–16)
IMM GRANULOCYTES # BLD AUTO: 0.1 K/UL (ref 0–0.04)
IMM GRANULOCYTES NFR BLD AUTO: 1 % (ref 0–0.5)
LYMPHOCYTES # BLD: 1 K/UL (ref 0.8–3.5)
LYMPHOCYTES NFR BLD: 19 % (ref 12–49)
MCH RBC QN AUTO: 32.8 PG (ref 26–34)
MCHC RBC AUTO-ENTMCNC: 32.2 G/DL (ref 30–36.5)
MCV RBC AUTO: 101.7 FL (ref 80–99)
MONOCYTES # BLD: 0.4 K/UL (ref 0–1)
MONOCYTES NFR BLD: 8 % (ref 5–13)
NEUTS SEG # BLD: 3.8 K/UL (ref 1.8–8)
NEUTS SEG NFR BLD: 72 % (ref 32–75)
NRBC # BLD: 0 K/UL (ref 0–0.01)
NRBC BLD-RTO: 0 PER 100 WBC
PLATELET # BLD AUTO: 193 K/UL (ref 150–400)
PMV BLD AUTO: 9.5 FL (ref 8.9–12.9)
POTASSIUM SERPL-SCNC: 3.3 MMOL/L (ref 3.5–5.1)
PROT SERPL-MCNC: 6.4 G/DL (ref 6.4–8.2)
RBC # BLD AUTO: 2.29 M/UL (ref 3.8–5.2)
SODIUM SERPL-SCNC: 137 MMOL/L (ref 136–145)
WBC # BLD AUTO: 5.2 K/UL (ref 3.6–11)

## 2022-09-02 PROCEDURE — 74011250637 HC RX REV CODE- 250/637: Performed by: STUDENT IN AN ORGANIZED HEALTH CARE EDUCATION/TRAINING PROGRAM

## 2022-09-02 PROCEDURE — 74011250637 HC RX REV CODE- 250/637: Performed by: NURSE PRACTITIONER

## 2022-09-02 PROCEDURE — 80053 COMPREHEN METABOLIC PANEL: CPT

## 2022-09-02 PROCEDURE — 97530 THERAPEUTIC ACTIVITIES: CPT

## 2022-09-02 PROCEDURE — 74011250636 HC RX REV CODE- 250/636: Performed by: NURSE PRACTITIONER

## 2022-09-02 PROCEDURE — 74011250637 HC RX REV CODE- 250/637: Performed by: PSYCHIATRY & NEUROLOGY

## 2022-09-02 PROCEDURE — 74011250637 HC RX REV CODE- 250/637: Performed by: HOSPITALIST

## 2022-09-02 PROCEDURE — 74011000258 HC RX REV CODE- 258: Performed by: PSYCHIATRY & NEUROLOGY

## 2022-09-02 PROCEDURE — 36415 COLL VENOUS BLD VENIPUNCTURE: CPT

## 2022-09-02 PROCEDURE — 74011250636 HC RX REV CODE- 250/636: Performed by: PSYCHIATRY & NEUROLOGY

## 2022-09-02 PROCEDURE — 85025 COMPLETE CBC W/AUTO DIFF WBC: CPT

## 2022-09-02 RX ORDER — NITROGLYCERIN 0.4 MG/1
0.4 TABLET SUBLINGUAL AS NEEDED
Qty: 12 EACH | Refills: 0 | Status: SHIPPED | OUTPATIENT
Start: 2022-09-02

## 2022-09-02 RX ORDER — PANTOPRAZOLE SODIUM 40 MG/1
40 TABLET, DELAYED RELEASE ORAL DAILY
Qty: 90 TABLET | Refills: 0 | Status: SHIPPED | OUTPATIENT
Start: 2022-09-02 | End: 2022-09-10

## 2022-09-02 RX ORDER — DOXYCYCLINE 100 MG/1
100 CAPSULE ORAL EVERY 12 HOURS
Qty: 17 CAPSULE | Refills: 0 | Status: SHIPPED | OUTPATIENT
Start: 2022-09-02 | End: 2022-09-11

## 2022-09-02 RX ORDER — UREA 10 %
50 LOTION (ML) TOPICAL DAILY
Qty: 30 TABLET | Refills: 0 | Status: SHIPPED | OUTPATIENT
Start: 2022-09-02

## 2022-09-02 RX ORDER — FOLIC ACID 1 MG/1
1 TABLET ORAL DAILY
Qty: 30 TABLET | Refills: 0 | Status: SHIPPED | OUTPATIENT
Start: 2022-09-03

## 2022-09-02 RX ORDER — POTASSIUM CHLORIDE 750 MG/1
30 TABLET, FILM COATED, EXTENDED RELEASE ORAL
Status: COMPLETED | OUTPATIENT
Start: 2022-09-02 | End: 2022-09-02

## 2022-09-02 RX ORDER — METRONIDAZOLE 500 MG/1
500 TABLET ORAL 2 TIMES DAILY
Qty: 16 TABLET | Refills: 0 | Status: SHIPPED | OUTPATIENT
Start: 2022-09-02 | End: 2022-09-10

## 2022-09-02 RX ORDER — LANOLIN ALCOHOL/MO/W.PET/CERES
325 CREAM (GRAM) TOPICAL
Qty: 30 TABLET | Refills: 0 | Status: SHIPPED | OUTPATIENT
Start: 2022-09-03

## 2022-09-02 RX ORDER — ATORVASTATIN CALCIUM 10 MG/1
10 TABLET, FILM COATED ORAL DAILY
Qty: 30 TABLET | Refills: 0 | Status: SHIPPED | OUTPATIENT
Start: 2022-09-03

## 2022-09-02 RX ADMIN — POTASSIUM CHLORIDE 30 MEQ: 750 TABLET, FILM COATED, EXTENDED RELEASE ORAL at 12:23

## 2022-09-02 RX ADMIN — FERROUS SULFATE TAB 325 MG (65 MG ELEMENTAL FE) 325 MG: 325 (65 FE) TAB at 09:08

## 2022-09-02 RX ADMIN — FOLIC ACID 1 MG: 1 TABLET ORAL at 09:07

## 2022-09-02 RX ADMIN — ATORVASTATIN CALCIUM 10 MG: 10 TABLET, FILM COATED ORAL at 09:08

## 2022-09-02 RX ADMIN — IMMUNE GLOBULIN (HUMAN) 35 G: 10 INJECTION INTRAVENOUS; SUBCUTANEOUS at 12:55

## 2022-09-02 RX ADMIN — OXYCODONE HYDROCHLORIDE AND ACETAMINOPHEN 1 TABLET: 5; 325 TABLET ORAL at 09:07

## 2022-09-02 RX ADMIN — METRONIDAZOLE 500 MG: 500 TABLET ORAL at 09:08

## 2022-09-02 RX ADMIN — DOXYCYCLINE HYCLATE 100 MG: 100 CAPSULE ORAL at 09:08

## 2022-09-02 RX ADMIN — PREGABALIN 100 MG: 50 CAPSULE ORAL at 09:07

## 2022-09-02 RX ADMIN — METOPROLOL SUCCINATE 25 MG: 50 TABLET, EXTENDED RELEASE ORAL at 09:07

## 2022-09-02 RX ADMIN — PANTOPRAZOLE SODIUM 40 MG: 40 TABLET, DELAYED RELEASE ORAL at 09:08

## 2022-09-02 RX ADMIN — OXYCODONE HYDROCHLORIDE AND ACETAMINOPHEN 1 TABLET: 5; 325 TABLET ORAL at 17:27

## 2022-09-02 RX ADMIN — THIAMINE HYDROCHLORIDE 500 MG: 100 INJECTION, SOLUTION INTRAMUSCULAR; INTRAVENOUS at 12:13

## 2022-09-02 RX ADMIN — ENOXAPARIN SODIUM 40 MG: 100 INJECTION SUBCUTANEOUS at 09:08

## 2022-09-02 RX ADMIN — PREGABALIN 100 MG: 50 CAPSULE ORAL at 17:28

## 2022-09-02 RX ADMIN — AMLODIPINE BESYLATE 5 MG: 5 TABLET ORAL at 09:07

## 2022-09-02 NOTE — PROGRESS NOTES
Comprehensive Nutrition Assessment    Type and Reason for Visit: Reassess (goals)    Nutrition Recommendations/Plan:   Diet as ordered/tolerated  2. Continue Ensure compact x3/d (660 kcal, 27 gm PRO). Please monitor and document PO and ONS intake in I/Os     Malnutrition Assessment:  Malnutrition Status:  Severe malnutrition (08/29/22 1516)    Context:  Chronic illness     Findings of the 6 clinical characteristics of malnutrition:   Energy Intake:  75% or less est energy requirements for 1 month or longer  Weight Loss:  Unable to assess     Body Fat Loss:  Severe body fat loss, Triceps   Muscle Mass Loss:  No significant muscle mass loss,    Fluid Accumulation:  No significant fluid accumulation,        Nutrition Assessment:    Admitted for generalized weakness. Poor intake reported d/t swallowing difficulty. Swallowing discomfort >1 week r/t pain w/ swallowing- meds taken w/ minimal bites of applesauce. RD consulted SLP- swallowing discomfort likely r/t dx of Schatzki's ring and chronic Gastritis. Pt also endorsed small intakes at baseline- unspecified as to time frame. Diet modified per request and continues on ONS for nutritional support. Pathology positive for H-pylori on 8/24 following EGD on 8/22/22,Treatment in place. LP failed at bedside x2 and by fluoroscopy. Pt with decrease oral intake for meals but accepting supplement. Reports that she is afraid to eat some foods because it feels like it gets stuck at times. May want to consider downgrade of texture. .  She does report she is leaving for inpt rehab today. Labs: H/H 7.5/23.3, .7, Cr 0.43, Ca 78.0, AST 14, B9 3.7, B12 >2000, Ferritin 614. Meds: amlodipine, atorvastatin, lovenox, FerrouSul, Folic Acid, flagyl, PPI, lyrica, toprol, colace, doxycycline, percocet. Nutrition Related Findings:    No N/V/D/C reported. +poor dentition. No chewing/swallowing difficulty w/ current meals per Pt. Last BM 8/29 per Pt. No edema.  Wound Type: Stage II (L buttock)        Current Nutrition Intake & Therapies:  Average Meal Intake: 1-25%  Average Supplement Intake: %  ADULT ORAL NUTRITION SUPPLEMENT Breakfast, Lunch, Dinner; Standard 4 oz  ADULT DIET Easy to Yue Gus; GI Wilcox (GERD/Peptic Ulcer)    Anthropometric Measures:  Height: 5' 11\" (180.3 cm)  Ideal Body Weight (IBW): 155 lbs (70 kg)  Current Body Wt:  119.3 kg (263 lb 0.1 oz), 169.7 % IBW. Stated  Current BMI (kg/m2): 36.7  Usual Body Weight: 119.3 kg (263 lb)  % Weight Change (Calculated): 0  BMI Category: Obese class 2 (BMI 35.0-39. 9)        Estimated Daily Nutrient Needs:  Energy Requirements Based On: Kcal/kg  Weight Used for Energy Requirements: Adjusted  Energy (kcal/day): 2068 kcal (25kcal/kg)  Weight Used for Protein Requirements: Adjusted  Protein (g/day): 99 g (1.2g/kg)  Method Used for Fluid Requirements: 1 ml/kcal  Fluid (ml/day): 2100 ml/d      Nutrition Diagnosis:   Inadequate oral intake related to altered GI function as evidenced by swallowing study results, intake 0-25%, Criteria as identified in malnutrition assessment, poor dentition, weight loss greater than or equal to 20% in 1 year, severe loss of subcutaneous fat        Nutrition Interventions:   Food and/or Nutrient Delivery: Continue current diet, Continue oral nutrition supplement  Nutrition Education/Counseling: No recommendations at this time  Coordination of Nutrition Care: Continue to monitor while inpatient  Plan of Care discussed with: Pt, RN.         Goals:  Previous Goal Met: Progressing toward goal(s)  Goals: Meet at least 75% of estimated needs, prior to discharge         Nutrition Monitoring and Evaluation:   Behavioral-Environmental Outcomes: None identified  Food/Nutrient Intake Outcomes: Food and nutrient intake, Supplement intake  Physical Signs/Symptoms Outcomes: Biochemical data, Chewing or swallowing, Meal time behavior      Discharge Planning:    No discharge needs at this time    Joseph Ford RD  Contact:[phone]

## 2022-09-02 NOTE — PROGRESS NOTES
Problem: Self Care Deficits Care Plan (Adult)  Goal: *Acute Goals and Plan of Care (Insert Text)  Description: Pt will be supervision/set-up self feeding  Pt will be mod A sup<->sit in prep for EOB ADL's  Pt will be mod A  LB dressing EOB level  Pt will be supervision/set-up  sit EOB 10 minutes in prep for EOB ADL's  Pt will be supervision/set-up  grooming EOB level  Pt will be mod A  sit<-> prep for toilet transfer  Pt will be MI kaity UE HEP in prep for self care tasks  Outcome: Progressing Towards Goal   OCCUPATIONAL THERAPY TREATMENT  Patient: Lata Stephens (30 y.o. female)  Date: 9/2/2022  Diagnosis: Weakness generalized [R53.1]  Rapidly progressive weakness [R53.1] <principal problem not specified>      Precautions: FALL  Chart, occupational therapy assessment, plan of care, and goals were reviewed. ASSESSMENT  Patient continues with skilled OT services and is progressing slowly towards goals. Patient received semi supine in bed upon PONCE/PTA arrival and agreeable to OT session with much encouragement. Pt continues to experience burning, tingling and numbness throughout UE/LE. Pt reports pain at a 8/10. In long sitting, pt req'd max A using R UE for grooming (washed face) as pt able to reach eyes only with assist to complete task d/t decreased UE motor planning and decreased activity tolerance. Patient completed bed mobility using LUE to reach bed rail but unable to grasp, supine <> sit, and scooting to EOB with max A x2 with additional time d/t decreased UB/LB function. Pt sat EOB for ~8 minutes with poor static/dynamic sitting balance. Pt needs max A x1 for sitting support. Pt returned to long sitting, patient re-educated on and completed bilateral UE (PROM) exercises to increase strength/movement needed for ADL'S and functional transfers, see grid below.  Pt continues to present with deficits in generalized strength/AROM, sitting balance, functional activity tolerance, decreased motor planning and FM and GM coordination impacting overall performance of ADLs and functional transfers/mobility. Pt requires increased assistance since returning from last hospital stay. Pt returned to bed, with call bell, nursing, bed alarm set and all needs met. Patient would benefit from continued skilled Occupational services while at [facility] in order to address current impairments and improve IND and safety with self care and functional transfer/mobility. Current OT d/c recommendation to IRF once medically appropriate. Current Level of Function Impacting Discharge (ADLs): max A grooming    Other factors to consider for discharge: Time of onset, medical prognosis/diagnosis, severity of deficits, PLOF, functional baseline, home environment, and family support          PLAN :  Patient continues to benefit from skilled intervention to address the above impairments. Continue treatment per established plan of care. to address goals. Recommendation for discharge: (in order for the patient to meet his/her long term goals)  [facility]     This discharge recommendation:  Has been made in collaboration with the attending provider and/or case management    IF patient discharges home will need the following DME: TBD       SUBJECTIVE:   Patient stated I'm so tired.     OBJECTIVE DATA SUMMARY:   Cognitive/Behavioral Status:  Neurologic State: Alert  Orientation Level: Oriented X4  Cognition: Follows commands             Functional Mobility and Transfers for ADLs:  Bed Mobility:  Rolling: Maximum assistance;Assist x2  Supine to Sit: Maximum assistance;Assist x2  Sit to Supine: Maximum assistance;Assist x2  Scooting: Maximum assistance;Assist x2    Transfers:             Balance:       ADL Intervention:       Grooming  Grooming Assistance: Maximum assistance  Position Performed: Long sitting on bed  Washing Face: Maximum assistance     Therapeutic Exercises:   Exercise Sets Reps AROM AAROM PROM Self PROM Comments Shoulder flex/ext 1 10 [] [] [x] [] Long sitting, decreased AROM in kaity UE   Elbow flex/ext 1 10 [] [] [x] []    Wrist flex/ext 1 10 [] [] [x] []         Pain:  8/10 kaity UE/LE    Activity Tolerance:   Fair and requires rest breaks  Please refer to the flowsheet for vital signs taken during this treatment. After treatment patient left in no apparent distress:   Bed locked and in lowest position, Supine in bed, Bed / chair alarm activated, and Side rails x 3    COMMUNICATION/COLLABORATION:   The patients plan of care was discussed with: Physical therapy assistant and Registered nurse.  Co-tx with PTA for increased pt/clinician safety with OOB activity     ROSARIO Davila  Time Calculation: 24 mins

## 2022-09-02 NOTE — PROGRESS NOTES
Patient has been accepted to admit to [facility] Inpatient rehab at [address] [place] [address]. Nurse to call report to [phone]. Discharge plan of care/case management plan validated with provider discharge order.

## 2022-09-02 NOTE — PROGRESS NOTES
Patient assessment completed for night shift. No change in status noted. Patient remained stable, pain controlled. Bed in lowest position and call bell within reach. Will continue to assess during this admission. Problem: Pressure Injury - Risk of  Goal: *Prevention of pressure injury  Description: Document Efrem Scale and appropriate interventions in the flowsheet. Outcome: Progressing Towards Goal  Note: Pressure Injury Interventions:  Sensory Interventions: Assess changes in LOC    Moisture Interventions: Absorbent underpads    Activity Interventions: PT/OT evaluation    Mobility Interventions: HOB 30 degrees or less    Nutrition Interventions: Document food/fluid/supplement intake    Friction and Shear Interventions: Minimize layers                Problem: Patient Education: Go to Patient Education Activity  Goal: Patient/Family Education  Outcome: Progressing Towards Goal     Problem: Falls - Risk of  Goal: *Absence of Falls  Description: Document Remington Fall Risk and appropriate interventions in the flowsheet. Outcome: Progressing Towards Goal  Note: Fall Risk Interventions:            Medication Interventions: Patient to call before getting OOB    Elimination Interventions: Call light in reach    History of Falls Interventions: Bed/chair exit alarm         Problem: Patient Education: Go to Patient Education Activity  Goal: Patient/Family Education  Outcome: Progressing Towards Goal     Problem: Patient Education: Go to Patient Education Activity  Goal: Patient/Family Education  Outcome: Progressing Towards Goal     Problem: Impaired Skin Integrity/Pressure Injury Treatment  Goal: *Improvement of Existing Pressure Injury  Outcome: Progressing Towards Goal  Goal: *Prevention of pressure injury  Description: Document Efrem Scale and appropriate interventions in the flowsheet.   Outcome: Progressing Towards Goal  Note: Pressure Injury Interventions:  Sensory Interventions: Assess changes in LOC    Moisture Interventions: Absorbent underpads    Activity Interventions: PT/OT evaluation    Mobility Interventions: HOB 30 degrees or less    Nutrition Interventions: Document food/fluid/supplement intake    Friction and Shear Interventions: Minimize layers                Problem: Patient Education: Go to Patient Education Activity  Goal: Patient/Family Education  Outcome: Progressing Towards Goal

## 2022-09-02 NOTE — PROGRESS NOTES
Discharge plan of care/case management plan validated with provider discharge order. Removed IV without complications, D/C tele, no prince. Report called to [facility] spoke to Savannah Oliva RN. Patient transported via [id number] [place] [address].

## 2022-09-02 NOTE — PROGRESS NOTES
PHYSICAL THERAPY TREATMENT  Patient: Angélica Moreno (89 y.o. female)  Date: 9/2/2022  Diagnosis: Weakness generalized [R53.1]  Rapidly progressive weakness [R53.1] <principal problem not specified>      Precautions:    Chart, physical therapy assessment, plan of care and goals were reviewed. ASSESSMENT  Patient continues with skilled PT services and is progressing towards goals. Patient supine in bed upon approach and agreed to therapy session after max education and encouragement to partake in therapy. Patient was max Ax2 for bed mobility, supine<>sit and scooting EOB where patient demonstrated poor sitting balance requiring constant total support to remain sitting upright. Patient reported \"tightness \" in her chest and had labored breathing for 1-2 minutes when first sitting upright which lessened after 1-2 minutes. HR was ranging from  BPM via tele box so vitals machine vitals said at HR was 115 BPM. Patient then performed seated TE ( see details below) at [facility] with therapist performing 90% of exercise. Patient then returned to supine>sit at max Ax2 and repositioned in bed at max Ax2 and then left supine in bed with call bell within reach and all needs meet. Current Level of Function Impacting Discharge (mobility/balance): impaired balance, general weakness, poor activity tolerance    Other factors to consider for discharge: PLOF, assistance at home, level of deficits, acute medical state, participation in therapy, pain with mobility. PLAN :  Patient continues to benefit from skilled intervention to address the above impairments. Continue treatment per established plan of care. to address goals.     Recommendation for discharge: (in order for the patient to meet his/her long term goals)  [facility]     This discharge recommendation:  Has been made in collaboration with the attending provider and/or case management    IF patient discharges home will need the following DME: hospital bed       SUBJECTIVE:   Patient stated I just woke up and don't really want to move.     OBJECTIVE DATA SUMMARY:   Critical Behavior:  Neurologic State: Alert  Orientation Level: Oriented X4  Cognition: Follows commands  Safety/Judgement: Home safety  Functional Mobility Training:  Bed Mobility:  Rolling: Maximum assistance;Assist x2  Supine to Sit: Maximum assistance;Assist x2  Sit to Supine: Maximum assistance;Assist x2  Scooting: Maximum assistance;Assist x2  Balance:  Sitting: Impaired; With support  Sitting - Static: Poor (constant support)  Sitting - Dynamic: Poor (constant support)    Therapeutic Exercises:   1x10 LAQ  Pain Ratin/10 burning pain with movement in GEOVANY legs    Activity Tolerance:   Bed locked and in lowest position Poor and requires rest breaks  Please refer to the flowsheet for vital signs taken during this treatment. After treatment patient left in no apparent distress:   Bed returned to lowest position, Supine in bed, Call bell within reach, and Side rails x 3    COMMUNICATION/COLLABORATION:   The patients plan of care was discussed with: Occupational therapy assistant and Registered nurse. Treatment occurred with OT due to patient requiring Ax2 for mobility and safety       Problem: Mobility Impaired (Adult and Pediatric)  Goal: *Acute Goals and Plan of Care (Insert Text)  Description: Pt stated goal: to get stronger  Pt will be I with LE HEP in 7 days. Pt will perform bed mobility with mod I in 7 days. Pt will perform transfers with mod I in 7 days. Pt will amb 25-50 feet with LRAD safely with mod I in 7 days.      Outcome: Progressing Towards Goal       Shasha Salcedo PTA   Time Calculation: 24 mins

## 2022-09-02 NOTE — DISCHARGE SUMMARY
Admit date: 8/27/2022   Admitting Provider: Kya Gardiner MD    Discharge date: 9/2/2022  Discharging Provider: ANGELI Le      * Admission Diagnoses: Weakness generalized [R53.1]  Rapidly progressive weakness [R53.1]    * Discharge Diagnoses:    Hospital Problems as of 9/2/2022 Never Reviewed            Codes Class Noted - Resolved POA    Rapidly progressive weakness ICD-10-CM: R53.1  ICD-9-CM: 728.87  8/31/2022 - Present Unknown        Severe protein-calorie malnutrition ([facility] [address] ICD-10-CM: E43  ICD-9-CM: 262  8/29/2022 - Present Unknown        Weakness generalized ICD-10-CM: R53.1  ICD-9-CM: 780.79  8/24/2022 - Present Unknown           * Hospital Course:     Natalya Fraire is a 47year old female with a PMH of anxiety, CAD, fibromyalgia, hypertension, PVD and weakness who presented with worsening weakness and inability to ambulate. Of note outpatient and inpatient follow-up most recently discharged on 8/19 EGD showed Schatzki's ring distal third of esophagus and chronic gastritis without bleeding biopsy revealed H. pylori was started on tetracycline and Flagyl for 14 days. In ED vital signs unremarkable. Initial labs significant for hemoglobin of 8.2. CT of the head negative for acute intracranial abnormality. 8/22 MRI of the brain with no acute or significant intracranial abnormality. MRI of the thoracic spine showed multilevel disc degenerative change, moderate to severe canal stenosis at T6-T7 and moderate to severe bilateral for minimal stenosis at T8-T9. Patient admitted for further work-up and placement. Neurology, GI, and hematology consulted. Patient with persistent chest tightness requiring nitrostat, cardiology consulted. Echo with EF of 55 to 60% with normal diastolic function. Cardiology recommending to have outpatient follow-up. Bedside LP attempts unsuccessful secondary to patient intolerance for pain. LP failed at bedside x2 and by fluroscopy will be followed up with outpatient.  For suspected CIDP completed three days of IVIG. Patient to continue doxycycline, metronidazole, and Protonix until 9/10. Patient to follow-up with neurology, GI, hematology, rheumatology, cardiology, and PCP within 2 weeks of discharge. All questions answered at time of encounter. * Procedures:   * No surgery found *  Cardiology and IR Orders (From admission to next 24h)       Start     Ordered    08/30/22 0915  IR FLUORO GUIDED NEEDLE PLACEMENT  [[id number]]  ONE TIME         08/30/22 0908    08/29/22 0845  IR FLUORO GUIDED NEEDLE PLACEMENT  [[id number]]  ONE TIME         08/29/22 6766                    Consults:   Neurology, hematology, cardiology, and GI    Significant Diagnostic Studies: As discussed in hospital course    Discharge Exam:  Visit Vitals  BP (!) 146/93 (BP 1 Location: Right lower arm, BP Patient Position: At rest)   Pulse 88   Temp 98.1 °F (36.7 °C)   Resp 17   Ht 5' 11\" (1.803 m)   Wt 119.3 kg (263 lb)   SpO2 100%   BMI 36.68 kg/m²       PHYSICAL EXAM:  Vitals and nursing note reviewed. Constitutional:       Appearance: She is obese. She is ill-appearing. HENT:      Head: Normocephalic and atraumatic. Neck:      Vascular: No carotid bruit. Cardiovascular:      Rate and Rhythm: Normal rate and regular rhythm. Pulmonary:      Effort: No respiratory distress. Breath sounds: No wheezing. Chest:      Chest wall: No tenderness. Abdominal:      General: Bowel sounds are normal.      Palpations: Abdomen is soft. Musculoskeletal:      Right lower leg: No edema. Left lower leg: No edema. Comments: Bilateral LE: 3/5 strength  Billateral UE: 4/5 strength   Skin:     General: Skin is warm. Capillary Refill: Capillary refill takes less than 2 seconds. Neurological:      Mental Status: She is alert and oriented to person, place, and time. Sensory: Sensory deficit present. Motor: Weakness present.       Gait: Gait abnormal.      Comments: BLE deep tendon reflexes: 0/4 symmetrically decreased sensations from groin down to level of T10 or higher. BLE sensation: decreased from groin down, absent proprioception in both legs   Psychiatric:      Comments: Subdued affect      * Discharge Condition: improved  * Disposition: [address]  [facility]    Discharge Medications:  Current Discharge Medication List        START taking these medications    Details   atorvastatin (LIPITOR) 10 mg tablet Take 1 Tablet by mouth daily. Qty: 30 Tablet, Refills: 0  Start date: 9/3/2022      doxycycline (VIBRAMYCIN) 100 mg capsule Take 1 Capsule by mouth every twelve (12) hours for 17 doses. Qty: 17 Capsule, Refills: 0  Start date: 9/2/2022, End date: 9/11/2022      ferrous sulfate 325 mg (65 mg iron) tablet Take 1 Tablet by mouth daily (with breakfast). Qty: 30 Tablet, Refills: 0  Start date: 9/3/2022      nitroglycerin (NITROSTAT) 0.4 mg SL tablet 1 Tablet by SubLINGual route as needed for Chest Pain. Up to 3 doses. Qty: 12 Each, Refills: 0  Start date: 4/2/0959      folic acid (FOLVITE) 1 mg tablet Take 1 Tablet by mouth daily. Qty: 30 Tablet, Refills: 0  Start date: 9/3/2022      thiamine (B-1) 50 mg tablet Take 1 Tablet by mouth daily. Qty: 30 Tablet, Refills: 0  Start date: 9/2/2022           CONTINUE these medications which have CHANGED    Details   metroNIDAZOLE (FLAGYL) 500 mg tablet Take 1 Tablet by mouth two (2) times a day for 8 days. Indications: H-pylori  Qty: 16 Tablet, Refills: 0  Start date: 9/2/2022, End date: 9/10/2022           CONTINUE these medications which have NOT CHANGED    Details   amLODIPine (NORVASC) 5 mg tablet Take 1 Tablet by mouth daily for 30 days. Qty: 30 Tablet, Refills: 0      metoprolol succinate (TOPROL-XL) 25 mg XL tablet Take 1 Tablet by mouth daily for 30 days. Qty: 30 Tablet, Refills: 0      docusate sodium (COLACE) 100 mg capsule Take 2 Capsules by mouth nightly for 30 days.   Qty: 60 Capsule, Refills: 0      pantoprazole (PROTONIX) 40 mg tablet Take 1 Tablet by mouth daily for 14 days. Indications: H-pylori triple treatment  Qty: 90 Tablet, Refills: 2      pregabalin (LYRICA) 100 mg capsule Take 100 mg by mouth three (3) times daily. STOP taking these medications       baclofen (LIORESAL) 10 mg tablet Comments:   Reason for Stopping:         tetracycline (ACHROMYCIN, SUMYCIN) 500 mg capsule Comments:   Reason for Stopping:               * Follow-up Care/Patient Instructions: Activity: PT/OT Eval and Treat  Diet: easy to chew and GI bland  Wound Care: Buttocks: Cleanse with NS, apply opticel ag to wound bed and cover with foam dressing. Dressing to be changed every other day and PRN for soiling.     Follow-up Information       Follow up With Specialties Details Why Contact Info    Sherry Hwang MD Gastroenterology Schedule an appointment as soon as possible for a visit in 2 month(s) Post-hospitalization follow-up - H pylori [facility]  [address] [facility] [place] [address]      Bandar Ospina MD Rheumatology Internal Medicine Schedule an appointment as soon as possible for a visit in 2 week(s) Post-hospitalization follow-up - [facility] [address] . [facility] [address]      aFye Castaneda NP Nurse Practitioner Schedule an appointment as soon as possible for a visit in 2 week(s) Post-hospitalization follow-up [address] [facility] [address] [facility] 16      Joshua Huntley MD Neurology Schedule an appointment as soon as possible for a visit in 2 week(s) Post-hospitalization follow-up [address] [facility]  [address]  [phone]      Leida Lyle MD Hematology and Oncology Schedule an appointment as soon as possible for a visit in 2 week(s) Post-hospitalization follow-up [facility] [address] [facility] [place] [phone]              Discharge summary greater than 35 minutes spent with the patient performing discharge instructions, medication review and physical exam    Signed:  ANGELI Licona  9/2/2022  12:17 PM

## 2022-09-02 NOTE — PROGRESS NOTES
Informed attending Ms. Mclain regarding the spike of temperature at 3pm. Rechecked recently, no fever-attending said good for discharge.

## 2022-09-02 NOTE — PROGRESS NOTES
Patient is to have one more dose of IVIG. Patient can transition to [facility] in [place] if ok with attending provider.

## 2022-09-05 LAB
Lab: NORMAL
REFERENCE LAB,REFLB: NORMAL
TEST DESCRIPTION:,ATST: NORMAL

## 2022-09-06 ENCOUNTER — HOSPITAL ENCOUNTER (OUTPATIENT)
Dept: LAB | Age: 52
Discharge: HOME | End: 2022-09-06

## 2022-09-06 LAB
ALBUMIN SERPL ELPH-MCNC: 3 G/DL (ref 2.9–4.4)
ALBUMIN/GLOB SERPL: 1.2 {RATIO} (ref 0.7–1.7)
ALPHA1 GLOB SERPL ELPH-MCNC: 0.3 G/DL (ref 0–0.4)
ALPHA2 GLOB SERPL ELPH-MCNC: 0.7 G/DL (ref 0.4–1)
B-GLOBULIN SERPL ELPH-MCNC: 1.1 G/DL (ref 0.7–1.3)
GAMMA GLOB SERPL ELPH-MCNC: 0.5 G/DL (ref 0.4–1.8)
GLOBULIN SER CALC-MCNC: 2.6 G/DL (ref 2.2–3.9)
HCT VFR BLD AUTO: 25.5 % (ref 35–47)
HGB BLD-MCNC: 8.1 G/DL (ref 11.5–16)
IGA SERPL-MCNC: 406 MG/DL (ref 87–352)
IGG SERPL-MCNC: 610 MG/DL (ref 586–1602)
IGM SERPL-MCNC: 65 MG/DL (ref 26–217)
KAPPA LC FREE SER-MCNC: 14.4 MG/L (ref 3.3–19.4)
KAPPA LC FREE/LAMBDA FREE SER: 1.44 {RATIO} (ref 0.26–1.65)
LAMBDA LC FREE SERPL-MCNC: 10 MG/L (ref 5.7–26.3)
M PROTEIN SERPL ELPH-MCNC: ABNORMAL G/DL
PROT PATTERN SERPL IFE-IMP: ABNORMAL
PROT SERPL-MCNC: 5.6 G/DL (ref 6–8.5)

## 2022-09-06 PROCEDURE — 36415 COLL VENOUS BLD VENIPUNCTURE: CPT

## 2022-09-06 PROCEDURE — 85018 HEMOGLOBIN: CPT

## 2022-09-07 ENCOUNTER — TRANSCRIBE ORDER (OUTPATIENT)
Dept: SCHEDULING | Age: 52
End: 2022-09-07

## 2022-09-07 DIAGNOSIS — R13.12 OROPHARYNGEAL DYSPHAGIA: Primary | ICD-10-CM

## 2022-09-07 LAB — VIT B1 BLD-SCNC: 47.9 NMOL/L (ref 66.5–200)

## 2022-09-09 ENCOUNTER — HOSPITAL ENCOUNTER (OUTPATIENT)
Dept: GENERAL RADIOLOGY | Age: 52
Discharge: HOME | End: 2022-09-09
Attending: PHYSICAL MEDICINE & REHABILITATION

## 2022-09-09 DIAGNOSIS — R13.12 OROPHARYNGEAL DYSPHAGIA: ICD-10-CM

## 2022-09-09 PROCEDURE — 74230 X-RAY XM SWLNG FUNCJ C+: CPT

## 2022-09-09 PROCEDURE — 74011000250 HC RX REV CODE- 250: Performed by: PHYSICAL MEDICINE & REHABILITATION

## 2022-09-09 PROCEDURE — 92611 MOTION FLUOROSCOPY/SWALLOW: CPT

## 2022-09-09 RX ADMIN — BARIUM SULFATE 30 ML: 400 PASTE ORAL at 09:22

## 2022-09-09 RX ADMIN — BARIUM SULFATE 50 ML: 0.81 POWDER, FOR SUSPENSION ORAL at 09:22

## 2022-09-09 NOTE — PROGRESS NOTES
[address] STUDY  Patient: Teofilo Rodríguez (21 y.o. female)  Date: 2022  Primary Diagnosis: Oropharyngeal dysphagia [R13.12]       Precautions:        ASSESSMENT :  Based on the objective data described below, the patient presents with mild oral dysphagia. Oral phase c/b prolonged mastication s/t poor dentition. Premature spillage to the level of the pyriforms w/ thin remaining consistencies initiate at the level of the vallecular. Minimal swallow delay appreciated. BOT retraction,  HLE, pharyngeal constriction and epiglottic inversion are wfl. Consistent trace flash penetration during the swallow w/ thin. This clears w/ force of the swallow and no subsequent aspiration observed. No pen/asp w/ remaining consistencies. No pharyngeal residue. Cervical osteophyte C6 w/ mild delayed clearance may contribute to globus sensation. Air noted throughout proximal esophagus. Unable to complete esophageal sweep s/t body habitus and inability to stand. Lyudmila Graven PLAN :  Recommendations and Planned Interventions:  Rec cont puree/thin w/ trials of solid upgrade. May consider GI consult given patient c/o globus sensation. SUBJECTIVE:   Patient reports difficulty chewing foods and currently on pureed. She reports pharyngeal globus sensation. OBJECTIVE:     Past Medical History:   Diagnosis Date    Anxiety     CAD (coronary artery disease)     Fibromyalgia     Hypertension     Neuropathy     Peripheral artery disease ([address]     Psychiatric disorder     anxiety     Past Surgical History:   Procedure Laterality Date    HX ANKLE FRACTURE TX Left     surgical implants    HX  SECTION            Film Views: Lateral       Video Flouroscopic Procedures  [x] Lateral View   [] A-P View [] Scanned to level of Sternum    [] Seated at 90 deg.   [] Other:    Presentation:   [] Spoon   [x] Cup   [x] Straw   [] Syringe   [] Consecutive Swallows  [x] Other:    Consistencies:   [x] Ba+ liquid [] Ba+ liquid (nectar)   [] Ba+ liquid (honey)     [x] Ba+ pudding   [] Ba+ crunched cookie   [x] Ba+ cookie   [] Other:       Decreased Tongue Base Retraction?: No  Laryngeal Elevation: WFL (within functional limits)  Aspiration/Penetration Score: 2 (Penetration/No residue-Contrast enters the airway penetrates, remains above the folds/cords, and is cleared)  Pharyngeal Symmetry: Symmetrical  Pharyngeal-Esophageal Segment: Suspected esophageal dysphagia  Pharyngeal Dysfunction: None    Oral Phase Severity: Mild  Pharyngeal Phase Severity: N/A      COMMUNICATION/EDUCATION:   Patient receptive of education regarding MBS results, diet recs, s/s aspiration and aspiration precautions.     Thank you for this referral.  Tiff Eagle M.S., M.Ed., CCC-SLP  Time Calculation: 18 mins

## 2022-09-10 ENCOUNTER — HOSPITAL ENCOUNTER (OUTPATIENT)
Dept: LAB | Age: 52
Discharge: HOME | End: 2022-09-10

## 2022-09-10 LAB
BASOPHILS # BLD: 0 K/UL (ref 0–0.1)
BASOPHILS NFR BLD: 0 % (ref 0–1)
DIFFERENTIAL METHOD BLD: ABNORMAL
EOSINOPHIL # BLD: 0 K/UL (ref 0–0.4)
EOSINOPHIL NFR BLD: 0 % (ref 0–7)
ERYTHROCYTE [DISTWIDTH] IN BLOOD BY AUTOMATED COUNT: 18.8 % (ref 11.5–14.5)
HCT VFR BLD AUTO: 22.7 % (ref 35–47)
HGB BLD-MCNC: 7 G/DL (ref 11.5–16)
IMM GRANULOCYTES # BLD AUTO: 0 K/UL (ref 0–0.04)
IMM GRANULOCYTES NFR BLD AUTO: 0 % (ref 0–0.5)
LYMPHOCYTES # BLD: 1.3 K/UL (ref 0.8–3.5)
LYMPHOCYTES NFR BLD: 23 % (ref 12–49)
MCH RBC QN AUTO: 31.5 PG (ref 26–34)
MCHC RBC AUTO-ENTMCNC: 30.8 G/DL (ref 30–36.5)
MCV RBC AUTO: 102.3 FL (ref 80–99)
MONOCYTES # BLD: 0.4 K/UL (ref 0–1)
MONOCYTES NFR BLD: 7 % (ref 5–13)
NEUTS SEG # BLD: 3.8 K/UL (ref 1.8–8)
NEUTS SEG NFR BLD: 70 % (ref 32–75)
NRBC # BLD: 0 K/UL (ref 0–0.01)
NRBC BLD-RTO: 0 PER 100 WBC
PLATELET # BLD AUTO: 214 K/UL (ref 150–400)
PMV BLD AUTO: 10.2 FL (ref 8.9–12.9)
RBC # BLD AUTO: 2.22 M/UL (ref 3.8–5.2)
WBC # BLD AUTO: 5.5 K/UL (ref 3.6–11)

## 2022-09-10 PROCEDURE — 85025 COMPLETE CBC W/AUTO DIFF WBC: CPT

## 2022-09-12 ENCOUNTER — HOSPITAL ENCOUNTER (OUTPATIENT)
Dept: LAB | Age: 52
Discharge: HOME | End: 2022-09-12

## 2022-09-12 LAB
BASOPHILS # BLD: 0 K/UL (ref 0–0.1)
BASOPHILS NFR BLD: 0 % (ref 0–1)
DIFFERENTIAL METHOD BLD: ABNORMAL
EOSINOPHIL # BLD: 0 K/UL (ref 0–0.4)
EOSINOPHIL NFR BLD: 0 % (ref 0–7)
ERYTHROCYTE [DISTWIDTH] IN BLOOD BY AUTOMATED COUNT: 19.9 % (ref 11.5–14.5)
HCT VFR BLD AUTO: 23.5 % (ref 35–47)
HGB BLD-MCNC: 7.2 G/DL (ref 11.5–16)
IMM GRANULOCYTES # BLD AUTO: 0 K/UL (ref 0–0.04)
IMM GRANULOCYTES NFR BLD AUTO: 0 % (ref 0–0.5)
LYMPHOCYTES # BLD: 1.2 K/UL (ref 0.8–3.5)
LYMPHOCYTES NFR BLD: 19 % (ref 12–49)
MCH RBC QN AUTO: 32.4 PG (ref 26–34)
MCHC RBC AUTO-ENTMCNC: 30.6 G/DL (ref 30–36.5)
MCV RBC AUTO: 105.9 FL (ref 80–99)
MONOCYTES # BLD: 0.5 K/UL (ref 0–1)
MONOCYTES NFR BLD: 7 % (ref 5–13)
NEUTS SEG # BLD: 4.7 K/UL (ref 1.8–8)
NEUTS SEG NFR BLD: 74 % (ref 32–75)
NRBC # BLD: 0.02 K/UL (ref 0–0.01)
NRBC BLD-RTO: 0.3 PER 100 WBC
PLATELET # BLD AUTO: 230 K/UL (ref 150–400)
PMV BLD AUTO: 10 FL (ref 8.9–12.9)
RBC # BLD AUTO: 2.22 M/UL (ref 3.8–5.2)
WBC # BLD AUTO: 6.4 K/UL (ref 3.6–11)

## 2022-09-12 PROCEDURE — 85025 COMPLETE CBC W/AUTO DIFF WBC: CPT

## 2022-09-25 ENCOUNTER — HOSPITAL ENCOUNTER (EMERGENCY)
Age: 52
Discharge: HOME | End: 2022-09-26
Attending: EMERGENCY MEDICINE
Payer: MEDICAID

## 2022-09-25 ENCOUNTER — APPOINTMENT (OUTPATIENT)
Dept: GENERAL RADIOLOGY | Age: 52
End: 2022-09-25
Attending: EMERGENCY MEDICINE
Payer: MEDICAID

## 2022-09-25 DIAGNOSIS — S40.021A CONTUSION OF RIGHT UPPER EXTREMITY, INITIAL ENCOUNTER: ICD-10-CM

## 2022-09-25 DIAGNOSIS — M79.601 RIGHT ARM PAIN: Primary | ICD-10-CM

## 2022-09-25 PROCEDURE — 99284 EMERGENCY DEPT VISIT MOD MDM: CPT

## 2022-09-25 PROCEDURE — 74011250636 HC RX REV CODE- 250/636: Performed by: PHYSICIAN ASSISTANT

## 2022-09-25 PROCEDURE — 73090 X-RAY EXAM OF FOREARM: CPT

## 2022-09-25 PROCEDURE — 96372 THER/PROPH/DIAG INJ SC/IM: CPT

## 2022-09-25 PROCEDURE — 93005 ELECTROCARDIOGRAM TRACING: CPT

## 2022-09-25 RX ORDER — DICLOFENAC SODIUM 10 MG/G
2 GEL TOPICAL 4 TIMES DAILY
Qty: 1 EACH | Refills: 0 | Status: SHIPPED | OUTPATIENT
Start: 2022-09-25 | End: 2022-10-07

## 2022-09-25 RX ORDER — MORPHINE SULFATE 4 MG/ML
4 INJECTION INTRAVENOUS ONCE
Status: COMPLETED | OUTPATIENT
Start: 2022-09-25 | End: 2022-09-25

## 2022-09-25 RX ADMIN — MORPHINE SULFATE 4 MG: 4 INJECTION INTRAVENOUS at 13:22

## 2022-09-25 NOTE — ED NOTES
Multiple attempts made to reach pt's caregiver without success. Pt does not know the number, number in chart is for a friend, that friend is not able to receive pt if ems drops her off. Pt states she has no one to care for her, her ex- she does not want contacted and her mother has dementia and is unable to come help her. Called case management and left voicemail that pt will be ems transport home r/t low extremity weakness and needs some one there to receive her. Have no received return call from case management.

## 2022-09-25 NOTE — ED PROVIDER NOTES
EMERGENCY DEPARTMENT HISTORY AND PHYSICAL EXAM      Date: 9/25/2022  Patient Name: Urbano Shukla    History of Presenting Illness     Chief Complaint   Patient presents with    Arm Pain       History Provided By: Patient    HPI: Urbano Shukla, 47 y.o. female with past medical history significant for HTN, CAD, PAD, fibromyalgia, and anxiety presents this ED with CC of arm pain. Patient reports that she has a hospital bed at home and tried to get out this morning to use the bathroom when she had her right arm caught between the bed rail of the bed. She presents with a contusion to her right forearm. Denies any additional injury or falling to the floor. Denies treating pain with anything. Patient states that she is otherwise well has no further concerns. There are no other complaints, changes, or physical findings at this time. PCP: Annita Cee NP    No current facility-administered medications on file prior to encounter. Current Outpatient Medications on File Prior to Encounter   Medication Sig Dispense Refill    atorvastatin (LIPITOR) 10 mg tablet Take 1 Tablet by mouth daily. 30 Tablet 0    ferrous sulfate 325 mg (65 mg iron) tablet Take 1 Tablet by mouth daily (with breakfast). 30 Tablet 0    nitroglycerin (NITROSTAT) 0.4 mg SL tablet 1 Tablet by SubLINGual route as needed for Chest Pain. Up to 3 doses. 12 Each 0    folic acid (FOLVITE) 1 mg tablet Take 1 Tablet by mouth daily. 30 Tablet 0    thiamine (B-1) 50 mg tablet Take 1 Tablet by mouth daily. 30 Tablet 0    pregabalin (LYRICA) 100 mg capsule Take 100 mg by mouth three (3) times daily.          Past History     Past Medical History:  Past Medical History:   Diagnosis Date    Anxiety     CAD (coronary artery disease)     Fibromyalgia     Hypertension     Neuropathy     Peripheral artery disease ([facility] [address]     Psychiatric disorder     anxiety       Past Surgical History:  Past Surgical History:   Procedure Laterality Date    HX ANKLE FRACTURE TX Left surgical implants    HX  SECTION         Family History:  No family history on file. Social History:  Social History     Tobacco Use    Smoking status: Every Day     Packs/day: 0.50     Types: Cigarettes    Smokeless tobacco: Never   Substance Use Topics    Alcohol use: Yes     Comment: occosionally     Drug use: Not Currently       Allergies: Allergies   Allergen Reactions    Codeine Nausea Only       Review of Systems   Review of Systems   Constitutional: Negative. Negative for chills, diaphoresis and fever. HENT: Negative. Negative for congestion, rhinorrhea and sore throat. Eyes: Negative. Respiratory: Negative. Negative for cough, chest tightness, shortness of breath and wheezing. Cardiovascular: Negative. Negative for chest pain and palpitations. Gastrointestinal: Negative. Negative for abdominal pain, diarrhea, nausea and vomiting. Genitourinary: Negative. Negative for difficulty urinating, dysuria, flank pain, frequency and hematuria. Musculoskeletal:  Positive for myalgias. Negative for back pain and gait problem. Skin: Negative. Negative for rash. Neurological: Negative. Negative for dizziness, syncope, weakness, light-headedness, numbness and headaches. Psychiatric/Behavioral: Negative. All other systems reviewed and are negative. Physical Exam   Physical Exam  Vitals and nursing note reviewed. Constitutional:       General: She is not in acute distress. Appearance: Normal appearance. She is obese. She is ill-appearing (chronically). She is not toxic-appearing. HENT:      Head: Normocephalic and atraumatic. Mouth/Throat:      Mouth: Mucous membranes are moist.      Pharynx: Oropharynx is clear. Eyes:      Extraocular Movements: Extraocular movements intact. Conjunctiva/sclera: Conjunctivae normal.      Pupils: Pupils are equal, round, and reactive to light. Cardiovascular:      Rate and Rhythm: Regular rhythm. Tachycardia present. Pulses: Normal pulses. Radial pulses are 2+ on the right side and 2+ on the left side. Heart sounds: No murmur heard. No friction rub. No gallop. Pulmonary:      Effort: Pulmonary effort is normal.      Breath sounds: Normal breath sounds. No wheezing, rhonchi or rales. Abdominal:      General: There is no distension. Palpations: Abdomen is soft. Tenderness: There is no abdominal tenderness. There is no guarding or rebound. Musculoskeletal:      Right upper arm: Normal.      Right elbow: Normal.      Right forearm: Tenderness (with small contusion) present. No deformity or bony tenderness. Right wrist: No swelling, deformity, bony tenderness or snuff box tenderness. Normal range of motion. Cervical back: Neck supple. No tenderness. Skin:     General: Skin is warm and dry. Capillary Refill: Capillary refill takes less than 2 seconds. Findings: No rash. Neurological:      General: No focal deficit present. Mental Status: She is alert and oriented to person, place, and time. Psychiatric:         Mood and Affect: Mood normal.         Behavior: Behavior normal.       Lab and Diagnostic Study Results   Labs -   No results found for this or any previous visit (from the past 12 hour(s)). Radiologic Studies -   @lastxrresult@  CT Results  (Last 48 hours)      None          CXR Results  (Last 48 hours)      None            Medical Decision Making and ED Course   Differential Diagnosis & Medical Decision Making Provider Note:   Ddx: strain, sprain, contusion, fracture, dislocation    - I am the first provider for this patient. I reviewed the vital signs, available nursing notes, past medical history, past surgical history, family history and social history. The patients presenting problems have been discussed, and they are in agreement with the care plan formulated and outlined with them.   I have encouraged them to ask questions as they arise throughout their visit. Vital Signs-Reviewed the patient's vital signs. No data found. ED Course:   2:37 PM  Patient reassessed and updated on imaging results, which are negative for acute fracture dislocation. States that her pain is well controlled at this time. Patient has no new complaints or concerns and all her questions have been answered. Anticipate discharge home shortly. ED Course as of 09/30/22 1422   Sun Sep 25, 2022   1909 Received patient as sign out from [place], [place]; pt is discharged, awaiting response from caretaker for patient to return home [LP]      ED Course User Index  [LP] Azar Ramirez NP     Patient evaluated by  who arranged transport and help for patient to get into her home. She was discharged without complication. TOBACCO COUNSELING: Upon evaluation, pt expressed that they are a current tobacco user. For approximately 10 minutes, pt has been counseled on the dangers of smoking and was encouraged to quit as soon as possible in order to decrease further risks to their health. Pt has conveyed their understanding of the risks involved should they continue to use tobacco products. Procedures   Performed by: Dennis Reeder PA-C  Procedures      Disposition   Disposition: DC- Adult Discharges: All of the diagnostic tests were reviewed and questions answered. Diagnosis, care plan and treatment options were discussed. The patient understands the instructions and will follow up as directed. The patients results have been reviewed with them. They have been counseled regarding their diagnosis. The patient verbally convey understanding and agreement of the signs, symptoms, diagnosis, treatment and prognosis and additionally agrees to follow up as recommended with their PCP in 24 - 48 hours. They also agree with the care-plan and convey that all of their questions have been answered.   I have also put together some discharge instructions for them that include: 1) educational information regarding their diagnosis, 2) how to care for their diagnosis at home, as well a 3) list of reasons why they would want to return to the ED prior to their follow-up appointment, should their condition change. DISCHARGE PLAN:  1. Current Discharge Medication List        CONTINUE these medications which have NOT CHANGED    Details   atorvastatin (LIPITOR) 10 mg tablet Take 1 Tablet by mouth daily. Qty: 30 Tablet, Refills: 0      ferrous sulfate 325 mg (65 mg iron) tablet Take 1 Tablet by mouth daily (with breakfast). Qty: 30 Tablet, Refills: 0      nitroglycerin (NITROSTAT) 0.4 mg SL tablet 1 Tablet by SubLINGual route as needed for Chest Pain. Up to 3 doses. Qty: 12 Each, Refills: 0      folic acid (FOLVITE) 1 mg tablet Take 1 Tablet by mouth daily. Qty: 30 Tablet, Refills: 0      thiamine (B-1) 50 mg tablet Take 1 Tablet by mouth daily. Qty: 30 Tablet, Refills: 0      pregabalin (LYRICA) 100 mg capsule Take 100 mg by mouth three (3) times daily. STOP taking these medications       amLODIPine (NORVASC) 5 mg tablet Comments:   Reason for Stopping:         metoprolol succinate (TOPROL-XL) 25 mg XL tablet Comments:   Reason for Stopping:         docusate sodium (COLACE) 100 mg capsule Comments:   Reason for Stoppin.   Follow-up Information       Follow up With Specialties Details Why Contact Info    Janay Marie NP Nurse Practitioner Schedule an appointment as soon as possible for a visit  As needed [id number] [place] [address]      Amanda Juares MD Orthopedic Surgery  As needed [place] [address]  [phone]            3. Return to ED if worse   4. Discharge Medication List as of 2022 12:14 PM        START taking these medications    Details   diclofenac (VOLTAREN) 1 % gel Apply 2 g to affected area four (4) times daily. , Normal, Disp-1 Each, R-0 CONTINUE these medications which have NOT CHANGED    Details   atorvastatin (LIPITOR) 10 mg tablet Take 1 Tablet by mouth daily. , Normal, Disp-30 Tablet, R-0      ferrous sulfate 325 mg (65 mg iron) tablet Take 1 Tablet by mouth daily (with breakfast). , Normal, Disp-30 Tablet, R-0      nitroglycerin (NITROSTAT) 0.4 mg SL tablet 1 Tablet by SubLINGual route as needed for Chest Pain. Up to 3 doses. , Normal, Disp-12 Each, R-0      folic acid (FOLVITE) 1 mg tablet Take 1 Tablet by mouth daily. , Normal, Disp-30 Tablet, R-0      thiamine (B-1) 50 mg tablet Take 1 Tablet by mouth daily. , Normal, Disp-30 Tablet, R-0      pregabalin (LYRICA) 100 mg capsule Take 100 mg by mouth three (3) times daily. , Historical Med           STOP taking these medications       amLODIPine (NORVASC) 5 mg tablet Comments:   Reason for Stopping:         metoprolol succinate (TOPROL-XL) 25 mg XL tablet Comments:   Reason for Stopping:         docusate sodium (COLACE) 100 mg capsule Comments:   Reason for Stopping:             Remove if admitted/transferred    Diagnosis/Clinical Impression     Clinical Impression:   1. Right arm pain    2. Contusion of right upper extremity, initial encounter        Attestations: Sandrine THOMAS PA-C, am the primary clinician of record. Please note that this dictation was completed with [employer], the [employer] voice recognition software. Quite often unanticipated grammatical, syntax, homophones, and other interpretive errors are inadvertently transcribed by the computer software. Please disregard these errors. Please excuse any errors that have escaped final proofreading. Thank you.

## 2022-09-25 NOTE — ED NOTES
Pt alert oriented. C/o right arm pain r/t injury. Pt incontinent of urine and stool. Pt cleaned. Purewick applied. Depends applied. Pt repositioned. Ginger ale po to pt.

## 2022-09-25 NOTE — ED TRIAGE NOTES
Fell Out of bed around 1100 right arm caught in bed rail for approx 30mins before some one heard her. Denies hitting head, denies blood thinners.  Presents with 10/10 pain to right arm

## 2022-09-26 VITALS
WEIGHT: 263 LBS | SYSTOLIC BLOOD PRESSURE: 135 MMHG | OXYGEN SATURATION: 100 % | HEART RATE: 99 BPM | TEMPERATURE: 98.4 F | DIASTOLIC BLOOD PRESSURE: 87 MMHG | HEIGHT: 71 IN | BODY MASS INDEX: 36.82 KG/M2 | RESPIRATION RATE: 21 BRPM

## 2022-09-26 LAB
ATRIAL RATE: 119 BPM
CALCULATED R AXIS, ECG10: 20 DEGREES
CALCULATED T AXIS, ECG11: 122 DEGREES
DIAGNOSIS, 93000: NORMAL
P-R INTERVAL, ECG05: 124 MS
Q-T INTERVAL, ECG07: 342 MS
QRS DURATION, ECG06: 86 MS
QTC CALCULATION (BEZET), ECG08: 481 MS
VENTRICULAR RATE, ECG03: 119 BPM

## 2022-09-26 NOTE — PROGRESS NOTES
Cm was asked this AM to find the Pt next of kin so that Pt can go home. Met f/f with Pt, she stated that her son is incarcerated and that this girlfriend Ronie Moritz takes care of her. Pt asked CM to contact [facility] to get the phone number to Jj Moritz. Ronie Moritz, [phone]    BERNARD called Lauren, she stated that she helps Pt but that she cannot spend the night with her because she has children. Lauren stated that she can meet Pt at her home at 1:00 pm    CMtalked with Pt about HH, a Nursing Home , Private Aids, or Group Home. Pt stated that she has [facility] come out and see her, Pt stated that the Nurse from the [address] is assisting her with getting into the [address] [place] [address] in [facility]. Pt stated that she is willing to go to the [address] [place] [address] in [facility] when they have an opening. Pt will be D/C to home, Lauren will meet the ambulance at Pt home. D/C to home with [facility].

## 2022-09-26 NOTE — ED NOTES
Pt has reported x 2 that she thinks she had a bowel movement. No stool noted either time. Pt repositioned. Po fluids offered to pt.

## 2022-09-26 NOTE — ED NOTES
Pt moved from room 11 to room 13. Report given to [facility]. Cardiac bp and pulse ox monitors continued. Liyah Cristobal remains in place. Callbell within pt reach. TV placed on channel of pt choice.

## 2022-09-26 NOTE — ED NOTES
Pt placed on a ripple mattress at this time, positioned and left comfortable.  Call bell and a bottle of water within reach

## 2022-09-26 NOTE — ED NOTES
Pt. Reports having a bowel movement, but there is no stool noted. Pt. Resting comfortably with no complaints at this time.

## 2022-10-03 ENCOUNTER — HOSPITAL ENCOUNTER (OUTPATIENT)
Age: 52
Setting detail: OBSERVATION
Discharge: NURSING, REHAB OR LONG-TERM CARE | End: 2022-10-07
Attending: EMERGENCY MEDICINE | Admitting: FAMILY MEDICINE
Payer: MEDICAID

## 2022-10-03 ENCOUNTER — APPOINTMENT (OUTPATIENT)
Dept: CT IMAGING | Age: 52
End: 2022-10-03
Attending: EMERGENCY MEDICINE
Payer: MEDICAID

## 2022-10-03 DIAGNOSIS — K52.9 GASTROENTERITIS, ACUTE: ICD-10-CM

## 2022-10-03 DIAGNOSIS — E86.0 DEHYDRATION: ICD-10-CM

## 2022-10-03 DIAGNOSIS — R30.0 DYSURIA: Primary | ICD-10-CM

## 2022-10-03 DIAGNOSIS — R10.9 FLANK PAIN: ICD-10-CM

## 2022-10-03 DIAGNOSIS — E87.6 HYPOKALEMIA: ICD-10-CM

## 2022-10-03 LAB
ALBUMIN SERPL-MCNC: 2.5 G/DL (ref 3.5–5)
ALBUMIN/GLOB SERPL: 0.5 {RATIO} (ref 1.1–2.2)
ALP SERPL-CCNC: 95 U/L (ref 45–117)
ALT SERPL-CCNC: 9 U/L (ref 12–78)
ANION GAP SERPL CALC-SCNC: 13 MMOL/L (ref 5–15)
APPEARANCE UR: ABNORMAL
AST SERPL W P-5'-P-CCNC: 13 U/L (ref 15–37)
BACTERIA URNS QL MICRO: NEGATIVE /HPF
BASOPHILS # BLD: 0 K/UL (ref 0–0.1)
BASOPHILS NFR BLD: 0 % (ref 0–1)
BILIRUB SERPL-MCNC: 0.5 MG/DL (ref 0.2–1)
BILIRUB UR QL: ABNORMAL
BUN SERPL-MCNC: 2 MG/DL (ref 6–20)
BUN/CREAT SERPL: 4 (ref 12–20)
CA-I BLD-MCNC: 9.3 MG/DL (ref 8.5–10.1)
CAOX CRY URNS QL MICRO: ABNORMAL
CHLORIDE SERPL-SCNC: 102 MMOL/L (ref 97–108)
CO2 SERPL-SCNC: 25 MMOL/L (ref 21–32)
COLOR UR: YELLOW
CREAT SERPL-MCNC: 0.54 MG/DL (ref 0.55–1.02)
DIFFERENTIAL METHOD BLD: ABNORMAL
EOSINOPHIL # BLD: 0 K/UL (ref 0–0.4)
EOSINOPHIL NFR BLD: 0 % (ref 0–7)
ERYTHROCYTE [DISTWIDTH] IN BLOOD BY AUTOMATED COUNT: 15.2 % (ref 11.5–14.5)
GLOBULIN SER CALC-MCNC: 5 G/DL (ref 2–4)
GLUCOSE SERPL-MCNC: 92 MG/DL (ref 65–100)
GLUCOSE UR STRIP.AUTO-MCNC: NEGATIVE MG/DL
HCT VFR BLD AUTO: 36.5 % (ref 35–47)
HGB BLD-MCNC: 11.6 G/DL (ref 11.5–16)
HGB UR QL STRIP: NEGATIVE
HYALINE CASTS URNS QL MICRO: ABNORMAL /LPF (ref 0–5)
IMM GRANULOCYTES # BLD AUTO: 0 K/UL (ref 0–0.04)
IMM GRANULOCYTES NFR BLD AUTO: 0 % (ref 0–0.5)
KETONES UR QL STRIP.AUTO: 80 MG/DL
LEUKOCYTE ESTERASE UR QL STRIP.AUTO: NEGATIVE
LYMPHOCYTES # BLD: 1.8 K/UL (ref 0.8–3.5)
LYMPHOCYTES NFR BLD: 23 % (ref 12–49)
MCH RBC QN AUTO: 31.1 PG (ref 26–34)
MCHC RBC AUTO-ENTMCNC: 31.8 G/DL (ref 30–36.5)
MCV RBC AUTO: 97.9 FL (ref 80–99)
MONOCYTES # BLD: 0.5 K/UL (ref 0–1)
MONOCYTES NFR BLD: 6 % (ref 5–13)
MUCOUS THREADS URNS QL MICRO: ABNORMAL /LPF
NEUTS SEG # BLD: 5.3 K/UL (ref 1.8–8)
NEUTS SEG NFR BLD: 71 % (ref 32–75)
NITRITE UR QL STRIP.AUTO: NEGATIVE
NRBC # BLD: 0 K/UL (ref 0–0.01)
NRBC BLD-RTO: 0 PER 100 WBC
PH UR STRIP: 6 [PH] (ref 5–8)
PLATELET # BLD AUTO: 358 K/UL (ref 150–400)
PMV BLD AUTO: 9.2 FL (ref 8.9–12.9)
POTASSIUM SERPL-SCNC: 2.7 MMOL/L (ref 3.5–5.1)
PROT SERPL-MCNC: 7.5 G/DL (ref 6.4–8.2)
PROT UR STRIP-MCNC: 100 MG/DL
RBC # BLD AUTO: 3.73 M/UL (ref 3.8–5.2)
RBC #/AREA URNS HPF: ABNORMAL /HPF (ref 0–5)
SODIUM SERPL-SCNC: 140 MMOL/L (ref 136–145)
SP GR UR REFRACTOMETRY: 1.02 (ref 1–1.03)
UROBILINOGEN UR QL STRIP.AUTO: 2 EU/DL (ref 0.1–1)
WBC # BLD AUTO: 7.6 K/UL (ref 3.6–11)
WBC URNS QL MICRO: ABNORMAL /HPF (ref 0–4)

## 2022-10-03 PROCEDURE — 36415 COLL VENOUS BLD VENIPUNCTURE: CPT

## 2022-10-03 PROCEDURE — 65270000029 HC RM PRIVATE

## 2022-10-03 PROCEDURE — 74011250636 HC RX REV CODE- 250/636: Performed by: EMERGENCY MEDICINE

## 2022-10-03 PROCEDURE — 80053 COMPREHEN METABOLIC PANEL: CPT

## 2022-10-03 PROCEDURE — 85025 COMPLETE CBC W/AUTO DIFF WBC: CPT

## 2022-10-03 PROCEDURE — 96374 THER/PROPH/DIAG INJ IV PUSH: CPT

## 2022-10-03 PROCEDURE — 74177 CT ABD & PELVIS W/CONTRAST: CPT

## 2022-10-03 PROCEDURE — 99285 EMERGENCY DEPT VISIT HI MDM: CPT

## 2022-10-03 PROCEDURE — 93005 ELECTROCARDIOGRAM TRACING: CPT

## 2022-10-03 PROCEDURE — 74011250637 HC RX REV CODE- 250/637: Performed by: EMERGENCY MEDICINE

## 2022-10-03 PROCEDURE — 81001 URINALYSIS AUTO W/SCOPE: CPT

## 2022-10-03 PROCEDURE — 74011000636 HC RX REV CODE- 636: Performed by: EMERGENCY MEDICINE

## 2022-10-03 RX ORDER — MORPHINE SULFATE 4 MG/ML
4 INJECTION INTRAVENOUS ONCE
Status: COMPLETED | OUTPATIENT
Start: 2022-10-03 | End: 2022-10-03

## 2022-10-03 RX ORDER — POTASSIUM CHLORIDE 1.5 G/1.77G
40 POWDER, FOR SOLUTION ORAL
Status: COMPLETED | OUTPATIENT
Start: 2022-10-03 | End: 2022-10-03

## 2022-10-03 RX ORDER — ACETAMINOPHEN 500 MG
500 TABLET ORAL ONCE
Status: DISCONTINUED | OUTPATIENT
Start: 2022-10-03 | End: 2022-10-03

## 2022-10-03 RX ADMIN — SODIUM CHLORIDE 1000 ML: 9 INJECTION, SOLUTION INTRAVENOUS at 23:31

## 2022-10-03 RX ADMIN — IOPAMIDOL 100 ML: 755 INJECTION, SOLUTION INTRAVENOUS at 22:25

## 2022-10-03 RX ADMIN — MORPHINE SULFATE 4 MG: 4 INJECTION, SOLUTION INTRAMUSCULAR; INTRAVENOUS at 21:23

## 2022-10-03 RX ADMIN — POTASSIUM CHLORIDE 40 MEQ: 1.5 POWDER, FOR SOLUTION ORAL at 21:04

## 2022-10-03 NOTE — Clinical Note
Status[de-identified] INPATIENT [101]   Type of Bed: Telemetry [19]   Cardiac Monitoring Required?: Yes   Inpatient Hospitalization Certified Necessary for the Following Reasons: 3. Patient receiving treatment that can only be provided in an inpatient setting (further clarification in H&P documentation)   Admitting Diagnosis: Dehydration [276.51. ICD-9-CM]   Admitting Physician: Jimena Armstrong [[id number]]   Attending Physician: Jimena Armstrong [[id number]]   Estimated Length of Stay: 2 Midnights   Discharge Plan[de-identified] Home with Office Follow-up

## 2022-10-03 NOTE — ED PROVIDER NOTES
EMERGENCY DEPARTMENT HISTORY AND PHYSICAL EXAM      Date: 10/3/2022  Patient Name: Dashawn Pierson      History of Presenting Illness     Chief Complaint   Patient presents with    Abdominal Pain    Urinary Pain       History Provided By: Patient    HPI: Dashawn Pierson, 47 y.o. female with a past medical history significant for CAD, anxiety, PAD, neuropathy presents to the ED with cc of urinary pain, R flank pain. Onset this AM but has noticed foul-smelling urine for a few days. This AM with chills, having some nausea but no emesis. There are no other complaints, changes, or physical findings at this time.     PCP: Mayi Monaco NP    Current Facility-Administered Medications   Medication Dose Route Frequency Provider Last Rate Last Admin    diphenhydrAMINE (BENADRYL) injection 25 mg  25 mg IntraVENous Q4H PRN Frankie Suarez MD   25 mg at 10/05/22 0606    hydrOXYzine (VISTARIL) injection 25 mg  25 mg IntraMUSCular Q6H PRN Frankie Suarez MD   25 mg at 10/05/22 0138    atorvastatin (LIPITOR) tablet 10 mg  10 mg Oral DAILY Frankie Suarez MD   10 mg at 10/04/22 7016    ferrous sulfate tablet 325 mg  325 mg Oral DAILY WITH BREAKFAST Frankie Suarez MD   325 mg at 26/53/27 2109    folic acid (FOLVITE) tablet 1 mg  1 mg Oral DAILY Frankie Suarez MD   1 mg at 10/04/22 0831    nitroglycerin (NITROSTAT) tablet 0.4 mg  0.4 mg SubLINGual PRN Popeye Marquis MD        thiamine mononitrate (B-1) tablet 50 mg  50 mg Oral DAILY Frankie Suarez MD   50 mg at 10/04/22 0830    0.9% sodium chloride infusion  75 mL/hr IntraVENous CONTINUOUS Frankie Suarez MD 75 mL/hr at 10/05/22 0131 75 mL/hr at 10/05/22 0131    acetaminophen (TYLENOL) tablet 650 mg  650 mg Oral Q6H PRN Frankie Suarez MD   650 mg at 10/04/22 0220    Or    acetaminophen (TYLENOL) suppository 650 mg  650 mg Rectal Q6H PRN Nighat Suarez MD        polyethylene glycol (MIRALAX) packet 17 g  17 g Oral DAILY PRN Popeye Marquis MD        ondansetron (ZOFRAN ODT) tablet 4 mg  4 mg Oral Q8H PRN Jerad Suarez MD        Or    ondansetron (ZOFRAN) injection 4 mg  4 mg IntraVENous Q6H PRN Jerad Suarez MD        enoxaparin (LOVENOX) injection 30 mg  30 mg SubCUTAneous Q12H Frankie Suarez MD        traMADoL Nilayjennifer Codie) tablet 50 mg  50 mg Oral Q6H PRN Phil Burleson MD   50 mg at 10/05/22 0606       Past History     Past Medical History:  Past Medical History:   Diagnosis Date    Anxiety     CAD (coronary artery disease)     Fibromyalgia     Hypertension     Neuropathy     Peripheral artery disease ([facility] [address]     Psychiatric disorder     anxiety       Past Surgical History:  Past Surgical History:   Procedure Laterality Date    HX ANKLE FRACTURE TX Left     surgical implants    HX  SECTION         Family History:  No family history on file. Social History:  Social History     Tobacco Use    Smoking status: Every Day     Packs/day: 0.50     Types: Cigarettes    Smokeless tobacco: Never   Substance Use Topics    Alcohol use: Yes     Comment: occosionally     Drug use: Not Currently       Allergies: Allergies   Allergen Reactions    Codeine Nausea Only         Review of Systems   Constitutional: Negative except as in HPI. Eyes: Negative except as in HPI.  ENT: Negative except as in HPI. Cardiovascular: Negative except as in HPI. Respiratory: Negative except as in HPI. Gastrointestinal: Negative except as in HPI. Genitourinary: Negative except as in HPI. Musculoskeletal: Negative except as in HPI. Integumentary: Negative except as in HPI. Neurological: Negative except as in HPI. Psychiatric: Negative except as in HPI. Endocrine: Negative except as in HPI. Hematologic/Lymphatic: Negative except as in HPI. Allergic/Immunologic: Negative except as in HPI.     Physical Exam   Constitutional: Awake and alert, interactive, NAD  Eyes: PERRL, no injection or scleral icterus, no discharge  HEENT: NCAT, neck supple, MMM, no oropharyngeal exudates  CV: RRR, no m/r/g  Respiratory: CTAB, no r/r/w  GI: Abd soft, nondistended, ttp suprapubically, R CVA tenderness  : Deferred  MSK: FROM, no joint effusions or edema  Skin: No rashes  Neuro: CN2-12 intact, symmetric facies, fluent speech. Lab and Diagnostic Study Results     Labs -     Recent Results (from the past 12 hour(s))   CK    Collection Time: 10/04/22  9:42 PM   Result Value Ref Range    CK 15 (L) 26 - 192 U/L   C REACTIVE PROTEIN, QT    Collection Time: 10/04/22  9:42 PM   Result Value Ref Range    C-Reactive protein 5.60 (H) 0.00 - 0.60 mg/dL   SED RATE (ESR)    Collection Time: 10/04/22  9:42 PM   Result Value Ref Range    Sed rate, automated 107 (H) 0 - 30 mm/hr   METABOLIC PANEL, COMPREHENSIVE    Collection Time: 10/04/22  9:42 PM   Result Value Ref Range    Sodium 141 136 - 145 mmol/L    Potassium 2.8 (L) 3.5 - 5.1 mmol/L    Chloride 105 97 - 108 mmol/L    CO2 24 21 - 32 mmol/L    Anion gap 12 5 - 15 mmol/L    Glucose 78 65 - 100 mg/dL    BUN 2 (L) 6 - 20 mg/dL    Creatinine 0.45 (L) 0.55 - 1.02 mg/dL    BUN/Creatinine ratio 4 (L) 12 - 20      eGFR >60 >60 ml/min/1.73m2    Calcium 9.0 8.5 - 10.1 mg/dL    Bilirubin, total 0.4 0.2 - 1.0 mg/dL    AST (SGOT) 13 (L) 15 - 37 U/L    ALT (SGPT) 10 (L) 12 - 78 U/L    Alk.  phosphatase 82 45 - 117 U/L    Protein, total 6.8 6.4 - 8.2 g/dL    Albumin 2.2 (L) 3.5 - 5.0 g/dL    Globulin 4.6 (H) 2.0 - 4.0 g/dL    A-G Ratio 0.5 (L) 1.1 - 2.2     URINALYSIS W/MICROSCOPIC    Collection Time: 10/05/22  6:55 AM   Result Value Ref Range    Color Susie      Appearance Clear Clear      Specific gravity 1.028 1.003 - 1.030      pH (UA) 6.0 5.0 - 8.0      Protein 100 (A) Negative mg/dL    Glucose Negative Negative mg/dL    Ketone 80 (A) Negative mg/dL    Bilirubin Negative Negative      Blood Negative Negative      Urobilinogen 2.0 (H) 0.1 - 1.0 EU/dL    Nitrites Negative Negative      Leukocyte Esterase Negative Negative      WBC 0-4 0 - 4 /hpf    RBC 0-5 0 - 5 /hpf    Bacteria Negative Negative /hpf    Mucus 1+ /lpf       Radiologic Studies -   [unfilled]  CT Results  (Last 48 hours)                 10/03/22 2224  CT ABD PELV W CONT Final result    Impression:  The terminal ileum and proximal colon are fluid containing with scattered areas   of wall thickening. This may be due at least in part due to underdistention, but   enterocolitis due to nonspecific infection or inflammation is not excluded. No   bowel obstruction or other acute abnormality in the abdomen or pelvis. Narrative:  EXAM:  CT ABD PELV W CONT       INDICATION: Flank pain       COMPARISON: None. TECHNIQUE: Helical CT of the abdomen  and pelvis  following the uneventful   intravenous administration of nonionic contrast.  Coronal and sagittal reformats   are performed. CT dose reduction was achieved through use of a standardized   protocol tailored for this examination and automatic exposure control for dose   modulation. FINDINGS:    The visualized lung bases demonstrate no mass or consolidation. The heart size   is normal. There is no pericardial or pleural effusion. The liver, spleen, pancreas, and adrenal glands are normal. The gall bladder is   present  without intra- or extra-hepatic biliary dilatation. The kidneys are symmetric without hydronephrosis. The terminal ileum and proximal colon are fluid containing with scattered areas   of wall thickening that may be at least in part due to areas of under   distention. There are no dilated bowel loops. The appendix is normal.         There are no enlarged lymph nodes. There is no free fluid or free air. The   aorta is normal in caliber. The urinary bladder is normal.  There is no pelvic mass. There is no aggressive bony lesion.                  CXR Results  (Last 48 hours)      None            Medical Decision Making and ED Course   - I am the first and primary provider for this patient AND AM THE PRIMARY PROVIDER OF RECORD. - I reviewed the vital signs, available nursing notes, past medical history, past surgical history, family history and social history. - Initial assessment performed. The patients presenting problems have been discussed, and the staff are in agreement with the care plan formulated and outlined with them. I have encouraged them to ask questions as they arise throughout their visit. Vital Signs-Reviewed the patient's vital signs. Patient Vitals for the past 12 hrs:   Temp Pulse Resp BP SpO2   10/05/22 0743 98.3 °F (36.8 °C) (!) 107 18 (!) 152/96 100 %   10/04/22 2330 98.6 °F (37 °C) (!) 111 18 (!) 145/91 100 %   10/04/22 2101 97.9 °F (36.6 °C) 72 16 (!) 143/95 99 %       EKG interpretation:         Provider Notes (Medical Decision Making):   52F w/dysuria, flank pain c/f pyelo, will check bloodwork and UA and treat as such pending convincing UA, otherwise will get CTAP to eval for other processes such as biliary disease. Morphine and zofran for pain and nausea for now. Dispo pending workup and tx response. ED Course:       ED Course as of 10/05/22 0807   [facility] Oct 03, 2022   2036 Potassium(!!): 2.7  Will replete PO. [YA]   2149 Bacteria: Negative [YA]   2149 RBC: 0-5 [YA]   2149 WBC: 0-4 [YA]   2149 Will get CTAP to eval further. [YA]   7463 Tachy, IVF ongoing. Discussed with patient's care coordinator and patient only has as needed home assistance does not have total care. Admitting hospitalist aware of the possible need for case management. Admission for dehydration. [HP]      ED Course User Index  [HP] Quincy Fair MD  [YA] Nikki Francisco MD         Consultations:     Hospitalist Dr. Rica Sampson    Disposition     Disposition: Admitted to Floor Medical Floor the case was discussed with the admitting physician Dr. Rica Sampson. Admitted      Diagnosis     Clinical Impression:   1. Dysuria    2. Flank pain    3. Gastroenteritis, acute    4. Dehydration    5.  Hypokalemia Attestations:     Troy Youngblood MD

## 2022-10-03 NOTE — ED TRIAGE NOTES
Pt arrived by EMS complain of right side abdominal pain that goes to back pain with urination.  Started around 10 am

## 2022-10-04 LAB
ALBUMIN SERPL-MCNC: 2.2 G/DL (ref 3.5–5)
ALBUMIN/GLOB SERPL: 0.5 {RATIO} (ref 1.1–2.2)
ALP SERPL-CCNC: 82 U/L (ref 45–117)
ALT SERPL-CCNC: 10 U/L (ref 12–78)
ANION GAP SERPL CALC-SCNC: 12 MMOL/L (ref 5–15)
ANION GAP SERPL CALC-SCNC: 14 MMOL/L (ref 5–15)
AST SERPL W P-5'-P-CCNC: 13 U/L (ref 15–37)
ATRIAL RATE: 118 BPM
BASOPHILS # BLD: 0 K/UL (ref 0–0.1)
BASOPHILS NFR BLD: 0 % (ref 0–1)
BILIRUB SERPL-MCNC: 0.4 MG/DL (ref 0.2–1)
BUN SERPL-MCNC: 2 MG/DL (ref 6–20)
BUN SERPL-MCNC: 2 MG/DL (ref 6–20)
BUN/CREAT SERPL: 4 (ref 12–20)
BUN/CREAT SERPL: 5 (ref 12–20)
CA-I BLD-MCNC: 9 MG/DL (ref 8.5–10.1)
CA-I BLD-MCNC: 9 MG/DL (ref 8.5–10.1)
CALCULATED P AXIS, ECG09: 58 DEGREES
CALCULATED R AXIS, ECG10: 7 DEGREES
CALCULATED T AXIS, ECG11: 32 DEGREES
CHLORIDE SERPL-SCNC: 105 MMOL/L (ref 97–108)
CHLORIDE SERPL-SCNC: 107 MMOL/L (ref 97–108)
CK SERPL-CCNC: 15 U/L (ref 26–192)
CO2 SERPL-SCNC: 20 MMOL/L (ref 21–32)
CO2 SERPL-SCNC: 24 MMOL/L (ref 21–32)
CREAT SERPL-MCNC: 0.44 MG/DL (ref 0.55–1.02)
CREAT SERPL-MCNC: 0.45 MG/DL (ref 0.55–1.02)
CRP SERPL-MCNC: 5.6 MG/DL (ref 0–0.6)
DIAGNOSIS, 93000: NORMAL
DIFFERENTIAL METHOD BLD: ABNORMAL
EOSINOPHIL # BLD: 0 K/UL (ref 0–0.4)
EOSINOPHIL NFR BLD: 0 % (ref 0–7)
ERYTHROCYTE [DISTWIDTH] IN BLOOD BY AUTOMATED COUNT: 15 % (ref 11.5–14.5)
ERYTHROCYTE [SEDIMENTATION RATE] IN BLOOD: 107 MM/HR (ref 0–30)
GLOBULIN SER CALC-MCNC: 4.6 G/DL (ref 2–4)
GLUCOSE SERPL-MCNC: 78 MG/DL (ref 65–100)
GLUCOSE SERPL-MCNC: 91 MG/DL (ref 65–100)
HCT VFR BLD AUTO: 31.8 % (ref 35–47)
HGB BLD-MCNC: 9.6 G/DL (ref 11.5–16)
IMM GRANULOCYTES # BLD AUTO: 0 K/UL (ref 0–0.04)
IMM GRANULOCYTES NFR BLD AUTO: 0 % (ref 0–0.5)
LYMPHOCYTES # BLD: 1.4 K/UL (ref 0.8–3.5)
LYMPHOCYTES NFR BLD: 17 % (ref 12–49)
MCH RBC QN AUTO: 30 PG (ref 26–34)
MCHC RBC AUTO-ENTMCNC: 30.2 G/DL (ref 30–36.5)
MCV RBC AUTO: 99.4 FL (ref 80–99)
MONOCYTES # BLD: 0.6 K/UL (ref 0–1)
MONOCYTES NFR BLD: 7 % (ref 5–13)
NEUTS SEG # BLD: 6.3 K/UL (ref 1.8–8)
NEUTS SEG NFR BLD: 76 % (ref 32–75)
NRBC # BLD: 0 K/UL (ref 0–0.01)
NRBC BLD-RTO: 0 PER 100 WBC
P-R INTERVAL, ECG05: 152 MS
PLATELET # BLD AUTO: 315 K/UL (ref 150–400)
PMV BLD AUTO: 9.3 FL (ref 8.9–12.9)
POTASSIUM SERPL-SCNC: 2.8 MMOL/L (ref 3.5–5.1)
POTASSIUM SERPL-SCNC: 3.5 MMOL/L (ref 3.5–5.1)
PROT SERPL-MCNC: 6.8 G/DL (ref 6.4–8.2)
Q-T INTERVAL, ECG07: 346 MS
QRS DURATION, ECG06: 82 MS
QTC CALCULATION (BEZET), ECG08: 484 MS
RBC # BLD AUTO: 3.2 M/UL (ref 3.8–5.2)
SODIUM SERPL-SCNC: 141 MMOL/L (ref 136–145)
SODIUM SERPL-SCNC: 141 MMOL/L (ref 136–145)
VENTRICULAR RATE, ECG03: 118 BPM
WBC # BLD AUTO: 8.3 K/UL (ref 3.6–11)

## 2022-10-04 PROCEDURE — 83874 ASSAY OF MYOGLOBIN: CPT

## 2022-10-04 PROCEDURE — 65270000029 HC RM PRIVATE

## 2022-10-04 PROCEDURE — 80053 COMPREHEN METABOLIC PANEL: CPT

## 2022-10-04 PROCEDURE — 80307 DRUG TEST PRSMV CHEM ANLYZR: CPT

## 2022-10-04 PROCEDURE — 82085 ASSAY OF ALDOLASE: CPT

## 2022-10-04 PROCEDURE — 86140 C-REACTIVE PROTEIN: CPT

## 2022-10-04 PROCEDURE — 85652 RBC SED RATE AUTOMATED: CPT

## 2022-10-04 PROCEDURE — 87040 BLOOD CULTURE FOR BACTERIA: CPT

## 2022-10-04 PROCEDURE — 80048 BASIC METABOLIC PNL TOTAL CA: CPT

## 2022-10-04 PROCEDURE — 96375 TX/PRO/DX INJ NEW DRUG ADDON: CPT

## 2022-10-04 PROCEDURE — 96376 TX/PRO/DX INJ SAME DRUG ADON: CPT

## 2022-10-04 PROCEDURE — 82550 ASSAY OF CK (CPK): CPT

## 2022-10-04 PROCEDURE — 87186 SC STD MICRODIL/AGAR DIL: CPT

## 2022-10-04 PROCEDURE — G0378 HOSPITAL OBSERVATION PER HR: HCPCS

## 2022-10-04 PROCEDURE — 85025 COMPLETE CBC W/AUTO DIFF WBC: CPT

## 2022-10-04 PROCEDURE — 36415 COLL VENOUS BLD VENIPUNCTURE: CPT

## 2022-10-04 PROCEDURE — 87086 URINE CULTURE/COLONY COUNT: CPT

## 2022-10-04 PROCEDURE — 74011250636 HC RX REV CODE- 250/636: Performed by: FAMILY MEDICINE

## 2022-10-04 PROCEDURE — 92610 EVALUATE SWALLOWING FUNCTION: CPT

## 2022-10-04 PROCEDURE — 74011250637 HC RX REV CODE- 250/637: Performed by: FAMILY MEDICINE

## 2022-10-04 PROCEDURE — 87077 CULTURE AEROBIC IDENTIFY: CPT

## 2022-10-04 RX ORDER — ENOXAPARIN SODIUM 100 MG/ML
30 INJECTION SUBCUTANEOUS EVERY 12 HOURS
Status: DISCONTINUED | OUTPATIENT
Start: 2022-10-05 | End: 2022-10-07 | Stop reason: HOSPADM

## 2022-10-04 RX ORDER — SODIUM CHLORIDE 9 MG/ML
75 INJECTION, SOLUTION INTRAVENOUS CONTINUOUS
Status: DISCONTINUED | OUTPATIENT
Start: 2022-10-04 | End: 2022-10-07 | Stop reason: HOSPADM

## 2022-10-04 RX ORDER — ONDANSETRON 2 MG/ML
4 INJECTION INTRAMUSCULAR; INTRAVENOUS
Status: DISCONTINUED | OUTPATIENT
Start: 2022-10-04 | End: 2022-10-07 | Stop reason: HOSPADM

## 2022-10-04 RX ORDER — ATORVASTATIN CALCIUM 10 MG/1
10 TABLET, FILM COATED ORAL DAILY
Status: DISCONTINUED | OUTPATIENT
Start: 2022-10-04 | End: 2022-10-07 | Stop reason: HOSPADM

## 2022-10-04 RX ORDER — ASPIRIN 325 MG/1
50 TABLET, FILM COATED ORAL DAILY
Status: DISCONTINUED | OUTPATIENT
Start: 2022-10-04 | End: 2022-10-07 | Stop reason: HOSPADM

## 2022-10-04 RX ORDER — POLYETHYLENE GLYCOL 3350 17 G/17G
17 POWDER, FOR SOLUTION ORAL DAILY PRN
Status: DISCONTINUED | OUTPATIENT
Start: 2022-10-04 | End: 2022-10-07 | Stop reason: HOSPADM

## 2022-10-04 RX ORDER — DIPHENHYDRAMINE HYDROCHLORIDE 50 MG/ML
12.5 INJECTION, SOLUTION INTRAMUSCULAR; INTRAVENOUS
Status: DISCONTINUED | OUTPATIENT
Start: 2022-10-04 | End: 2022-10-05

## 2022-10-04 RX ORDER — TRAMADOL HYDROCHLORIDE 50 MG/1
50 TABLET ORAL
Status: DISCONTINUED | OUTPATIENT
Start: 2022-10-04 | End: 2022-10-07 | Stop reason: HOSPADM

## 2022-10-04 RX ORDER — LANOLIN ALCOHOL/MO/W.PET/CERES
325 CREAM (GRAM) TOPICAL
Status: DISCONTINUED | OUTPATIENT
Start: 2022-10-04 | End: 2022-10-06

## 2022-10-04 RX ORDER — ACETAMINOPHEN 325 MG/1
650 TABLET ORAL
Status: DISCONTINUED | OUTPATIENT
Start: 2022-10-04 | End: 2022-10-07 | Stop reason: HOSPADM

## 2022-10-04 RX ORDER — ACETAMINOPHEN 650 MG/1
650 SUPPOSITORY RECTAL
Status: DISCONTINUED | OUTPATIENT
Start: 2022-10-04 | End: 2022-10-07 | Stop reason: HOSPADM

## 2022-10-04 RX ORDER — FOLIC ACID 1 MG/1
1 TABLET ORAL DAILY
Status: DISCONTINUED | OUTPATIENT
Start: 2022-10-04 | End: 2022-10-07 | Stop reason: HOSPADM

## 2022-10-04 RX ORDER — NITROGLYCERIN 0.4 MG/1
0.4 TABLET SUBLINGUAL AS NEEDED
Status: DISCONTINUED | OUTPATIENT
Start: 2022-10-04 | End: 2022-10-07 | Stop reason: HOSPADM

## 2022-10-04 RX ORDER — DIPHENHYDRAMINE HCL 25 MG
25 CAPSULE ORAL ONCE
Status: COMPLETED | OUTPATIENT
Start: 2022-10-04 | End: 2022-10-04

## 2022-10-04 RX ORDER — ENOXAPARIN SODIUM 100 MG/ML
40 INJECTION SUBCUTANEOUS DAILY
Status: DISCONTINUED | OUTPATIENT
Start: 2022-10-04 | End: 2022-10-04

## 2022-10-04 RX ORDER — ONDANSETRON 4 MG/1
4 TABLET, ORALLY DISINTEGRATING ORAL
Status: DISCONTINUED | OUTPATIENT
Start: 2022-10-04 | End: 2022-10-07 | Stop reason: HOSPADM

## 2022-10-04 RX ADMIN — TRAMADOL HYDROCHLORIDE 50 MG: 50 TABLET, COATED ORAL at 21:26

## 2022-10-04 RX ADMIN — FERROUS SULFATE TAB 325 MG (65 MG ELEMENTAL FE) 325 MG: 325 (65 FE) TAB at 08:31

## 2022-10-04 RX ADMIN — ACETAMINOPHEN 650 MG: 325 TABLET, FILM COATED ORAL at 08:31

## 2022-10-04 RX ADMIN — DIPHENHYDRAMINE HYDROCHLORIDE 12.5 MG: 50 INJECTION INTRAMUSCULAR; INTRAVENOUS at 08:30

## 2022-10-04 RX ADMIN — DIPHENHYDRAMINE HYDROCHLORIDE 12.5 MG: 50 INJECTION INTRAMUSCULAR; INTRAVENOUS at 03:26

## 2022-10-04 RX ADMIN — ATORVASTATIN CALCIUM 10 MG: 10 TABLET, FILM COATED ORAL at 08:34

## 2022-10-04 RX ADMIN — SODIUM CHLORIDE 75 ML/HR: 9 INJECTION, SOLUTION INTRAVENOUS at 13:13

## 2022-10-04 RX ADMIN — THIAMINE HCL TAB 100 MG 50 MG: 100 TAB at 08:30

## 2022-10-04 RX ADMIN — DIPHENHYDRAMINE HYDROCHLORIDE 25 MG: 25 CAPSULE ORAL at 10:47

## 2022-10-04 RX ADMIN — DIPHENHYDRAMINE HYDROCHLORIDE 12.5 MG: 50 INJECTION INTRAMUSCULAR; INTRAVENOUS at 21:26

## 2022-10-04 RX ADMIN — FOLIC ACID 1 MG: 1 TABLET ORAL at 08:31

## 2022-10-04 NOTE — PROGRESS NOTES
SPEECH LANGUAGE PATHOLOGY BEDSIDE SWALLOW EVALUATIONS  Patient: Rosa Maria Smith  (62 y.o. )  Date: 10/4/2022  Primary Diagnosis:    Precautions:      ASSESSMENT :  Patient is A&Ox4 and follows basic commands. She reports globus sensation with medicine and eating softer foods from \"can\" at home. She states she was unable to follow-up with GI because she could not get out of her home. Notified CM patient requesting rehab. Patient presents w/ mild oral dysphagia s/t dentition and oral hygiene. Patient swallow fxn at baseline. Oral phase c/b reduced mastication w/ slow A-P transit of solids. Pharyngeal phase c/b timely swallow and HLE is wfl upon palpation. No overt s/s of pen/asp observed. PLAN :  Recommendations and Planned Interventions:  Rec minced and moist diet w/ thin liquids and strict asp/GERD precautions. Rec GI consult s/t worsening globus sensation w/ hx of Schatzki's Ring in 08/2022 w/ recs to follow up in 4 weeks. Patient denies follow up. No further ST intervention at this time. SUBJECTIVE:   Patient reports globus sensation with medicine. She reports L paraesthesia and facial droop since COVID dx 2 years ago. OBJECTIVE:   Patient admitted w/ R sided abdominal pain w/ urination. Past Medical History:   Diagnosis Date    Anxiety     CAD (coronary artery disease)     Fibromyalgia     Hypertension     Neuropathy     Peripheral artery disease ([address]     Psychiatric disorder     anxiety     MBS performed on 9/9/22 by Maya Alonzo CCC-SLP  ASSESSMENT :  Based on the objective data described below, the patient presents with mild oral dysphagia. Oral phase c/b prolonged mastication s/t poor dentition. Premature spillage to the level of the pyriforms w/ thin remaining consistencies initiate at the level of the vallecular. Minimal swallow delay appreciated. BOT retraction,  HLE, pharyngeal constriction and epiglottic inversion are wfl.    Consistent trace flash penetration during the swallow w/ thin. This clears w/ force of the swallow and no subsequent aspiration observed. No pen/asp w/ remaining consistencies. No pharyngeal residue. Cervical osteophyte C6 w/ mild delayed clearance may contribute to globus sensation. Air noted throughout proximal esophagus. Unable to complete esophageal sweep s/t body habitus and inability to stand. .      Diet prior to admission: Soft/thin  Current Diet:  DIET ADULT  DIET ADULT ORAL NUTRITION SUPPLEMENT     Cognitive and Communication Status:  Neurologic State: Alert  Orientation Level: Oriented X4  Cognition: Follows commands  Perception: Appears intact  Perseveration: No perseveration noted     Swallowing Evaluation:   Oral Assessment:  Oral Assessment  Labial: Left droop  Dentition: Limited  Oral Hygiene: white coating tongue, caries  Lingual: No impairment  Velum: No impairment  Mandible: No impairment  P.O. Trials:  Patient Position: upright in bed  Vocal quality prior to P.O.: No impairment  Consistency Presented: Solid; Thin liquid;Puree  How Presented: SLP-fed/presented;Cup/sip;Spoon;Straw;Successive swallows     Bolus Acceptance: No impairment  Bolus Formation/Control: Impaired  Type of Impairment: Mastication  Propulsion: No impairment  Oral Residue: None  Initiation of Swallow: No impairment  Laryngeal Elevation: Functional  Aspiration Signs/Symptoms: None  Pharyngeal Phase Characteristics: No impairment, issues, or problems              Oral Phase Severity: Mild  Pharyngeal Phase Severity : Minimal  Voice:     Vocal Quality: No impairment       Pain:  Pain Scale 1: Numeric (0 - 10)  Pain Intensity 1: 10  Pain Location 1: Abdomen;Leg      After treatment:   Patient left in no apparent distress in bed, Call bell within reach, and Nursing notified    COMMUNICATION/EDUCATION:   Patient was educated regarding diet recs, swallow strategies, s/s aspiration and aspiraiton/GERD precautions.   She demonstrated Fair understanding as evidenced by reduced engagement. The patient's plan of care including recommendations, planned interventions, and recommended diet changes were discussed with: Registered nurse and CM .      Thank you for this referral.  Jaquelin Buchanan M.S., M.Ed., CCC-SLP  Time Calculation: 59 mins

## 2022-10-04 NOTE — ROUTINE PROCESS
TRANSFER - OUT REPORT:    Verbal report given to Mustapha Schuler RN(name) on Corning Borne  being transferred to [facility] (unit) for routine progression of care       Report consisted of patients Situation, Background, Assessment and   Recommendations(SBAR). Information from the following report(s) SBAR was reviewed with the receiving nurse. Opportunity for questions and clarification was provided.       Patient transported with:   Monitor  Registered Nurse

## 2022-10-04 NOTE — ED NOTES
Spoke with Joanna Soriano from [facility] to inform him that the pt might be discharged tonight. Per Mr Mariana Chavez pt does not have anyone to take care of her if she would get discharge. Pt is bed bound at home and is unable to get up out of bed to get something to drink, eat or go to the bathroom.  is very concern about the pt. The nurse informed Dr. Rosalina Meza about the issue.

## 2022-10-04 NOTE — PROGRESS NOTES
Dual RN skin assessment performed with Alfredito Fisher RN. Skin assessment revealed 2 small, pink-colored, healed wounds to buttocks with excoriation to mid buttocks fold. No other wounds noted.

## 2022-10-04 NOTE — PROGRESS NOTES
Reason for Readmission:    Dehydration           RUR Score/Risk Level:   23%      PCP: First and Last name:  Leandra Watkins   Name of Practice:    Are you a current patient: Yes/No:    Approximate date of last visit:    Can you participate in a virtual visit with your PCP:     Is a Care Conference indicated:       Did you attend your follow up appointment (s): If not, why not: No, did not have an appointment         Resources/supports as identified by patient/family:   Pt lives alone and does not have a support system        Top Challenges facing patient (as identified by patient/family and CM): Finances/Medication cost?   No    Transportation   Yes, Pt is bed bound     Support system or lack thereof? No support    Living arrangements? Lives alone         Self-care/ADLs/Cognition? Not able to are for herself. Current Advanced Directive/Advance Care Plan:    Full code    Pt stated that she does not have anyone to list as an Emergency contact. Pt stated that Joel Robles does not want anything to do with her. Plan for utilizing home health:  Not at this time. Transition of Care Plan:        Met f/f with Pt, she confirmed that  the information on the face sheet is correct. Pt stated that she lives in [facility] and that her mother lives in [address] [place] [address] stated that her mother has Alzheimer. Pt stated that she lives alone, Pt stated that [facility] is suppose to be getting her a Nurse for 4 hours a day. Pt stated that they have not be able to set that up for he. Pt stated that [facility] comes out but that they really don't do anything. Pt stated that she is bed bound and cannot, get up to take care of herself. Pt stated that her Ex- will come and assist her. Pt stated that her son and daughter are incarcerated. Pt is very familiar to this CM. Pt is a frequent readmit and always wants to go home.  Pt lives alone, does not have a support sytem and ends up coming back to the hospital.     Pt stated that her daughter will get out of [place] on November 14. Pt stated that her daughter will come and take care of her when she gets out of alf. CM discussed D/C planning with Pt, discussed that it is not safe for her to go home. Discussed with Pt about going to a Nursing Home until her daughter gets out of alf. Pt was in agreement with going to a Nursing Home for LTC until her daughter gets out of alf. Pt signed a choice letter for CM to send to several Nursing Homes to inquire who can accept Pt. CM dispo: Nursing Home for LTC. Pt may need a UAI.

## 2022-10-04 NOTE — H&P
History and Physical    NAME: Colonel Uribe   :  1970   MRN:  [id number]     Date/Time:  10/4/2022 9:01 AM    Patient PCP: Cornelia Mckinnon NP  ______________________________________________________________________             Subjective:     CHIEF COMPLAINT: R abominal pain that goes to the back and pain with urination        HISTORY OF PRESENT ILLNESS:       Patient is a 47y.o. year old female with a history of PVD, HTN and fibromyalgia presents with acute right flank pain and pain with urination. It began early yesterday morning and has not occurred prior. The pain is sharp, severe, and constant without radiation. Patient confirms fever this morning (99.1?), flank pain, increased urgency, pain and burning with urination since yesterday morning. Confirms generalized pruritis, especially at lower and upper extremities bilaterally. Denies hematuria and previous history of kidney stones.     PMH: HTN, PVD, Fibromyalgia    Allergies: denies food, medication, and environmental allergies    SH: smokes 1-2- cigarettes per day, denies alcohol and illicit drug use    FH: mother - HTN, diabetes mellitus, CAD; father - HTN, diabetes mellitus, CAD, esophageal cancer; brother: PVD; 3 children: all have asthma    Labs:  UA on 10/3: calcium oxalate crystals present  CT scan abdomen and pelvis with contrast on 10/3: terminal ileum and proximal colon with fluid containing scattered areas of wall thickening  Low K (2.7)  Awaiting blood culture results        Past Medical History:   Diagnosis Date    Anxiety     CAD (coronary artery disease)     Fibromyalgia     Hypertension     Neuropathy     Peripheral artery disease ([address]     Psychiatric disorder     anxiety        Past Surgical History:   Procedure Laterality Date    HX ANKLE FRACTURE TX Left     surgical implants    HX  SECTION         Social History     Tobacco Use    Smoking status: Every Day     Packs/day: 0.50     Types: Cigarettes    Smokeless tobacco: Never Substance Use Topics    Alcohol use: Yes     Comment: occosionally         No family history on file. Allergies   Allergen Reactions    Codeine Nausea Only        Prior to Admission medications    Medication Sig Start Date End Date Taking? Authorizing Provider   diclofenac (VOLTAREN) 1 % gel Apply 2 g to affected area four (4) times daily. 9/25/22   Monalisa Kauffman PA-C   atorvastatin (LIPITOR) 10 mg tablet Take 1 Tablet by mouth daily. 9/3/22   Mayco Bravo PA   ferrous sulfate 325 mg (65 mg iron) tablet Take 1 Tablet by mouth daily (with breakfast). 9/3/22   Mayco Bravo PA   nitroglycerin (NITROSTAT) 0.4 mg SL tablet 1 Tablet by SubLINGual route as needed for Chest Pain. Up to 3 doses. Patient not taking: Reported on 10/4/2022 9/2/22   ANGELI Huber   folic acid (FOLVITE) 1 mg tablet Take 1 Tablet by mouth daily. 9/3/22   Mayco Bravo PA   thiamine (B-1) 50 mg tablet Take 1 Tablet by mouth daily. 9/2/22   Mayco Bravo PA   pregabalin (LYRICA) 100 mg capsule Take 100 mg by mouth three (3) times daily.     Other, MD William         Current Facility-Administered Medications:     atorvastatin (LIPITOR) tablet 10 mg, 10 mg, Oral, DAILY, Frankie Suarez MD, 10 mg at 10/04/22 1637    ferrous sulfate tablet 325 mg, 325 mg, Oral, DAILY WITH BREAKFAST, Frankie Suarez MD, 325 mg at 65/58/59 4926    folic acid (FOLVITE) tablet 1 mg, 1 mg, Oral, DAILY, Frankie Suarez MD, 1 mg at 10/04/22 0831    nitroglycerin (NITROSTAT) tablet 0.4 mg, 0.4 mg, SubLINGual, PRN, Frankie Suarez MD    thiamine mononitrate (B-1) tablet 50 mg, 50 mg, Oral, DAILY, Frankie Suarez MD, 50 mg at 10/04/22 0830    0.9% sodium chloride infusion, 75 mL/hr, IntraVENous, CONTINUOUS, Frankie Suarez MD    acetaminophen (TYLENOL) tablet 650 mg, 650 mg, Oral, Q6H PRN, 650 mg at 10/04/22 0831 **OR** acetaminophen (TYLENOL) suppository 650 mg, 650 mg, Rectal, Q6H PRN, Rafael Lara MD polyethylene glycol (MIRALAX) packet 17 g, 17 g, Oral, DAILY PRN, Frankie Suarez MD    ondansetron (ZOFRAN ODT) tablet 4 mg, 4 mg, Oral, Q8H PRN **OR** ondansetron (ZOFRAN) injection 4 mg, 4 mg, IntraVENous, Q6H PRN, Frankie Suarez MD    [START ON 10/5/2022] enoxaparin (LOVENOX) injection 30 mg, 30 mg, SubCUTAneous, Q12H, Frankie Suarez MD    diphenhydrAMINE (BENADRYL) injection 12.5 mg, 12.5 mg, IntraVENous, Q4H PRN, Frankie Suarez MD, 12.5 mg at 10/04/22 0830    LAB DATA REVIEWED:    Recent Results (from the past 24 hour(s))   EKG, 12 LEAD, INITIAL    Collection Time: 10/03/22  7:19 PM   Result Value Ref Range    Ventricular Rate 118 BPM    Atrial Rate 118 BPM    P-R Interval 152 ms    QRS Duration 82 ms    Q-T Interval 346 ms    QTC Calculation (Bezet) 484 ms    Calculated P Axis 58 degrees    Calculated R Axis 7 degrees    Calculated T Axis 32 degrees    Diagnosis       Sinus tachycardia  Possible Inferior infarct (cited on or before 25-SEP-2022)  Abnormal ECG  When compared with ECG of 25-SEP-2022 11:57,  Nonspecific T wave abnormality, improved in Inferior leads  Nonspecific T wave abnormality no longer evident in Anterolateral leads  Confirmed by BUDDY CELIS, Rosanna Arguello ([id number]) on 10/4/2022 11:15:39 AM     CBC WITH AUTOMATED DIFF    Collection Time: 10/03/22  7:28 PM   Result Value Ref Range    WBC 7.6 3.6 - 11.0 K/uL    RBC 3.73 (L) 3.80 - 5.20 M/uL    HGB 11.6 11.5 - 16.0 g/dL    HCT 36.5 35.0 - 47.0 %    MCV 97.9 80.0 - 99.0 FL    MCH 31.1 26.0 - 34.0 PG    MCHC 31.8 30.0 - 36.5 g/dL    RDW 15.2 (H) 11.5 - 14.5 %    PLATELET 830 967 - 402 K/uL    MPV 9.2 8.9 - 12.9 FL    NRBC 0.0 0.0  WBC    ABSOLUTE NRBC 0.00 0.00 - 0.01 K/uL    NEUTROPHILS 71 32 - 75 %    LYMPHOCYTES 23 12 - 49 %    MONOCYTES 6 5 - 13 %    EOSINOPHILS 0 0 - 7 %    BASOPHILS 0 0 - 1 %    IMMATURE GRANULOCYTES 0 0 - 0.5 %    ABS. NEUTROPHILS 5.3 1.8 - 8.0 K/UL    ABS. LYMPHOCYTES 1.8 0.8 - 3.5 K/UL    ABS.  MONOCYTES 0.5 0.0 - 1.0 K/UL    ABS. EOSINOPHILS 0.0 0.0 - 0.4 K/UL    ABS. BASOPHILS 0.0 0.0 - 0.1 K/UL    ABS. IMM. GRANS. 0.0 0.00 - 0.04 K/UL    DF AUTOMATED     METABOLIC PANEL, COMPREHENSIVE    Collection Time: 10/03/22  7:28 PM   Result Value Ref Range    Sodium 140 136 - 145 mmol/L    Potassium 2.7 (LL) 3.5 - 5.1 mmol/L    Chloride 102 97 - 108 mmol/L    CO2 25 21 - 32 mmol/L    Anion gap 13 5 - 15 mmol/L    Glucose 92 65 - 100 mg/dL    BUN 2 (L) 6 - 20 mg/dL    Creatinine 0.54 (L) 0.55 - 1.02 mg/dL    BUN/Creatinine ratio 4 (L) 12 - 20      eGFR >60 >60 ml/min/1.73m2    Calcium 9.3 8.5 - 10.1 mg/dL    Bilirubin, total 0.5 0.2 - 1.0 mg/dL    AST (SGOT) 13 (L) 15 - 37 U/L    ALT (SGPT) 9 (L) 12 - 78 U/L    Alk. phosphatase 95 45 - 117 U/L    Protein, total 7.5 6.4 - 8.2 g/dL    Albumin 2.5 (L) 3.5 - 5.0 g/dL    Globulin 5.0 (H) 2.0 - 4.0 g/dL    A-G Ratio 0.5 (L) 1.1 - 2.2     URINALYSIS W/ RFLX MICROSCOPIC    Collection Time: 10/03/22  8:40 PM   Result Value Ref Range    Color Yellow      Appearance Turbid (A) Clear      Specific gravity 1.023 1.003 - 1.030      pH (UA) 6.0 5.0 - 8.0      Protein 100 (A) Negative mg/dL    Glucose Negative Negative mg/dL    Ketone 80 (A) Negative mg/dL    Bilirubin Small (A) Negative      Blood Negative Negative      Urobilinogen 2.0 (H) 0.1 - 1.0 EU/dL    Nitrites Negative Negative      Leukocyte Esterase Negative Negative     URINE MICROSCOPIC    Collection Time: 10/03/22  8:40 PM   Result Value Ref Range    WBC 0-4 0 - 4 /hpf    RBC 0-5 0 - 5 /hpf    Bacteria Negative Negative /hpf    Mucus 2+ (A) Negative /lpf    CA Oxalate crystals 1+ (A) Negative    Hyaline cast 0-2 0 - 5 /lpf       XR Results (most recent):  Results from Hospital Encounter encounter on 09/25/22    XR FOREARM RT AP/LAT    Narrative  EXAM: XR FOREARM RT AP/LAT    INDICATION: PAIN. COMPARISON: None.     FINDINGS: Two views of the right radius and ulna demonstrate no fracture or  other acute osseous, articular or soft tissue abnormality. Impression  No acute abnormality. CT ABD PELV W CONT   Final Result   The terminal ileum and proximal colon are fluid containing with scattered areas   of wall thickening. This may be due at least in part due to underdistention, but   enterocolitis due to nonspecific infection or inflammation is not excluded. No   bowel obstruction or other acute abnormality in the abdomen or pelvis. Review of Systems:  Constitutional: Negative for chills and fever. HENT: Confirms headache yesterday. Denies dizziness, lightheadedness  Eyes: Negative. Respiratory: Negative. Cardiovascular: Negative. Gastrointestinal: Positive for abdominal pain, constipation, decreased appetite. Negative for nausea and vomiting. Skin: Negative. Neurological: Negative. Objective:   VITALS:    Visit Vitals  /85 (BP 1 Location: Left upper arm, BP Patient Position: At rest;Lying)   Pulse (!) 112   Temp 99.1 °F (37.3 °C)   Resp 16   Ht 5' 11\" (1.803 m)   Wt 117.9 kg (260 lb)   SpO2 100%   BMI 36.26 kg/m²       Physical Exam:   Constitutional: pt is oriented to person, place, and time. HENT:   Head: Normocephalic and atraumatic. Eyes: Pupils are equal, round, and reactive to light. EOM are normal.   Cardiovascular: Normal rate, regular rhythm and normal heart sounds. Pulmonary/Chest: Breath sounds normal. No wheezes. No rales. Exhibits no tenderness. Abdominal: Soft. Bowel sounds are normal. There is no abdominal tenderness. There is no rebound and no guarding. Tenderness on palpation at the R flank and RUQ. Musculoskeletal: Normal range of motion. Neurological: pt is alert and oriented to person, place, and time. Alert. Normal strength. Decreased sensation to light touch in upper extremities bilaterally. Displays a negative Romberg sign.         ASSESSMENT:  Hypokalemia  Nephrolithiasis  Hypertension  Peripheral vein disease  Fibromyalgia  Generalized pruritis    PLAN:    Lipitor 10 mg daily  Lovenox 30 subcu every 12  Ferrous sulfate 325 mg daily  Thiamine 50 mg daily  Continue IV fluids  Rheumatology consult  PT OT consult  Replace potassium    ________________________________________________________________________    Signed: Izabel Spencer MD

## 2022-10-05 LAB
ALBUMIN SERPL-MCNC: 2.4 G/DL (ref 3.5–5)
ALBUMIN/GLOB SERPL: 0.6 {RATIO} (ref 1.1–2.2)
ALP SERPL-CCNC: 85 U/L (ref 45–117)
ALT SERPL-CCNC: 10 U/L (ref 12–78)
AMPHET UR QL SCN: NEGATIVE
ANION GAP SERPL CALC-SCNC: 11 MMOL/L (ref 5–15)
APPEARANCE UR: CLEAR
AST SERPL W P-5'-P-CCNC: 14 U/L (ref 15–37)
BACTERIA URNS QL MICRO: NEGATIVE /HPF
BARBITURATES UR QL SCN: NEGATIVE
BASOPHILS # BLD: 0 K/UL (ref 0–0.1)
BASOPHILS NFR BLD: 0 % (ref 0–1)
BENZODIAZ UR QL: NEGATIVE
BILIRUB SERPL-MCNC: 0.4 MG/DL (ref 0.2–1)
BILIRUB UR QL: NEGATIVE
BUN SERPL-MCNC: 2 MG/DL (ref 6–20)
BUN/CREAT SERPL: 4 (ref 12–20)
CA-I BLD-MCNC: 8.8 MG/DL (ref 8.5–10.1)
CANNABINOIDS UR QL SCN: NEGATIVE
CHLORIDE SERPL-SCNC: 108 MMOL/L (ref 97–108)
CO2 SERPL-SCNC: 24 MMOL/L (ref 21–32)
COCAINE UR QL SCN: NEGATIVE
COLOR UR: ABNORMAL
CREAT SERPL-MCNC: 0.49 MG/DL (ref 0.55–1.02)
DIFFERENTIAL METHOD BLD: ABNORMAL
DRUG SCRN COMMENT,DRGCM: ABNORMAL
EOSINOPHIL # BLD: 0 K/UL (ref 0–0.4)
EOSINOPHIL NFR BLD: 0 % (ref 0–7)
ERYTHROCYTE [DISTWIDTH] IN BLOOD BY AUTOMATED COUNT: 15.2 % (ref 11.5–14.5)
GLOBULIN SER CALC-MCNC: 3.7 G/DL (ref 2–4)
GLUCOSE BLD STRIP.AUTO-MCNC: 103 MG/DL (ref 65–100)
GLUCOSE BLD STRIP.AUTO-MCNC: 145 MG/DL (ref 65–100)
GLUCOSE BLD STRIP.AUTO-MCNC: 173 MG/DL (ref 65–100)
GLUCOSE SERPL-MCNC: 74 MG/DL (ref 65–100)
GLUCOSE UR STRIP.AUTO-MCNC: NEGATIVE MG/DL
HCT VFR BLD AUTO: 31.4 % (ref 35–47)
HGB BLD-MCNC: 9.9 G/DL (ref 11.5–16)
HGB UR QL STRIP: NEGATIVE
IMM GRANULOCYTES # BLD AUTO: 0 K/UL (ref 0–0.04)
IMM GRANULOCYTES NFR BLD AUTO: 0 % (ref 0–0.5)
KETONES UR QL STRIP.AUTO: 80 MG/DL
LEUKOCYTE ESTERASE UR QL STRIP.AUTO: NEGATIVE
LYMPHOCYTES # BLD: 1.6 K/UL (ref 0.8–3.5)
LYMPHOCYTES NFR BLD: 28 % (ref 12–49)
MCH RBC QN AUTO: 30.8 PG (ref 26–34)
MCHC RBC AUTO-ENTMCNC: 31.5 G/DL (ref 30–36.5)
MCV RBC AUTO: 97.8 FL (ref 80–99)
METHADONE UR QL: NEGATIVE
MONOCYTES # BLD: 0.5 K/UL (ref 0–1)
MONOCYTES NFR BLD: 8 % (ref 5–13)
MUCOUS THREADS URNS QL MICRO: ABNORMAL /LPF
MYOGLOBIN SERPL-MCNC: 17 NG/ML (ref 13–71)
NEUTS SEG # BLD: 3.8 K/UL (ref 1.8–8)
NEUTS SEG NFR BLD: 64 % (ref 32–75)
NITRITE UR QL STRIP.AUTO: NEGATIVE
NRBC # BLD: 0 K/UL (ref 0–0.01)
NRBC BLD-RTO: 0 PER 100 WBC
OPIATES UR QL: POSITIVE
PCP UR QL: NEGATIVE
PERFORMED BY, TECHID: ABNORMAL
PH UR STRIP: 6 [PH] (ref 5–8)
PLATELET # BLD AUTO: 328 K/UL (ref 150–400)
PMV BLD AUTO: 9.9 FL (ref 8.9–12.9)
POTASSIUM SERPL-SCNC: 3 MMOL/L (ref 3.5–5.1)
PROT SERPL-MCNC: 6.1 G/DL (ref 6.4–8.2)
PROT UR STRIP-MCNC: 100 MG/DL
RBC # BLD AUTO: 3.21 M/UL (ref 3.8–5.2)
RBC #/AREA URNS HPF: ABNORMAL /HPF (ref 0–5)
SODIUM SERPL-SCNC: 143 MMOL/L (ref 136–145)
SP GR UR REFRACTOMETRY: 1.03 (ref 1–1.03)
UROBILINOGEN UR QL STRIP.AUTO: 2 EU/DL (ref 0.1–1)
WBC # BLD AUTO: 5.9 K/UL (ref 3.6–11)
WBC URNS QL MICRO: ABNORMAL /HPF (ref 0–4)

## 2022-10-05 PROCEDURE — 74011250637 HC RX REV CODE- 250/637: Performed by: FAMILY MEDICINE

## 2022-10-05 PROCEDURE — 81001 URINALYSIS AUTO W/SCOPE: CPT

## 2022-10-05 PROCEDURE — 74011250637 HC RX REV CODE- 250/637

## 2022-10-05 PROCEDURE — 82962 GLUCOSE BLOOD TEST: CPT

## 2022-10-05 PROCEDURE — 96375 TX/PRO/DX INJ NEW DRUG ADDON: CPT

## 2022-10-05 PROCEDURE — 97530 THERAPEUTIC ACTIVITIES: CPT

## 2022-10-05 PROCEDURE — G0378 HOSPITAL OBSERVATION PER HR: HCPCS

## 2022-10-05 PROCEDURE — 97161 PT EVAL LOW COMPLEX 20 MIN: CPT

## 2022-10-05 PROCEDURE — 85025 COMPLETE CBC W/AUTO DIFF WBC: CPT

## 2022-10-05 PROCEDURE — 97165 OT EVAL LOW COMPLEX 30 MIN: CPT

## 2022-10-05 PROCEDURE — 74011250636 HC RX REV CODE- 250/636: Performed by: SPECIALIST

## 2022-10-05 PROCEDURE — 65270000029 HC RM PRIVATE

## 2022-10-05 PROCEDURE — 96376 TX/PRO/DX INJ SAME DRUG ADON: CPT

## 2022-10-05 PROCEDURE — 80053 COMPREHEN METABOLIC PANEL: CPT

## 2022-10-05 PROCEDURE — 74011250636 HC RX REV CODE- 250/636: Performed by: FAMILY MEDICINE

## 2022-10-05 PROCEDURE — 96372 THER/PROPH/DIAG INJ SC/IM: CPT

## 2022-10-05 RX ORDER — POTASSIUM CHLORIDE 750 MG/1
40 TABLET, FILM COATED, EXTENDED RELEASE ORAL
Status: COMPLETED | OUTPATIENT
Start: 2022-10-05 | End: 2022-10-05

## 2022-10-05 RX ORDER — HYDROXYZINE 50 MG/ML
25 INJECTION, SOLUTION INTRAMUSCULAR
Status: DISCONTINUED | OUTPATIENT
Start: 2022-10-05 | End: 2022-10-07 | Stop reason: HOSPADM

## 2022-10-05 RX ORDER — METOPROLOL SUCCINATE 50 MG/1
50 TABLET, EXTENDED RELEASE ORAL DAILY
Status: DISCONTINUED | OUTPATIENT
Start: 2022-10-06 | End: 2022-10-07 | Stop reason: HOSPADM

## 2022-10-05 RX ORDER — DIPHENHYDRAMINE HYDROCHLORIDE 50 MG/ML
25 INJECTION, SOLUTION INTRAMUSCULAR; INTRAVENOUS
Status: DISCONTINUED | OUTPATIENT
Start: 2022-10-05 | End: 2022-10-07 | Stop reason: HOSPADM

## 2022-10-05 RX ORDER — PREGABALIN 150 MG/1
150 CAPSULE ORAL 3 TIMES DAILY
Status: DISCONTINUED | OUTPATIENT
Start: 2022-10-05 | End: 2022-10-07 | Stop reason: HOSPADM

## 2022-10-05 RX ADMIN — PREGABALIN 150 MG: 150 CAPSULE ORAL at 10:13

## 2022-10-05 RX ADMIN — HYDROXYZINE HYDROCHLORIDE 25 MG: 50 INJECTION, SOLUTION INTRAMUSCULAR at 01:38

## 2022-10-05 RX ADMIN — FERROUS SULFATE TAB 325 MG (65 MG ELEMENTAL FE) 325 MG: 325 (65 FE) TAB at 08:16

## 2022-10-05 RX ADMIN — PREGABALIN 150 MG: 150 CAPSULE ORAL at 20:32

## 2022-10-05 RX ADMIN — METHYLPREDNISOLONE SODIUM SUCCINATE 40 MG: 40 INJECTION, POWDER, FOR SOLUTION INTRAMUSCULAR; INTRAVENOUS at 10:13

## 2022-10-05 RX ADMIN — TRAMADOL HYDROCHLORIDE 50 MG: 50 TABLET, COATED ORAL at 06:06

## 2022-10-05 RX ADMIN — ENOXAPARIN SODIUM 30 MG: 100 INJECTION SUBCUTANEOUS at 08:16

## 2022-10-05 RX ADMIN — ATORVASTATIN CALCIUM 10 MG: 10 TABLET, FILM COATED ORAL at 08:16

## 2022-10-05 RX ADMIN — ACETAMINOPHEN 650 MG: 325 TABLET, FILM COATED ORAL at 15:41

## 2022-10-05 RX ADMIN — POTASSIUM CHLORIDE 40 MEQ: 750 TABLET, FILM COATED, EXTENDED RELEASE ORAL at 13:35

## 2022-10-05 RX ADMIN — FOLIC ACID 1 MG: 1 TABLET ORAL at 08:16

## 2022-10-05 RX ADMIN — DIPHENHYDRAMINE HYDROCHLORIDE 25 MG: 50 INJECTION INTRAMUSCULAR; INTRAVENOUS at 00:27

## 2022-10-05 RX ADMIN — METHYLPREDNISOLONE SODIUM SUCCINATE 40 MG: 40 INJECTION, POWDER, FOR SOLUTION INTRAMUSCULAR; INTRAVENOUS at 17:04

## 2022-10-05 RX ADMIN — ENOXAPARIN SODIUM 30 MG: 100 INJECTION SUBCUTANEOUS at 20:31

## 2022-10-05 RX ADMIN — TRAMADOL HYDROCHLORIDE 50 MG: 50 TABLET, COATED ORAL at 22:19

## 2022-10-05 RX ADMIN — SODIUM CHLORIDE 75 ML/HR: 9 INJECTION, SOLUTION INTRAVENOUS at 01:31

## 2022-10-05 RX ADMIN — DIPHENHYDRAMINE HYDROCHLORIDE 25 MG: 50 INJECTION INTRAMUSCULAR; INTRAVENOUS at 06:06

## 2022-10-05 RX ADMIN — THIAMINE HCL TAB 100 MG 50 MG: 100 TAB at 08:16

## 2022-10-05 RX ADMIN — SODIUM CHLORIDE 75 ML/HR: 9 INJECTION, SOLUTION INTRAVENOUS at 15:41

## 2022-10-05 RX ADMIN — PREGABALIN 150 MG: 150 CAPSULE ORAL at 15:41

## 2022-10-05 RX ADMIN — HYDROXYZINE HYDROCHLORIDE 25 MG: 50 INJECTION, SOLUTION INTRAMUSCULAR at 10:13

## 2022-10-05 NOTE — PROGRESS NOTES
The patient reports limb weakness and paresthesia and burning pains in the upper and lower extremities and loss of coordination and control of her trunk, upper and lower extremity and fingers with attempted movement. The patient denies PRM or GCA symptoms but the ESR was 107 (H) and CRP is 6.6 ((H). Neurology has been consulted. I will empirically start IV Solumedrol. CPK was 15.       Maryanne Quan MD, Celestino Vinson

## 2022-10-05 NOTE — PROGRESS NOTES
Transfer skin assessment. Wound present on each side of buttocks, pink in color. Multiple small scattered wounds midline sacral area. Bilateral feet very dry, flaky, peeling, yellow in color, toenails curled and embedded in skin, MD notified and order for podiatrist consult received.

## 2022-10-05 NOTE — CONSULTS
NEUROLOGY CONSULT    Name Ham Lopez Age 47 y.o. MRN [id number]  1970     Referring Physician: Tara Lutz NP      Chief Complaint:  pain and paresthesias in lower extremity bilaterally. Ms. Oneill is a 47year old female with a history of CAD, anxiety, PAD who presented to the ED on 10/3/22 with right flank pain radiating to the back. She verbalized she is still experiencing burning pain, numbness/tingling, and weakness in the upper and lower extremity bilaterally. She is unable to use both hands to make a fist and feed herself. She was to rehab after discharging from the hospital less than 2 months ago and was told she was going to have lumbar puncture done outpatient but never got done. At the time of my evaluation, she was awake, alert, and oriented. She deferred some neurological assessment due to pain all over her body. She answered questions appropriately and followed minimal commands. Assessment and Plan  1) Paresthesias and weakness in all extremities:Started 2 months ago causing her to fall due to weakness and lack of motor coordination. LP attempted during the last admission multiple times and was unsuccessful. Patient has been taking Lyrica 100 mg TID and denied any improvement of her symptoms. Will increase Lyrica to 150 mg TID and recommend to repeat Fluoroscopy- guided LP. Will continue to follow patient clinically. - Head CT completed on 22 for generalized weakness and numbness and showed no acute abnormality.    - MRI brain 22 showed no acute or significant intracranial abnormality.    - MRI cervical spine 22 showed T1-2: Central focal protrusion. C6-7: Central focal protrusion, mild to moderate bilateral foraminal stenosis. C5-6: Broad and left posterolateral bulge. Severe left and mild right foraminal stenosis. C5-6: Broad and left posterolateral bulge. Severe left and mild right foraminal stenosis.  C4-5: Moderate bilateral foraminal stenosis left greater than right. C3-4, C2-3: Mild left foraminal stenosis. - MRI thoracic spine 8/29/22 showed Multilevel disc degenerative change. Moderate to severe canal stenosis at T6-T7. Moderate to severe bilateral foraminal stenoses at T8-T9  2) CAD  3) Hypertension  4) Peripheral artery disease  5) Anxiety  6) Psychiatric disorder  7) Fibromyalgia    Thanks for the consult,  Miryam Mahoney  Nurse practitioner    Collaborating Physician:  Dr. Annabella Cherry   Allergen Reactions    Codeine Nausea Only           Current Facility-Administered Medications   Medication Dose Route Frequency    diphenhydrAMINE (BENADRYL) injection 25 mg  25 mg IntraVENous Q4H PRN    hydrOXYzine (VISTARIL) injection 25 mg  25 mg IntraMUSCular Q6H PRN    atorvastatin (LIPITOR) tablet 10 mg  10 mg Oral DAILY    ferrous sulfate tablet 325 mg  325 mg Oral DAILY WITH BREAKFAST    folic acid (FOLVITE) tablet 1 mg  1 mg Oral DAILY    nitroglycerin (NITROSTAT) tablet 0.4 mg  0.4 mg SubLINGual PRN    thiamine mononitrate (B-1) tablet 50 mg  50 mg Oral DAILY    0.9% sodium chloride infusion  75 mL/hr IntraVENous CONTINUOUS    acetaminophen (TYLENOL) tablet 650 mg  650 mg Oral Q6H PRN    Or    acetaminophen (TYLENOL) suppository 650 mg  650 mg Rectal Q6H PRN    polyethylene glycol (MIRALAX) packet 17 g  17 g Oral DAILY PRN    ondansetron (ZOFRAN ODT) tablet 4 mg  4 mg Oral Q8H PRN    Or    ondansetron (ZOFRAN) injection 4 mg  4 mg IntraVENous Q6H PRN    enoxaparin (LOVENOX) injection 30 mg  30 mg SubCUTAneous Q12H    traMADoL (ULTRAM) tablet 50 mg  50 mg Oral Q6H PRN       Past Medical History:   Diagnosis Date    Anxiety     CAD (coronary artery disease)     Fibromyalgia     Hypertension     Neuropathy     Peripheral artery disease ([facility])     Psychiatric disorder     anxiety        Social History     Tobacco Use    Smoking status: Every Day     Packs/day: 0.50     Types: Cigarettes    Smokeless tobacco: Never   Substance Use Topics    Alcohol use:  Yes Comment: occosionally     Drug use: Not Currently            Exam  Visit Vitals  BP (!) 152/96 (BP 1 Location: Left upper arm, BP Patient Position: At rest)   Pulse (!) 107   Temp 98.3 °F (36.8 °C)   Resp 18   Ht 5' 11\" (1.803 m)   Wt 87 kg (191 lb 12.8 oz)   SpO2 100%   BMI 26.75 kg/m²         General: Well developed, well nourished. Patient in no distress   Head: Normocephalic, atraumatic, anicteric sclera   Neck Normal ROM, No thyromegally   Lungs:     Cardiac:    Abd:    Ext: No pedal edema   Skin: Supple no rash. Itching     NeurologicExam:  Mental Status: Alert and oriented to person place and time   Speech: Fluent no aphasia or dysarthria. Cranial Nerves:   Pupils are equal round and reactive to light and accommodation, extraocular movements are intact and full, visual fields are intact by confrontation, no nystagmus noted, face is symmetric, sensation in face is intact and symmetric, hearing is intact and symmetric, tongue and uvula are in midline with normal movements, palate is elevating equally, Decreased shoulder shrug symmetrically. .   Motor:  Weakness in all upper and lower extremity. Unable to assess strength due to pain. Unable to grasp object or make a fist. Normal bulk and tone. Reflexes:   Deep tendon reflexes deferred. Patient verbalized it's too painful. Sensory:   Decreased sensation in the upper and lower extremity symmetrically    Gait:  Deferred. Tremor:   No tremor noted. Cerebellar:  Lack of coordination intact for finger-nose-finger. Neurovascular: No carotid bruits.  No JVD      Lab Review  Lab Results   Component Value Date/Time    WBC 8.3 10/04/2022 12:21 PM    HCT 31.8 (L) 10/04/2022 12:21 PM    HGB 9.6 (L) 10/04/2022 12:21 PM    PLATELET 599 56/63/4022 12:21 PM     Lab Results   Component Value Date/Time    Sodium 141 10/04/2022 09:42 PM    Potassium 2.8 (L) 10/04/2022 09:42 PM    Chloride 105 10/04/2022 09:42 PM    CO2 24 10/04/2022 09:42 PM    Glucose 78 10/04/2022 09:42 PM    BUN 2 (L) 10/04/2022 09:42 PM    Creatinine 0.45 (L) 10/04/2022 09:42 PM    Calcium 9.0 10/04/2022 09:42 PM     Lab Results   Component Value Date/Time    Vitamin B12 >2,000 (H) 08/30/2022 09:24 AM    Folate 3.7 (L) 08/30/2022 09:24 AM     Lab Results   Component Value Date/Time    LDL, calculated 101.4 (H) 08/30/2022 09:24 AM     No results found for: HBA1C, JVH1IYLR, YUR4AEUZ  No components found for: TROPQUANT  No results found for: LENIN

## 2022-10-05 NOTE — PROGRESS NOTES
Daily Note     Today's date: 2019  Patient name: Issac Padilla II  : 2013  MRN: [id number]  Referring provider: Dilip Figueroa MD  Dx:   Encounter Diagnosis     ICD-10-CM    1  Feeding difficulties R63 3          1* [facility] 51/80----> precert    Subjective: Co-tx with ST x 45 mins Pt brought to therapy by mom and dad who were present for feeding portion of the session  Pt's mom provided foods from home  She reports that pt completed his homework accepting vegetables 3x this week  He was initially resistant to accepting sliced carrots vs diced but he was able to do so  He accepted all presented of peas  Mom did not have celery to present  Objective: Started session with use of obstacle course for somatosensory prep prior to food presentations  He crawled up/down ramps, crawled through barrel, and crawled across bolster  He transitioned to feeding room  He was seated in pediatric chair  Continued with routines of feeding sessions with blowing bubbles for deep breathing and cleaning hands and table with table bubbles for tactile processing and hygiene  He was presented with foods from home with the use of a reinforcer  He was presented with bologna, corn, diced peaches with tapioca balls and dehydrated apples used as reinforcer  Assessment: He required min redirection to complete the obstacle course as directed to complete somatosensory prep activities  He demonstrated good participation in prep routines  When given the choice he chose to eat foods from his tupperware container today  He verbally protested to eating corn but he was able to accept without difficulty  He quickly demonstrated full oral acceptance of all foods presented with use of reinforcer  He accepted all presented of peaches but not remaining foods  He was given a set amount of bologna and corn to finish and he earned his juice and the rest of his apples and of the foods that were presented       Other: Pt was given homework to complete TRANSFER - OUT REPORT:    Verbal report given to Wellington Cee on Mitali Joseph being transferred to [facility]  for routine progression of care. Report consisted of patient's Situation, Background, Assessment and   Recommendations(SBAR). at home this week-to try one vegetable 4 days  He has to choose from corn, peas, broccoli, and celery  Plan: Continue per plan of care

## 2022-10-05 NOTE — PROGRESS NOTES
PHYSICAL THERAPY EVALUATION  Patient: Serina Mock (35 y.o. female)  Date: 10/5/2022  Primary Diagnosis: Dehydration [E86.0]       Precautions: falls       ASSESSMENT  Patient cleared for participation by nursing, and received semi-supine agreeable to participation in therapy    Pt is a 48 yo F presenting with acute R flank pain and pain with urination. Admitted 10/3/22 with hypokalemia, nephrolithiasis, HTN, peripheral vein disease, fibromyalgia, generalized pruritis. Home Situation  Home Environment: Apartment ([place])  # Steps to Enter: 4  Rails to Enter: No  One/Two Story Residence: Two story  Living Alone: Yes  Support Systems: Parent(s)  Patient Expects to be Discharged to[de-identified] Skilled nursing facility  Current DME Used/Available at Home: Wheelchair, Walker, rolling, Walker, rollator, Other (comment) (Brianna Armin lift)  PLOF: getting to side of bed with assistance, last stood at rehab with equipment. Based on the objective data described below, the patient presents with grossly decr LE strength L>R, decr coordination, decr sitting balance, decr sensory, decr transfers and incr need for assistance throughout functional mobility. Mobility performed this session: Supine>sit: primarily completed with modA x2 for LE and UE assist. [address] [facility] [place] x2 for scooting, cueing on hand placement to facilitate however difficulty noted, pt ed however on scooting each him at a time. Once at EOB, attempted sit to stand with x2 A however unable to complete / clear hips from bed, therefore deferred further attempts. Transferred back into supine with modA x2 for trunk / LE assistance. Once in supine, pt ed on ankle pumps, quad sets, and gluteal isometrics to be performed on own. Pt motivated to participate in therapy and regain functional indep. Pt will benefit from continued skilled PT to address above deficits and return to PLOF. Current PT DC recommendation SNF.      Other factors to consider for discharge: current mobility, baseline mobility, DME, social support      PLAN :  Recommendations and Planned Interventions: bed mobility training, transfer training, gait training, therapeutic exercises, neuromuscular re-education, patient and family training/education, and therapeutic activities      Frequency/Duration: Patient will be followed by physical therapy:  3-5x/week to address goals.     Recommendation for discharge: (in order for the patient to meet his/her long term goals)  [address]   Patient reported by the time she stood at rehab she had to be discharged    OBJECTIVE DATA SUMMARY:   HISTORY:    Past Medical History:   Diagnosis Date    Anxiety     CAD (coronary artery disease)     Fibromyalgia     Hypertension     Neuropathy     Peripheral artery disease ([facility] [address]     Psychiatric disorder     anxiety     Past Surgical History:   Procedure Laterality Date    HX ANKLE FRACTURE TX Left     surgical implants    HX  SECTION         Home Situation  Home Environment: Apartment ([facility])  # Steps to Enter: 4  Rails to Enter: No  One/Two Story Residence: Two story  Living Alone: Yes  Support Systems: Parent(s)  Patient Expects to be Discharged to[de-identified] Skilled nursing facility  Current DME Used/Available at Home: Wheelchair, Walker, rolling, Elverna Corti, rollator, Other (comment) (Shanta Come lift)    EXAMINATION/PRESENTATION/DECISION MAKING:   Critical Behavior:  Neurologic State: Alert  Orientation Level: Oriented X4  Cognition: Follows commands, Appropriate for age attention/concentration  Safety/Judgement: Awareness of environment, Insight into deficits    Range Of Motion:  AROM: Generally decreased, functional (Decr BLE, L>R)                       Strength:    Strength: Generally decreased, functional (2+/5 L knee ext, 3/5 DF B, 4/5 knee ext R)                    Tone & Sensation:                  Sensation: Impaired (Numbness B hands / B feet)               Coordination:  Coordination: Generally decreased, functional (L>R)  Vision:      Functional Mobility:  Bed Mobility:  Rolling: Moderate assistance;Assist x2  Supine to Sit: Moderate assistance;Assist x2  Sit to Supine: Moderate assistance;Assist x2  Scooting: Moderate assistance;Assist x2  Transfers:  Sit to Stand:  (ARNOLDO)  Stand to Sit:  (ARNOLDO)                       Balance:   Sitting: Impaired; With support  Sitting - Static: Fair (occasional)  Sitting - Dynamic: Poor (constant support)  Standing:  (Attempted, unable to clear bed)  Ambulation/Gait Training:    Therapeutic Exercises:   See above    Functional Measure:  [facility] Form  How much difficulty does the patient currently have. .. Unable A Lot A Little None   1. Turning over in bed (including adjusting bedclothes, sheets and blankets)? [] 1   [x] 2   [] 3   [] 4   2. Sitting down on and standing up from a chair with arms ( e.g., wheelchair, bedside commode, etc.)   [x] 1   [] 2   [] 3   [] 4   3. Moving from lying on back to sitting on the side of the bed? [] 1   [x] 2   [] 3   [] 4          How much help from another person does the patient currently need. .. Total A Lot A Little None   4. Moving to and from a bed to a chair (including a wheelchair)? [x] 1   [] 2   [] 3   [] 4   5. Need to walk in hospital room? [x] 1   [] 2   [] 3   [] 4   6. Climbing 3-5 steps with a railing? [x] 1   [] 2   [] 3   [] 4   © 2007, Trustees of [employer], under license to [employer]. All rights reserved     Score:  Initial: 8/24 Most Recent: (Date: 10/5/22 )   Interpretation of Tool:  Represents activities that are increasingly more difficult (i.e. Bed mobility, Transfers, Gait).   Score 24 23 22-20 19-15 14-10 9-7 6   Modifier CH CI CJ CK CL CM CN         Physical Therapy Evaluation Charge Determination   History Examination Presentation Decision-Making   MEDIUM  Complexity : 1-2 comorbidities / personal factors will impact the outcome/ POC  MEDIUM Complexity : 3 Standardized tests and measures addressing body structure, function, activity limitation and / or participation in recreation  LOW Complexity : Stable, uncomplicated  Other outcome measures [facility] 6  8/24      Based on the above components, the patient evaluation is determined to be of the following complexity level: LOW     Pain Rating:  No reported pain    Activity Tolerance:   Fair    After treatment patient left in no apparent distress:   Bed locked and in lowest position Supine in bed and Call bell within reach    GOALS:    Problem: Mobility Impaired (Adult and Pediatric)  Goal: *Acute Goals and Plan of Care (Insert Text)  Description: Pt stated goals: indep  Pt will be I with LE HEP in 7 days. Pt will perform bed mobility with stand by A in 7 days. Pt will perform sit<>stand with CGA in 7 days  Pt will perform bed<>chair with CGA, LRAD, in 7 days  Pt will ambulate 10-20 ft with CGA, LRAD, in 7 days       Outcome: Not Met       COMMUNICATION/EDUCATION:   The patients plan of care was discussed with: Occupational therapist and Registered nurse. Patient/family have participated as able in goal setting and plan of care. PT/OT sessions occurred together for increased safety of pt and clinician.       Thank you for this referral.  Krystal Macedo, PT, PT, DPT

## 2022-10-05 NOTE — PROGRESS NOTES
No urine output during the night. Bladder scan read 300mL-314mL. Dr. Kirby Spikes notified. Order received to straight cath. Will continue to monitor.

## 2022-10-05 NOTE — PROGRESS NOTES
PROGRESS NOTE    Patient: Serina Mock MRN: [id number]  SSN: [id number]    YOB: 1970  Age: 47 y.o. Sex: female      Admit Date: 10/3/2022    LOS: 2 days       Subjective     CHIEF COMPLAINT: R abominal pain that goes to the back and pain with urination           HISTORY OF PRESENT ILLNESS:        Patient is a 47y.o. year old female with a history of PVD, HTN and fibromyalgia presents with acute right flank pain and pain with urination. It began early yesterday morning and has not occurred prior. The pain is sharp, severe, and constant without radiation. Patient confirms fever this morning (99.1?), flank pain, increased urgency, pain and burning with urination since yesterday morning. Confirms generalized pruritis, especially at lower and upper extremities bilaterally. Denies hematuria and previous history of kidney stones.      PMH: HTN, PVD, Fibromyalgia     Allergies: denies food, medication, and environmental allergies     SH: smokes 1-2- cigarettes per day, denies alcohol and illicit drug use     FH: mother - HTN, diabetes mellitus, CAD; father - HTN, diabetes mellitus, CAD, esophageal cancer; brother: PVD; 3 children: all have asthma     Labs:  UA on 10/3: calcium oxalate crystals present  CT scan abdomen and pelvis with contrast on 10/3: terminal ileum and proximal colon with fluid containing scattered areas of wall thickening  Low K (2.7)  Awaiting blood culture results    10/5/22  High BP (152/96) and pulse (107)  Rheumatology consult: prior CAT of cervical spine on 8/12/22 shows extensive cervical spinal stenosis at C3-C4 and C2-C3 and bulging disks at C4-5 and C5-C6 and focal protrusions at C6-7, ordered muscle enzymes and basic rheumatology labs, does not have features that supports fibromyalgia diagnosis; started IV solumedrol  Labs from 10/4:  Low K (2.8)  Positive for opiates  Patient is alert and awake  Denies lightheadedness, dizziness, chest pain, dyspnea, abdominal pain, wheezing, cough, nausea, vomiting, constipation, difficulties urinating  Confirms generalized pruritis, especially at lower and upper extremities bilaterally  Confirms headache, abdominal pain, decreased appetite, constipation  PE reveals decreased sensation to light touch in upper extremities bilaterally. Tenderness on palpation at the R flank and RUQ  States she previously was prescribed amlodipine 5mg po and metoprolol 50 mg po             Objective     Visit Vitals  BP (!) 152/96 (BP 1 Location: Left upper arm, BP Patient Position: At rest)   Pulse (!) 107   Temp 98.3 °F (36.8 °C)   Resp 18   Ht 5' 11\" (1.803 m)   Wt 87 kg (191 lb 12.8 oz)   SpO2 100%   BMI 26.75 kg/m²      O2 Device: None (Room air)    Physical Exam:   Physical Exam    Intake & Output:  Current Shift: No intake/output data recorded. Last three shifts: No intake/output data recorded. 24 Hour Results:    Recent Results (from the past 24 hour(s))   CBC WITH AUTOMATED DIFF    Collection Time: 10/04/22 12:21 PM   Result Value Ref Range    WBC 8.3 3.6 - 11.0 K/uL    RBC 3.20 (L) 3.80 - 5.20 M/uL    HGB 9.6 (L) 11.5 - 16.0 g/dL    HCT 31.8 (L) 35.0 - 47.0 %    MCV 99.4 (H) 80.0 - 99.0 FL    MCH 30.0 26.0 - 34.0 PG    MCHC 30.2 30.0 - 36.5 g/dL    RDW 15.0 (H) 11.5 - 14.5 %    PLATELET 042 015 - 450 K/uL    MPV 9.3 8.9 - 12.9 FL    NRBC 0.0 0.0  WBC    ABSOLUTE NRBC 0.00 0.00 - 0.01 K/uL    NEUTROPHILS 76 (H) 32 - 75 %    LYMPHOCYTES 17 12 - 49 %    MONOCYTES 7 5 - 13 %    EOSINOPHILS 0 0 - 7 %    BASOPHILS 0 0 - 1 %    IMMATURE GRANULOCYTES 0 0 - 0.5 %    ABS. NEUTROPHILS 6.3 1.8 - 8.0 K/UL    ABS. LYMPHOCYTES 1.4 0.8 - 3.5 K/UL    ABS. MONOCYTES 0.6 0.0 - 1.0 K/UL    ABS. EOSINOPHILS 0.0 0.0 - 0.4 K/UL    ABS. BASOPHILS 0.0 0.0 - 0.1 K/UL    ABS. IMM.  GRANS. 0.0 0.00 - 0.04 K/UL    DF AUTOMATED     METABOLIC PANEL, BASIC    Collection Time: 10/04/22 12:21 PM   Result Value Ref Range    Sodium 141 136 - 145 mmol/L    Potassium 3.5 3.5 - 5.1 mmol/L    Chloride 107 97 - 108 mmol/L    CO2 20 (L) 21 - 32 mmol/L    Anion gap 14 5 - 15 mmol/L    Glucose 91 65 - 100 mg/dL    BUN 2 (L) 6 - 20 mg/dL    Creatinine 0.44 (L) 0.55 - 1.02 mg/dL    BUN/Creatinine ratio 5 (L) 12 - 20      eGFR >60 >60 ml/min/1.73m2    Calcium 9.0 8.5 - 10.1 mg/dL   CK    Collection Time: 10/04/22  9:42 PM   Result Value Ref Range    CK 15 (L) 26 - 192 U/L   C REACTIVE PROTEIN, QT    Collection Time: 10/04/22  9:42 PM   Result Value Ref Range    C-Reactive protein 5.60 (H) 0.00 - 0.60 mg/dL   SED RATE (ESR)    Collection Time: 10/04/22  9:42 PM   Result Value Ref Range    Sed rate, automated 107 (H) 0 - 30 mm/hr   METABOLIC PANEL, COMPREHENSIVE    Collection Time: 10/04/22  9:42 PM   Result Value Ref Range    Sodium 141 136 - 145 mmol/L    Potassium 2.8 (L) 3.5 - 5.1 mmol/L    Chloride 105 97 - 108 mmol/L    CO2 24 21 - 32 mmol/L    Anion gap 12 5 - 15 mmol/L    Glucose 78 65 - 100 mg/dL    BUN 2 (L) 6 - 20 mg/dL    Creatinine 0.45 (L) 0.55 - 1.02 mg/dL    BUN/Creatinine ratio 4 (L) 12 - 20      eGFR >60 >60 ml/min/1.73m2    Calcium 9.0 8.5 - 10.1 mg/dL    Bilirubin, total 0.4 0.2 - 1.0 mg/dL    AST (SGOT) 13 (L) 15 - 37 U/L    ALT (SGPT) 10 (L) 12 - 78 U/L    Alk.  phosphatase 82 45 - 117 U/L    Protein, total 6.8 6.4 - 8.2 g/dL    Albumin 2.2 (L) 3.5 - 5.0 g/dL    Globulin 4.6 (H) 2.0 - 4.0 g/dL    A-G Ratio 0.5 (L) 1.1 - 2.2     URINALYSIS W/MICROSCOPIC    Collection Time: 10/05/22  6:55 AM   Result Value Ref Range    Color Susie      Appearance Clear Clear      Specific gravity 1.028 1.003 - 1.030      pH (UA) 6.0 5.0 - 8.0      Protein 100 (A) Negative mg/dL    Glucose Negative Negative mg/dL    Ketone 80 (A) Negative mg/dL    Bilirubin Negative Negative      Blood Negative Negative      Urobilinogen 2.0 (H) 0.1 - 1.0 EU/dL    Nitrites Negative Negative      Leukocyte Esterase Negative Negative      WBC 0-4 0 - 4 /hpf    RBC 0-5 0 - 5 /hpf    Bacteria Negative Negative /hpf Mucus 1+ /lpf         Imaging:    CT ABD PELV W CONT   Final Result   The terminal ileum and proximal colon are fluid containing with scattered areas   of wall thickening. This may be due at least in part due to underdistention, but   enterocolitis due to nonspecific infection or inflammation is not excluded. No   bowel obstruction or other acute abnormality in the abdomen or pelvis.             ASSESSMENT:  Hypokalemia  Hypertension  Peripheral vein disease  Fibromyalgia  Generalized pruritis  Cervical spinal stenosis at C3-C4 and C2-C3   Bulging disks at C4-5 and C5-C6 with focal protrusions at C6-7  Opiate dependence     PLAN:     Lipitor 10 mg daily  Lovenox 30 subcu every 12  Ferrous sulfate 325 mg daily  Thiamine 50 mg daily  Continue IV fluids  Rheumatology consult  PT OT consult  Replace potassium    Prescribe amlodipine 5mg po BID for hypertension  Consult neurology for neurological symptoms  Consult psychiatry for opiate dependence  Order MRI of spine for neurological symptoms              Current Facility-Administered Medications:     diphenhydrAMINE (BENADRYL) injection 25 mg, 25 mg, IntraVENous, Q4H PRN, Frankie Suarez MD, 25 mg at 10/05/22 0606    hydrOXYzine (VISTARIL) injection 25 mg, 25 mg, IntraMUSCular, Q6H PRN, Frankie Suarez MD, 25 mg at 10/05/22 0138    atorvastatin (LIPITOR) tablet 10 mg, 10 mg, Oral, DAILY, Frankie Suarez MD, 10 mg at 10/05/22 0816    ferrous sulfate tablet 325 mg, 325 mg, Oral, DAILY WITH BREAKFAST, Frankie Suarez MD, 325 mg at 00/26/71 3452    folic acid (FOLVITE) tablet 1 mg, 1 mg, Oral, DAILY, Frankie Suarez MD, 1 mg at 10/05/22 0816    nitroglycerin (NITROSTAT) tablet 0.4 mg, 0.4 mg, SubLINGual, PRN, Frankie Suarez MD    thiamine mononitrate (B-1) tablet 50 mg, 50 mg, Oral, DAILY, Frankie Suarez MD, 50 mg at 10/05/22 0816    0.9% sodium chloride infusion, 75 mL/hr, IntraVENous, CONTINUOUS, Frankie Suarez MD, Last Rate: 75 mL/hr at 10/05/22 0131, 75 mL/hr at 10/05/22 0131    acetaminophen (TYLENOL) tablet 650 mg, 650 mg, Oral, Q6H PRN, 650 mg at 10/04/22 0831 **OR** acetaminophen (TYLENOL) suppository 650 mg, 650 mg, Rectal, Q6H PRN, Frankie Suarez MD    polyethylene glycol (MIRALAX) packet 17 g, 17 g, Oral, DAILY PRN, Frankie Suarez MD    ondansetron (ZOFRAN ODT) tablet 4 mg, 4 mg, Oral, Q8H PRN **OR** ondansetron (ZOFRAN) injection 4 mg, 4 mg, IntraVENous, Q6H PRN, Frankie Suarez MD    enoxaparin (LOVENOX) injection 30 mg, 30 mg, SubCUTAneous, Q12H, Frankie Suarez MD, 30 mg at 10/05/22 0816    traMADoL (ULTRAM) tablet 50 mg, 50 mg, Oral, Q6H PRN, Frankie Suarez MD, 50 mg at 10/05/22 0606    Current Outpatient Medications   Medication Instructions    atorvastatin (LIPITOR) 10 mg, Oral, DAILY    diclofenac (VOLTAREN) 2 g, Topical, 4 TIMES DAILY    ferrous sulfate 325 mg, Oral, DAILY WITH BREAKFAST    folic acid (FOLVITE) 1 mg, Oral, DAILY    nitroglycerin (NITROSTAT) 0.4 mg, SubLINGual, AS NEEDED, Up to 3 doses. pregabalin (LYRICA) 100 mg, Oral, 3 TIMES DAILY    thiamine (B-1) 50 mg, Oral, DAILY         Signed By: Keila Garber     October 5, 2022         *ATTENTION:  This note has been created by a medical student for educational purposes only. Please do not refer to the content of this note for clinical decision-making, billing, or other purposes. Please see attending physicians note to obtain clinical information on this patient. *

## 2022-10-05 NOTE — CONSULTS
Consult Date: 10/4/2022    Consults:    Evaluate for joint pains    Subjective :    Is a 26-year-old female who was admitted to the hospital because she reported that she had severe pain and burning paresthesias in both feet. She reports a history that for  starting 2 months ago she started developing severe muscle weakness in the upper and lower extremities with loss of control and coordination. She has been having multiple falls because her legs does not bear her weight and she cannot control the direction of the use of her limbs involving the lower extremities and the upper extremities. She report when she reaches out she is unable to use her fingers because she cannot make a fist and cannot controlled fingers controlled to do some fine motor skills and then when she raises her arms, she hits herself in the face and cannot control the upper limbs. She also reported having tingling and numbness in her fingers. She report she is unable to ambulate because of frequent falling and balance disturbance. Rheumatology was consulted to evaluate her for fibromyalgia although she does not complain of muscle soreness, but complained of the above symptoms. Her review of systems for lupus was negative. She does have dry eyes and dry mouth and states that she drinks only 1-1/2 bottles of water daily due to issues with access to water. She denies having Raynaud's. She also mentions that she has been under the care of neurology throughout the process and has been taking Lyrica 100 mg 3 times a day without any success or improvement of her symptoms. She denies having peripheral joint pain other than the paresthesias and burning when her limbs that is worse when touched. She has weakness when trying to sit upright and states that her trunk is unbalanced and she is unable to maintain an upright sitting position.   Prior CAT scan of the cervical spine showed left-sided spinal stenosis at C3-4 and C2-3 she also has bulging disks at C4-5 C5-6 and focal protrusions at C6-7. Past Medical History:   Diagnosis Date    Anxiety     CAD (coronary artery disease)     Fibromyalgia     Hypertension     Neuropathy     Peripheral artery disease ([address]     Psychiatric disorder     anxiety      Past Surgical History:   Procedure Laterality Date    HX ANKLE FRACTURE TX Left     surgical implants    HX  SECTION       No family history on file.    Social History     Tobacco Use    Smoking status: Every Day     Packs/day: 0.50     Types: Cigarettes    Smokeless tobacco: Never   Substance Use Topics    Alcohol use: Yes     Comment: occosionally        Current Facility-Administered Medications   Medication Dose Route Frequency Provider Last Rate Last Admin    atorvastatin (LIPITOR) tablet 10 mg  10 mg Oral DAILY Frankie Suarez MD   10 mg at 10/04/22 2413    ferrous sulfate tablet 325 mg  325 mg Oral DAILY WITH BREAKFAST Frankie Suarez MD   325 mg at  7563    folic acid (FOLVITE) tablet 1 mg  1 mg Oral DAILY Frankie Suarez MD   1 mg at 10/04/22 0831    nitroglycerin (NITROSTAT) tablet 0.4 mg  0.4 mg SubLINGual PRN Jerad Suarez MD        thiamine mononitrate (B-1) tablet 50 mg  50 mg Oral DAILY Frankie Suarez MD   50 mg at 10/04/22 0830    0.9% sodium chloride infusion  75 mL/hr IntraVENous CONTINUOUS Frankie Suarez MD 75 mL/hr at 10/04/22 1313 75 mL/hr at 10/04/22 1313    acetaminophen (TYLENOL) tablet 650 mg  650 mg Oral Q6H PRN Jerad Suarez MD   650 mg at 10/04/22 0271    Or    acetaminophen (TYLENOL) suppository 650 mg  650 mg Rectal Q6H PRN Jerad Suarez MD        polyethylene glycol (MIRALAX) packet 17 g  17 g Oral DAILY PRN Jerad Suarez MD        ondansetron (ZOFRAN ODT) tablet 4 mg  4 mg Oral Q8H PRN Frankie Suarez MD        Or    ondansetron (ZOFRAN) injection 4 mg  4 mg IntraVENous Q6H PRN Phil Burleson MD        [START ON 10/5/2022] enoxaparin (LOVENOX) injection 30 mg  30 mg SubCUTAneous Q12H Julissa Suarez MD        diphenhydrAMINE (BENADRYL) injection 12.5 mg  12.5 mg IntraVENous Q4H PRN Frankie Suarez MD   12.5 mg at 10/04/22 0830    traMADoL (ULTRAM) tablet 50 mg  50 mg Oral Q6H PRN Julissa Suarez MD            Review of Systems   Constitutional: Negative. HENT: Negative. Eyes: Negative. Respiratory: Negative. Cardiovascular: Negative. Gastrointestinal: Negative. Endocrine: Negative. Musculoskeletal:  Positive for gait problem. Negative for arthralgias and myalgias. Skin: Negative. Allergic/Immunologic: Negative. Neurological:         Patient is burning and numbness and tingling in the upper and lower extremities and weakness and lack of coordination and strength with using her hands and the upper extremities and legs. She report that she is unable to control her trunk while trying to sit upright. She reported that her mouth feels numb on the inside muscle weakness of the upper and lower extremities   Hematological: Negative. Psychiatric/Behavioral:          Feels depressed because of the lack of physical body control     Objective     Vital signs for last 24 hours:  Visit Vitals  BP (!) 143/95 (BP 1 Location: Right upper arm, BP Patient Position: Lying;Supine)   Pulse 72   Temp 97.9 °F (36.6 °C)   Resp 16   Ht 5' 11\" (1.803 m)   Wt 117.9 kg (260 lb)   SpO2 99%   BMI 36.26 kg/m²       Intake/Output this shift:  Current Shift: No intake/output data recorded. Last 3 Shifts: No intake/output data recorded.     Data Review:   Recent Results (from the past 24 hour(s))   CBC WITH AUTOMATED DIFF    Collection Time: 10/04/22 12:21 PM   Result Value Ref Range    WBC 8.3 3.6 - 11.0 K/uL    RBC 3.20 (L) 3.80 - 5.20 M/uL    HGB 9.6 (L) 11.5 - 16.0 g/dL    HCT 31.8 (L) 35.0 - 47.0 %    MCV 99.4 (H) 80.0 - 99.0 FL    MCH 30.0 26.0 - 34.0 PG    MCHC 30.2 30.0 - 36.5 g/dL    RDW 15.0 (H) 11.5 - 14.5 %    PLATELET 132 772 - 911 K/uL    MPV 9.3 8.9 - 12.9 FL    NRBC 0.0 0.0  WBC    ABSOLUTE NRBC 0.00 0.00 - 0.01 K/uL    NEUTROPHILS 76 (H) 32 - 75 %    LYMPHOCYTES 17 12 - 49 %    MONOCYTES 7 5 - 13 %    EOSINOPHILS 0 0 - 7 %    BASOPHILS 0 0 - 1 %    IMMATURE GRANULOCYTES 0 0 - 0.5 %    ABS. NEUTROPHILS 6.3 1.8 - 8.0 K/UL    ABS. LYMPHOCYTES 1.4 0.8 - 3.5 K/UL    ABS. MONOCYTES 0.6 0.0 - 1.0 K/UL    ABS. EOSINOPHILS 0.0 0.0 - 0.4 K/UL    ABS. BASOPHILS 0.0 0.0 - 0.1 K/UL    ABS. IMM. GRANS. 0.0 0.00 - 0.04 K/UL    DF AUTOMATED     METABOLIC PANEL, BASIC    Collection Time: 10/04/22 12:21 PM   Result Value Ref Range    Sodium 141 136 - 145 mmol/L    Potassium 3.5 3.5 - 5.1 mmol/L    Chloride 107 97 - 108 mmol/L    CO2 20 (L) 21 - 32 mmol/L    Anion gap 14 5 - 15 mmol/L    Glucose 91 65 - 100 mg/dL    BUN 2 (L) 6 - 20 mg/dL    Creatinine 0.44 (L) 0.55 - 1.02 mg/dL    BUN/Creatinine ratio 5 (L) 12 - 20      eGFR >60 >60 ml/min/1.73m2    Calcium 9.0 8.5 - 10.1 mg/dL       Physical Exam  HENT:      Head: Atraumatic. Eyes:      Extraocular Movements: Extraocular movements intact. Cardiovascular:      Rate and Rhythm: Normal rate and regular rhythm. Pulmonary:      Effort: Pulmonary effort is normal.      Breath sounds: Normal breath sounds. Abdominal:      Palpations: Abdomen is soft. Musculoskeletal:      Cervical back: Normal range of motion. Comments: Muscle skeletal weakness of the upper and lower extremities with paresthesias and burning sensations with touching the skin of the legs and feet no other muscle pain with palpation   Neurological:      Mental Status: She is alert. Comments: Patient is burning and numbness and tingling in the upper and lower extremities and weakness and lack of coordination and strength with using her hands and the upper extremities and legs. She report that she is unable to control her trunk while trying to sit upright.   She reported that her mouth feels numb on the inside muscle weakness of the upper and lower extremities   Psychiatric:      Comments: Verbalizing feeling depressed because of her current medical condition       Assessment and plan:     Rheumatology was consulted to evaluate the patient for the possibility of fibromyalgia, although with the examination and questioning, the patient does describe having multiple neurologic deficits involving the upper and lower extremities and her trunk and face. She has extensive cervical spinal stenosis and bulging disc which may could contribute to some of her features although I agree that a neurologic evaluation may clarify an underlying etiology to explain why she reports having loss of coordination and strength and paresthesias and pain involving the upper extremities lower extremities and her trunk of the body and her mouth. I will Consult neurology and order muscle enzymes and basic rheumatic lab tests, although she does not report having features that supports the diagnosis of fibromyalgia.

## 2022-10-05 NOTE — PROGRESS NOTES
PROGRESS NOTE    Patient: Ham Lopez MRN: [id number]  SSN: [id number]    YOB: 1970  Age: 47 y.o. Sex: female      Admit Date: 10/3/2022    LOS: 2 days       Subjective     CHIEF COMPLAINT: R abominal pain that goes to the back and pain with urination           HISTORY OF PRESENT ILLNESS:        Patient is a 47y.o. year old female with a history of PVD, HTN and fibromyalgia presents with acute right flank pain and pain with urination. It began early yesterday morning and has not occurred prior. The pain is sharp, severe, and constant without radiation. Patient confirms fever this morning (99.1?), flank pain, increased urgency, pain and burning with urination since yesterday morning. Confirms generalized pruritis, especially at lower and upper extremities bilaterally. Denies hematuria and previous history of kidney stones.      PMH: HTN, PVD, Fibromyalgia     Allergies: denies food, medication, and environmental allergies     SH: smokes 1-2- cigarettes per day, denies alcohol and illicit drug use     FH: mother - HTN, diabetes mellitus, CAD; father - HTN, diabetes mellitus, CAD, esophageal cancer; brother: PVD; 3 children: all have asthma     Labs:  UA on 10/3: calcium oxalate crystals present  CT scan abdomen and pelvis with contrast on 10/3: terminal ileum and proximal colon with fluid containing scattered areas of wall thickening  Low K (2.7)  Awaiting blood culture results    10/5/22  High BP (152/96) and pulse (107)  Rheumatology consult: prior CAT of cervical spine on 8/12/22 shows extensive cervical spinal stenosis at C3-C4 and C2-C3 and bulging disks at C4-5 and C5-C6 and focal protrusions at C6-7, ordered muscle enzymes and basic rheumatology labs, does not have features that supports fibromyalgia diagnosis; started IV solumedrol  Labs from 10/4:  Low K (2.8)  Positive for opiates  Patient is alert and awake  Denies lightheadedness, dizziness, chest pain, dyspnea, abdominal pain, wheezing, cough, nausea, vomiting, constipation, difficulties urinating  Confirms generalized pruritis, especially at lower and upper extremities bilaterally  Confirms headache, abdominal pain, decreased appetite, constipation  PE reveals decreased sensation to light touch in upper extremities bilaterally. Tenderness on palpation at the R flank and RUQ  States she previously was prescribed amlodipine 5mg po and metoprolol 50 mg po             Objective     Visit Vitals  BP (!) 152/96 (BP 1 Location: Left upper arm, BP Patient Position: At rest)   Pulse (!) 107   Temp 98.3 °F (36.8 °C)   Resp 18   Ht 5' 11\" (1.803 m)   Wt 87 kg (191 lb 12.8 oz)   SpO2 100%   BMI 26.75 kg/m²      O2 Device: None (Room air)    Physical Exam:   Physical Exam    Intake & Output:  Current Shift: 10/05 0701 - 10/05 1900  In: -   Out: 500 [Urine:500]  Last three shifts: No intake/output data recorded. 24 Hour Results:    Recent Results (from the past 24 hour(s))   CK    Collection Time: 10/04/22  9:42 PM   Result Value Ref Range    CK 15 (L) 26 - 192 U/L   C REACTIVE PROTEIN, QT    Collection Time: 10/04/22  9:42 PM   Result Value Ref Range    C-Reactive protein 5.60 (H) 0.00 - 0.60 mg/dL   SED RATE (ESR)    Collection Time: 10/04/22  9:42 PM   Result Value Ref Range    Sed rate, automated 107 (H) 0 - 30 mm/hr   METABOLIC PANEL, COMPREHENSIVE    Collection Time: 10/04/22  9:42 PM   Result Value Ref Range    Sodium 141 136 - 145 mmol/L    Potassium 2.8 (L) 3.5 - 5.1 mmol/L    Chloride 105 97 - 108 mmol/L    CO2 24 21 - 32 mmol/L    Anion gap 12 5 - 15 mmol/L    Glucose 78 65 - 100 mg/dL    BUN 2 (L) 6 - 20 mg/dL    Creatinine 0.45 (L) 0.55 - 1.02 mg/dL    BUN/Creatinine ratio 4 (L) 12 - 20      eGFR >60 >60 ml/min/1.73m2    Calcium 9.0 8.5 - 10.1 mg/dL    Bilirubin, total 0.4 0.2 - 1.0 mg/dL    AST (SGOT) 13 (L) 15 - 37 U/L    ALT (SGPT) 10 (L) 12 - 78 U/L    Alk.  phosphatase 82 45 - 117 U/L    Protein, total 6.8 6.4 - 8.2 g/dL    Albumin 2.2 (L) 3.5 - 5.0 g/dL    Globulin 4.6 (H) 2.0 - 4.0 g/dL    A-G Ratio 0.5 (L) 1.1 - 2.2     URINALYSIS W/MICROSCOPIC    Collection Time: 10/05/22  6:55 AM   Result Value Ref Range    Color Susie      Appearance Clear Clear      Specific gravity 1.028 1.003 - 1.030      pH (UA) 6.0 5.0 - 8.0      Protein 100 (A) Negative mg/dL    Glucose Negative Negative mg/dL    Ketone 80 (A) Negative mg/dL    Bilirubin Negative Negative      Blood Negative Negative      Urobilinogen 2.0 (H) 0.1 - 1.0 EU/dL    Nitrites Negative Negative      Leukocyte Esterase Negative Negative      WBC 0-4 0 - 4 /hpf    RBC 0-5 0 - 5 /hpf    Bacteria Negative Negative /hpf    Mucus 1+ /lpf   METABOLIC PANEL, COMPREHENSIVE    Collection Time: 10/05/22  9:49 AM   Result Value Ref Range    Sodium 143 136 - 145 mmol/L    Potassium 3.0 (L) 3.5 - 5.1 mmol/L    Chloride 108 97 - 108 mmol/L    CO2 24 21 - 32 mmol/L    Anion gap 11 5 - 15 mmol/L    Glucose 74 65 - 100 mg/dL    BUN 2 (L) 6 - 20 mg/dL    Creatinine 0.49 (L) 0.55 - 1.02 mg/dL    BUN/Creatinine ratio 4 (L) 12 - 20      eGFR >60 >60 ml/min/1.73m2    Calcium 8.8 8.5 - 10.1 mg/dL    Bilirubin, total 0.4 0.2 - 1.0 mg/dL    AST (SGOT) 14 (L) 15 - 37 U/L    ALT (SGPT) 10 (L) 12 - 78 U/L    Alk.  phosphatase 85 45 - 117 U/L    Protein, total 6.1 (L) 6.4 - 8.2 g/dL    Albumin 2.4 (L) 3.5 - 5.0 g/dL    Globulin 3.7 2.0 - 4.0 g/dL    A-G Ratio 0.6 (L) 1.1 - 2.2     CBC WITH AUTOMATED DIFF    Collection Time: 10/05/22  9:49 AM   Result Value Ref Range    WBC 5.9 3.6 - 11.0 K/uL    RBC 3.21 (L) 3.80 - 5.20 M/uL    HGB 9.9 (L) 11.5 - 16.0 g/dL    HCT 31.4 (L) 35.0 - 47.0 %    MCV 97.8 80.0 - 99.0 FL    MCH 30.8 26.0 - 34.0 PG    MCHC 31.5 30.0 - 36.5 g/dL    RDW 15.2 (H) 11.5 - 14.5 %    PLATELET 529 784 - 371 K/uL    MPV 9.9 8.9 - 12.9 FL    NRBC 0.0 0.0  WBC    ABSOLUTE NRBC 0.00 0.00 - 0.01 K/uL    NEUTROPHILS 64 32 - 75 %    LYMPHOCYTES 28 12 - 49 %    MONOCYTES 8 5 - 13 %    EOSINOPHILS 0 0 - 7 % BASOPHILS 0 0 - 1 %    IMMATURE GRANULOCYTES 0 0 - 0.5 %    ABS. NEUTROPHILS 3.8 1.8 - 8.0 K/UL    ABS. LYMPHOCYTES 1.6 0.8 - 3.5 K/UL    ABS. MONOCYTES 0.5 0.0 - 1.0 K/UL    ABS. EOSINOPHILS 0.0 0.0 - 0.4 K/UL    ABS. BASOPHILS 0.0 0.0 - 0.1 K/UL    ABS. IMM. GRANS. 0.0 0.00 - 0.04 K/UL    DF AUTOMATED     GLUCOSE, POC    Collection Time: 10/05/22 11:14 AM   Result Value Ref Range    Glucose (POC) 103 (H) 65 - 100 mg/dL    Performed by [facility]          Imaging:    CT ABD PELV W CONT   Final Result   The terminal ileum and proximal colon are fluid containing with scattered areas   of wall thickening. This may be due at least in part due to underdistention, but   enterocolitis due to nonspecific infection or inflammation is not excluded. No   bowel obstruction or other acute abnormality in the abdomen or pelvis.             ASSESSMENT:  Hypokalemia  Hypertension  Peripheral vein disease  Fibromyalgia  Generalized pruritis  Cervical spinal stenosis at C3-C4 and C2-C3   Bulging disks at C4-5 and C5-C6 with focal protrusions at C6-7  Opiate dependence     PLAN:     Lipitor 10 mg daily  Lovenox 30 subcu every 12  Ferrous sulfate 325 mg daily  Thiamine 50 mg daily  Continue IV fluids  Rheumatology consult  PT OT consult  Replace potassium      Consult neurology    Toprol XL 50 mg dailyfor   PT OT consult                Current Facility-Administered Medications:     diphenhydrAMINE (BENADRYL) injection 25 mg, 25 mg, IntraVENous, Q4H PRN, Frankie Suarez MD, 25 mg at 10/05/22 0606    hydrOXYzine (VISTARIL) injection 25 mg, 25 mg, IntraMUSCular, Q6H PRN, Frankie Suarez MD, 25 mg at 10/05/22 1013    methylPREDNISolone (PF) (SOLU-MEDROL) injection 40 mg, 40 mg, IntraVENous, Q8H, Konrad Guzman MD, 40 mg at 10/05/22 1013    pregabalin (LYRICA) capsule 150 mg, 150 mg, Oral, TID, Alexis Deal ARNP, 150 mg at 10/05/22 1013    atorvastatin (LIPITOR) tablet 10 mg, 10 mg, Oral, DAILY, Frankie Suarez MD, 10 mg at 10/05/22 0816    ferrous sulfate tablet 325 mg, 325 mg, Oral, DAILY WITH BREAKFAST, Frankie Suarez MD, 325 mg at 12/93/47 8996    folic acid (FOLVITE) tablet 1 mg, 1 mg, Oral, DAILY, Frankie Suarez MD, 1 mg at 10/05/22 0816    nitroglycerin (NITROSTAT) tablet 0.4 mg, 0.4 mg, SubLINGual, PRN, Hiram Suarez MD    thiamine mononitrate (B-1) tablet 50 mg, 50 mg, Oral, DAILY, Frankie Suarez MD, 50 mg at 10/05/22 0816    0.9% sodium chloride infusion, 75 mL/hr, IntraVENous, CONTINUOUS, Frankie Suarez MD, Last Rate: 75 mL/hr at 10/05/22 0131, 75 mL/hr at 10/05/22 0131    acetaminophen (TYLENOL) tablet 650 mg, 650 mg, Oral, Q6H PRN, 650 mg at 10/04/22 0831 **OR** acetaminophen (TYLENOL) suppository 650 mg, 650 mg, Rectal, Q6H PRN, Frankie Suarez MD    polyethylene glycol (MIRALAX) packet 17 g, 17 g, Oral, DAILY PRN, Frankie Suarez MD    ondansetron (ZOFRAN ODT) tablet 4 mg, 4 mg, Oral, Q8H PRN **OR** ondansetron (ZOFRAN) injection 4 mg, 4 mg, IntraVENous, Q6H PRN, Frankie Suarez MD    enoxaparin (LOVENOX) injection 30 mg, 30 mg, SubCUTAneous, Q12H, Frankie Suarez MD, 30 mg at 10/05/22 0816    traMADoL (ULTRAM) tablet 50 mg, 50 mg, Oral, Q6H PRN, Frankie Suarez MD, 50 mg at 10/05/22 0606    Current Outpatient Medications   Medication Instructions    atorvastatin (LIPITOR) 10 mg, Oral, DAILY    diclofenac (VOLTAREN) 2 g, Topical, 4 TIMES DAILY    ferrous sulfate 325 mg, Oral, DAILY WITH BREAKFAST    folic acid (FOLVITE) 1 mg, Oral, DAILY    nitroglycerin (NITROSTAT) 0.4 mg, SubLINGual, AS NEEDED, Up to 3 doses. pregabalin (LYRICA) 100 mg, Oral, 3 TIMES DAILY    thiamine (B-1) 50 mg, Oral, DAILY         Signed By: Marito Cochran MD     October 5, 2022         *ATTENTION:  This note has been created by a medical student for educational purposes only. Please do not refer to the content of this note for clinical decision-making, billing, or other purposes.   Please see attending [address] [place] [address] Street note to obtain clinical information on this patient. *

## 2022-10-05 NOTE — PROGRESS NOTES
OCCUPATIONAL THERAPY EVALUATION  Patient: Juanita Matson (24 y.o. female)  Date: 10/5/2022  Primary Diagnosis: Dehydration [E86.0]       Precautions: fall risk, Ax2       ASSESSMENT  Pt is a 47 y.o. female presenting to [facility] with c/o abdominal pain, admitted 10/3/22 and currently being treated for hypokalemia, nephrolithiasis, HTN, PVD, fibromyalgia, and generalized pruritis. Pt received semi-supine in bed upon OT and PT arrival, AXO x4, and agreeable to OT evaluation. Based on current observations, pt presents with deficits in generalized strength/AROM, bed mobility, balance, functional activity tolerance, sensation, and GM and FM coordination currently impacting overall performance of ADLs and functional transfers/mobility (see below for objective details and assist levels). Overall, pt tolerates session fair. Pt able to complete bed mobility with mod Ax2. Pt attempted sit<>stand x2 attempts with significant A from therapists, however, pt unable to clear buttocks from EOB. Pt demo'ing good motivation to participate with therapy and return to PLOF. Pt washes face with set up while semi-supine in bed. Pt would benefit from continued skilled OT services to address current impairments and improve IND and safety with self cares and functional transfers/mobility. Current OT d/c recommendation Richie Morrison once medically appropriate. Other factors to consider for discharge: family/social support, DME, time since onset, severity of deficits, functional baseline     Patient will benefit from skilled therapy intervention to address the above noted impairments.        PLAN :  Recommendations and Planned Interventions: self care training, functional mobility training, therapeutic exercise, balance training, therapeutic activities, endurance activities, patient education, and home safety training    Recommend with staff: Encourage HEP in prep for ADLs/mobility, Frequent repositioning to prevent skin breakdown, and Use of bed/chair alarm for safety    Recommend next session: LB dressing  and Seated grooming    Frequency/Duration: Patient will be followed by occupational therapy:  3-5x/week to address goals. Recommendation for discharge: (in order for the patient to meet his/her long term goals)  Richie Morrison    This discharge recommendation:  Has been made in collaboration with the attending provider and/or case management    IF patient discharges home will need the following DME: TBD       SUBJECTIVE:   Patient stated By the time I stood in rehab I had to be discharged.     OBJECTIVE DATA SUMMARY:   HISTORY:   Past Medical History:   Diagnosis Date    Anxiety     CAD (coronary artery disease)     Fibromyalgia     Hypertension     Neuropathy     Peripheral artery disease ([facility] [address]     Psychiatric disorder     anxiety     Past Surgical History:   Procedure Laterality Date    HX ANKLE FRACTURE TX Left     surgical implants    HX  SECTION         Per pt and chart review:   Home Situation  Home Environment: Apartment ([place])  # Steps to Enter: 4  Rails to Enter: No  One/Two Story Residence: Two story  Living Alone: Yes  Support Systems: Parent(s)  Patient Expects to be Discharged to[de-identified] Skilled nursing facility  Current DME Used/Available at Home: Wheelchair, Walker, rolling, Walker, rollator, Other (comment) (Lucillie Del Angel lift)    Hand dominance: Right    EXAMINATION OF PERFORMANCE DEFICITS:  Cognitive/Behavioral Status:  Neurologic State: Alert  Orientation Level: Oriented X4  Cognition: Follows commands; Appropriate for age attention/concentration        Safety/Judgement: Awareness of environment; Insight into deficits    Hearing:   Auditory  Auditory Impairment: None    Range of Motion:  AROM: Generally decreased, functional    Strength:  Strength: Grossly decreased, non-functional    Coordination:  Coordination: Generally decreased, functional  Fine Motor Skills-Upper: Left Impaired;Right Impaired    Gross Motor Skills-Upper: Left Impaired;Right Impaired     Finger Opposition Finger to Nose Dysdiadokinesis   Right UE  [] Intact    [x] Impaired   [] Intact    [x] Impaired [] Intact    [x] Impaired   Left UE [] Intact    [x] Impaired [] Intact    [x] Impaired [] Intact    [x] Impaired       Tone & Sensation:  Sensation: Impaired (Reports numbness of bilateral hands)    Balance:  Sitting: Impaired; With support  Sitting - Static: Fair (occasional)  Sitting - Dynamic: Poor (constant support)  Standing:  (Attempted, unable to clear bed)    Functional Mobility and Transfers for ADLs:  Bed Mobility:  Rolling: Moderate assistance;Assist x2  Supine to Sit: Moderate assistance;Assist x2  Sit to Supine: Moderate assistance;Assist x2  Scooting: Moderate assistance;Assist x2    ADL Intervention and task modifications:  Grooming  Position Performed: Other (comment) (Supine)  Washing Face: Set-up  \  Cognitive Retraining  Safety/Judgement: Awareness of environment; Insight into deficits    Therapeutic Exercise:  Pt educated on and would benefit from UE HEP. Functional Measure:    [address] [place] [id number] AM-PAC \"6 Clicks\"                                                       Daily Activity Inpatient Short Form  How much help from another person does the patient currently need. .. Total; A Lot A Little None   1. Putting on and taking off regular lower body clothing? [x]  1 []  2 []  3 []  4   2. Bathing (including washing, rinsing, drying)? []  1 [x]  2 []  3 []  4   3. Toileting, which includes using toilet, bedpan or urinal? [] 1 [x]  2 []  3 []  4   4. Putting on and taking off regular upper body clothing? []  1 [x]  2 []  3 []  4   5. Taking care of personal grooming such as brushing teeth? []  1 []  2 [x]  3 []  4   6. Eating meals? []  1 []  2 [x]  3 []  4   © 2007, Trustees of [address] [place] [address] [address] [address], under license to [employer].  All rights reserved     Score: 13/24     Interpretation of Tool:  Represents clinically-significant functional categories (i.e. Activities of daily living). Percentage of Impairment CH    0%   CI    1-19% CJ    20-39% CK    40-59% CL    60-79% CM    80-99% CN     100%   [facility]  Score 6-24 24 23 20-22 15-19 10-14 7-9 6     Occupational Therapy Evaluation Charge Determination   History Examination Decision-Making   LOW Complexity : Brief history review  LOW Complexity : 1-3 performance deficits relating to physical, cognitive , or psychosocial skils that result in activity limitations and / or participation restrictions  LOW Complexity : No comorbidities that affect functional and no verbal or physical assistance needed to complete eval tasks       Based on the above components, the patient evaluation is determined to be of the following complexity level: LOW     Pain Rating:  Pt did not report     Activity Tolerance:   Fair and requires rest breaks    After treatment patient left in no apparent distress:    Supine in bed, Call bell within reach, and Bed / chair alarm activated, bed locked and in lowest position    COMMUNICATION/EDUCATION:   The patients plan of care was discussed with: Physical therapist and Registered nurse. Patient/family agree to work toward stated goals and plan of care. This patients plan of care is appropriate for delegation to [facility]. PT/OT sessions occurred together for increased safety of pt and clinician. Thank you for this referral.  Madelaine Calabrese OT  Time Calculation: 34 mins    Problem: Self Care Deficits Care Plan (Adult)  Goal: *Acute Goals and Plan of Care (Insert Text)  Description: Patient stated goal: \"Get back to doing things for myself. \"    1. Pt will be mod I sup <> sit in prep for EOB ADLs  2. Pt will be mod I grooming sitting EOB  3. Pt will be mod I LE dressing sitting EOB/long sit  4. Pt will be mod I sit <>  prep for toileting LRAD  5. Pt will be mod I toileting/toilet transfer/cloth mgmt LRAD  6.  Pt will be mod I following UE HEP in prep for self care tasks      Outcome: Not Met

## 2022-10-06 LAB
ALBUMIN SERPL-MCNC: 2.5 G/DL (ref 3.5–5)
ALBUMIN/GLOB SERPL: 0.7 {RATIO} (ref 1.1–2.2)
ALDOLASE SERPL-CCNC: 5.5 U/L (ref 3.3–10.3)
ALP SERPL-CCNC: 89 U/L (ref 45–117)
ALT SERPL-CCNC: 15 U/L (ref 12–78)
ANION GAP SERPL CALC-SCNC: 8 MMOL/L (ref 5–15)
APPEARANCE UR: ABNORMAL
AST SERPL W P-5'-P-CCNC: 22 U/L (ref 15–37)
BACTERIA URNS QL MICRO: ABNORMAL /HPF
BILIRUB SERPL-MCNC: 0.3 MG/DL (ref 0.2–1)
BILIRUB UR QL: NEGATIVE
BUN SERPL-MCNC: 7 MG/DL (ref 6–20)
BUN/CREAT SERPL: 16 (ref 12–20)
CA-I BLD-MCNC: 9.3 MG/DL (ref 8.5–10.1)
CAOX CRY URNS QL MICRO: ABNORMAL
CHLORIDE SERPL-SCNC: 109 MMOL/L (ref 97–108)
CO2 SERPL-SCNC: 26 MMOL/L (ref 21–32)
COLOR UR: YELLOW
CREAT SERPL-MCNC: 0.45 MG/DL (ref 0.55–1.02)
CRP SERPL-MCNC: 2.88 MG/DL (ref 0–0.6)
ERYTHROCYTE [DISTWIDTH] IN BLOOD BY AUTOMATED COUNT: 14.6 % (ref 11.5–14.5)
ERYTHROCYTE [SEDIMENTATION RATE] IN BLOOD: 108 MM/HR (ref 0–30)
GLOBULIN SER CALC-MCNC: 3.8 G/DL (ref 2–4)
GLUCOSE BLD STRIP.AUTO-MCNC: 176 MG/DL (ref 65–100)
GLUCOSE SERPL-MCNC: 147 MG/DL (ref 65–100)
GLUCOSE UR STRIP.AUTO-MCNC: NEGATIVE MG/DL
HCT VFR BLD AUTO: 31 % (ref 35–47)
HGB BLD-MCNC: 9.6 G/DL (ref 11.5–16)
HGB UR QL STRIP: NEGATIVE
HYALINE CASTS URNS QL MICRO: ABNORMAL /LPF (ref 0–5)
KETONES UR QL STRIP.AUTO: 80 MG/DL
LEUKOCYTE ESTERASE UR QL STRIP.AUTO: NEGATIVE
MCH RBC QN AUTO: 29.9 PG (ref 26–34)
MCHC RBC AUTO-ENTMCNC: 31 G/DL (ref 30–36.5)
MCV RBC AUTO: 96.6 FL (ref 80–99)
MUCOUS THREADS URNS QL MICRO: ABNORMAL /LPF
NITRITE UR QL STRIP.AUTO: POSITIVE
NRBC # BLD: 0 K/UL (ref 0–0.01)
NRBC BLD-RTO: 0 PER 100 WBC
PERFORMED BY, TECHID: ABNORMAL
PH UR STRIP: 5 [PH] (ref 5–8)
PLATELET # BLD AUTO: 351 K/UL (ref 150–400)
PMV BLD AUTO: 9.8 FL (ref 8.9–12.9)
POTASSIUM SERPL-SCNC: 3.8 MMOL/L (ref 3.5–5.1)
PROT SERPL-MCNC: 6.3 G/DL (ref 6.4–8.2)
PROT UR STRIP-MCNC: 30 MG/DL
RBC # BLD AUTO: 3.21 M/UL (ref 3.8–5.2)
RBC #/AREA URNS HPF: ABNORMAL /HPF (ref 0–5)
RHEUMATOID FACT SERPL-ACNC: <10 IU/ML
SODIUM SERPL-SCNC: 143 MMOL/L (ref 136–145)
SP GR UR REFRACTOMETRY: 1.03 (ref 1–1.03)
UROBILINOGEN UR QL STRIP.AUTO: 2 EU/DL (ref 0.1–1)
WBC # BLD AUTO: 5.7 K/UL (ref 3.6–11)
WBC URNS QL MICRO: ABNORMAL /HPF (ref 0–4)

## 2022-10-06 PROCEDURE — G0378 HOSPITAL OBSERVATION PER HR: HCPCS

## 2022-10-06 PROCEDURE — 96376 TX/PRO/DX INJ SAME DRUG ADON: CPT

## 2022-10-06 PROCEDURE — 96372 THER/PROPH/DIAG INJ SC/IM: CPT

## 2022-10-06 PROCEDURE — 74011250637 HC RX REV CODE- 250/637: Performed by: FAMILY MEDICINE

## 2022-10-06 PROCEDURE — 87186 SC STD MICRODIL/AGAR DIL: CPT

## 2022-10-06 PROCEDURE — 86235 NUCLEAR ANTIGEN ANTIBODY: CPT

## 2022-10-06 PROCEDURE — 85027 COMPLETE CBC AUTOMATED: CPT

## 2022-10-06 PROCEDURE — 80053 COMPREHEN METABOLIC PANEL: CPT

## 2022-10-06 PROCEDURE — 82962 GLUCOSE BLOOD TEST: CPT

## 2022-10-06 PROCEDURE — 74011250636 HC RX REV CODE- 250/636: Performed by: FAMILY MEDICINE

## 2022-10-06 PROCEDURE — 85652 RBC SED RATE AUTOMATED: CPT

## 2022-10-06 PROCEDURE — 86225 DNA ANTIBODY NATIVE: CPT

## 2022-10-06 PROCEDURE — 81001 URINALYSIS AUTO W/SCOPE: CPT

## 2022-10-06 PROCEDURE — 87086 URINE CULTURE/COLONY COUNT: CPT

## 2022-10-06 PROCEDURE — 86038 ANTINUCLEAR ANTIBODIES: CPT

## 2022-10-06 PROCEDURE — 86431 RHEUMATOID FACTOR QUANT: CPT

## 2022-10-06 PROCEDURE — 74011250636 HC RX REV CODE- 250/636: Performed by: SPECIALIST

## 2022-10-06 PROCEDURE — 65270000029 HC RM PRIVATE

## 2022-10-06 PROCEDURE — 86200 CCP ANTIBODY: CPT

## 2022-10-06 PROCEDURE — 86140 C-REACTIVE PROTEIN: CPT

## 2022-10-06 PROCEDURE — 87077 CULTURE AEROBIC IDENTIFY: CPT

## 2022-10-06 PROCEDURE — 36415 COLL VENOUS BLD VENIPUNCTURE: CPT

## 2022-10-06 PROCEDURE — 74011250637 HC RX REV CODE- 250/637

## 2022-10-06 RX ORDER — LANOLIN ALCOHOL/MO/W.PET/CERES
325 CREAM (GRAM) TOPICAL
Status: DISCONTINUED | OUTPATIENT
Start: 2022-10-07 | End: 2022-10-07 | Stop reason: HOSPADM

## 2022-10-06 RX ORDER — CIPROFLOXACIN 500 MG/1
500 TABLET ORAL EVERY 12 HOURS
Status: DISCONTINUED | OUTPATIENT
Start: 2022-10-06 | End: 2022-10-07 | Stop reason: HOSPADM

## 2022-10-06 RX ORDER — AMLODIPINE BESYLATE 5 MG/1
5 TABLET ORAL DAILY
Status: DISCONTINUED | OUTPATIENT
Start: 2022-10-06 | End: 2022-10-07

## 2022-10-06 RX ADMIN — TRAMADOL HYDROCHLORIDE 50 MG: 50 TABLET, COATED ORAL at 22:23

## 2022-10-06 RX ADMIN — PREGABALIN 150 MG: 150 CAPSULE ORAL at 18:55

## 2022-10-06 RX ADMIN — SODIUM CHLORIDE 75 ML/HR: 9 INJECTION, SOLUTION INTRAVENOUS at 19:36

## 2022-10-06 RX ADMIN — AMLODIPINE BESYLATE 5 MG: 5 TABLET ORAL at 12:00

## 2022-10-06 RX ADMIN — FERROUS SULFATE TAB 325 MG (65 MG ELEMENTAL FE) 325 MG: 325 (65 FE) TAB at 08:22

## 2022-10-06 RX ADMIN — ATORVASTATIN CALCIUM 10 MG: 10 TABLET, FILM COATED ORAL at 08:22

## 2022-10-06 RX ADMIN — THIAMINE HCL TAB 100 MG 50 MG: 100 TAB at 08:22

## 2022-10-06 RX ADMIN — CIPROFLOXACIN HYDROCHLORIDE 500 MG: 500 TABLET, FILM COATED ORAL at 12:00

## 2022-10-06 RX ADMIN — METHYLPREDNISOLONE SODIUM SUCCINATE 40 MG: 40 INJECTION, POWDER, FOR SOLUTION INTRAMUSCULAR; INTRAVENOUS at 09:03

## 2022-10-06 RX ADMIN — SODIUM CHLORIDE 75 ML/HR: 9 INJECTION, SOLUTION INTRAVENOUS at 01:49

## 2022-10-06 RX ADMIN — METHYLPREDNISOLONE SODIUM SUCCINATE 40 MG: 40 INJECTION, POWDER, FOR SOLUTION INTRAMUSCULAR; INTRAVENOUS at 00:42

## 2022-10-06 RX ADMIN — ENOXAPARIN SODIUM 30 MG: 100 INJECTION SUBCUTANEOUS at 08:23

## 2022-10-06 RX ADMIN — CIPROFLOXACIN HYDROCHLORIDE 500 MG: 500 TABLET, FILM COATED ORAL at 23:56

## 2022-10-06 RX ADMIN — TRAMADOL HYDROCHLORIDE 50 MG: 50 TABLET, COATED ORAL at 15:45

## 2022-10-06 RX ADMIN — METOPROLOL SUCCINATE 50 MG: 50 TABLET, EXTENDED RELEASE ORAL at 08:25

## 2022-10-06 RX ADMIN — ENOXAPARIN SODIUM 30 MG: 100 INJECTION SUBCUTANEOUS at 20:49

## 2022-10-06 RX ADMIN — PREGABALIN 150 MG: 150 CAPSULE ORAL at 22:23

## 2022-10-06 RX ADMIN — METHYLPREDNISOLONE SODIUM SUCCINATE 40 MG: 40 INJECTION, POWDER, FOR SOLUTION INTRAMUSCULAR; INTRAVENOUS at 18:00

## 2022-10-06 RX ADMIN — TRAMADOL HYDROCHLORIDE 50 MG: 50 TABLET, COATED ORAL at 07:05

## 2022-10-06 RX ADMIN — FOLIC ACID 1 MG: 1 TABLET ORAL at 08:22

## 2022-10-06 NOTE — PROGRESS NOTES
Progress Note  Date:10/6/2022       Room:[facility]  Patient Shaniqua Rolle     YOB: 1970     Age:52 y.o. Subjective    Subjective : The patient is a 53-year-old female reports having burning paresthesias in both hands and feet and the upper and lower extremities. The ESR as 108 (H) and CRP has decreased from 6.6 (H) and now is 2.88 (H) since starting IV Steroids. The CPK was 15 and Myoglobin is 16. The RF IS < 10. She denies having PMR or GCA symptoms or diffuse muscle soreness. She has severe muscle weakness in the upper and lower extremities with loss of motor control and coordination with attempted movement and fine motor skills. She also reported having tingling and numbness in her fingers. She report she is unable to ambulate because of frequent falling and balance disturbance. She has been under the care of Neurology and is taking Pregabalin. She denies having peripheral joint pain other than the paresthesias and burning when touching her limbs. the case was discussed Neurology. Serologies still pending. Review of Systems:    Constitutional: Negative. HENT: Negative. Eyes: Negative. Respiratory: Negative. Cardiovascular: Negative. Gastrointestinal: Negative. Endocrine: Negative. Musculoskeletal:  Reported gait problem. Negative for arthralgias and myalgias. Skin: Negative. Allergic/Immunologic: Negative. Neurological:         Patient is burning and numbness and tingling in the upper and lower extremities and weakness and lack of coordination and strength with using her hands and the upper extremities and legs. She report that she is unable to control her trunk while trying to sit upright. She reported that her mouth feels numb on the inside muscle weakness of the upper and lower extremities   Hematological: Negative.     Psychiatric/Behavioral:          Feels depressed because of the lack of physical body control    Objective         Vitals Last 24 Hours:  TEMPERATURE:  Temp  Av °F (36.7 °C)  Min: 97.7 °F (36.5 °C)  Max: 98.8 °F (37.1 °C)  RESPIRATIONS RANGE: Resp  Av.3  Min: 18  Max: 19  PULSE OXIMETRY RANGE: SpO2  Av.3 %  Min: 98 %  Max: 100 %  PULSE RANGE: Pulse  Av  Min: 100  Max: 111  BLOOD PRESSURE RANGE: Systolic (30YRW), JKU:221 , Min:140 , NJP:109   ; Diastolic (11UGW), DDM:305, Min:98, Max:104    I/O (24Hr): Intake/Output Summary (Last 24 hours) at 10/6/2022 1715  Last data filed at 10/6/2022 0445  Gross per 24 hour   Intake --   Output 400 ml   Net -400 ml     Objective:  Vital signs: (most recent): Blood pressure (!) 140/98, pulse 100, temperature 98.8 °F (37.1 °C), resp. rate 18, height 5' 11\" (1.803 m), weight 87 kg (191 lb 12.8 oz), SpO2 98 %. :    Physical Exam:  HENT:      Head: Atraumatic. Eyes:   Extraocular Movements: Extraocular movements intact. Cardiovascular:      Rate and Rhythm: Normal rate and regular rhythm. Pulmonary:      Effort: Pulmonary effort is normal.      Breath sounds: Normal breath sounds. Abdominal:      Palpations: Abdomen is soft. Musculoskeletal:      Cervical back: Normal range of motion. Comments: Muscle skeletal weakness of the upper and lower extremities. no other muscle pain with palpation   Neurological:      Mental Status: She is alert. Comments: Patient is burning and numbness and tingling in the upper and lower extremities and weakness and lack of coordination and strength with using her hands and the upper extremities and legs. She report that she is unable to control her trunk while trying to sit upright.   She reported that her mouth feels numb on the inside muscle weakness of the upper and lower extremities   Psychiatric:      Comments: Verbalizing feeling depressed because of her current medical condition     Labs/Imaging/Diagnostics    Labs:  CBC:  Recent Labs     10/06/22  0652 10/05/22  0949 10/04/22  1221   WBC 5.7 5.9 8.3   RBC 3.21* 3.21* 3.20*   HGB 9.6* 9.9* 9.6*   HCT 31.0* 31.4* 31.8*   MCV 96.6 97.8 99.4*   RDW 14.6* 15.2* 15.0*    328 315     CHEMISTRIES:  Recent Labs     10/06/22  0652 10/05/22  0949 10/04/22  2142    143 141   K 3.8 3.0* 2.8*   * 108 105   CO2 26 24 24   BUN 7 2* 2*   CA 9.3 8.8 9.0   PT/INR:No results for input(s): INR, INREXT in the last 72 hours. No lab exists for component: PROTIME  APTT:No results for input(s): APTT in the last 72 hours. LIVER PROFILE:  Recent Labs     10/06/22  0652 10/05/22  0949 10/04/22  2142   AST 22 14* 13*   ALT 15 10* 10*     Lab Results   Component Value Date/Time    ALT (SGPT) 15 10/06/2022 06:52 AM    AST (SGOT) 22 10/06/2022 06:52 AM    Alk. phosphatase 89 10/06/2022 06:52 AM    Bilirubin, total 0.3 10/06/2022 06:52 AM       Imaging Last 24 Hours:  No results found. Assessment//Plan   Active Problems:    Dehydration (10/3/2022)      Assessment & Plan:    Rheumatology was consulted to evaluate the patient who has a reported history of  fibromyalgia; although she has no muscle soreness and is taking moderate doses of Pregabalin  TID. She denies having diffuse muscle soreness and denies peripheral joint pains; She also denied having PMR or GCA symptoms. The patient has extensive cervical spinal stenosis and bulging disc, and some clinical neurologic features. She is empiric receiving IV steroids because she has elevated ESR and CRP, until she has the neurologic evaluation.        Electronically signed by Tosin Alba MD on 10/6/2022 at 5:15 PM

## 2022-10-06 NOTE — PROGRESS NOTES
PROGRESS NOTE    Patient: Karthik Dumont MRN: [id number]  SSN: [id number]    YOB: 1970  Age: 47 y.o. Sex: female      Admit Date: 10/3/2022    LOS: 3 days       Subjective     CHIEF COMPLAINT: R abominal pain that goes to the back and pain with urination           HISTORY OF PRESENT ILLNESS:        Patient is a 47y.o. year old female with a history of PVD, HTN and fibromyalgia presents with acute right flank pain and pain with urination. It began early yesterday morning and has not occurred prior. The pain is sharp, severe, and constant without radiation. Patient confirms fever this morning (99.1?), flank pain, increased urgency, pain and burning with urination since yesterday morning. Confirms generalized pruritis, especially at lower and upper extremities bilaterally. Denies hematuria and previous history of kidney stones.      PMH: HTN, PVD, Fibromyalgia     Allergies: denies food, medication, and environmental allergies     SH: smokes 1-2- cigarettes per day, denies alcohol and illicit drug use     FH: mother - HTN, diabetes mellitus, CAD; father - HTN, diabetes mellitus, CAD, esophageal cancer; brother: PVD; 3 children: all have asthma     Labs:  UA on 10/3: calcium oxalate crystals present  CT scan abdomen and pelvis with contrast on 10/3: terminal ileum and proximal colon with fluid containing scattered areas of wall thickening  Low K (2.7)  Awaiting blood culture results    10/5/22  High BP (152/96) and pulse (107)  Rheumatology consult: prior CAT of cervical spine on 8/12/22 shows extensive cervical spinal stenosis at C3-C4 and C2-C3 and bulging disks at C4-5 and C5-C6 and focal protrusions at C6-7, ordered muscle enzymes and basic rheumatology labs, does not have features that supports fibromyalgia diagnosis; started IV solumedrol  Labs from 10/4:  Low K (2.8)  Positive for opiates  Patient is alert and awake  Denies lightheadedness, dizziness, chest pain, dyspnea, abdominal pain, wheezing, cough, nausea, vomiting, constipation, difficulties urinating  Confirms generalized pruritis, especially at lower and upper extremities bilaterally  Confirms headache, abdominal pain, decreased appetite, constipation  PE reveals decreased sensation to light touch in upper extremities bilaterally. Tenderness on palpation at the R flank and RUQ  States she previously was prescribed amlodipine 5mg po and metoprolol 50 mg po     10/6/22  High pulse (101) and BP (156/104)   Rheumatology consult:placed on IV solumedrol for elevated inflammatory markers  Inserted prince catheter yesterday due to inability to urinate  Awaiting neurology consult  Labs: Increasing Hgb (9.6)  UA: positive for nitrites and +4 bacteria  Awaiting urine culture results  Patient is alert and awake  Denies lightheadedness, dizziness, chest pain, dyspnea, wheezing, cough, nausea, vomiting, constipation, hematuria, pain or burning with urination, abdominal pain  Denies history of UTIs  Confirms headache, constipation - requested medication  Confirms increased aching pain at hands and lower extremities bilaterally  PE:  Decreased sensation to light touch in upper extremities bilaterally  Tachycardia with regular rhythm and rate  Tenderness on palpation at the suprapubic area without guarding         Objective     Visit Vitals  BP (!) 156/104 (BP 1 Location: Left upper arm, BP Patient Position: At rest;Supine)   Pulse (!) 101   Temp 97.8 °F (36.6 °C)   Resp 19   Ht 5' 11\" (1.803 m)   Wt 87 kg (191 lb 12.8 oz)   SpO2 99%   BMI 26.75 kg/m²      O2 Device: None (Room air)    Physical Exam:   Physical Exam    Intake & Output:  Current Shift: No intake/output data recorded.   Last three shifts: 10/04 1901 - 10/06 0700  In: -   Out: 900 [Urine:900]        24 Hour Results:    Recent Results (from the past 24 hour(s))   GLUCOSE, POC    Collection Time: 10/05/22  4:22 PM   Result Value Ref Range    Glucose (POC) 145 (H) 65 - 100 mg/dL    Performed by Malika Hewitt KRISTAL    GLUCOSE, POC    Collection Time: 10/05/22  8:02 PM   Result Value Ref Range    Glucose (POC) 173 (H) 65 - 100 mg/dL    Performed by Ellie Florentino W/MICROSCOPIC    Collection Time: 10/06/22  2:35 AM   Result Value Ref Range    Color Yellow      Appearance Turbid (A) Clear      Specific gravity 1.026 1.003 - 1.030      pH (UA) 5.0 5.0 - 8.0      Protein 30 (A) Negative mg/dL    Glucose Negative Negative mg/dL    Ketone 80 (A) Negative mg/dL    Bilirubin Negative Negative      Blood Negative Negative      Urobilinogen 2.0 (H) 0.1 - 1.0 EU/dL    Nitrites Positive (A) Negative      Leukocyte Esterase Negative Negative      WBC 0-4 0 - 4 /hpf    RBC 0-5 0 - 5 /hpf    Bacteria 4+ (A) Negative /hpf    Mucus 3+ (A) Negative /lpf    CA Oxalate crystals 2+ (A) Negative    Hyaline cast 2-5 0 - 5 /lpf   CBC W/O DIFF    Collection Time: 10/06/22  6:52 AM   Result Value Ref Range    WBC 5.7 3.6 - 11.0 K/uL    RBC 3.21 (L) 3.80 - 5.20 M/uL    HGB 9.6 (L) 11.5 - 16.0 g/dL    HCT 31.0 (L) 35.0 - 47.0 %    MCV 96.6 80.0 - 99.0 FL    MCH 29.9 26.0 - 34.0 PG    MCHC 31.0 30.0 - 36.5 g/dL    RDW 14.6 (H) 11.5 - 14.5 %    PLATELET 231 805 - 967 K/uL    MPV 9.8 8.9 - 12.9 FL    NRBC 0.0 0.0  WBC    ABSOLUTE NRBC 0.00 0.00 - 9.07 K/uL   METABOLIC PANEL, COMPREHENSIVE    Collection Time: 10/06/22  6:52 AM   Result Value Ref Range    Sodium 143 136 - 145 mmol/L    Potassium 3.8 3.5 - 5.1 mmol/L    Chloride 109 (H) 97 - 108 mmol/L    CO2 26 21 - 32 mmol/L    Anion gap 8 5 - 15 mmol/L    Glucose 147 (H) 65 - 100 mg/dL    BUN 7 6 - 20 mg/dL    Creatinine 0.45 (L) 0.55 - 1.02 mg/dL    BUN/Creatinine ratio 16 12 - 20      eGFR >60 >60 ml/min/1.73m2    Calcium 9.3 8.5 - 10.1 mg/dL    Bilirubin, total 0.3 0.2 - 1.0 mg/dL    AST (SGOT) 22 15 - 37 U/L    ALT (SGPT) 15 12 - 78 U/L    Alk.  phosphatase 89 45 - 117 U/L    Protein, total 6.3 (L) 6.4 - 8.2 g/dL    Albumin 2.5 (L) 3.5 - 5.0 g/dL    Globulin 3.8 2.0 - 4.0 g/dL A-G Ratio 0.7 (L) 1.1 - 2.2     C REACTIVE PROTEIN, QT    Collection Time: 10/06/22  6:52 AM   Result Value Ref Range    C-Reactive protein 2.88 (H) 0.00 - 0.60 mg/dL         Imaging:    CT ABD PELV W CONT   Final Result   The terminal ileum and proximal colon are fluid containing with scattered areas   of wall thickening. This may be due at least in part due to underdistention, but   enterocolitis due to nonspecific infection or inflammation is not excluded. No   bowel obstruction or other acute abnormality in the abdomen or pelvis.             ASSESSMENT:  Hypokalemia - resolved  Hypertension  Peripheral vein disease  Fibromyalgia  Generalized pruritis  Cervical spinal stenosis at C3-C4 and C2-C3   Bulging disks at C4-5 and C5-C6 with focal protrusions at C6-7  Opiate dependence  UTI     PLAN:     Lipitor 10 mg daily  Lovenox 30 subcu every 12  Ferrous sulfate 325 mg daily  Thiamine 50 mg daily  Continue IV fluids  Rheumatology consult  PT OT consult  Replace potassium      Consult neurology    Toprol XL 50 mg dailyfor   PT OT consult      Continue miralax dosage for constipation  Add amlodipine 5 mg po once daily for hypertension  Cipro 500 twice daily    Possible discharge next 24 hours                  Current Facility-Administered Medications:     diphenhydrAMINE (BENADRYL) injection 25 mg, 25 mg, IntraVENous, Q4H PRN, Frankie Suarez MD, 25 mg at 10/05/22 0606    hydrOXYzine (VISTARIL) injection 25 mg, 25 mg, IntraMUSCular, Q6H PRN, Frankie Suarez MD, 25 mg at 10/05/22 1013    methylPREDNISolone (PF) (SOLU-MEDROL) injection 40 mg, 40 mg, IntraVENous, Q8H, Pranay Gresham MD, 40 mg at 10/06/22 0903    pregabalin (LYRICA) capsule 150 mg, 150 mg, Oral, TID, Alexis Deal ARNP, 150 mg at 10/05/22 2032    metoprolol succinate (TOPROL-XL) XL tablet 50 mg, 50 mg, Oral, DAILY, Frankie Suarez MD, 50 mg at 10/06/22 0825    atorvastatin (LIPITOR) tablet 10 mg, 10 mg, Oral, DAILY, Jerson Suarez Res, MD, 10 mg at 10/06/22 6053    ferrous sulfate tablet 325 mg, 325 mg, Oral, DAILY WITH BREAKFAST, Frankie Suarez MD, 325 mg at 16/78/94 0546    folic acid (FOLVITE) tablet 1 mg, 1 mg, Oral, DAILY, Frankie Suarez MD, 1 mg at 10/06/22 4490    nitroglycerin (NITROSTAT) tablet 0.4 mg, 0.4 mg, SubLINGual, PRN, Danita Suarez MD    thiamine mononitrate (B-1) tablet 50 mg, 50 mg, Oral, DAILY, Frankie Suarez MD, 50 mg at 10/06/22 3329    0.9% sodium chloride infusion, 75 mL/hr, IntraVENous, CONTINUOUS, Frankie Suarez MD, Last Rate: 75 mL/hr at 10/06/22 0149, 75 mL/hr at 10/06/22 0149    acetaminophen (TYLENOL) tablet 650 mg, 650 mg, Oral, Q6H PRN, 650 mg at 10/05/22 1541 **OR** acetaminophen (TYLENOL) suppository 650 mg, 650 mg, Rectal, Q6H PRN, Frankie Suarez MD    polyethylene glycol (MIRALAX) packet 17 g, 17 g, Oral, DAILY PRN, Frankie Suarez MD    ondansetron (ZOFRAN ODT) tablet 4 mg, 4 mg, Oral, Q8H PRN **OR** ondansetron (ZOFRAN) injection 4 mg, 4 mg, IntraVENous, Q6H PRN, Frankie Suarez MD    enoxaparin (LOVENOX) injection 30 mg, 30 mg, SubCUTAneous, Q12H, Frankie Suarez MD, 30 mg at 10/06/22 2851    traMADoL (ULTRAM) tablet 50 mg, 50 mg, Oral, Q6H PRN, Frankie Suarez MD, 50 mg at 10/06/22 0705    Current Outpatient Medications   Medication Instructions    atorvastatin (LIPITOR) 10 mg, Oral, DAILY    diclofenac (VOLTAREN) 2 g, Topical, 4 TIMES DAILY    ferrous sulfate 325 mg, Oral, DAILY WITH BREAKFAST    folic acid (FOLVITE) 1 mg, Oral, DAILY    nitroglycerin (NITROSTAT) 0.4 mg, SubLINGual, AS NEEDED, Up to 3 doses. pregabalin (LYRICA) 100 mg, Oral, 3 TIMES DAILY    thiamine (B-1) 50 mg, Oral, DAILY         Signed By: Indira Zhang MD     October 6, 2022         *ATTENTION:  This note has been created by a medical student for educational purposes only. Please do not refer to the content of this note for clinical decision-making, billing, or other purposes.   Please see [place] [phone] note to obtain clinical information on this patient. *

## 2022-10-06 NOTE — ROUTINE PROCESS
I called Dr. José Miguel Velázquez to tell him patient was not voiding urine and had a bladder scan of 322. He said to put in a prince cath.

## 2022-10-06 NOTE — PROGRESS NOTES
Progress Note  Date:10/5/2022       Room:[facility]  Patient Rhoda Garvin     YOB: 1970     Age:52 y.o. Subjective    Subjective : The patient is a 50-year-old female who was admitted to the hospital because she reported that she had severe pain and burning paresthesias in both feet. The ESR as 107 (H) and CRP was 6.6 (H). She denies having PMR or GCA symptoms. She reports a 2 month history  of developing severe muscle weakness in the upper and lower extremities with loss of control and coordination with attempted movement. She has been having multiple falls because her legs does not bear her weight and she cannot control the direction of the use of her limbs involving the lower extremities and the upper extremities with reaching and with ambulation. She report being unable to make a fist or controlled her fingers to do fine motor skills. When she raises her arms above her head, she hits herself in the face and cannot control the upper limbs. She also reported having tingling and numbness in her fingers. She report she is unable to ambulate because of frequent falling and balance disturbance. Rheumatology was consulted to evaluate her for fibromyalgia although she does not complain of diffuse muscle soreness. She denies having peripheral joint pain other than the paresthesias and burning when touching her limbs. She has weakness when trying to sit upright and states that her trunk is unbalanced and she is unable to maintain an upright sitting position. Review of Systems:    Constitutional: Negative. HENT: Negative. Eyes: Negative. Respiratory: Negative. Cardiovascular: Negative. Gastrointestinal: Negative. Endocrine: Negative. Musculoskeletal:  Positive for gait problem. Negative for arthralgias and myalgias. Skin: Negative. Allergic/Immunologic: Negative.     Neurological:         Patient is burning and numbness and tingling in the upper and lower extremities and weakness and lack of coordination and strength with using her hands and the upper extremities and legs. She report that she is unable to control her trunk while trying to sit upright. She reported that her mouth feels numb on the inside muscle weakness of the upper and lower extremities   Hematological: Negative. Psychiatric/Behavioral:          Feels depressed because of the lack of physical body control    Objective         Vitals Last 24 Hours:  TEMPERATURE:  Temp  Av.2 °F (36.8 °C)  Min: 97.7 °F (36.5 °C)  Max: 98.6 °F (37 °C)  RESPIRATIONS RANGE: Resp  Av  Min: 18  Max: 18  PULSE OXIMETRY RANGE: SpO2  Av %  Min: 100 %  Max: 100 %  PULSE RANGE: Pulse  Av.5  Min: 107  Max: 113  BLOOD PRESSURE RANGE: Systolic (05JBO), HZA:538 , Min:133 , LTW:307   ; Diastolic (74AVZ), VVK:66, Min:91, Max:101    I/O (24Hr): Intake/Output Summary (Last 24 hours) at 10/5/2022 2210  Last data filed at 10/5/2022 1155  Gross per 24 hour   Intake --   Output 500 ml   Net -500 ml     Objective:  Vital signs: (most recent): Blood pressure (!) 156/101, pulse (!) 111, temperature 97.7 °F (36.5 °C), resp. rate 18, height 5' 11\" (1.803 m), weight 87 kg (191 lb 12.8 oz), SpO2 100 %. :    Physical Exam:  HENT:      Head: Atraumatic. Eyes:   Extraocular Movements: Extraocular movements intact. Cardiovascular:      Rate and Rhythm: Normal rate and regular rhythm. Pulmonary:      Effort: Pulmonary effort is normal.      Breath sounds: Normal breath sounds. Abdominal:      Palpations: Abdomen is soft. Musculoskeletal:      Cervical back: Normal range of motion. Comments: Muscle skeletal weakness of the upper and lower extremities. no other muscle pain with palpation   Neurological:      Mental Status: She is alert.       Comments: Patient is burning and numbness and tingling in the upper and lower extremities and weakness and lack of coordination and strength with using her hands and the upper extremities and legs. She report that she is unable to control her trunk while trying to sit upright. She reported that her mouth feels numb on the inside muscle weakness of the upper and lower extremities   Psychiatric:      Comments: Verbalizing feeling depressed because of her current medical condition     Labs/Imaging/Diagnostics    Labs:  CBC:  Recent Labs     10/05/22  0949 10/04/22  1221 10/03/22  1928   WBC 5.9 8.3 7.6   RBC 3.21* 3.20* 3.73*   HGB 9.9* 9.6* 11.6   HCT 31.4* 31.8* 36.5   MCV 97.8 99.4* 97.9   RDW 15.2* 15.0* 15.2*    315 358     CHEMISTRIES:  Recent Labs     10/05/22  0949 10/04/22  2142 10/04/22  1221    141 141   K 3.0* 2.8* 3.5    105 107   CO2 24 24 20*   BUN 2* 2* 2*   CA 8.8 9.0 9.0   PT/INR:No results for input(s): INR, INREXT in the last 72 hours. No lab exists for component: PROTIME  APTT:No results for input(s): APTT in the last 72 hours. LIVER PROFILE:  Recent Labs     10/05/22  0949 10/04/22  2142 10/03/22  1928   AST 14* 13* 13*   ALT 10* 10* 9*     Lab Results   Component Value Date/Time    ALT (SGPT) 10 (L) 10/05/2022 09:49 AM    AST (SGOT) 14 (L) 10/05/2022 09:49 AM    Alk. phosphatase 85 10/05/2022 09:49 AM    Bilirubin, total 0.4 10/05/2022 09:49 AM       Imaging Last 24 Hours:  No results found. Assessment//Plan   Active Problems:    Dehydration (10/3/2022)      Assessment & Plan:    Rheumatology was consulted to evaluate the patient for fibromyalgia. She denies having diffuse muscle soreness and denies peripheral joint pains; which were absent on physical examination. She also denied having ESR and GCA symptoms; But she has an elevated Sed rate and C-reactive protein. The patient does describe having multiple neurologic deficits involving the upper and lower extremities and her trunk and face.   She also has extensive cervical spinal stenosis and bulging disc, which may could also contribute to some of her clinical neurologic features. I agree that a neurologic evaluation may clarify the underlying etiology to explain why she reports having loss of coordination and strength and paresthesias and burning pains involving the upper extremities lower extremities and her trunk of the body and in her mouth. The serology labs are still pending ;although in view of the elevated inflammatory markers and insignificant limb weakness and paresthesias and lack of coordination; I will empirically placd her on IV Solumedrol until her neurological work up has been done to determine whether she need to continue IV steroids.            Electronically signed by Patricio Mendoza MD on 10/5/2022 at 10:10 PM

## 2022-10-06 NOTE — ROUTINE PROCESS
Patient was complaining that her whole body burned and said it hurt when we turned her. She would push against me when I tried to turn her making it harder. I was as gentle as I could be to turn her. She was drowsy most of the night and accused me of not giving her the solu medrol iv, which was given.

## 2022-10-06 NOTE — PROGRESS NOTES
PROGRESS NOTE    Patient: Ivett Reynolds MRN: [id number]  SSN: [id number]    YOB: 1970  Age: 47 y.o. Sex: female      Admit Date: 10/3/2022    LOS: 3 days       Subjective     CHIEF COMPLAINT: R abominal pain that goes to the back and pain with urination           HISTORY OF PRESENT ILLNESS:        Patient is a 47y.o. year old female with a history of PVD, HTN and fibromyalgia presents with acute right flank pain and pain with urination. It began early yesterday morning and has not occurred prior. The pain is sharp, severe, and constant without radiation. Patient confirms fever this morning (99.1?), flank pain, increased urgency, pain and burning with urination since yesterday morning. Confirms generalized pruritis, especially at lower and upper extremities bilaterally. Denies hematuria and previous history of kidney stones.      PMH: HTN, PVD, Fibromyalgia     Allergies: denies food, medication, and environmental allergies     SH: smokes 1-2- cigarettes per day, denies alcohol and illicit drug use     FH: mother - HTN, diabetes mellitus, CAD; father - HTN, diabetes mellitus, CAD, esophageal cancer; brother: PVD; 3 children: all have asthma     Labs:  UA on 10/3: calcium oxalate crystals present  CT scan abdomen and pelvis with contrast on 10/3: terminal ileum and proximal colon with fluid containing scattered areas of wall thickening  Low K (2.7)  Awaiting blood culture results    10/5/22  High BP (152/96) and pulse (107)  Rheumatology consult: prior CAT of cervical spine on 8/12/22 shows extensive cervical spinal stenosis at C3-C4 and C2-C3 and bulging disks at C4-5 and C5-C6 and focal protrusions at C6-7, ordered muscle enzymes and basic rheumatology labs, does not have features that supports fibromyalgia diagnosis; started IV solumedrol  Labs from 10/4:  Low K (2.8)  Positive for opiates  Patient is alert and awake  Denies lightheadedness, dizziness, chest pain, dyspnea, abdominal pain, wheezing, cough, nausea, vomiting, constipation, difficulties urinating  Confirms generalized pruritis, especially at lower and upper extremities bilaterally  Confirms headache, abdominal pain, decreased appetite, constipation  PE reveals decreased sensation to light touch in upper extremities bilaterally. Tenderness on palpation at the R flank and RUQ  States she previously was prescribed amlodipine 5mg po and metoprolol 50 mg po     10/6/22  High pulse (101) and BP (156/104)   Rheumatology consult:placed on IV solumedrol for elevated inflammatory markers  Inserted prince catheter yesterday due to inability to urinate  Awaiting neurology consult  Labs: Increasing Hgb (9.6)  UA: positive for nitrites and +4 bacteria  Awaiting urine culture results  Patient is alert and awake  Denies lightheadedness, dizziness, chest pain, dyspnea, wheezing, cough, nausea, vomiting, constipation, hematuria, pain or burning with urination, abdominal pain  Denies history of UTIs  Confirms headache, constipation - requested medication  Confirms increased aching pain at hands and lower extremities bilaterally  PE:  Decreased sensation to light touch in upper extremities bilaterally  Tachycardia with regular rhythm and rate  Tenderness on palpation at the suprapubic area without guarding         Objective     Visit Vitals  BP (!) 156/104 (BP 1 Location: Left upper arm, BP Patient Position: At rest;Supine)   Pulse (!) 101   Temp 97.8 °F (36.6 °C)   Resp 19   Ht 5' 11\" (1.803 m)   Wt 87 kg (191 lb 12.8 oz)   SpO2 99%   BMI 26.75 kg/m²      O2 Device: None (Room air)    Physical Exam:   Physical Exam    Intake & Output:  Current Shift: No intake/output data recorded.   Last three shifts: 10/04 1901 - 10/06 0700  In: -   Out: 900 [Urine:900]        24 Hour Results:    Recent Results (from the past 24 hour(s))   METABOLIC PANEL, COMPREHENSIVE    Collection Time: 10/05/22  9:49 AM   Result Value Ref Range    Sodium 143 136 - 145 mmol/L    Potassium 3.0 (L) 3.5 - 5.1 mmol/L    Chloride 108 97 - 108 mmol/L    CO2 24 21 - 32 mmol/L    Anion gap 11 5 - 15 mmol/L    Glucose 74 65 - 100 mg/dL    BUN 2 (L) 6 - 20 mg/dL    Creatinine 0.49 (L) 0.55 - 1.02 mg/dL    BUN/Creatinine ratio 4 (L) 12 - 20      eGFR >60 >60 ml/min/1.73m2    Calcium 8.8 8.5 - 10.1 mg/dL    Bilirubin, total 0.4 0.2 - 1.0 mg/dL    AST (SGOT) 14 (L) 15 - 37 U/L    ALT (SGPT) 10 (L) 12 - 78 U/L    Alk. phosphatase 85 45 - 117 U/L    Protein, total 6.1 (L) 6.4 - 8.2 g/dL    Albumin 2.4 (L) 3.5 - 5.0 g/dL    Globulin 3.7 2.0 - 4.0 g/dL    A-G Ratio 0.6 (L) 1.1 - 2.2     CBC WITH AUTOMATED DIFF    Collection Time: 10/05/22  9:49 AM   Result Value Ref Range    WBC 5.9 3.6 - 11.0 K/uL    RBC 3.21 (L) 3.80 - 5.20 M/uL    HGB 9.9 (L) 11.5 - 16.0 g/dL    HCT 31.4 (L) 35.0 - 47.0 %    MCV 97.8 80.0 - 99.0 FL    MCH 30.8 26.0 - 34.0 PG    MCHC 31.5 30.0 - 36.5 g/dL    RDW 15.2 (H) 11.5 - 14.5 %    PLATELET 371 387 - 455 K/uL    MPV 9.9 8.9 - 12.9 FL    NRBC 0.0 0.0  WBC    ABSOLUTE NRBC 0.00 0.00 - 0.01 K/uL    NEUTROPHILS 64 32 - 75 %    LYMPHOCYTES 28 12 - 49 %    MONOCYTES 8 5 - 13 %    EOSINOPHILS 0 0 - 7 %    BASOPHILS 0 0 - 1 %    IMMATURE GRANULOCYTES 0 0 - 0.5 %    ABS. NEUTROPHILS 3.8 1.8 - 8.0 K/UL    ABS. LYMPHOCYTES 1.6 0.8 - 3.5 K/UL    ABS. MONOCYTES 0.5 0.0 - 1.0 K/UL    ABS. EOSINOPHILS 0.0 0.0 - 0.4 K/UL    ABS. BASOPHILS 0.0 0.0 - 0.1 K/UL    ABS. IMM.  GRANS. 0.0 0.00 - 0.04 K/UL    DF AUTOMATED     GLUCOSE, POC    Collection Time: 10/05/22 11:14 AM   Result Value Ref Range    Glucose (POC) 103 (H) 65 - 100 mg/dL    Performed by Lisbeth Griffiths [place], POC    Collection Time: 10/05/22  4:22 PM   Result Value Ref Range    Glucose (POC) 145 (H) 65 - 100 mg/dL    Performed by Angela Summers    GLUCOSE, POC    Collection Time: 10/05/22  8:02 PM   Result Value Ref Range    Glucose (POC) 173 (H) 65 - 100 mg/dL    Performed by Praveena Anand W/MICROSCOPIC    Collection Time: 10/06/22  2:35 AM   Result Value Ref Range    Color Yellow      Appearance Turbid (A) Clear      Specific gravity 1.026 1.003 - 1.030      pH (UA) 5.0 5.0 - 8.0      Protein 30 (A) Negative mg/dL    Glucose Negative Negative mg/dL    Ketone 80 (A) Negative mg/dL    Bilirubin Negative Negative      Blood Negative Negative      Urobilinogen 2.0 (H) 0.1 - 1.0 EU/dL    Nitrites Positive (A) Negative      Leukocyte Esterase Negative Negative      WBC 0-4 0 - 4 /hpf    RBC 0-5 0 - 5 /hpf    Bacteria 4+ (A) Negative /hpf    Mucus 3+ (A) Negative /lpf    CA Oxalate crystals 2+ (A) Negative    Hyaline cast 2-5 0 - 5 /lpf   CBC W/O DIFF    Collection Time: 10/06/22  6:52 AM   Result Value Ref Range    WBC 5.7 3.6 - 11.0 K/uL    RBC 3.21 (L) 3.80 - 5.20 M/uL    HGB 9.6 (L) 11.5 - 16.0 g/dL    HCT 31.0 (L) 35.0 - 47.0 %    MCV 96.6 80.0 - 99.0 FL    MCH 29.9 26.0 - 34.0 PG    MCHC 31.0 30.0 - 36.5 g/dL    RDW 14.6 (H) 11.5 - 14.5 %    PLATELET 693 688 - 254 K/uL    MPV 9.8 8.9 - 12.9 FL    NRBC 0.0 0.0  WBC    ABSOLUTE NRBC 0.00 0.00 - 0.01 K/uL         Imaging:    CT ABD PELV W CONT   Final Result   The terminal ileum and proximal colon are fluid containing with scattered areas   of wall thickening. This may be due at least in part due to underdistention, but   enterocolitis due to nonspecific infection or inflammation is not excluded. No   bowel obstruction or other acute abnormality in the abdomen or pelvis.             ASSESSMENT:  Hypokalemia - resolved  Hypertension  Peripheral vein disease  Fibromyalgia  Generalized pruritis  Cervical spinal stenosis at C3-C4 and C2-C3   Bulging disks at C4-5 and C5-C6 with focal protrusions at C6-7  Opiate dependence  UTI     PLAN:     Lipitor 10 mg daily  Lovenox 30 subcu every 12  Ferrous sulfate 325 mg daily  Thiamine 50 mg daily  Continue IV fluids  Rheumatology consult  PT OT consult  Replace potassium      Consult neurology    Toprol XL 50 mg dailyfor   PT OT consult    Add ceftriaxone for UTI  Continue miralax dosage for constipation  Add amlodipine 5 mg po once daily for hypertension                  Current Facility-Administered Medications:     diphenhydrAMINE (BENADRYL) injection 25 mg, 25 mg, IntraVENous, Q4H PRN, Frankie Suarez MD, 25 mg at 10/05/22 0606    hydrOXYzine (VISTARIL) injection 25 mg, 25 mg, IntraMUSCular, Q6H PRN, Frankie Suarez MD, 25 mg at 10/05/22 1013    methylPREDNISolone (PF) (SOLU-MEDROL) injection 40 mg, 40 mg, IntraVENous, Q8H, Yessica Hill MD, 40 mg at 10/06/22 0042    pregabalin (LYRICA) capsule 150 mg, 150 mg, Oral, TID, Alexis Deal ARNP, 150 mg at 10/05/22 2032    metoprolol succinate (TOPROL-XL) XL tablet 50 mg, 50 mg, Oral, DAILY, Frankie Suarez MD, 50 mg at 10/06/22 0825    atorvastatin (LIPITOR) tablet 10 mg, 10 mg, Oral, DAILY, Mimi Lopez MD, 10 mg at 10/06/22 9277    ferrous sulfate tablet 325 mg, 325 mg, Oral, DAILY WITH BREAKFAST, Frankie Suarez MD, 325 mg at 59/25/03 7991    folic acid (FOLVITE) tablet 1 mg, 1 mg, Oral, DAILY, Frankie Suarez MD, 1 mg at 10/06/22 5418    nitroglycerin (NITROSTAT) tablet 0.4 mg, 0.4 mg, SubLINGual, PRN, Danita Suarez MD    thiamine mononitrate (B-1) tablet 50 mg, 50 mg, Oral, DAILY, Frankie Suarez MD, 50 mg at 10/06/22 3482    0.9% sodium chloride infusion, 75 mL/hr, IntraVENous, CONTINUOUS, Frankie Suarez MD, Last Rate: 75 mL/hr at 10/06/22 0149, 75 mL/hr at 10/06/22 0149    acetaminophen (TYLENOL) tablet 650 mg, 650 mg, Oral, Q6H PRN, 650 mg at 10/05/22 1541 **OR** acetaminophen (TYLENOL) suppository 650 mg, 650 mg, Rectal, Q6H PRN, Frankie Suarez MD    polyethylene glycol (MIRALAX) packet 17 g, 17 g, Oral, DAILY PRN, Frankie Suarez MD    ondansetron (ZOFRAN ODT) tablet 4 mg, 4 mg, Oral, Q8H PRN **OR** ondansetron (ZOFRAN) injection 4 mg, 4 mg, IntraVENous, Q6H PRN, Frankie Suarez MD    enoxaparin (LOVENOX) injection 30 mg, 30 mg, SubCUTAneous, Q12H, Daniela Jasmyne Zarco MD, 30 mg at 10/06/22 0785    traMADoL (ULTRAM) tablet 50 mg, 50 mg, Oral, Q6H PRN, Frankie Suarez MD, 50 mg at 10/06/22 0705    Current Outpatient Medications   Medication Instructions    atorvastatin (LIPITOR) 10 mg, Oral, DAILY    diclofenac (VOLTAREN) 2 g, Topical, 4 TIMES DAILY    ferrous sulfate 325 mg, Oral, DAILY WITH BREAKFAST    folic acid (FOLVITE) 1 mg, Oral, DAILY    nitroglycerin (NITROSTAT) 0.4 mg, SubLINGual, AS NEEDED, Up to 3 doses. pregabalin (LYRICA) 100 mg, Oral, 3 TIMES DAILY    thiamine (B-1) 50 mg, Oral, DAILY         Signed By: Keila Garber     October 6, 2022         *ATTENTION:  This note has been created by a medical student for educational purposes only. Please do not refer to the content of this note for clinical decision-making, billing, or other purposes. Please see attending physicians note to obtain clinical information on this patient. *

## 2022-10-07 VITALS
SYSTOLIC BLOOD PRESSURE: 158 MMHG | RESPIRATION RATE: 18 BRPM | HEART RATE: 81 BPM | DIASTOLIC BLOOD PRESSURE: 102 MMHG | WEIGHT: 191.8 LBS | HEIGHT: 71 IN | BODY MASS INDEX: 26.85 KG/M2 | OXYGEN SATURATION: 99 % | TEMPERATURE: 98.6 F

## 2022-10-07 LAB — COVID-19 RAPID TEST, COVR: NOT DETECTED

## 2022-10-07 PROCEDURE — 96376 TX/PRO/DX INJ SAME DRUG ADON: CPT

## 2022-10-07 PROCEDURE — 74011250636 HC RX REV CODE- 250/636: Performed by: SPECIALIST

## 2022-10-07 PROCEDURE — 74011250637 HC RX REV CODE- 250/637: Performed by: FAMILY MEDICINE

## 2022-10-07 PROCEDURE — G0378 HOSPITAL OBSERVATION PER HR: HCPCS

## 2022-10-07 PROCEDURE — 96372 THER/PROPH/DIAG INJ SC/IM: CPT

## 2022-10-07 PROCEDURE — 74011250637 HC RX REV CODE- 250/637

## 2022-10-07 PROCEDURE — 74011250636 HC RX REV CODE- 250/636: Performed by: FAMILY MEDICINE

## 2022-10-07 PROCEDURE — 87635 SARS-COV-2 COVID-19 AMP PRB: CPT

## 2022-10-07 PROCEDURE — 97530 THERAPEUTIC ACTIVITIES: CPT

## 2022-10-07 RX ORDER — PREGABALIN 150 MG/1
150 CAPSULE ORAL 3 TIMES DAILY
Qty: 60 CAPSULE | Refills: 0 | Status: SHIPPED | OUTPATIENT
Start: 2022-10-07

## 2022-10-07 RX ORDER — PREDNISONE 10 MG/1
TABLET ORAL
Qty: 20 TABLET | Refills: 0 | Status: SHIPPED | OUTPATIENT
Start: 2022-10-07

## 2022-10-07 RX ORDER — CIPROFLOXACIN 500 MG/1
500 TABLET ORAL EVERY 12 HOURS
Qty: 20 TABLET | Refills: 0 | Status: SHIPPED | OUTPATIENT
Start: 2022-10-07

## 2022-10-07 RX ORDER — AMLODIPINE BESYLATE 10 MG/1
10 TABLET ORAL DAILY
Qty: 60 TABLET | Refills: 0 | Status: SHIPPED | OUTPATIENT
Start: 2022-10-08

## 2022-10-07 RX ORDER — AMLODIPINE BESYLATE 5 MG/1
10 TABLET ORAL DAILY
Status: DISCONTINUED | OUTPATIENT
Start: 2022-10-08 | End: 2022-10-07 | Stop reason: HOSPADM

## 2022-10-07 RX ORDER — METOPROLOL SUCCINATE 50 MG/1
50 TABLET, EXTENDED RELEASE ORAL DAILY
Qty: 60 TABLET | Refills: 0 | Status: SHIPPED | OUTPATIENT
Start: 2022-10-08

## 2022-10-07 RX ADMIN — THIAMINE HCL TAB 100 MG 50 MG: 100 TAB at 08:28

## 2022-10-07 RX ADMIN — ATORVASTATIN CALCIUM 10 MG: 10 TABLET, FILM COATED ORAL at 08:28

## 2022-10-07 RX ADMIN — TRAMADOL HYDROCHLORIDE 50 MG: 50 TABLET, COATED ORAL at 08:27

## 2022-10-07 RX ADMIN — METOPROLOL SUCCINATE 50 MG: 50 TABLET, EXTENDED RELEASE ORAL at 08:28

## 2022-10-07 RX ADMIN — AMLODIPINE BESYLATE 5 MG: 5 TABLET ORAL at 08:28

## 2022-10-07 RX ADMIN — METHYLPREDNISOLONE SODIUM SUCCINATE 40 MG: 40 INJECTION, POWDER, FOR SOLUTION INTRAMUSCULAR; INTRAVENOUS at 02:14

## 2022-10-07 RX ADMIN — FOLIC ACID 1 MG: 1 TABLET ORAL at 08:28

## 2022-10-07 RX ADMIN — ENOXAPARIN SODIUM 30 MG: 100 INJECTION SUBCUTANEOUS at 08:28

## 2022-10-07 RX ADMIN — FERROUS SULFATE TAB 325 MG (65 MG ELEMENTAL FE) 325 MG: 325 (65 FE) TAB at 12:00

## 2022-10-07 RX ADMIN — METHYLPREDNISOLONE SODIUM SUCCINATE 40 MG: 40 INJECTION, POWDER, FOR SOLUTION INTRAMUSCULAR; INTRAVENOUS at 10:00

## 2022-10-07 RX ADMIN — CIPROFLOXACIN HYDROCHLORIDE 500 MG: 500 TABLET, FILM COATED ORAL at 12:00

## 2022-10-07 RX ADMIN — PREGABALIN 150 MG: 150 CAPSULE ORAL at 08:28

## 2022-10-07 NOTE — PROGRESS NOTES
PHYSICAL THERAPY TREATMENT  Patient: Margret Catalan (89 y.o. female)  Date: 10/7/2022  Diagnosis: Dehydration [E86.0] <principal problem not specified>      Precautions:    Chart, physical therapy assessment, plan of care and goals were reviewed. ASSESSMENT  Patient continues with skilled PT services and is progressing towards goals. Pt supine in bed upon PT arrival, agreeable to session. (See below for objective details and assist levels). Overall pt tolerated session fair today. Patient required fair to constant support to balance sitting balance while sitting EOB to perform seated TE ( see details below). Patient then returned to supine in bed and found to be soiled. Patient was max Ax1 for bed mobility to assist with claude care and changing bed sheets. Patient then left supine in bed with call bell within reach and all needs meet. Current Level of Function Impacting Discharge (mobility/balance): Impaired balance, general weakness, poor activity tolerance    Other factors to consider for discharge: PLOF, assistance at home, level of deficits, acute medical state         PLAN :  Patient continues to benefit from skilled intervention to address the above impairments. Continue treatment per established plan of care to address goals. Recommendation for discharge: (in order for the patient to meet his/her long term goals)  Richie Morrison    This discharge recommendation:  Has been made in collaboration with the attending provider and/or case management    IF patient discharges home will need the following DME: to be determined (TBD)       SUBJECTIVE:   Patient stated are they Donato Councilman have me doing exercises at the place im going to.     OBJECTIVE DATA SUMMARY:   Critical Behavior:  Neurologic State: Alert  Orientation Level: Oriented X4  Cognition: Appropriate decision making, Follows commands  Safety/Judgement: Awareness of environment, Insight into deficits  Functional Mobility Training:  Bed Mobility:  Rolling: Maximum assistance;Assist x1  Supine to Sit: Minimum assistance;Assist x1  Sit to Supine: Maximum assistance;Assist x1  Scooting: Maximum assistance;Assist x1  Balance:  Sitting: Impaired; With support  Sitting - Static: Fair (occasional)  Sitting - Dynamic: Poor (constant support)  Therapeutic Exercises:   1x10 LAQ  1x10 AP  1x10 hip ADD/ABD     Pain Ratin/10 burning in bottom and BLE    Activity Tolerance:   Fair and requires rest breaks    After treatment patient left in no apparent distress:   Bed locked and returned to lowest position, Supine in bed, Call bell within reach, Bed / chair alarm activated, and Side rails x 3      COMMUNICATION/COLLABORATION:   The patients plan of care was discussed with: Registered nurse. GOALS:    Problem: Mobility Impaired (Adult and Pediatric)  Goal: *Acute Goals and Plan of Care (Insert Text)  Description: Pt stated goals: indep  Pt will be I with LE HEP in 7 days. Pt will perform bed mobility with stand by A in 7 days.   Pt will perform sit<>stand with CGA in 7 days  Pt will perform bed<>chair with CGA, LRAD, in 7 days  Pt will ambulate 10-20 ft with CGA, LRAD, in 7 days       Outcome: Progressing Towards Goal       Fco Lauren PTA, PT   Time Calculation: 44 mins

## 2022-10-07 NOTE — DISCHARGE INSTRUCTIONS
Discharge Instructions       PATIENT ID: Arline Peabody  MRN: [id number]   YOB: 1970    DATE OF ADMISSION: [unfilled]    DATE OF DISCHARGE: 10/7/2022    PRIMARY CARE PROVIDER: @PCP@     ATTENDING PHYSICIAN: [unfilled]  DISCHARGING PROVIDER: Franko Bruno MD    To contact this individual call [phone] and ask the  to page. If unavailable ask to be transferred the Adult Hospitalist Department. DISCHARGE DIAGNOSES stenosis neuropathy dehydration    CONSULTATIONS: [unfilled]    PROCEDURES/SURGERIES: * No surgery found *    PENDING TEST RESULTS:   At the time of discharge the following test results are still pending: None    [facility] AP[facility]MENTS:   [facility]     ADDITIONAL CARE RECOMMENDATIONS: PT OT    DIET: Cardiac Diet      ACTIVITY: Activity as tolerated    Wound care: Wound Care Order: submitted to [facility] Please view [email or web address]    EQUIPMENT needed: ***      DISCHARGE MEDICATIONS:   See Medication Reconciliation Form    It is important that you take the medication exactly as they are prescribed. Keep your medication in the bottles provided by the pharmacist and keep a list of the medication names, dosages, and times to be taken in your wallet. Do not take other medications without consulting your doctor. NOTIFY YOUR PHYSICIAN FOR ANY OF THE FOLLOWING:   Fever over 101 degrees for 24 hours. Chest pain, shortness of breath, fever, chills, nausea, vomiting, diarrhea, change in mentation, falling, weakness, bleeding. Severe pain or pain not relieved by medications. Or, any other signs or symptoms that you may have questions about.       DISPOSITION:    Home With:   OT  PT  HH  RN       SNF/Inpatient Rehab/LTAC    Independent/assisted living    Hospice    Other:         PROBLEM LIST Updated:  Yes ***       Signed:   Franko Bruno MD  10/7/2022  11:19 AM    Discharge Instructions       PATIENT ID: Arline Peabody  MRN: [id number]   DATE OF BIRTH: 1970    DATE OF ADMISSION: [unfilled]    DATE OF DISCHARGE: 10/7/2022    PRIMARY CARE PROVIDER: @PCP@     ATTENDING PHYSICIAN: [unfilled]  DISCHARGING PROVIDER: Lashonda Bruno MD    To contact this individual call [phone] and ask the  to page. If unavailable ask to be transferred the Adult Hospitalist Department. DISCHARGE DIAGNOSES ***    CONSULTATIONS: [unfilled]    PROCEDURES/SURGERIES: * No surgery found *    PENDING TEST RESULTS:   At the time of discharge the following test results are still pending: ***    FOLLOW UP APPOINTMENTS:   @[facility]@     ADDITIONAL CARE RECOMMENDATIONS: ***    DIET: {diet:78577}      ACTIVITY: {discharge activity:79056}    Wound care: {[facility] Wound Care Instructions:01072}    EQUIPMENT needed: ***      DISCHARGE MEDICATIONS:   See Medication Reconciliation Form    It is important that you take the medication exactly as they are prescribed. Keep your medication in the bottles provided by the pharmacist and keep a list of the medication names, dosages, and times to be taken in your wallet. Do not take other medications without consulting your doctor. NOTIFY YOUR PHYSICIAN FOR ANY OF THE FOLLOWING:   Fever over 101 degrees for 24 hours. Chest pain, shortness of breath, fever, chills, nausea, vomiting, diarrhea, change in mentation, falling, weakness, bleeding. Severe pain or pain not relieved by medications. Or, any other signs or symptoms that you may have questions about.       DISPOSITION:    Home With:   OT  PT  HH  RN       SNF/Inpatient Rehab/LTAC    Independent/assisted living    Hospice    Other:         PROBLEM LIST Updated:  Yes ***       Signed:   Lashonda Bruno MD  10/7/2022  11:19 AM

## 2022-10-07 NOTE — PROGRESS NOTES
Patient accepted to go to [facility] today, room 228 and nurse can call report to [phone]. CM notified patient of acceptance and DC today. Patient will require stretcher transport. CM notified  and DC nurse. Discharge plan of care/case management plan validated with provider discharge order.

## 2022-10-07 NOTE — PROGRESS NOTES
PROGRESS NOTE    Patient: Hannah Schultz MRN: [id number]  SSN: [id number]    YOB: 1970  Age: 47 y.o. Sex: female      Admit Date: 10/3/2022    LOS: 4 days       Subjective     CHIEF COMPLAINT: R abominal pain that goes to the back and pain with urination           HISTORY OF PRESENT ILLNESS:        Patient is a 47y.o. year old female with a history of PVD, HTN and fibromyalgia presents with acute right flank pain and pain with urination. It began early yesterday morning and has not occurred prior. The pain is sharp, severe, and constant without radiation. Patient confirms fever this morning (99.1?), flank pain, increased urgency, pain and burning with urination since yesterday morning. Confirms generalized pruritis, especially at lower and upper extremities bilaterally. Denies hematuria and previous history of kidney stones.      PMH: HTN, PVD, Fibromyalgia     Allergies: denies food, medication, and environmental allergies     SH: smokes 1-2- cigarettes per day, denies alcohol and illicit drug use     FH: mother - HTN, diabetes mellitus, CAD; father - HTN, diabetes mellitus, CAD, esophageal cancer; brother: PVD; 3 children: all have asthma     Labs:  UA on 10/3: calcium oxalate crystals present  CT scan abdomen and pelvis with contrast on 10/3: terminal ileum and proximal colon with fluid containing scattered areas of wall thickening  Low K (2.7)  Awaiting blood culture results    10/5/22  High BP (152/96) and pulse (107)  Rheumatology consult: prior CAT of cervical spine on 8/12/22 shows extensive cervical spinal stenosis at C3-C4 and C2-C3 and bulging disks at C4-5 and C5-C6 and focal protrusions at C6-7, ordered muscle enzymes and basic rheumatology labs, does not have features that supports fibromyalgia diagnosis; started IV solumedrol  Labs from 10/4:  Low K (2.8)  Positive for opiates  Patient is alert and awake  Denies lightheadedness, dizziness, chest pain, dyspnea, abdominal pain, wheezing, cough, nausea, vomiting, constipation, difficulties urinating  Confirms generalized pruritis, especially at lower and upper extremities bilaterally  Confirms headache, abdominal pain, decreased appetite, constipation  PE reveals decreased sensation to light touch in upper extremities bilaterally. Tenderness on palpation at the R flank and RUQ  States she previously was prescribed amlodipine 5mg po and metoprolol 50 mg po     10/6/22  High pulse (101) and BP (156/104)   Rheumatology consult:placed on IV solumedrol for elevated inflammatory markers  Inserted prince catheter yesterday due to inability to urinate  Awaiting neurology consult  Labs:   Increasing Hgb (9.6)  UA: positive for nitrites and +4 bacteria  Awaiting urine culture results  Patient is alert and awake  Denies lightheadedness, dizziness, chest pain, dyspnea, wheezing, cough, nausea, vomiting, constipation, hematuria, pain or burning with urination, abdominal pain  Denies history of UTIs  Confirms headache, constipation - requested medication  Confirms increased aching pain at hands and lower extremities bilaterally  PE:  Decreased sensation to light touch in upper extremities bilaterally  Tachycardia with regular rhythm and rate  Tenderness on palpation at the suprapubic area without guarding    10/7/22  High BP (158/102)  Rheumatology consult: no changes  Awaiting neurology consult  Labs:  Urine culture on 10/4: probable enterococcus species  Awaiting urine culture results  Patient is alert and awake  Denies lightheadedness, dizziness, chest pain, dyspnea, wheezing, nausea, vomiting, hematuria, pain or burning with urination, abdominal pain  Confirms headache and fatigue  Confirms BM yesterday with normal consistency  Denies aching pain at hands and lower extremities bilaterally since yesterday  PE:  Decreased sensation to light touch in upper extremities bilaterally  Increased pain with palpation to lower extremities bilaterally  Tachycardia with regular rhythm and rate  No tenderness on palpation of abdomen         Objective     Visit Vitals  BP (!) 158/102 (BP 1 Location: Right upper arm, BP Patient Position: Semi fowlers)   Pulse 81   Temp 98.6 °F (37 °C)   Resp 18   Ht 5' 11\" (1.803 m)   Wt 87 kg (191 lb 12.8 oz)   SpO2 99%   BMI 26.75 kg/m²      O2 Device: None (Room air)    Physical Exam:   Physical Exam    Intake & Output:  Current Shift: No intake/output data recorded. Last three shifts: 10/05 1901 - 10/07 0700  In: -   Out: 1000 [Urine:1000]        24 Hour Results:    Recent Results (from the past 24 hour(s))   GLUCOSE, POC    Collection Time: 10/06/22  7:49 PM   Result Value Ref Range    Glucose (POC) 176 (H) 65 - 100 mg/dL    Performed by Erika Orosco          Imaging:    CT ABD PELV W CONT   Final Result   The terminal ileum and proximal colon are fluid containing with scattered areas   of wall thickening. This may be due at least in part due to underdistention, but   enterocolitis due to nonspecific infection or inflammation is not excluded. No   bowel obstruction or other acute abnormality in the abdomen or pelvis.             ASSESSMENT:  Hypokalemia - resolved  Hypertension  Peripheral vein disease  Fibromyalgia  Generalized pruritis  Cervical spinal stenosis at C3-C4 and C2-C3   Bulging disks at C4-5 and C5-C6 with focal protrusions at C6-7  Opiate dependence  UTI     PLAN:     Lipitor 10 mg daily  Lovenox 30 subcu every 12  Ferrous sulfate 325 mg daily  Thiamine 50 mg daily  Continue IV fluids  Rheumatology consult  PT OT consult  Replace potassium      Consult neurology    Toprol XL 50 mg dailyfor   PT OT consult    Add ceftriaxone for UTI  Continue miralax dosage for constipation  Add amlodipine 5 mg po once daily for hypertension    Increase amlodipine to 10 mg po once daily for hypertension                Current Facility-Administered Medications:     amLODIPine (NORVASC) tablet 5 mg, 5 mg, Oral, DAILY, Frankie Suarez MD, 5 mg at 10/06/22 1200    ciprofloxacin HCl (CIPRO) tablet 500 mg, 500 mg, Oral, Q12H, Frankie Suarez MD, 500 mg at 10/06/22 2356    ferrous sulfate tablet 325 mg, 325 mg, Oral, DAILY WITH LUNCH, Radha Suarez MD    diphenhydrAMINE (BENADRYL) injection 25 mg, 25 mg, IntraVENous, Q4H PRN, Frankie Suarez MD, 25 mg at 10/05/22 0606    hydrOXYzine (VISTARIL) injection 25 mg, 25 mg, IntraMUSCular, Q6H PRN, Frankie Suarez MD, 25 mg at 10/05/22 1013    methylPREDNISolone (PF) (SOLU-MEDROL) injection 40 mg, 40 mg, IntraVENous, Q8H, Inga Jean MD, 40 mg at 10/07/22 0214    pregabalin (LYRICA) capsule 150 mg, 150 mg, Oral, TID, Alexis Deal, SAUL, 150 mg at 10/06/22 2223    metoprolol succinate (TOPROL-XL) XL tablet 50 mg, 50 mg, Oral, DAILY, Frankie Suarez MD, 50 mg at 10/06/22 0825    atorvastatin (LIPITOR) tablet 10 mg, 10 mg, Oral, DAILY, Frankie Suarez MD, 10 mg at 68/11/60 5950    folic acid (FOLVITE) tablet 1 mg, 1 mg, Oral, DAILY, Frankie Suarez MD, 1 mg at 10/06/22 7673    nitroglycerin (NITROSTAT) tablet 0.4 mg, 0.4 mg, SubLINGual, PRN, Radha Suarez MD    thiamine mononitrate (B-1) tablet 50 mg, 50 mg, Oral, DAILY, Frankie Suarez MD, 50 mg at 10/06/22 4540    0.9% sodium chloride infusion, 75 mL/hr, IntraVENous, CONTINUOUS, Frankie Suarez MD, Last Rate: 75 mL/hr at 10/06/22 1936, 75 mL/hr at 10/06/22 1936    acetaminophen (TYLENOL) tablet 650 mg, 650 mg, Oral, Q6H PRN, 650 mg at 10/05/22 1541 **OR** acetaminophen (TYLENOL) suppository 650 mg, 650 mg, Rectal, Q6H PRN, Frankie Suarez MD    polyethylene glycol (MIRALAX) packet 17 g, 17 g, Oral, DAILY PRN, Frankie Suarez MD    ondansetron (ZOFRAN ODT) tablet 4 mg, 4 mg, Oral, Q8H PRN **OR** ondansetron (ZOFRAN) injection 4 mg, 4 mg, IntraVENous, Q6H PRN, Frankie Suarez MD    enoxaparin (LOVENOX) injection 30 mg, 30 mg, SubCUTAneous, Q12H, Frankie Suarez MD, 30 mg at 10/06/22 2049    traMADoL (ULTRAM) tablet 50 mg, 50 mg, Oral, Q6H PRN, Frankie Suarez MD, 50 mg at 10/06/22 2223    Current Outpatient Medications   Medication Instructions    atorvastatin (LIPITOR) 10 mg, Oral, DAILY    diclofenac (VOLTAREN) 2 g, Topical, 4 TIMES DAILY    ferrous sulfate 325 mg, Oral, DAILY WITH BREAKFAST    folic acid (FOLVITE) 1 mg, Oral, DAILY    nitroglycerin (NITROSTAT) 0.4 mg, SubLINGual, AS NEEDED, Up to 3 doses. pregabalin (LYRICA) 100 mg, Oral, 3 TIMES DAILY    thiamine (B-1) 50 mg, Oral, DAILY         Signed By: Maverick Body     October 7, 2022         *ATTENTION:  This note has been created by a medical student for educational purposes only. Please do not refer to the content of this note for clinical decision-making, billing, or other purposes. Please see attending physicians note to obtain clinical information on this patient. *

## 2022-10-07 NOTE — DISCHARGE SUMMARY
Discharge Summary       PATIENT ID: Stacia Fang  MRN: [id number]   YOB: 1970    DATE OF ADMISSION: 10/3/2022  6:52 PM    DATE OF DISCHARGE:   PRIMARY CARE PROVIDER: Osman Thompson NP     ATTENDING PHYSICIAN: Jonathan Suarez  DISCHARGING PROVIDER: Jonathan Suarez      CONSULTATIONS: IP CONSULT TO RHEUMATOLOGY  IP CONSULT TO NEUROLOGY  IP CONSULT TO PODIATRY  IP CONSULT TO NEUROLOGY    PROCEDURES/SURGERIES: * No surgery found *    ADMITTING DIAGNOSES:    Patient Active Problem List    Diagnosis Date Noted    Dehydration 10/03/2022    Rapidly progressive weakness 08/31/2022    Severe protein-calorie malnutrition ([facility] [address] 08/29/2022    Weakness generalized 08/24/2022    Gait disturbance 08/20/2022       DISCHARGE DIAGNOSES / PLAN:      Hypokalemia - resolved  Hypertension  PVD   Fibromyalgia  Generalized pruritis  Cervical spinal stenosis at C3-C4 and C2-C3   Bulging disks at C4-5 and C5-C6 with focal protrusions at C6-7  Opiate dependence  UTI        DISCHARGE MEDICATIONS:  Current Discharge Medication List        START taking these medications    Details   amLODIPine (NORVASC) 10 mg tablet Take 1 Tablet by mouth daily. Qty: 60 Tablet, Refills: 0  Start date: 10/8/2022      ciprofloxacin HCl (CIPRO) 500 mg tablet Take 1 Tablet by mouth every twelve (12) hours. Qty: 20 Tablet, Refills: 0  Start date: 10/7/2022      metoprolol succinate (TOPROL-XL) 50 mg XL tablet Take 1 Tablet by mouth daily. Qty: 60 Tablet, Refills: 0  Start date: 10/8/2022      predniSONE (DELTASONE) 10 mg tablet 2 tablets daily  Qty: 20 Tablet, Refills: 0  Start date: 10/7/2022           CONTINUE these medications which have CHANGED    Details   pregabalin (LYRICA) 150 mg capsule Take 1 Capsule by mouth three (3) times daily.  Max Daily Amount: 450 mg.  Qty: 60 Capsule, Refills: 0  Start date: 10/7/2022    Associated Diagnoses: Dysuria           CONTINUE these medications which have NOT CHANGED    Details   atorvastatin (LIPITOR) 10 mg tablet Take 1 Tablet by mouth daily. Qty: 30 Tablet, Refills: 0      ferrous sulfate 325 mg (65 mg iron) tablet Take 1 Tablet by mouth daily (with breakfast). Qty: 30 Tablet, Refills: 0      nitroglycerin (NITROSTAT) 0.4 mg SL tablet 1 Tablet by SubLINGual route as needed for Chest Pain. Up to 3 doses. Qty: 12 Each, Refills: 0      folic acid (FOLVITE) 1 mg tablet Take 1 Tablet by mouth daily. Qty: 30 Tablet, Refills: 0      thiamine (B-1) 50 mg tablet Take 1 Tablet by mouth daily. Qty: 30 Tablet, Refills: 0           STOP taking these medications       diclofenac (VOLTAREN) 1 % gel Comments:   Reason for Stopping:                 NOTIFY YOUR PHYSICIAN FOR ANY OF THE FOLLOWING:   Fever over 101 degrees for 24 hours. Chest pain, shortness of breath, fever, chills, nausea, vomiting, diarrhea, change in mentation, falling, weakness, bleeding. Severe pain or pain not relieved by medications. Or, any other signs or symptoms that you may have questions about. DISPOSITION:  x  Home With:   OT  PT  HH  RN       Long term SNF/Inpatient Rehab    Independent/assisted living    Hospice    Other:       PATIENT CONDITION AT DISCHARGE: Stable      PHYSICAL EXAMINATION AT DISCHARGE:  General:          Alert, cooperative, no distress, appears stated age. HEENT:           Atraumatic, anicteric sclerae, pink conjunctivae                          No oral ulcers, mucosa moist, throat clear, dentition fair  Neck:               Supple, symmetrical  Lungs:             Clear to auscultation bilaterally. No Wheezing or Rhonchi. No rales. Chest wall:      No tenderness  No Accessory muscle use. Heart:              Regular  rhythm,  No  murmur   No edema  Abdomen:        Soft, non-tender. Not distended. Bowel sounds normal  Extremities:     No cyanosis. No clubbing,                            Skin turgor normal, Capillary refill normal  Skin:                Not pale.   Not Jaundiced  No rashes   Psych:             Not anxious or agitated. Neurologic:      Alert, moves all extremities, answers questions appropriately and responds to commands     CT ABD PELV W CONT   Final Result   The terminal ileum and proximal colon are fluid containing with scattered areas   of wall thickening. This may be due at least in part due to underdistention, but   enterocolitis due to nonspecific infection or inflammation is not excluded. No   bowel obstruction or other acute abnormality in the abdomen or pelvis. Recent Results (from the past 24 hour(s))   GLUCOSE, POC    Collection Time: 10/06/22  7:49 PM   Result Value Ref Range    Glucose (POC) 176 (H) 65 - 100 mg/dL    Performed by [address] [place] [address] Road:  Patient is a 47y.o. year old female with a history of PVD, HTN and fibromyalgia presents with acute right flank pain and pain with urination. It began early yesterday morning and has not occurred prior. The pain is sharp, severe, and constant without radiation. Patient confirms fever this morning (99.1?), flank pain, increased urgency, pain and burning with urination since yesterday morning. Confirms generalized pruritis, especially at lower and upper extremities bilaterally. Denies hematuria and previous history of kidney stones.      PMH: HTN, PVD, Fibromyalgia     Allergies: denies food, medication, and environmental allergies     SH: smokes 1-2- cigarettes per day, denies alcohol and illicit drug use     FH: mother - HTN, diabetes mellitus, CAD; father - HTN, diabetes mellitus, CAD, esophageal cancer; brother: PVD; 3 children: all have asthma     Labs:  UA on 10/3: calcium oxalate crystals present  CT scan abdomen and pelvis with contrast on 10/3: terminal ileum and proximal colon with fluid containing scattered areas of wall thickening  Low K (2.7)  Awaiting blood culture results     10/5/22  High BP (152/96) and pulse (107)  Rheumatology consult: prior CAT of cervical spine on 8/12/22 shows extensive cervical spinal stenosis at C3-C4 and C2-C3 and bulging disks at C4-5 and C5-C6 and focal protrusions at C6-7, ordered muscle enzymes and basic rheumatology labs, does not have features that supports fibromyalgia diagnosis; started IV solumedrol  Labs from 10/4:  Low K (2.8)  Positive for opiates  Patient is alert and awake  Denies lightheadedness, dizziness, chest pain, dyspnea, abdominal pain, wheezing, cough, nausea, vomiting, constipation, difficulties urinating  Confirms generalized pruritis, especially at lower and upper extremities bilaterally  Confirms headache, abdominal pain, decreased appetite, constipation  PE reveals decreased sensation to light touch in upper extremities bilaterally. Tenderness on palpation at the R flank and RUQ  States she previously was prescribed amlodipine 5mg po and metoprolol 50 mg po      10/6/22  High pulse (101) and BP (156/104)   Rheumatology consult:placed on IV solumedrol for elevated inflammatory markers  Inserted prince catheter yesterday due to inability to urinate  Awaiting neurology consult  Labs:   Increasing Hgb (9.6)  UA: positive for nitrites and +4 bacteria  Awaiting urine culture results  Patient is alert and awake  Denies lightheadedness, dizziness, chest pain, dyspnea, wheezing, cough, nausea, vomiting, constipation, hematuria, pain or burning with urination, abdominal pain  Denies history of UTIs  Confirms headache, constipation - requested medication  Confirms increased aching pain at hands and lower extremities bilaterally  PE:  Decreased sensation to light touch in upper extremities bilaterally  Tachycardia with regular rhythm and rate  Tenderness on palpation at the suprapubic area without guarding     10/7/22  High BP (158/102)  Rheumatology consult: no changes  Awaiting neurology consult  Labs:  Urine culture on 10/4: probable enterococcus species  Awaiting urine culture results  Patient is alert and awake  Denies lightheadedness, dizziness, chest pain, dyspnea, wheezing, nausea, vomiting, hematuria, pain or burning with urination, abdominal pain  Confirms headache and fatigue  Confirms BM yesterday with normal consistency  Denies aching pain at hands and lower extremities bilaterally since yesterday  PE:  Decreased sensation to light touch in upper extremities bilaterally  Increased pain with palpation to lower extremities bilaterally  Tachycardia with regular rhythm and rate  No tenderness on palpation of abdomen    Urine culture positive for Enterococcus sensitivities still pending we will continue on Cipro    Otherwise patient remained stable discharge to skilled care rehab    Medication reconciliation done time chart patient 35 minutes 50% time spent on counseling and coordination of care      Signed:   Natalie Sánchez MD  10/7/2022  11:19 AM

## 2022-10-08 LAB
BACTERIA SPEC CULT: ABNORMAL
BACTERIA SPEC CULT: NORMAL
CCP IGA+IGG SERPL IA-ACNC: 13 UNITS (ref 0–19)
COLONY COUNT,CNT: ABNORMAL
COLONY COUNT,CNT: NORMAL
DSDNA AB SER-ACNC: <1 IU/ML (ref 0–9)
ENA JO1 AB SER-ACNC: <0.2 AI (ref 0–0.9)
ENA RNP AB SER-ACNC: <0.2 AI (ref 0–0.9)
ENA SM AB SER-ACNC: <0.2 AI (ref 0–0.9)
ENA SS-A AB SER-ACNC: 1.3 AI (ref 0–0.9)
ENA SS-B AB SER-ACNC: <0.2 AI (ref 0–0.9)
SPECIAL REQUESTS,SREQ: ABNORMAL
SPECIAL REQUESTS,SREQ: NORMAL

## 2022-10-10 LAB
ANA TITR SER IF: NEGATIVE {TITER}
BACTERIA SPEC CULT: NORMAL
SPECIAL REQUESTS,SREQ: NORMAL

## 2022-10-11 LAB — HISTONE IGG SER IA-ACNC: 0.3 UNITS (ref 0–0.9)

## 2022-11-02 PROBLEM — E86.0 DEHYDRATION: Status: RESOLVED | Noted: 2022-10-03 | Resolved: 2022-11-02

## 2022-12-05 ENCOUNTER — APPOINTMENT (OUTPATIENT)
Dept: CT IMAGING | Age: 52
End: 2022-12-05
Attending: NURSE PRACTITIONER
Payer: MEDICAID

## 2022-12-05 ENCOUNTER — HOSPITAL ENCOUNTER (EMERGENCY)
Age: 52
Discharge: NURSING, REHAB OR LONG-TERM CARE | End: 2022-12-06
Payer: MEDICAID
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
## 2022-12-05 PROCEDURE — 99284 EMERGENCY DEPT VISIT MOD MDM: CPT

## 2022-12-05 PROCEDURE — 72125 CT NECK SPINE W/O DYE: CPT

## 2022-12-05 PROCEDURE — 70450 CT HEAD/BRAIN W/O DYE: CPT

## 2022-12-06 VITALS
WEIGHT: 184 LBS | BODY MASS INDEX: 25.76 KG/M2 | HEART RATE: 75 BPM | RESPIRATION RATE: 16 BRPM | SYSTOLIC BLOOD PRESSURE: 108 MMHG | TEMPERATURE: 98.2 F | DIASTOLIC BLOOD PRESSURE: 73 MMHG | OXYGEN SATURATION: 98 % | HEIGHT: 71 IN

## 2022-12-06 NOTE — ED PROVIDER NOTES
EMERGENCY DEPARTMENT HISTORY AND PHYSICAL EXAM      Date: 2022  Patient Name: Lord Alford    History of Presenting Illness     Chief Complaint   Patient presents with    Fall    Arm Pain    Ankle Pain       History Provided By: Patient    HPI: Lord Alford, 47 y.o. female past medical history of anxiety, CAD, fibromyalgia, hypertension, neuropathy, PAD, anxiety presents to the ER after a fall. Patient fell out of bed. Patient hit the right side of face also complains of right arm and right leg pain. Patient denies any LOC. Patient comes in for evaluation of her leg and face pain. Moderate severity, no known exacerbating or relieving factors, no other associated signs and symptoms     There are no other complaints, changes, or physical findings at this time. Past History     Past Medical History:  Past Medical History:   Diagnosis Date    Anxiety     CAD (coronary artery disease)     Fibromyalgia     Hypertension     Neuropathy     Peripheral artery disease ([facility] [address]     Psychiatric disorder     anxiety       Past Surgical History:  Past Surgical History:   Procedure Laterality Date    HX ANKLE FRACTURE TX Left     surgical implants    HX  SECTION         Family History:  History reviewed. No pertinent family history. Social History:  Social History     Tobacco Use    Smoking status: Every Day     Packs/day: 0.50     Types: Cigarettes    Smokeless tobacco: Never   Substance Use Topics    Alcohol use: Yes     Comment: occosionally     Drug use: Not Currently       Allergies: Allergies   Allergen Reactions    Codeine Nausea Only       PCP: Katy Lee NP    No current facility-administered medications on file prior to encounter. Current Outpatient Medications on File Prior to Encounter   Medication Sig Dispense Refill    pregabalin (LYRICA) 150 mg capsule Take 1 Capsule by mouth three (3) times daily.  Max Daily Amount: 450 mg. 60 Capsule 0    amLODIPine (NORVASC) 10 mg tablet Take 1 Tablet by mouth daily. 60 Tablet 0    ciprofloxacin HCl (CIPRO) 500 mg tablet Take 1 Tablet by mouth every twelve (12) hours. 20 Tablet 0    metoprolol succinate (TOPROL-XL) 50 mg XL tablet Take 1 Tablet by mouth daily. 60 Tablet 0    predniSONE (DELTASONE) 10 mg tablet 2 tablets daily 20 Tablet 0    atorvastatin (LIPITOR) 10 mg tablet Take 1 Tablet by mouth daily. 30 Tablet 0    ferrous sulfate 325 mg (65 mg iron) tablet Take 1 Tablet by mouth daily (with breakfast). 30 Tablet 0    nitroglycerin (NITROSTAT) 0.4 mg SL tablet 1 Tablet by SubLINGual route as needed for Chest Pain. Up to 3 doses. 12 Each 0    folic acid (FOLVITE) 1 mg tablet Take 1 Tablet by mouth daily. 30 Tablet 0    thiamine (B-1) 50 mg tablet Take 1 Tablet by mouth daily. 30 Tablet 0       Review of Systems   Review of Systems   Constitutional: Negative. Negative for appetite change, chills, fatigue and fever. HENT:  Positive for facial swelling. Negative for congestion and sinus pain. Eyes: Negative. Negative for pain and visual disturbance. Respiratory: Negative. Negative for chest tightness and shortness of breath. Cardiovascular: Negative. Negative for chest pain. Gastrointestinal: Negative. Negative for abdominal pain, diarrhea, nausea and vomiting. Genitourinary: Negative. Negative for difficulty urinating. No discharge   Musculoskeletal:  Positive for arthralgias. Skin: Negative. Negative for rash. Neurological: Negative. Negative for weakness and headaches. Hematological: Negative. Psychiatric/Behavioral: Negative. Negative for agitation. The patient is not nervous/anxious. All other systems reviewed and are negative. Physical Exam   Physical Exam  Vitals and nursing note reviewed. Constitutional:       Appearance: Normal appearance. HENT:      Head: Atraumatic.       Right Ear: Tympanic membrane and external ear normal.      Left Ear: Tympanic membrane and external ear normal.      Nose: Nose normal. Mouth/Throat:      Mouth: Mucous membranes are dry. Eyes:      Extraocular Movements: Extraocular movements intact. Pupils: Pupils are equal, round, and reactive to light. Cardiovascular:      Rate and Rhythm: Normal rate and regular rhythm. Pulses: Normal pulses. Heart sounds: Normal heart sounds. Pulmonary:      Breath sounds: Normal breath sounds. Abdominal:      General: Abdomen is flat. Palpations: Abdomen is soft. Musculoskeletal:         General: Normal range of motion. Cervical back: Normal range of motion and neck supple. Skin:     General: Skin is warm and dry. Capillary Refill: Capillary refill takes less than 2 seconds. Neurological:      General: No focal deficit present. Mental Status: She is alert and oriented to person, place, and time. Mental status is at baseline. Psychiatric:         Mood and Affect: Mood normal.         Behavior: Behavior normal.       Lab and Diagnostic Study Results   Labs -   No results found for this or any previous visit (from the past 12 hour(s)). Radiologic Studies -   @lastxrresult@  CT Results  (Last 48 hours)      None          CXR Results  (Last 48 hours)      None            Medical Decision Making and ED Course   Differential Diagnosis & Medical Decision Making Provider Note:   Patient presents after fall with face, arm, leg pain. Will get imaging to further evaluate for fracture vs. Dislocation vs. Contusion. Will treat with analgesics at this time and continue to monitor for changes in mentation.     - I am the first provider for this patient. I reviewed the vital signs, available nursing notes, past medical history, past surgical history, family history and social history. The patients presenting problems have been discussed, and they are in agreement with the care plan formulated and outlined with them. I have encouraged them to ask questions as they arise throughout their visit.     Vital Signs-Reviewed the patient's vital signs. No data found. ED Course:          Procedures   Performed by: Roxanne Huynh NP  Procedures      Disposition   Disposition: DC- Adult Discharges: All of the diagnostic tests were reviewed and questions answered. Diagnosis, care plan and treatment options were discussed. The patient understands the instructions and will follow up as directed. The patients results have been reviewed with them. They have been counseled regarding their diagnosis. The patient verbally convey understanding and agreement of the signs, symptoms, diagnosis, treatment and prognosis and additionally agrees to follow up as recommended with their PCP in 24 - 48 hours. They also agree with the care-plan and convey that all of their questions have been answered. I have also put together some discharge instructions for them that include: 1) educational information regarding their diagnosis, 2) how to care for their diagnosis at home, as well a 3) list of reasons why they would want to return to the ED prior to their follow-up appointment, should their condition change. DISCHARGE PLAN:  1. Current Discharge Medication List        CONTINUE these medications which have NOT CHANGED    Details   pregabalin (LYRICA) 150 mg capsule Take 1 Capsule by mouth three (3) times daily. Max Daily Amount: 450 mg.  Qty: 60 Capsule, Refills: 0    Associated Diagnoses: Dysuria      amLODIPine (NORVASC) 10 mg tablet Take 1 Tablet by mouth daily. Qty: 60 Tablet, Refills: 0      ciprofloxacin HCl (CIPRO) 500 mg tablet Take 1 Tablet by mouth every twelve (12) hours. Qty: 20 Tablet, Refills: 0      metoprolol succinate (TOPROL-XL) 50 mg XL tablet Take 1 Tablet by mouth daily. Qty: 60 Tablet, Refills: 0      predniSONE (DELTASONE) 10 mg tablet 2 tablets daily  Qty: 20 Tablet, Refills: 0      atorvastatin (LIPITOR) 10 mg tablet Take 1 Tablet by mouth daily.   Qty: 30 Tablet, Refills: 0      ferrous sulfate 325 mg (65 mg iron) tablet Take 1 Tablet by mouth daily (with breakfast). Qty: 30 Tablet, Refills: 0      nitroglycerin (NITROSTAT) 0.4 mg SL tablet 1 Tablet by SubLINGual route as needed for Chest Pain. Up to 3 doses. Qty: 12 Each, Refills: 0      folic acid (FOLVITE) 1 mg tablet Take 1 Tablet by mouth daily. Qty: 30 Tablet, Refills: 0      thiamine (B-1) 50 mg tablet Take 1 Tablet by mouth daily. Qty: 30 Tablet, Refills: 0           2. Follow-up Information       Follow up With Specialties Details Why Contact Info    Katy Lee NP Nurse Practitioner   [address] [place] [address]  [phone]            3. Return to ED if worse   4. Discharge Medication List as of 12/6/2022  2:43 AM            Diagnosis/Clinical Impression     Clinical Impression:   1. Fall, initial encounter        Attestations: David THOMAS NP, am the primary clinician of record. Please note that this dictation was completed with [employer], the [employer] voice recognition software. Quite often unanticipated grammatical, syntax, homophones, and other interpretive errors are inadvertently transcribed by the computer software. Please disregard these errors. Please excuse any errors that have escaped final proofreading. Thank you.

## 2022-12-06 NOTE — ED TRIAGE NOTES
Pt reports fall out of bed, hit right side of face also complains of right arm and right leg pain denies loc

## 2023-05-26 RX ORDER — PREDNISONE 10 MG/1
TABLET ORAL
Status: ON HOLD | COMMUNITY
Start: 2022-10-07

## 2023-05-26 RX ORDER — FOLIC ACID 1 MG/1
1 TABLET ORAL DAILY
Status: ON HOLD | COMMUNITY
Start: 2022-09-03

## 2023-05-26 RX ORDER — PREGABALIN 150 MG/1
150 CAPSULE ORAL 3 TIMES DAILY
Status: ON HOLD | COMMUNITY
Start: 2022-10-07

## 2023-05-26 RX ORDER — ATORVASTATIN CALCIUM 10 MG/1
10 TABLET, FILM COATED ORAL DAILY
Status: ON HOLD | COMMUNITY
Start: 2022-09-03

## 2023-05-26 RX ORDER — NITROGLYCERIN 0.4 MG/1
0.4 TABLET SUBLINGUAL PRN
Status: ON HOLD | COMMUNITY
Start: 2022-09-02

## 2023-05-26 RX ORDER — CIPROFLOXACIN 500 MG/1
500 TABLET, FILM COATED ORAL EVERY 12 HOURS
Status: ON HOLD | COMMUNITY
Start: 2022-10-07

## 2023-05-26 RX ORDER — AMLODIPINE BESYLATE 10 MG/1
10 TABLET ORAL DAILY
Status: ON HOLD | COMMUNITY
Start: 2022-10-08

## 2023-05-26 RX ORDER — METOPROLOL SUCCINATE 50 MG/1
50 TABLET, EXTENDED RELEASE ORAL DAILY
Status: ON HOLD | COMMUNITY
Start: 2022-10-08

## 2023-05-26 RX ORDER — FERROUS SULFATE 325(65) MG
325 TABLET ORAL
Status: ON HOLD | COMMUNITY
Start: 2022-09-03

## 2023-07-28 ENCOUNTER — HOSPITAL ENCOUNTER (INPATIENT)
Facility: HOSPITAL | Age: 53
LOS: 5 days | Discharge: NURSING, REHAB OR LONG-TERM CARE | DRG: 425 | End: 2023-08-03
Attending: EMERGENCY MEDICINE | Admitting: INTERNAL MEDICINE
Payer: COMMERCIAL

## 2023-07-28 ENCOUNTER — APPOINTMENT (OUTPATIENT)
Facility: HOSPITAL | Age: 53
DRG: 425 | End: 2023-07-28
Payer: COMMERCIAL

## 2023-07-28 DIAGNOSIS — K63.89 COLON DISTENTION: ICD-10-CM

## 2023-07-28 DIAGNOSIS — R19.7 DIARRHEA, UNSPECIFIED TYPE: ICD-10-CM

## 2023-07-28 DIAGNOSIS — R06.01 ORTHOPNEA: ICD-10-CM

## 2023-07-28 DIAGNOSIS — E87.6 HYPOKALEMIA: Primary | ICD-10-CM

## 2023-07-28 LAB
ALBUMIN SERPL-MCNC: 3.6 G/DL (ref 3.5–5)
ALBUMIN/GLOB SERPL: 0.9 (ref 1.1–2.2)
ALP SERPL-CCNC: 78 U/L (ref 45–117)
ALT SERPL-CCNC: 17 U/L (ref 12–78)
ANION GAP SERPL CALC-SCNC: 6 MMOL/L (ref 5–15)
AST SERPL W P-5'-P-CCNC: 25 U/L (ref 15–37)
BASOPHILS # BLD: 0 K/UL (ref 0–0.1)
BASOPHILS NFR BLD: 0 % (ref 0–1)
BILIRUB SERPL-MCNC: 0.4 MG/DL (ref 0.2–1)
BUN SERPL-MCNC: 7 MG/DL (ref 6–20)
BUN/CREAT SERPL: 11 (ref 12–20)
CA-I BLD-MCNC: 8.8 MG/DL (ref 8.5–10.1)
CHLORIDE SERPL-SCNC: 100 MMOL/L (ref 97–108)
CO2 SERPL-SCNC: 38 MMOL/L (ref 21–32)
CREAT SERPL-MCNC: 0.65 MG/DL (ref 0.55–1.02)
DIFFERENTIAL METHOD BLD: NORMAL
EOSINOPHIL # BLD: 0 K/UL (ref 0–0.4)
EOSINOPHIL NFR BLD: 1 % (ref 0–7)
ERYTHROCYTE [DISTWIDTH] IN BLOOD BY AUTOMATED COUNT: 13.1 % (ref 11.5–14.5)
GLOBULIN SER CALC-MCNC: 3.9 G/DL (ref 2–4)
GLUCOSE SERPL-MCNC: 112 MG/DL (ref 65–100)
HCT VFR BLD AUTO: 36.6 % (ref 35–47)
HGB BLD-MCNC: 11.7 G/DL (ref 11.5–16)
IMM GRANULOCYTES # BLD AUTO: 0 K/UL (ref 0–0.04)
IMM GRANULOCYTES NFR BLD AUTO: 0 % (ref 0–0.5)
LIPASE SERPL-CCNC: 192 U/L (ref 73–393)
LYMPHOCYTES # BLD: 1.8 K/UL (ref 0.8–3.5)
LYMPHOCYTES NFR BLD: 28 % (ref 12–49)
MAGNESIUM SERPL-MCNC: 2.2 MG/DL (ref 1.6–2.4)
MCH RBC QN AUTO: 28.7 PG (ref 26–34)
MCHC RBC AUTO-ENTMCNC: 32 G/DL (ref 30–36.5)
MCV RBC AUTO: 89.7 FL (ref 80–99)
MONOCYTES # BLD: 0.5 K/UL (ref 0–1)
MONOCYTES NFR BLD: 8 % (ref 5–13)
NEUTS SEG # BLD: 4 K/UL (ref 1.8–8)
NEUTS SEG NFR BLD: 63 % (ref 32–75)
NRBC # BLD: 0 K/UL (ref 0–0.01)
NRBC BLD-RTO: 0 PER 100 WBC
PLATELET # BLD AUTO: 238 K/UL (ref 150–400)
PMV BLD AUTO: 9.5 FL (ref 8.9–12.9)
POTASSIUM SERPL-SCNC: 2.3 MMOL/L (ref 3.5–5.1)
PROT SERPL-MCNC: 7.5 G/DL (ref 6.4–8.2)
RBC # BLD AUTO: 4.08 M/UL (ref 3.8–5.2)
SODIUM SERPL-SCNC: 144 MMOL/L (ref 136–145)
WBC # BLD AUTO: 6.3 K/UL (ref 3.6–11)

## 2023-07-28 PROCEDURE — 74177 CT ABD & PELVIS W/CONTRAST: CPT

## 2023-07-28 PROCEDURE — 93005 ELECTROCARDIOGRAM TRACING: CPT | Performed by: EMERGENCY MEDICINE

## 2023-07-28 PROCEDURE — 6370000000 HC RX 637 (ALT 250 FOR IP): Performed by: EMERGENCY MEDICINE

## 2023-07-28 PROCEDURE — 80053 COMPREHEN METABOLIC PANEL: CPT

## 2023-07-28 PROCEDURE — 99285 EMERGENCY DEPT VISIT HI MDM: CPT

## 2023-07-28 PROCEDURE — 83735 ASSAY OF MAGNESIUM: CPT

## 2023-07-28 PROCEDURE — 83690 ASSAY OF LIPASE: CPT

## 2023-07-28 PROCEDURE — 6360000004 HC RX CONTRAST MEDICATION: Performed by: EMERGENCY MEDICINE

## 2023-07-28 PROCEDURE — 96365 THER/PROPH/DIAG IV INF INIT: CPT

## 2023-07-28 PROCEDURE — 96366 THER/PROPH/DIAG IV INF ADDON: CPT

## 2023-07-28 PROCEDURE — 36415 COLL VENOUS BLD VENIPUNCTURE: CPT

## 2023-07-28 PROCEDURE — 96375 TX/PRO/DX INJ NEW DRUG ADDON: CPT

## 2023-07-28 PROCEDURE — 6360000002 HC RX W HCPCS: Performed by: EMERGENCY MEDICINE

## 2023-07-28 PROCEDURE — 85025 COMPLETE CBC W/AUTO DIFF WBC: CPT

## 2023-07-28 PROCEDURE — 80048 BASIC METABOLIC PNL TOTAL CA: CPT

## 2023-07-28 PROCEDURE — 83880 ASSAY OF NATRIURETIC PEPTIDE: CPT

## 2023-07-28 RX ORDER — POTASSIUM CHLORIDE 20 MEQ/1
40 TABLET, EXTENDED RELEASE ORAL ONCE
Status: COMPLETED | OUTPATIENT
Start: 2023-07-28 | End: 2023-07-28

## 2023-07-28 RX ORDER — MORPHINE SULFATE 4 MG/ML
4 INJECTION, SOLUTION INTRAMUSCULAR; INTRAVENOUS
Status: COMPLETED | OUTPATIENT
Start: 2023-07-28 | End: 2023-07-28

## 2023-07-28 RX ORDER — POTASSIUM CHLORIDE 7.45 MG/ML
10 INJECTION INTRAVENOUS
Status: COMPLETED | OUTPATIENT
Start: 2023-07-28 | End: 2023-07-29

## 2023-07-28 RX ORDER — MAGNESIUM SULFATE IN WATER 40 MG/ML
2000 INJECTION, SOLUTION INTRAVENOUS
Status: COMPLETED | OUTPATIENT
Start: 2023-07-28 | End: 2023-07-28

## 2023-07-28 RX ADMIN — POTASSIUM CHLORIDE 40 MEQ: 1500 TABLET, EXTENDED RELEASE ORAL at 21:52

## 2023-07-28 RX ADMIN — IOPAMIDOL 100 ML: 755 INJECTION, SOLUTION INTRAVENOUS at 22:16

## 2023-07-28 RX ADMIN — POTASSIUM CHLORIDE 10 MEQ: 7.45 INJECTION INTRAVENOUS at 22:42

## 2023-07-28 RX ADMIN — MAGNESIUM SULFATE HEPTAHYDRATE 2000 MG: 40 INJECTION, SOLUTION INTRAVENOUS at 21:52

## 2023-07-28 RX ADMIN — MORPHINE SULFATE 4 MG: 4 INJECTION, SOLUTION INTRAMUSCULAR; INTRAVENOUS at 22:32

## 2023-07-28 ASSESSMENT — PAIN DESCRIPTION - LOCATION: LOCATION: ABDOMEN

## 2023-07-28 ASSESSMENT — PAIN SCALES - GENERAL: PAINLEVEL_OUTOF10: 8

## 2023-07-28 ASSESSMENT — LIFESTYLE VARIABLES
HOW MANY STANDARD DRINKS CONTAINING ALCOHOL DO YOU HAVE ON A TYPICAL DAY: PATIENT DOES NOT DRINK
HOW OFTEN DO YOU HAVE A DRINK CONTAINING ALCOHOL: NEVER

## 2023-07-28 ASSESSMENT — PAIN - FUNCTIONAL ASSESSMENT: PAIN_FUNCTIONAL_ASSESSMENT: NONE - DENIES PAIN

## 2023-07-29 ENCOUNTER — APPOINTMENT (OUTPATIENT)
Facility: HOSPITAL | Age: 53
DRG: 425 | End: 2023-07-29
Attending: INTERNAL MEDICINE
Payer: COMMERCIAL

## 2023-07-29 PROBLEM — E87.6 HYPOKALEMIA: Status: ACTIVE | Noted: 2023-07-29

## 2023-07-29 LAB
ALBUMIN SERPL-MCNC: 3.4 G/DL (ref 3.5–5)
ANION GAP SERPL CALC-SCNC: 3 MMOL/L (ref 5–15)
ANION GAP SERPL CALC-SCNC: 5 MMOL/L (ref 5–15)
BNP SERPL-MCNC: 143 PG/ML
BUN SERPL-MCNC: 5 MG/DL (ref 6–20)
BUN SERPL-MCNC: 7 MG/DL (ref 6–20)
BUN/CREAT SERPL: 10 (ref 12–20)
BUN/CREAT SERPL: 13 (ref 12–20)
CA-I BLD-MCNC: 8.5 MG/DL (ref 8.5–10.1)
CA-I BLD-MCNC: 8.8 MG/DL (ref 8.5–10.1)
CHLORIDE SERPL-SCNC: 100 MMOL/L (ref 97–108)
CHLORIDE SERPL-SCNC: 101 MMOL/L (ref 97–108)
CO2 SERPL-SCNC: 36 MMOL/L (ref 21–32)
CO2 SERPL-SCNC: 40 MMOL/L (ref 21–32)
CREAT SERPL-MCNC: 0.5 MG/DL (ref 0.55–1.02)
CREAT SERPL-MCNC: 0.54 MG/DL (ref 0.55–1.02)
EKG ATRIAL RATE: 81 BPM
EKG DIAGNOSIS: NORMAL
EKG P AXIS: 66 DEGREES
EKG P-R INTERVAL: 150 MS
EKG Q-T INTERVAL: 328 MS
EKG QRS DURATION: 100 MS
EKG QTC CALCULATION (BAZETT): 381 MS
EKG R AXIS: 22 DEGREES
EKG T AXIS: -78 DEGREES
EKG VENTRICULAR RATE: 81 BPM
GLUCOSE SERPL-MCNC: 88 MG/DL (ref 65–100)
GLUCOSE SERPL-MCNC: 94 MG/DL (ref 65–100)
MAGNESIUM SERPL-MCNC: 2.4 MG/DL (ref 1.6–2.4)
PHOSPHATE SERPL-MCNC: 2.8 MG/DL (ref 2.6–4.7)
POTASSIUM SERPL-SCNC: 2 MMOL/L (ref 3.5–5.1)
POTASSIUM SERPL-SCNC: 2 MMOL/L (ref 3.5–5.1)
POTASSIUM SERPL-SCNC: 2.3 MMOL/L (ref 3.5–5.1)
POTASSIUM SERPL-SCNC: 2.6 MMOL/L (ref 3.5–5.1)
POTASSIUM UR-SCNC: 4 MMOL/L
SODIUM SERPL-SCNC: 142 MMOL/L (ref 136–145)
SODIUM SERPL-SCNC: 143 MMOL/L (ref 136–145)

## 2023-07-29 PROCEDURE — 80069 RENAL FUNCTION PANEL: CPT

## 2023-07-29 PROCEDURE — 6370000000 HC RX 637 (ALT 250 FOR IP): Performed by: INTERNAL MEDICINE

## 2023-07-29 PROCEDURE — 80048 BASIC METABOLIC PNL TOTAL CA: CPT

## 2023-07-29 PROCEDURE — 2000000000 HC ICU R&B

## 2023-07-29 PROCEDURE — 2500000003 HC RX 250 WO HCPCS: Performed by: INTERNAL MEDICINE

## 2023-07-29 PROCEDURE — 84132 ASSAY OF SERUM POTASSIUM: CPT

## 2023-07-29 PROCEDURE — 84133 ASSAY OF URINE POTASSIUM: CPT

## 2023-07-29 PROCEDURE — 6360000002 HC RX W HCPCS: Performed by: INTERNAL MEDICINE

## 2023-07-29 PROCEDURE — 2580000003 HC RX 258: Performed by: INTERNAL MEDICINE

## 2023-07-29 PROCEDURE — 83735 ASSAY OF MAGNESIUM: CPT

## 2023-07-29 PROCEDURE — 93308 TTE F-UP OR LMTD: CPT

## 2023-07-29 PROCEDURE — 6360000002 HC RX W HCPCS: Performed by: EMERGENCY MEDICINE

## 2023-07-29 PROCEDURE — 6370000000 HC RX 637 (ALT 250 FOR IP): Performed by: HOSPITALIST

## 2023-07-29 PROCEDURE — 36415 COLL VENOUS BLD VENIPUNCTURE: CPT

## 2023-07-29 RX ORDER — ONDANSETRON 2 MG/ML
4 INJECTION INTRAMUSCULAR; INTRAVENOUS EVERY 6 HOURS PRN
Status: DISCONTINUED | OUTPATIENT
Start: 2023-07-29 | End: 2023-08-03 | Stop reason: HOSPADM

## 2023-07-29 RX ORDER — ACETAMINOPHEN 325 MG/1
650 TABLET ORAL EVERY 6 HOURS PRN
Status: DISCONTINUED | OUTPATIENT
Start: 2023-07-29 | End: 2023-08-03 | Stop reason: HOSPADM

## 2023-07-29 RX ORDER — ENEMA 19; 7 G/133ML; G/133ML
1 ENEMA RECTAL 2 TIMES DAILY PRN
Status: DISPENSED | OUTPATIENT
Start: 2023-07-29 | End: 2023-08-01

## 2023-07-29 RX ORDER — SODIUM CHLORIDE 0.9 % (FLUSH) 0.9 %
5-40 SYRINGE (ML) INJECTION EVERY 12 HOURS SCHEDULED
Status: DISCONTINUED | OUTPATIENT
Start: 2023-07-29 | End: 2023-08-03 | Stop reason: HOSPADM

## 2023-07-29 RX ORDER — DEXTROSE MONOHYDRATE, SODIUM CHLORIDE, AND POTASSIUM CHLORIDE 50; 2.98; 4.5 G/1000ML; G/1000ML; G/1000ML
INJECTION, SOLUTION INTRAVENOUS CONTINUOUS
Status: DISCONTINUED | OUTPATIENT
Start: 2023-07-29 | End: 2023-08-02

## 2023-07-29 RX ORDER — ONDANSETRON 4 MG/1
4 TABLET, ORALLY DISINTEGRATING ORAL EVERY 8 HOURS PRN
Status: DISCONTINUED | OUTPATIENT
Start: 2023-07-29 | End: 2023-08-03 | Stop reason: HOSPADM

## 2023-07-29 RX ORDER — BISACODYL 10 MG
10 SUPPOSITORY, RECTAL RECTAL ONCE
Status: COMPLETED | OUTPATIENT
Start: 2023-07-29 | End: 2023-07-29

## 2023-07-29 RX ORDER — SODIUM CHLORIDE 9 MG/ML
INJECTION, SOLUTION INTRAVENOUS PRN
Status: DISCONTINUED | OUTPATIENT
Start: 2023-07-29 | End: 2023-08-03 | Stop reason: HOSPADM

## 2023-07-29 RX ORDER — OXYCODONE HYDROCHLORIDE AND ACETAMINOPHEN 5; 325 MG/1; MG/1
1 TABLET ORAL EVERY 8 HOURS PRN
Status: DISCONTINUED | OUTPATIENT
Start: 2023-07-29 | End: 2023-08-03 | Stop reason: HOSPADM

## 2023-07-29 RX ORDER — POTASSIUM CHLORIDE 1.5 G/1.58G
40 POWDER, FOR SOLUTION ORAL ONCE
Status: COMPLETED | OUTPATIENT
Start: 2023-07-29 | End: 2023-07-29

## 2023-07-29 RX ORDER — POTASSIUM CHLORIDE 7.45 MG/ML
10 INJECTION INTRAVENOUS
Status: COMPLETED | OUTPATIENT
Start: 2023-07-29 | End: 2023-07-29

## 2023-07-29 RX ORDER — FERROUS SULFATE 325(65) MG
325 TABLET ORAL 2 TIMES DAILY WITH MEALS
Status: DISCONTINUED | OUTPATIENT
Start: 2023-07-29 | End: 2023-08-03 | Stop reason: HOSPADM

## 2023-07-29 RX ORDER — AMLODIPINE BESYLATE 5 MG/1
10 TABLET ORAL DAILY
Status: DISCONTINUED | OUTPATIENT
Start: 2023-07-29 | End: 2023-08-01

## 2023-07-29 RX ORDER — ENOXAPARIN SODIUM 100 MG/ML
40 INJECTION SUBCUTANEOUS DAILY
Status: DISCONTINUED | OUTPATIENT
Start: 2023-07-29 | End: 2023-08-03 | Stop reason: HOSPADM

## 2023-07-29 RX ORDER — SODIUM CHLORIDE 0.9 % (FLUSH) 0.9 %
5-40 SYRINGE (ML) INJECTION PRN
Status: DISCONTINUED | OUTPATIENT
Start: 2023-07-29 | End: 2023-08-03 | Stop reason: HOSPADM

## 2023-07-29 RX ORDER — POLYETHYLENE GLYCOL 3350 17 G/17G
17 POWDER, FOR SOLUTION ORAL DAILY PRN
Status: DISCONTINUED | OUTPATIENT
Start: 2023-07-29 | End: 2023-08-03 | Stop reason: HOSPADM

## 2023-07-29 RX ORDER — CHOLESTYRAMINE LIGHT 4 G/5.7G
2 POWDER, FOR SUSPENSION ORAL 3 TIMES DAILY
Status: DISCONTINUED | OUTPATIENT
Start: 2023-07-29 | End: 2023-08-03 | Stop reason: HOSPADM

## 2023-07-29 RX ORDER — DULOXETIN HYDROCHLORIDE 30 MG/1
60 CAPSULE, DELAYED RELEASE ORAL DAILY
Status: DISCONTINUED | OUTPATIENT
Start: 2023-07-29 | End: 2023-08-03 | Stop reason: HOSPADM

## 2023-07-29 RX ORDER — METOPROLOL SUCCINATE 50 MG/1
50 TABLET, EXTENDED RELEASE ORAL DAILY
Status: DISCONTINUED | OUTPATIENT
Start: 2023-07-29 | End: 2023-08-03

## 2023-07-29 RX ORDER — POTASSIUM CHLORIDE 20 MEQ/1
20 TABLET, EXTENDED RELEASE ORAL EVERY 6 HOURS
Status: DISCONTINUED | OUTPATIENT
Start: 2023-07-29 | End: 2023-07-30

## 2023-07-29 RX ORDER — POTASSIUM CHLORIDE 20 MEQ/1
20 TABLET, EXTENDED RELEASE ORAL 2 TIMES DAILY
Status: DISCONTINUED | OUTPATIENT
Start: 2023-07-29 | End: 2023-07-29

## 2023-07-29 RX ORDER — ACETAMINOPHEN 650 MG/1
650 SUPPOSITORY RECTAL EVERY 6 HOURS PRN
Status: DISCONTINUED | OUTPATIENT
Start: 2023-07-29 | End: 2023-08-03 | Stop reason: HOSPADM

## 2023-07-29 RX ORDER — SPIRONOLACTONE 25 MG/1
25 TABLET ORAL 2 TIMES DAILY
Status: DISCONTINUED | OUTPATIENT
Start: 2023-07-29 | End: 2023-07-31

## 2023-07-29 RX ORDER — ATORVASTATIN CALCIUM 10 MG/1
10 TABLET, FILM COATED ORAL NIGHTLY
Status: DISCONTINUED | OUTPATIENT
Start: 2023-07-29 | End: 2023-08-03 | Stop reason: HOSPADM

## 2023-07-29 RX ORDER — CETIRIZINE HYDROCHLORIDE 10 MG/1
10 TABLET ORAL DAILY
Status: DISCONTINUED | OUTPATIENT
Start: 2023-07-29 | End: 2023-08-03 | Stop reason: HOSPADM

## 2023-07-29 RX ORDER — FOLIC ACID 1 MG/1
1 TABLET ORAL DAILY
Status: DISCONTINUED | OUTPATIENT
Start: 2023-07-29 | End: 2023-08-03 | Stop reason: HOSPADM

## 2023-07-29 RX ADMIN — POTASSIUM CHLORIDE 10 MEQ: 7.45 INJECTION INTRAVENOUS at 10:49

## 2023-07-29 RX ADMIN — OXYCODONE AND ACETAMINOPHEN 1 TABLET: 5; 325 TABLET ORAL at 13:06

## 2023-07-29 RX ADMIN — POTASSIUM CHLORIDE 20 MEQ: 1500 TABLET, EXTENDED RELEASE ORAL at 14:28

## 2023-07-29 RX ADMIN — POTASSIUM CHLORIDE 10 MEQ: 7.45 INJECTION INTRAVENOUS at 11:54

## 2023-07-29 RX ADMIN — DULOXETINE 60 MG: 30 CAPSULE, DELAYED RELEASE ORAL at 09:37

## 2023-07-29 RX ADMIN — OXYCODONE AND ACETAMINOPHEN 1 TABLET: 5; 325 TABLET ORAL at 20:17

## 2023-07-29 RX ADMIN — SODIUM CHLORIDE, PRESERVATIVE FREE 10 ML: 5 INJECTION INTRAVENOUS at 09:37

## 2023-07-29 RX ADMIN — POTASSIUM CHLORIDE 10 MEQ: 7.45 INJECTION INTRAVENOUS at 02:29

## 2023-07-29 RX ADMIN — AMLODIPINE BESYLATE 10 MG: 5 TABLET ORAL at 09:37

## 2023-07-29 RX ADMIN — FERROUS SULFATE TAB 325 MG (65 MG ELEMENTAL FE) 325 MG: 325 (65 FE) TAB at 17:31

## 2023-07-29 RX ADMIN — POTASSIUM CHLORIDE 10 MEQ: 7.45 INJECTION INTRAVENOUS at 15:18

## 2023-07-29 RX ADMIN — CHOLESTYRAMINE 2 G: 4 POWDER, FOR SUSPENSION ORAL at 13:07

## 2023-07-29 RX ADMIN — CHOLESTYRAMINE 2 G: 4 POWDER, FOR SUSPENSION ORAL at 09:35

## 2023-07-29 RX ADMIN — SPIRONOLACTONE 25 MG: 25 TABLET ORAL at 14:28

## 2023-07-29 RX ADMIN — FOLIC ACID 1 MG: 1 TABLET ORAL at 09:37

## 2023-07-29 RX ADMIN — ACETAMINOPHEN 650 MG: 325 TABLET ORAL at 18:40

## 2023-07-29 RX ADMIN — POTASSIUM CHLORIDE 10 MEQ: 7.45 INJECTION INTRAVENOUS at 20:03

## 2023-07-29 RX ADMIN — ENOXAPARIN SODIUM 40 MG: 100 INJECTION SUBCUTANEOUS at 09:35

## 2023-07-29 RX ADMIN — POTASSIUM CHLORIDE 10 MEQ: 7.45 INJECTION INTRAVENOUS at 16:36

## 2023-07-29 RX ADMIN — POTASSIUM CHLORIDE 10 MEQ: 7.45 INJECTION INTRAVENOUS at 05:52

## 2023-07-29 RX ADMIN — CETIRIZINE HYDROCHLORIDE 10 MG: 10 TABLET, FILM COATED ORAL at 09:37

## 2023-07-29 RX ADMIN — POTASSIUM CHLORIDE 10 MEQ: 7.45 INJECTION INTRAVENOUS at 17:33

## 2023-07-29 RX ADMIN — BISACODYL 10 MG: 10 SUPPOSITORY RECTAL at 09:28

## 2023-07-29 RX ADMIN — ATORVASTATIN CALCIUM 10 MG: 10 TABLET, FILM COATED ORAL at 21:04

## 2023-07-29 RX ADMIN — POTASSIUM CHLORIDE 10 MEQ: 7.45 INJECTION INTRAVENOUS at 07:48

## 2023-07-29 RX ADMIN — POTASSIUM CHLORIDE 20 MEQ: 1500 TABLET, EXTENDED RELEASE ORAL at 20:18

## 2023-07-29 RX ADMIN — FERROUS SULFATE TAB 325 MG (65 MG ELEMENTAL FE) 325 MG: 325 (65 FE) TAB at 09:38

## 2023-07-29 RX ADMIN — POTASSIUM CHLORIDE 40 MEQ: 1.5 FOR SOLUTION ORAL at 22:58

## 2023-07-29 RX ADMIN — POTASSIUM CHLORIDE 20 MEQ: 1500 TABLET, EXTENDED RELEASE ORAL at 09:37

## 2023-07-29 RX ADMIN — POTASSIUM CHLORIDE, DEXTROSE MONOHYDRATE AND SODIUM CHLORIDE: 300; 5; 450 INJECTION, SOLUTION INTRAVENOUS at 18:42

## 2023-07-29 RX ADMIN — POTASSIUM CHLORIDE 10 MEQ: 7.45 INJECTION INTRAVENOUS at 09:34

## 2023-07-29 RX ADMIN — SODIUM CHLORIDE, PRESERVATIVE FREE 10 ML: 5 INJECTION INTRAVENOUS at 20:18

## 2023-07-29 RX ADMIN — CHOLESTYRAMINE 2 G: 4 POWDER, FOR SUSPENSION ORAL at 20:17

## 2023-07-29 RX ADMIN — POTASSIUM CHLORIDE 10 MEQ: 7.45 INJECTION INTRAVENOUS at 14:11

## 2023-07-29 RX ADMIN — POTASSIUM CHLORIDE 10 MEQ: 7.45 INJECTION INTRAVENOUS at 00:13

## 2023-07-29 RX ADMIN — POTASSIUM CHLORIDE 10 MEQ: 7.45 INJECTION INTRAVENOUS at 06:54

## 2023-07-29 RX ADMIN — METOPROLOL SUCCINATE 50 MG: 50 TABLET, EXTENDED RELEASE ORAL at 09:37

## 2023-07-29 RX ADMIN — POTASSIUM CHLORIDE 10 MEQ: 7.45 INJECTION INTRAVENOUS at 01:16

## 2023-07-29 RX ADMIN — POTASSIUM CHLORIDE 10 MEQ: 7.45 INJECTION INTRAVENOUS at 18:39

## 2023-07-29 ASSESSMENT — PAIN DESCRIPTION - LOCATION
LOCATION: ABDOMEN
LOCATION: OTHER (COMMENT)
LOCATION: ABDOMEN

## 2023-07-29 ASSESSMENT — PAIN - FUNCTIONAL ASSESSMENT
PAIN_FUNCTIONAL_ASSESSMENT: ACTIVITIES ARE NOT PREVENTED
PAIN_FUNCTIONAL_ASSESSMENT: ACTIVITIES ARE NOT PREVENTED

## 2023-07-29 ASSESSMENT — PAIN DESCRIPTION - DESCRIPTORS
DESCRIPTORS: ACHING;BURNING
DESCRIPTORS: ACHING
DESCRIPTORS: CRAMPING

## 2023-07-29 ASSESSMENT — PAIN SCALES - GENERAL
PAINLEVEL_OUTOF10: 10
PAINLEVEL_OUTOF10: 3
PAINLEVEL_OUTOF10: 3

## 2023-07-29 ASSESSMENT — PAIN DESCRIPTION - ORIENTATION: ORIENTATION: POSTERIOR

## 2023-07-29 NOTE — CARE COORDINATION
07/29/23 1609   Service Assessment   Patient Orientation Alert and Oriented   Cognition Alert   History Provided By Patient   Primary Caregiver Self   Support Systems Family Members   PCP Verified by CM Yes   Prior Functional Level Assistance with the following:;Bathing;Dressing   Current Functional Level Assistance with the following:;Bathing;Dressing   Can patient return to prior living arrangement Yes   Ability to make needs known: Good   Family able to assist with home care needs: Yes   Would you like for me to discuss the discharge plan with any other family members/significant others, and if so, who? Yes   Financial Resources  Resources None   Social/Functional History   Type of Home   (SNF)   Home Layout One level   Home Access Level entry   ADL Assistance Independent   Homemaking Assistance Independent   Ambulation Assistance Independent   Transfer Assistance Independent   Active  No   Discharge Planning   Type of [address] [facility]   Patient expects to be discharged to: [facility]   [address] Provided? No   Mode of Transport at Discharge ALS   Confirm Follow Up Transport Family   Condition of Participation: Discharge Planning   Freedom of Choice list was provided with basic dialogue that supports the patient's individualized plan of care/goals, treatment preferences, and shares the quality data associated with the providers? No     Patient will return to [address] [place] [address] to complete SNF stay. She will require medical transport. Facility assist with ADL/IADL care. Demographics verified.

## 2023-07-29 NOTE — ED PROVIDER NOTES
50 mg  50 mg Oral Daily Franco Desir MD   50 mg at 07/29/23 0937    amLODIPine (NORVASC) tablet 10 mg  10 mg Oral Daily Franco Desir MD   10 mg at 07/29/23 5166    atorvastatin (LIPITOR) tablet 10 mg  10 mg Oral Nightly Franco Desir MD        cetirizine (ZYRTEC) tablet 10 mg  10 mg Oral Daily Franco Desir MD   10 mg at 07/29/23 2252    cholestyramine light packet 2 g  2 g Oral TID Franco Desir MD   2 g at 07/29/23 1307    dextrose 5 % and 0.45 % NaCl with KCl 40 mEq infusion   IntraVENous Continuous Nasir Mayen MD 50 mL/hr at 07/29/23 1842 New Bag at 07/29/23 1842    oxyCODONE-acetaminophen (PERCOCET) 5-325 MG per tablet 1 tablet  1 tablet Oral Q8H PRN Yuan Belcher MD   1 tablet at 07/29/23 1306    potassium chloride 10 mEq/100 mL IVPB (Peripheral Line)  10 mEq IntraVENous Q1H Nasir Mayen  mL/hr at 07/29/23 1839 10 mEq at 07/29/23 1839    spironolactone (ALDACTONE) tablet 25 mg  25 mg Oral BID Nasir Mayen MD   25 mg at 07/29/23 1428    potassium chloride (KLOR-CON M) extended release tablet 20 mEq  20 mEq Oral Q6H Nasir Mayen MD   20 mEq at 07/29/23 1428    sodium phosphate (FLEET) rectal enema 1 enema  1 enema Rectal BID PRN Yuan Belcher MD           Social Determinants of Health:  Social Determinants of Health     Tobacco Use: High Risk    Smoking Tobacco Use: Every Day    Smokeless Tobacco Use: Never    Passive Exposure: Not on file   Alcohol Use: Not At Risk    Frequency of Alcohol Consumption: Never    Average Number of Drinks: Patient does not drink    Frequency of Binge Drinking: Never   Financial Resource Strain: Not on file   Food Insecurity: Not on file   Transportation Needs: Not on file   Physical Activity: Not on file   Stress: Not on file   Social Connections: Not on file   Intimate Partner Violence: Not on file   Depression: Not on file   Housing Stability: Not on file           Physical Exam     Physical Exam  Vitals and nursing note reviewed.    Constitutional:

## 2023-07-29 NOTE — ED TRIAGE NOTES
Pt brought in by EMS from [address], [address]. Per EMS they were called by staff as pt blood work showed potassium of 1.7. Reports diarrhea the last several days and abd pain.

## 2023-07-29 NOTE — ED NOTES
Pt taken upstairs by KAMLESH Rodriguez and ICU RN [place].       [facility]  07/29/23 6041

## 2023-07-29 NOTE — H&P
History and Physical    Patient: Leann Juan MRN: [id number]  SSN: [id number]    YOB: 1970  Age: 46 y.o. Sex: female      Subjective:      Leann Juan is a 46 y.o. female with PMH of CAD, hypertension, PAD, chronic diarrhea, and other medical problems as below. Presented to the ED with chief complaint of abnormal lab. Patient was brought in from [address] [place] [address], [address], after found to have hypokalemia. Potassium of 1.7. She reports generalized fatigue for the past week. Also noticed that for the past month,her abdomen has been more distended. She also complain of shortness of breath, associated with orthopnea and generalized swelling. Addition, she also reports having liquid diarrhea for the past 1 year with occasional blood in stool. However, in the past 1 week, she developed generalized abdominal pain, rated 8/10 and associated with diaphoresis. In the ED, vitals within normal limits. Lab showed showed hypokalemia, K 2.3.  oral and IV potassium replacement ordered, however repeat potassium remained low. CT abdomen showed anasarca and gaseous dilatation of the colon. Case discussed with ED physician and agree that patient require inpatient admission/ observation for further management. Chart review: none     Past Medical History:   Diagnosis Date    Anxiety     CAD (coronary artery disease)     Fibromyalgia     Hypertension     Neuropathy     Peripheral artery disease ([address])     Psychiatric disorder     anxiety     No family history on file. Social History     Tobacco Use    Smoking status: Every Day     Packs/day: 0.50     Types: Cigarettes    Smokeless tobacco: Never   Substance Use Topics    Alcohol use: Yes        Objective:     Physical Exam:   General: alert, cooperative,moderate distress  Eye: conjunctivae/corneas clear. PERRL, EOM's intact. Throat and Neck: normal and no erythema or exudates noted.  No mass   Lung: clear to auscultation bilaterally  Heart: regular rate and rhythm,

## 2023-07-29 NOTE — ED NOTES
TRANSFER - OUT REPORT:    Verbal report given to Roshan Jones RN on Dante Mann  being transferred to CVICU for routine progression of patient care       Report consisted of patient's Situation, Background, Assessment and   Recommendations(SBAR). Information from the following report(s) ED Encounter Summary, ED SBAR, and MAR was reviewed with the receiving nurse. Dresser Fall Assessment:    Presents to emergency department  because of falls (Syncope, seizure, or loss of consciousness): No  Age > 70: No  Altered Mental Status, Intoxication with alcohol or substance confusion (Disorientation, impaired judgment, poor safety awaremess, or inability to follow instructions): No  Impaired Mobility: Ambulates or transfers with assistive devices or assistance; Unable to ambulate or transer.: No  Nursing Judgement: No          Lines:   Peripheral IV 07/28/23 Distal;Left;Posterior Forearm (Active)       Peripheral IV 07/28/23 Right Antecubital (Active)   Site Assessment Clean, dry & intact 07/28/23 2241   Line Status Blood return noted 07/28/23 2241   Phlebitis Assessment No symptoms 07/28/23 2241   Infiltration Assessment 0 07/28/23 2241        Opportunity for questions and clarification was provided.       Patient transported with:  Monitor, Registered Nurse, and Tech           Charlotte Ortiz RN  07/29/23 9094

## 2023-07-30 ENCOUNTER — APPOINTMENT (OUTPATIENT)
Facility: HOSPITAL | Age: 53
DRG: 425 | End: 2023-07-30
Payer: COMMERCIAL

## 2023-07-30 LAB
ALBUMIN SERPL-MCNC: 3.5 G/DL (ref 3.5–5)
ANION GAP SERPL CALC-SCNC: 6 MMOL/L (ref 5–15)
ANION GAP SERPL CALC-SCNC: 9 MMOL/L (ref 5–15)
BASOPHILS # BLD: 0 K/UL (ref 0–0.1)
BASOPHILS NFR BLD: 0 % (ref 0–1)
BUN SERPL-MCNC: 3 MG/DL (ref 6–20)
BUN SERPL-MCNC: 4 MG/DL (ref 6–20)
BUN/CREAT SERPL: 6 (ref 12–20)
BUN/CREAT SERPL: 8 (ref 12–20)
C COLI+JEJUNI TUF STL QL NAA+PROBE: NEGATIVE
C DIFF GDH STL QL: NEGATIVE
C DIFF TOX A+B STL QL IA: NEGATIVE
C DIFF TOXIN INTERPRETATION: NORMAL
CA-I BLD-MCNC: 8.9 MG/DL (ref 8.5–10.1)
CA-I BLD-MCNC: 8.9 MG/DL (ref 8.5–10.1)
CHLORIDE SERPL-SCNC: 101 MMOL/L (ref 97–108)
CHLORIDE SERPL-SCNC: 104 MMOL/L (ref 97–108)
CO2 SERPL-SCNC: 31 MMOL/L (ref 21–32)
CO2 SERPL-SCNC: 36 MMOL/L (ref 21–32)
CREAT SERPL-MCNC: 0.47 MG/DL (ref 0.55–1.02)
CREAT SERPL-MCNC: 0.51 MG/DL (ref 0.55–1.02)
DIFFERENTIAL METHOD BLD: ABNORMAL
EC STX1+STX2 GENES STL QL NAA+PROBE: NEGATIVE
ECHO AV AREA PEAK VELOCITY: 1.9 CM2
ECHO AV AREA VTI: 1.9 CM2
ECHO AV AREA/BSA PEAK VELOCITY: 0.9 CM2/M2
ECHO AV AREA/BSA VTI: 0.9 CM2/M2
ECHO AV MEAN GRADIENT: 5 MMHG
ECHO AV MEAN VELOCITY: 1.1 M/S
ECHO AV PEAK GRADIENT: 9 MMHG
ECHO AV PEAK VELOCITY: 1.5 M/S
ECHO AV VELOCITY RATIO: 0.6
ECHO AV VTI: 28.3 CM
ECHO BSA: 2.13 M2
ECHO LV INTERNAL DIMENSION DIASTOLE INDEX: 2.65 CM/M2
ECHO LV INTERNAL DIMENSION DIASTOLIC: 5.6 CM (ref 3.9–5.3)
ECHO LV IVSD: 1.2 CM (ref 0.6–0.9)
ECHO LV MASS 2D: 280.5 G (ref 67–162)
ECHO LV MASS INDEX 2D: 132.9 G/M2 (ref 43–95)
ECHO LV POSTERIOR WALL DIASTOLIC: 1.2 CM (ref 0.6–0.9)
ECHO LV RELATIVE WALL THICKNESS RATIO: 0.43
ECHO LVOT AREA: 3.1 CM2
ECHO LVOT AV VTI INDEX: 0.6
ECHO LVOT DIAM: 2 CM
ECHO LVOT MEAN GRADIENT: 2 MMHG
ECHO LVOT PEAK GRADIENT: 4 MMHG
ECHO LVOT PEAK VELOCITY: 0.9 M/S
ECHO LVOT STROKE VOLUME INDEX: 25.3 ML/M2
ECHO LVOT SV: 53.4 ML
ECHO LVOT VTI: 17 CM
ECHO MV A VELOCITY: 0.8 M/S
ECHO MV E VELOCITY: 0.71 M/S
ECHO MV E/A RATIO: 0.89
ECHO MV REGURGITANT PEAK GRADIENT: 6 MMHG
ECHO MV REGURGITANT PEAK VELOCITY: 1.2 M/S
ECHO TV REGURGITANT MAX VELOCITY: 0.79 M/S
ECHO TV REGURGITANT PEAK GRADIENT: 2 MMHG
EOSINOPHIL # BLD: 0 K/UL (ref 0–0.4)
EOSINOPHIL NFR BLD: 0 % (ref 0–7)
ERYTHROCYTE [DISTWIDTH] IN BLOOD BY AUTOMATED COUNT: 13.1 % (ref 11.5–14.5)
ETEC ELTA+ESTB GENES STL QL NAA+PROBE: NEGATIVE
GLUCOSE SERPL-MCNC: 91 MG/DL (ref 65–100)
GLUCOSE SERPL-MCNC: 97 MG/DL (ref 65–100)
HCT VFR BLD AUTO: 33.9 % (ref 35–47)
HGB BLD-MCNC: 11.1 G/DL (ref 11.5–16)
IMM GRANULOCYTES # BLD AUTO: 0 K/UL (ref 0–0.04)
IMM GRANULOCYTES NFR BLD AUTO: 0 % (ref 0–0.5)
LYMPHOCYTES # BLD: 1.9 K/UL (ref 0.8–3.5)
LYMPHOCYTES NFR BLD: 30 % (ref 12–49)
MAGNESIUM SERPL-MCNC: 2.3 MG/DL (ref 1.6–2.4)
MCH RBC QN AUTO: 28.8 PG (ref 26–34)
MCHC RBC AUTO-ENTMCNC: 32.7 G/DL (ref 30–36.5)
MCV RBC AUTO: 88.1 FL (ref 80–99)
MONOCYTES # BLD: 0.6 K/UL (ref 0–1)
MONOCYTES NFR BLD: 10 % (ref 5–13)
NEUTS SEG # BLD: 3.8 K/UL (ref 1.8–8)
NEUTS SEG NFR BLD: 60 % (ref 32–75)
NRBC # BLD: 0 K/UL (ref 0–0.01)
NRBC BLD-RTO: 0 PER 100 WBC
P SHIGELLOIDES DNA STL QL NAA+PROBE: NEGATIVE
PHOSPHATE SERPL-MCNC: 2.6 MG/DL (ref 2.6–4.7)
PLATELET # BLD AUTO: 223 K/UL (ref 150–400)
PMV BLD AUTO: 9.8 FL (ref 8.9–12.9)
POTASSIUM SERPL-SCNC: 2.6 MMOL/L (ref 3.5–5.1)
POTASSIUM SERPL-SCNC: 3 MMOL/L (ref 3.5–5.1)
RBC # BLD AUTO: 3.85 M/UL (ref 3.8–5.2)
SALMONELLA SP SPAO STL QL NAA+PROBE: NEGATIVE
SHIGELLA SP+EIEC IPAH STL QL NAA+PROBE: NEGATIVE
SODIUM SERPL-SCNC: 143 MMOL/L (ref 136–145)
SODIUM SERPL-SCNC: 144 MMOL/L (ref 136–145)
V CHOL+PARA+VUL DNA STL QL NAA+NON-PROBE: NEGATIVE
WBC # BLD AUTO: 6.3 K/UL (ref 3.6–11)
Y ENTEROCOL DNA STL QL NAA+NON-PROBE: NEGATIVE

## 2023-07-30 PROCEDURE — 94761 N-INVAS EAR/PLS OXIMETRY MLT: CPT

## 2023-07-30 PROCEDURE — 2500000003 HC RX 250 WO HCPCS: Performed by: INTERNAL MEDICINE

## 2023-07-30 PROCEDURE — 80048 BASIC METABOLIC PNL TOTAL CA: CPT

## 2023-07-30 PROCEDURE — 2580000003 HC RX 258: Performed by: INTERNAL MEDICINE

## 2023-07-30 PROCEDURE — 80069 RENAL FUNCTION PANEL: CPT

## 2023-07-30 PROCEDURE — 02HV33Z INSERTION OF INFUSION DEVICE INTO SUPERIOR VENA CAVA, PERCUTANEOUS APPROACH: ICD-10-PCS | Performed by: HOSPITALIST

## 2023-07-30 PROCEDURE — 6360000002 HC RX W HCPCS: Performed by: INTERNAL MEDICINE

## 2023-07-30 PROCEDURE — 6370000000 HC RX 637 (ALT 250 FOR IP): Performed by: INTERNAL MEDICINE

## 2023-07-30 PROCEDURE — 6370000000 HC RX 637 (ALT 250 FOR IP): Performed by: HOSPITALIST

## 2023-07-30 PROCEDURE — 2000000000 HC ICU R&B

## 2023-07-30 PROCEDURE — 87506 IADNA-DNA/RNA PROBE TQ 6-11: CPT

## 2023-07-30 PROCEDURE — 87449 NOS EACH ORGANISM AG IA: CPT

## 2023-07-30 PROCEDURE — 74018 RADEX ABDOMEN 1 VIEW: CPT

## 2023-07-30 PROCEDURE — 36415 COLL VENOUS BLD VENIPUNCTURE: CPT

## 2023-07-30 PROCEDURE — 87324 CLOSTRIDIUM AG IA: CPT

## 2023-07-30 PROCEDURE — 83735 ASSAY OF MAGNESIUM: CPT

## 2023-07-30 PROCEDURE — 85025 COMPLETE CBC W/AUTO DIFF WBC: CPT

## 2023-07-30 RX ORDER — POTASSIUM CHLORIDE 7.45 MG/ML
10 INJECTION INTRAVENOUS
Status: COMPLETED | OUTPATIENT
Start: 2023-07-30 | End: 2023-07-31

## 2023-07-30 RX ORDER — POTASSIUM CHLORIDE 20 MEQ/1
40 TABLET, EXTENDED RELEASE ORAL EVERY 6 HOURS
Status: DISCONTINUED | OUTPATIENT
Start: 2023-07-30 | End: 2023-08-03

## 2023-07-30 RX ORDER — POTASSIUM CHLORIDE 7.45 MG/ML
10 INJECTION INTRAVENOUS
Status: COMPLETED | OUTPATIENT
Start: 2023-07-30 | End: 2023-07-30

## 2023-07-30 RX ADMIN — CHOLESTYRAMINE 2 G: 4 POWDER, FOR SUSPENSION ORAL at 20:43

## 2023-07-30 RX ADMIN — SPIRONOLACTONE 25 MG: 25 TABLET ORAL at 09:09

## 2023-07-30 RX ADMIN — POTASSIUM CHLORIDE, DEXTROSE MONOHYDRATE AND SODIUM CHLORIDE: 300; 5; 450 INJECTION, SOLUTION INTRAVENOUS at 06:28

## 2023-07-30 RX ADMIN — FERROUS SULFATE TAB 325 MG (65 MG ELEMENTAL FE) 325 MG: 325 (65 FE) TAB at 09:25

## 2023-07-30 RX ADMIN — POTASSIUM CHLORIDE 10 MEQ: 7.46 INJECTION, SOLUTION INTRAVENOUS at 14:52

## 2023-07-30 RX ADMIN — CHOLESTYRAMINE 2 G: 4 POWDER, FOR SUSPENSION ORAL at 09:09

## 2023-07-30 RX ADMIN — POTASSIUM CHLORIDE 10 MEQ: 7.46 INJECTION, SOLUTION INTRAVENOUS at 21:57

## 2023-07-30 RX ADMIN — POTASSIUM CHLORIDE 20 MEQ: 1500 TABLET, EXTENDED RELEASE ORAL at 02:43

## 2023-07-30 RX ADMIN — CHOLESTYRAMINE 2 G: 4 POWDER, FOR SUSPENSION ORAL at 14:52

## 2023-07-30 RX ADMIN — ATORVASTATIN CALCIUM 10 MG: 10 TABLET, FILM COATED ORAL at 20:44

## 2023-07-30 RX ADMIN — SODIUM CHLORIDE, PRESERVATIVE FREE 10 ML: 5 INJECTION INTRAVENOUS at 09:09

## 2023-07-30 RX ADMIN — POTASSIUM CHLORIDE 40 MEQ: 1500 TABLET, EXTENDED RELEASE ORAL at 20:44

## 2023-07-30 RX ADMIN — AMLODIPINE BESYLATE 10 MG: 5 TABLET ORAL at 09:08

## 2023-07-30 RX ADMIN — METOPROLOL SUCCINATE 50 MG: 50 TABLET, EXTENDED RELEASE ORAL at 09:09

## 2023-07-30 RX ADMIN — SODIUM CHLORIDE, PRESERVATIVE FREE 10 ML: 5 INJECTION INTRAVENOUS at 20:48

## 2023-07-30 RX ADMIN — OXYCODONE AND ACETAMINOPHEN 1 TABLET: 5; 325 TABLET ORAL at 18:43

## 2023-07-30 RX ADMIN — POTASSIUM CHLORIDE 10 MEQ: 7.46 INJECTION, SOLUTION INTRAVENOUS at 12:26

## 2023-07-30 RX ADMIN — POTASSIUM CHLORIDE 10 MEQ: 7.46 INJECTION, SOLUTION INTRAVENOUS at 18:44

## 2023-07-30 RX ADMIN — FERROUS SULFATE TAB 325 MG (65 MG ELEMENTAL FE) 325 MG: 325 (65 FE) TAB at 17:55

## 2023-07-30 RX ADMIN — POTASSIUM CHLORIDE 40 MEQ: 1500 TABLET, EXTENDED RELEASE ORAL at 14:52

## 2023-07-30 RX ADMIN — SPIRONOLACTONE 25 MG: 25 TABLET ORAL at 17:55

## 2023-07-30 RX ADMIN — DULOXETINE 60 MG: 30 CAPSULE, DELAYED RELEASE ORAL at 09:08

## 2023-07-30 RX ADMIN — ACETAMINOPHEN 650 MG: 325 TABLET ORAL at 14:52

## 2023-07-30 RX ADMIN — CETIRIZINE HYDROCHLORIDE 10 MG: 10 TABLET, FILM COATED ORAL at 09:08

## 2023-07-30 RX ADMIN — ENOXAPARIN SODIUM 40 MG: 100 INJECTION SUBCUTANEOUS at 09:09

## 2023-07-30 RX ADMIN — ACETAMINOPHEN 650 MG: 325 TABLET ORAL at 20:44

## 2023-07-30 RX ADMIN — POTASSIUM CHLORIDE 10 MEQ: 7.46 INJECTION, SOLUTION INTRAVENOUS at 23:46

## 2023-07-30 RX ADMIN — FOLIC ACID 1 MG: 1 TABLET ORAL at 09:08

## 2023-07-30 RX ADMIN — POTASSIUM CHLORIDE 20 MEQ: 1500 TABLET, EXTENDED RELEASE ORAL at 09:08

## 2023-07-30 RX ADMIN — OXYCODONE AND ACETAMINOPHEN 1 TABLET: 5; 325 TABLET ORAL at 10:46

## 2023-07-30 ASSESSMENT — PAIN SCALES - GENERAL
PAINLEVEL_OUTOF10: 10
PAINLEVEL_OUTOF10: 0
PAINLEVEL_OUTOF10: 3
PAINLEVEL_OUTOF10: 0
PAINLEVEL_OUTOF10: 8
PAINLEVEL_OUTOF10: 8

## 2023-07-30 ASSESSMENT — PAIN DESCRIPTION - LOCATION
LOCATION: HEAD
LOCATION: HEAD

## 2023-07-30 NOTE — PLAN OF CARE
Problem: Discharge Planning  Goal: Discharge to home or other facility with appropriate resources  Outcome: Progressing  Flowsheets (Taken 7/29/2023 2000)  Discharge to home or other facility with appropriate resources:   Identify barriers to discharge with patient and caregiver   Refer to discharge planning if patient needs post-hospital services based on physician order or complex needs related to functional status, cognitive ability or social support system     Problem: Pain  Goal: Verbalizes/displays adequate comfort level or baseline comfort level  7/29/2023 2245 by Macarena Styles RN  Outcome: Progressing  7/29/2023 1138 by Lalit Clement RN  Outcome: Progressing     Problem: Safety - Adult  Goal: Free from fall injury  7/29/2023 2245 by Macarena Styles RN  Outcome: Progressing  [address] (Taken 7/29/2023 2000)  Free From Fall Injury: Based on caregiver fall risk screen, instruct family/caregiver to ask for assistance with transferring infant if caregiver noted to have fall risk factors  7/29/2023 1138 by Lalit Clement RN  Outcome: Progressing     Problem: Skin/Tissue Integrity  Goal: Absence of new skin breakdown  Description: 1. Monitor for areas of redness and/or skin breakdown  2. Assess vascular access sites hourly  3. Every 4-6 hours minimum:  Change oxygen saturation probe site  4. Every 4-6 hours:  If on nasal continuous positive airway pressure, respiratory therapy assess nares and determine need for appliance change or resting period.   7/29/2023 2245 by Macarena Styles RN  Outcome: Progressing  7/29/2023 1138 by Lalti Clement RN  Outcome: Progressing

## 2023-07-30 NOTE — PROCEDURES
Vascular Access Team Consult Note: received consult for PIV placement and ultrasound-guided lab draws, after client declined PICC placement at this time, due to concerns over discharge plans and family obligations per client. Ultrasound-guided (USG) PIV placement: see Epic Avatar and EHR flowsheet date for additional vascular access device and procedural information. 20 g 1 inch PIV placed with ultrasound-guidance x 1 attempt in right anterior mid forearm vein. Brisk blood return noted, labs drawn after waste, and flushed easily with 10 mL NS. Dressed with skin barrier prep wipe and transparent Tegaderm dressing. Needle-free connector and alcohol swab end caps utilized. Client tolerated well, no patient complaints. Client continues to benefit from ultrasound-guided PIV placement due to limited suitable veins for USG cannulation, vein depth, and small vein diameters. Primary care nurse, [place], notified. Please re-enter IP PICC Team Consult in [facility] for any further vascular access needs.      Kiki Herron RN

## 2023-07-31 LAB
ALBUMIN SERPL-MCNC: 3.4 G/DL (ref 3.5–5)
ANION GAP SERPL CALC-SCNC: 5 MMOL/L (ref 5–15)
BUN SERPL-MCNC: 3 MG/DL (ref 6–20)
BUN/CREAT SERPL: 7 (ref 12–20)
CA-I BLD-MCNC: 9 MG/DL (ref 8.5–10.1)
CHLORIDE SERPL-SCNC: 106 MMOL/L (ref 97–108)
CO2 SERPL-SCNC: 33 MMOL/L (ref 21–32)
CREAT SERPL-MCNC: 0.45 MG/DL (ref 0.55–1.02)
GLUCOSE SERPL-MCNC: 101 MG/DL (ref 65–100)
MAGNESIUM SERPL-MCNC: 2 MG/DL (ref 1.6–2.4)
MAGNESIUM SERPL-MCNC: NORMAL MG/DL (ref 1.6–2.4)
PHOSPHATE SERPL-MCNC: 2.5 MG/DL (ref 2.6–4.7)
POTASSIUM SERPL-SCNC: 3.2 MMOL/L (ref 3.5–5.1)
POTASSIUM SERPL-SCNC: ABNORMAL MMOL/L (ref 3.5–5.1)
SODIUM SERPL-SCNC: 144 MMOL/L (ref 136–145)

## 2023-07-31 PROCEDURE — 1100000000 HC RM PRIVATE

## 2023-07-31 PROCEDURE — 6360000002 HC RX W HCPCS: Performed by: HOSPITALIST

## 2023-07-31 PROCEDURE — 6370000000 HC RX 637 (ALT 250 FOR IP): Performed by: HOSPITALIST

## 2023-07-31 PROCEDURE — 94761 N-INVAS EAR/PLS OXIMETRY MLT: CPT

## 2023-07-31 PROCEDURE — 6370000000 HC RX 637 (ALT 250 FOR IP): Performed by: INTERNAL MEDICINE

## 2023-07-31 PROCEDURE — 36415 COLL VENOUS BLD VENIPUNCTURE: CPT

## 2023-07-31 PROCEDURE — 6360000002 HC RX W HCPCS: Performed by: INTERNAL MEDICINE

## 2023-07-31 PROCEDURE — 80069 RENAL FUNCTION PANEL: CPT

## 2023-07-31 PROCEDURE — 83735 ASSAY OF MAGNESIUM: CPT

## 2023-07-31 PROCEDURE — 2500000003 HC RX 250 WO HCPCS: Performed by: INTERNAL MEDICINE

## 2023-07-31 PROCEDURE — 84132 ASSAY OF SERUM POTASSIUM: CPT

## 2023-07-31 PROCEDURE — 2580000003 HC RX 258: Performed by: INTERNAL MEDICINE

## 2023-07-31 RX ORDER — LORAZEPAM 2 MG/ML
0.5 INJECTION INTRAMUSCULAR ONCE
Status: COMPLETED | OUTPATIENT
Start: 2023-07-31 | End: 2023-07-31

## 2023-07-31 RX ORDER — POTASSIUM CHLORIDE 7.45 MG/ML
10 INJECTION INTRAVENOUS
Status: COMPLETED | OUTPATIENT
Start: 2023-07-31 | End: 2023-07-31

## 2023-07-31 RX ORDER — POTASSIUM CHLORIDE 7.45 MG/ML
10 INJECTION INTRAVENOUS
Status: DISCONTINUED | OUTPATIENT
Start: 2023-07-31 | End: 2023-07-31

## 2023-07-31 RX ORDER — SPIRONOLACTONE 25 MG/1
50 TABLET ORAL 2 TIMES DAILY
Status: DISCONTINUED | OUTPATIENT
Start: 2023-07-31 | End: 2023-08-03 | Stop reason: HOSPADM

## 2023-07-31 RX ORDER — HYDRALAZINE HYDROCHLORIDE 20 MG/ML
10 INJECTION INTRAMUSCULAR; INTRAVENOUS EVERY 6 HOURS PRN
Status: DISCONTINUED | OUTPATIENT
Start: 2023-07-31 | End: 2023-08-03 | Stop reason: HOSPADM

## 2023-07-31 RX ORDER — LIDOCAINE HYDROCHLORIDE 20 MG/ML
JELLY TOPICAL ONCE
Status: COMPLETED | OUTPATIENT
Start: 2023-07-31 | End: 2023-07-31

## 2023-07-31 RX ADMIN — FOLIC ACID 1 MG: 1 TABLET ORAL at 09:10

## 2023-07-31 RX ADMIN — HYDRALAZINE HYDROCHLORIDE 10 MG: 20 INJECTION, SOLUTION INTRAMUSCULAR; INTRAVENOUS at 20:13

## 2023-07-31 RX ADMIN — ACETAMINOPHEN 650 MG: 325 TABLET ORAL at 06:09

## 2023-07-31 RX ADMIN — LIDOCAINE HYDROCHLORIDE: 20 JELLY TOPICAL at 11:59

## 2023-07-31 RX ADMIN — POTASSIUM CHLORIDE 40 MEQ: 1500 TABLET, EXTENDED RELEASE ORAL at 20:13

## 2023-07-31 RX ADMIN — CHOLESTYRAMINE 2 G: 4 POWDER, FOR SUSPENSION ORAL at 14:19

## 2023-07-31 RX ADMIN — POTASSIUM CHLORIDE, DEXTROSE MONOHYDRATE AND SODIUM CHLORIDE: 300; 5; 450 INJECTION, SOLUTION INTRAVENOUS at 18:08

## 2023-07-31 RX ADMIN — CHOLESTYRAMINE 2 G: 4 POWDER, FOR SUSPENSION ORAL at 09:10

## 2023-07-31 RX ADMIN — POTASSIUM CHLORIDE 10 MEQ: 7.46 INJECTION, SOLUTION INTRAVENOUS at 12:00

## 2023-07-31 RX ADMIN — CETIRIZINE HYDROCHLORIDE 10 MG: 10 TABLET, FILM COATED ORAL at 09:11

## 2023-07-31 RX ADMIN — SPIRONOLACTONE 50 MG: 25 TABLET ORAL at 18:08

## 2023-07-31 RX ADMIN — METOPROLOL SUCCINATE 50 MG: 50 TABLET, EXTENDED RELEASE ORAL at 09:11

## 2023-07-31 RX ADMIN — LORAZEPAM 0.5 MG: 2 INJECTION INTRAMUSCULAR; INTRAVENOUS at 12:00

## 2023-07-31 RX ADMIN — OXYCODONE AND ACETAMINOPHEN 1 TABLET: 5; 325 TABLET ORAL at 12:42

## 2023-07-31 RX ADMIN — SPIRONOLACTONE 25 MG: 25 TABLET ORAL at 09:09

## 2023-07-31 RX ADMIN — ATORVASTATIN CALCIUM 10 MG: 10 TABLET, FILM COATED ORAL at 20:14

## 2023-07-31 RX ADMIN — POTASSIUM CHLORIDE 10 MEQ: 7.46 INJECTION, SOLUTION INTRAVENOUS at 12:44

## 2023-07-31 RX ADMIN — FERROUS SULFATE TAB 325 MG (65 MG ELEMENTAL FE) 325 MG: 325 (65 FE) TAB at 11:04

## 2023-07-31 RX ADMIN — SODIUM CHLORIDE, PRESERVATIVE FREE 10 ML: 5 INJECTION INTRAVENOUS at 20:16

## 2023-07-31 RX ADMIN — DULOXETINE 60 MG: 30 CAPSULE, DELAYED RELEASE ORAL at 09:09

## 2023-07-31 RX ADMIN — POTASSIUM CHLORIDE 10 MEQ: 7.46 INJECTION, SOLUTION INTRAVENOUS at 14:18

## 2023-07-31 RX ADMIN — OXYCODONE AND ACETAMINOPHEN 1 TABLET: 5; 325 TABLET ORAL at 20:54

## 2023-07-31 RX ADMIN — ENOXAPARIN SODIUM 40 MG: 100 INJECTION SUBCUTANEOUS at 09:10

## 2023-07-31 RX ADMIN — AMLODIPINE BESYLATE 10 MG: 5 TABLET ORAL at 09:09

## 2023-07-31 RX ADMIN — POTASSIUM CHLORIDE 10 MEQ: 7.46 INJECTION, SOLUTION INTRAVENOUS at 02:14

## 2023-07-31 RX ADMIN — POTASSIUM CHLORIDE 40 MEQ: 1500 TABLET, EXTENDED RELEASE ORAL at 02:14

## 2023-07-31 RX ADMIN — SODIUM CHLORIDE, PRESERVATIVE FREE 10 ML: 5 INJECTION INTRAVENOUS at 09:12

## 2023-07-31 RX ADMIN — CHOLESTYRAMINE 2 G: 4 POWDER, FOR SUSPENSION ORAL at 20:13

## 2023-07-31 RX ADMIN — POTASSIUM CHLORIDE 40 MEQ: 1500 TABLET, EXTENDED RELEASE ORAL at 09:10

## 2023-07-31 RX ADMIN — FERROUS SULFATE TAB 325 MG (65 MG ELEMENTAL FE) 325 MG: 325 (65 FE) TAB at 18:08

## 2023-07-31 RX ADMIN — ACETAMINOPHEN 650 MG: 325 TABLET ORAL at 14:18

## 2023-07-31 RX ADMIN — POTASSIUM CHLORIDE 40 MEQ: 1500 TABLET, EXTENDED RELEASE ORAL at 14:18

## 2023-07-31 RX ADMIN — POTASSIUM CHLORIDE, DEXTROSE MONOHYDRATE AND SODIUM CHLORIDE: 300; 5; 450 INJECTION, SOLUTION INTRAVENOUS at 03:58

## 2023-07-31 RX ADMIN — POTASSIUM CHLORIDE 10 MEQ: 7.46 INJECTION, SOLUTION INTRAVENOUS at 11:04

## 2023-07-31 RX ADMIN — OXYCODONE AND ACETAMINOPHEN 1 TABLET: 5; 325 TABLET ORAL at 02:14

## 2023-07-31 ASSESSMENT — PAIN SCALES - GENERAL
PAINLEVEL_OUTOF10: 9
PAINLEVEL_OUTOF10: 0
PAINLEVEL_OUTOF10: 7
PAINLEVEL_OUTOF10: 10
PAINLEVEL_OUTOF10: 6
PAINLEVEL_OUTOF10: 7
PAINLEVEL_OUTOF10: 7
PAINLEVEL_OUTOF10: 0
PAINLEVEL_OUTOF10: 0
PAINLEVEL_OUTOF10: 8
PAINLEVEL_OUTOF10: 0
PAINLEVEL_OUTOF10: 10
PAINLEVEL_OUTOF10: 6
PAINLEVEL_OUTOF10: 0
PAINLEVEL_OUTOF10: 0
PAINLEVEL_OUTOF10: 5

## 2023-07-31 ASSESSMENT — PAIN DESCRIPTION - LOCATION
LOCATION: HEAD
LOCATION: ABDOMEN
LOCATION: ABDOMEN;HEAD

## 2023-07-31 ASSESSMENT — PAIN SCALES - WONG BAKER: WONGBAKER_NUMERICALRESPONSE: 6

## 2023-07-31 ASSESSMENT — PAIN DESCRIPTION - DESCRIPTORS: DESCRIPTORS: ACHING

## 2023-07-31 NOTE — CARE COORDINATION
Pending GI consult. Recent clinicals sent to [facility]. Awaiting a response from admissions re: if patient is a LTC resident vs SNF.        ALMA ROSA Graham

## 2023-08-01 ENCOUNTER — APPOINTMENT (OUTPATIENT)
Facility: HOSPITAL | Age: 53
DRG: 425 | End: 2023-08-01
Payer: COMMERCIAL

## 2023-08-01 LAB
ALBUMIN SERPL-MCNC: 3.7 G/DL (ref 3.5–5)
ANION GAP SERPL CALC-SCNC: 5 MMOL/L (ref 5–15)
BASOPHILS # BLD: 0 K/UL (ref 0–0.1)
BASOPHILS NFR BLD: 0 % (ref 0–1)
BUN SERPL-MCNC: 3 MG/DL (ref 6–20)
BUN/CREAT SERPL: 6 (ref 12–20)
CA-I BLD-MCNC: 9.6 MG/DL (ref 8.5–10.1)
CHLORIDE SERPL-SCNC: 110 MMOL/L (ref 97–108)
CHLORIDE UR-SCNC: 136 MMOL/L
CO2 SERPL-SCNC: 29 MMOL/L (ref 21–32)
CREAT SERPL-MCNC: 0.48 MG/DL (ref 0.55–1.02)
DIFFERENTIAL METHOD BLD: ABNORMAL
EOSINOPHIL # BLD: 0 K/UL (ref 0–0.4)
EOSINOPHIL NFR BLD: 1 % (ref 0–7)
ERYTHROCYTE [DISTWIDTH] IN BLOOD BY AUTOMATED COUNT: 13.2 % (ref 11.5–14.5)
GLUCOSE BLD STRIP.AUTO-MCNC: 105 MG/DL (ref 65–100)
GLUCOSE BLD STRIP.AUTO-MCNC: 95 MG/DL (ref 65–100)
GLUCOSE SERPL-MCNC: 93 MG/DL (ref 65–100)
HCT VFR BLD AUTO: 35.7 % (ref 35–47)
HGB BLD-MCNC: 11.3 G/DL (ref 11.5–16)
IMM GRANULOCYTES # BLD AUTO: 0 K/UL (ref 0–0.04)
IMM GRANULOCYTES NFR BLD AUTO: 0 % (ref 0–0.5)
LYMPHOCYTES # BLD: 1.4 K/UL (ref 0.8–3.5)
LYMPHOCYTES NFR BLD: 28 % (ref 12–49)
MAGNESIUM SERPL-MCNC: 2 MG/DL (ref 1.6–2.4)
MCH RBC QN AUTO: 28.4 PG (ref 26–34)
MCHC RBC AUTO-ENTMCNC: 31.7 G/DL (ref 30–36.5)
MCV RBC AUTO: 89.7 FL (ref 80–99)
MONOCYTES # BLD: 0.6 K/UL (ref 0–1)
MONOCYTES NFR BLD: 11 % (ref 5–13)
NEUTS SEG # BLD: 3.1 K/UL (ref 1.8–8)
NEUTS SEG NFR BLD: 60 % (ref 32–75)
NRBC # BLD: 0 K/UL (ref 0–0.01)
NRBC BLD-RTO: 0 PER 100 WBC
PERFORMED BY:: ABNORMAL
PERFORMED BY:: NORMAL
PHOSPHATE SERPL-MCNC: 2.8 MG/DL (ref 2.6–4.7)
PLATELET # BLD AUTO: 220 K/UL (ref 150–400)
PMV BLD AUTO: 9.9 FL (ref 8.9–12.9)
POTASSIUM SERPL-SCNC: 3.7 MMOL/L (ref 3.5–5.1)
POTASSIUM UR-SCNC: 5 MMOL/L
RBC # BLD AUTO: 3.98 M/UL (ref 3.8–5.2)
SODIUM SERPL-SCNC: 144 MMOL/L (ref 136–145)
SODIUM UR-SCNC: 153 MMOL/L
WBC # BLD AUTO: 5.1 K/UL (ref 3.6–11)

## 2023-08-01 PROCEDURE — 2500000003 HC RX 250 WO HCPCS: Performed by: INTERNAL MEDICINE

## 2023-08-01 PROCEDURE — 83735 ASSAY OF MAGNESIUM: CPT

## 2023-08-01 PROCEDURE — 82962 GLUCOSE BLOOD TEST: CPT

## 2023-08-01 PROCEDURE — 6370000000 HC RX 637 (ALT 250 FOR IP): Performed by: HOSPITALIST

## 2023-08-01 PROCEDURE — 2580000003 HC RX 258: Performed by: INTERNAL MEDICINE

## 2023-08-01 PROCEDURE — 85025 COMPLETE CBC W/AUTO DIFF WBC: CPT

## 2023-08-01 PROCEDURE — 36415 COLL VENOUS BLD VENIPUNCTURE: CPT

## 2023-08-01 PROCEDURE — 6360000002 HC RX W HCPCS: Performed by: INTERNAL MEDICINE

## 2023-08-01 PROCEDURE — 82088 ASSAY OF ALDOSTERONE: CPT

## 2023-08-01 PROCEDURE — 6370000000 HC RX 637 (ALT 250 FOR IP): Performed by: INTERNAL MEDICINE

## 2023-08-01 PROCEDURE — 82436 ASSAY OF URINE CHLORIDE: CPT

## 2023-08-01 PROCEDURE — 80069 RENAL FUNCTION PANEL: CPT

## 2023-08-01 PROCEDURE — 84244 ASSAY OF RENIN: CPT

## 2023-08-01 PROCEDURE — 74018 RADEX ABDOMEN 1 VIEW: CPT

## 2023-08-01 PROCEDURE — 84133 ASSAY OF URINE POTASSIUM: CPT

## 2023-08-01 PROCEDURE — 1100000000 HC RM PRIVATE

## 2023-08-01 PROCEDURE — 84300 ASSAY OF URINE SODIUM: CPT

## 2023-08-01 RX ORDER — SUMATRIPTAN 25 MG/1
50 TABLET, FILM COATED ORAL ONCE
Status: COMPLETED | OUTPATIENT
Start: 2023-08-01 | End: 2023-08-01

## 2023-08-01 RX ORDER — LOSARTAN POTASSIUM 50 MG/1
50 TABLET ORAL DAILY
Status: DISCONTINUED | OUTPATIENT
Start: 2023-08-01 | End: 2023-08-03

## 2023-08-01 RX ADMIN — CHOLESTYRAMINE 2 G: 4 POWDER, FOR SUSPENSION ORAL at 20:44

## 2023-08-01 RX ADMIN — OXYCODONE AND ACETAMINOPHEN 1 TABLET: 5; 325 TABLET ORAL at 17:11

## 2023-08-01 RX ADMIN — POTASSIUM CHLORIDE 40 MEQ: 1500 TABLET, EXTENDED RELEASE ORAL at 08:17

## 2023-08-01 RX ADMIN — FERROUS SULFATE TAB 325 MG (65 MG ELEMENTAL FE) 325 MG: 325 (65 FE) TAB at 17:08

## 2023-08-01 RX ADMIN — ACETAMINOPHEN 650 MG: 325 TABLET ORAL at 01:03

## 2023-08-01 RX ADMIN — CETIRIZINE HYDROCHLORIDE 10 MG: 10 TABLET, FILM COATED ORAL at 08:17

## 2023-08-01 RX ADMIN — SPIRONOLACTONE 50 MG: 25 TABLET ORAL at 08:17

## 2023-08-01 RX ADMIN — ACETAMINOPHEN 650 MG: 325 TABLET ORAL at 20:01

## 2023-08-01 RX ADMIN — METOPROLOL SUCCINATE 50 MG: 50 TABLET, EXTENDED RELEASE ORAL at 08:17

## 2023-08-01 RX ADMIN — POTASSIUM CHLORIDE 40 MEQ: 1500 TABLET, EXTENDED RELEASE ORAL at 20:44

## 2023-08-01 RX ADMIN — CHOLESTYRAMINE 2 G: 4 POWDER, FOR SUSPENSION ORAL at 08:17

## 2023-08-01 RX ADMIN — AMLODIPINE BESYLATE 10 MG: 5 TABLET ORAL at 08:17

## 2023-08-01 RX ADMIN — POTASSIUM CHLORIDE 40 MEQ: 1500 TABLET, EXTENDED RELEASE ORAL at 15:21

## 2023-08-01 RX ADMIN — POTASSIUM CHLORIDE, DEXTROSE MONOHYDRATE AND SODIUM CHLORIDE: 300; 5; 450 INJECTION, SOLUTION INTRAVENOUS at 05:42

## 2023-08-01 RX ADMIN — CHOLESTYRAMINE 2 G: 4 POWDER, FOR SUSPENSION ORAL at 15:21

## 2023-08-01 RX ADMIN — OXYCODONE AND ACETAMINOPHEN 1 TABLET: 5; 325 TABLET ORAL at 08:17

## 2023-08-01 RX ADMIN — POTASSIUM CHLORIDE 40 MEQ: 1500 TABLET, EXTENDED RELEASE ORAL at 01:05

## 2023-08-01 RX ADMIN — DULOXETINE 60 MG: 30 CAPSULE, DELAYED RELEASE ORAL at 08:17

## 2023-08-01 RX ADMIN — SODIUM CHLORIDE, PRESERVATIVE FREE 10 ML: 5 INJECTION INTRAVENOUS at 08:37

## 2023-08-01 RX ADMIN — ENOXAPARIN SODIUM 40 MG: 100 INJECTION SUBCUTANEOUS at 08:18

## 2023-08-01 RX ADMIN — SUMATRIPTAN SUCCINATE 50 MG: 25 TABLET ORAL at 03:53

## 2023-08-01 RX ADMIN — ATORVASTATIN CALCIUM 10 MG: 10 TABLET, FILM COATED ORAL at 20:43

## 2023-08-01 RX ADMIN — FERROUS SULFATE TAB 325 MG (65 MG ELEMENTAL FE) 325 MG: 325 (65 FE) TAB at 08:17

## 2023-08-01 RX ADMIN — SPIRONOLACTONE 50 MG: 25 TABLET ORAL at 17:08

## 2023-08-01 RX ADMIN — FOLIC ACID 1 MG: 1 TABLET ORAL at 08:17

## 2023-08-01 RX ADMIN — SODIUM CHLORIDE, PRESERVATIVE FREE 10 ML: 5 INJECTION INTRAVENOUS at 20:44

## 2023-08-01 ASSESSMENT — PAIN - FUNCTIONAL ASSESSMENT: PAIN_FUNCTIONAL_ASSESSMENT: PREVENTS OR INTERFERES WITH ALL ACTIVE AND SOME PASSIVE ACTIVITIES

## 2023-08-01 ASSESSMENT — PAIN DESCRIPTION - DESCRIPTORS
DESCRIPTORS: ACHING;DISCOMFORT
DESCRIPTORS: ACHING;DISCOMFORT
DESCRIPTORS: ACHING
DESCRIPTORS: ACHING

## 2023-08-01 ASSESSMENT — PAIN SCALES - GENERAL
PAINLEVEL_OUTOF10: 8
PAINLEVEL_OUTOF10: 9
PAINLEVEL_OUTOF10: 10
PAINLEVEL_OUTOF10: 1
PAINLEVEL_OUTOF10: 4
PAINLEVEL_OUTOF10: 3
PAINLEVEL_OUTOF10: 9
PAINLEVEL_OUTOF10: 0
PAINLEVEL_OUTOF10: 10
PAINLEVEL_OUTOF10: 0

## 2023-08-01 ASSESSMENT — PAIN DESCRIPTION - ORIENTATION
ORIENTATION: RIGHT
ORIENTATION: RIGHT;ANTERIOR

## 2023-08-01 ASSESSMENT — PAIN DESCRIPTION - LOCATION
LOCATION: ABDOMEN
LOCATION: ABDOMEN
LOCATION: HEAD
LOCATION: HEAD
LOCATION: ABDOMEN;HEAD

## 2023-08-01 ASSESSMENT — PAIN DESCRIPTION - PAIN TYPE
TYPE: ACUTE PAIN
TYPE: ACUTE PAIN

## 2023-08-01 ASSESSMENT — PAIN DESCRIPTION - ONSET: ONSET: PROGRESSIVE

## 2023-08-01 ASSESSMENT — PAIN SCALES - WONG BAKER
WONGBAKER_NUMERICALRESPONSE: 0
WONGBAKER_NUMERICALRESPONSE: 0

## 2023-08-01 ASSESSMENT — PAIN DESCRIPTION - FREQUENCY: FREQUENCY: CONTINUOUS

## 2023-08-01 NOTE — WOUND CARE
WCN consulted for bed evaluation, patient states that she is uncomfortable because the bed isnt long enough. Patient to be transferred to sunita bed for extra room. Patient declined for [facility] to evaluate sacrum but stated that she did not have any wounds or tenderness to sacrum. Recommend applying sacral foam dressing for protection from friction and pressure, nurse to peel back dressing daily for skin assessment and dressing to be changed every 3 days and PRN for soiling.

## 2023-08-01 NOTE — CARE COORDINATION
Patient is a long term care resident at [facility]. She will return there once medically stable. Clinicals have been provided via [address]

## 2023-08-02 ENCOUNTER — ANESTHESIA (OUTPATIENT)
Facility: HOSPITAL | Age: 53
DRG: 425 | End: 2023-08-02
Payer: COMMERCIAL

## 2023-08-02 ENCOUNTER — ANESTHESIA EVENT (OUTPATIENT)
Facility: HOSPITAL | Age: 53
DRG: 425 | End: 2023-08-02
Payer: COMMERCIAL

## 2023-08-02 LAB
ALBUMIN SERPL-MCNC: 3.8 G/DL (ref 3.5–5)
ANION GAP SERPL CALC-SCNC: 7 MMOL/L (ref 5–15)
BASOPHILS # BLD: 0 K/UL (ref 0–0.1)
BASOPHILS NFR BLD: 0 % (ref 0–1)
BUN SERPL-MCNC: 4 MG/DL (ref 6–20)
BUN/CREAT SERPL: 7 (ref 12–20)
CA-I BLD-MCNC: 9.6 MG/DL (ref 8.5–10.1)
CHLORIDE SERPL-SCNC: 109 MMOL/L (ref 97–108)
CO2 SERPL-SCNC: 26 MMOL/L (ref 21–32)
CREAT SERPL-MCNC: 0.55 MG/DL (ref 0.55–1.02)
DIFFERENTIAL METHOD BLD: NORMAL
EOSINOPHIL # BLD: 0 K/UL (ref 0–0.4)
EOSINOPHIL NFR BLD: 1 % (ref 0–7)
ERYTHROCYTE [DISTWIDTH] IN BLOOD BY AUTOMATED COUNT: 13.4 % (ref 11.5–14.5)
GLUCOSE SERPL-MCNC: 96 MG/DL (ref 65–100)
HCT VFR BLD AUTO: 36.9 % (ref 35–47)
HGB BLD-MCNC: 11.8 G/DL (ref 11.5–16)
IMM GRANULOCYTES # BLD AUTO: 0 K/UL (ref 0–0.04)
IMM GRANULOCYTES NFR BLD AUTO: 0 % (ref 0–0.5)
LYMPHOCYTES # BLD: 1.5 K/UL (ref 0.8–3.5)
LYMPHOCYTES NFR BLD: 29 % (ref 12–49)
MAGNESIUM SERPL-MCNC: 2 MG/DL (ref 1.6–2.4)
MCH RBC QN AUTO: 28.6 PG (ref 26–34)
MCHC RBC AUTO-ENTMCNC: 32 G/DL (ref 30–36.5)
MCV RBC AUTO: 89.6 FL (ref 80–99)
MONOCYTES # BLD: 0.5 K/UL (ref 0–1)
MONOCYTES NFR BLD: 11 % (ref 5–13)
NEUTS SEG # BLD: 3 K/UL (ref 1.8–8)
NEUTS SEG NFR BLD: 59 % (ref 32–75)
NRBC # BLD: 0 K/UL (ref 0–0.01)
NRBC BLD-RTO: 0 PER 100 WBC
PHOSPHATE SERPL-MCNC: 4.1 MG/DL (ref 2.6–4.7)
PLATELET # BLD AUTO: 212 K/UL (ref 150–400)
PMV BLD AUTO: 9.1 FL (ref 8.9–12.9)
POTASSIUM SERPL-SCNC: 4.2 MMOL/L (ref 3.5–5.1)
RBC # BLD AUTO: 4.12 M/UL (ref 3.8–5.2)
SODIUM SERPL-SCNC: 142 MMOL/L (ref 136–145)
WBC # BLD AUTO: 5 K/UL (ref 3.6–11)

## 2023-08-02 PROCEDURE — 0DBN8ZX EXCISION OF SIGMOID COLON, VIA NATURAL OR ARTIFICIAL OPENING ENDOSCOPIC, DIAGNOSTIC: ICD-10-PCS | Performed by: INTERNAL MEDICINE

## 2023-08-02 PROCEDURE — 3600007512: Performed by: INTERNAL MEDICINE

## 2023-08-02 PROCEDURE — 85025 COMPLETE CBC W/AUTO DIFF WBC: CPT

## 2023-08-02 PROCEDURE — 83735 ASSAY OF MAGNESIUM: CPT

## 2023-08-02 PROCEDURE — 7100000010 HC PHASE II RECOVERY - FIRST 15 MIN: Performed by: INTERNAL MEDICINE

## 2023-08-02 PROCEDURE — 6370000000 HC RX 637 (ALT 250 FOR IP): Performed by: HOSPITALIST

## 2023-08-02 PROCEDURE — 6370000000 HC RX 637 (ALT 250 FOR IP): Performed by: INTERNAL MEDICINE

## 2023-08-02 PROCEDURE — 2500000003 HC RX 250 WO HCPCS: Performed by: INTERNAL MEDICINE

## 2023-08-02 PROCEDURE — 6360000002 HC RX W HCPCS: Performed by: HOSPITALIST

## 2023-08-02 PROCEDURE — 2580000003 HC RX 258: Performed by: INTERNAL MEDICINE

## 2023-08-02 PROCEDURE — 88305 TISSUE EXAM BY PATHOLOGIST: CPT

## 2023-08-02 PROCEDURE — 2709999900 HC NON-CHARGEABLE SUPPLY: Performed by: INTERNAL MEDICINE

## 2023-08-02 PROCEDURE — 3700000001 HC ADD 15 MINUTES (ANESTHESIA): Performed by: INTERNAL MEDICINE

## 2023-08-02 PROCEDURE — 2580000003 HC RX 258: Performed by: NURSE ANESTHETIST, CERTIFIED REGISTERED

## 2023-08-02 PROCEDURE — 3700000000 HC ANESTHESIA ATTENDED CARE: Performed by: INTERNAL MEDICINE

## 2023-08-02 PROCEDURE — 2500000003 HC RX 250 WO HCPCS: Performed by: NURSE ANESTHETIST, CERTIFIED REGISTERED

## 2023-08-02 PROCEDURE — 36415 COLL VENOUS BLD VENIPUNCTURE: CPT

## 2023-08-02 PROCEDURE — 0D7N8ZZ DILATION OF SIGMOID COLON, VIA NATURAL OR ARTIFICIAL OPENING ENDOSCOPIC: ICD-10-PCS | Performed by: INTERNAL MEDICINE

## 2023-08-02 PROCEDURE — 7100000011 HC PHASE II RECOVERY - ADDTL 15 MIN: Performed by: INTERNAL MEDICINE

## 2023-08-02 PROCEDURE — 1100000000 HC RM PRIVATE

## 2023-08-02 PROCEDURE — 80069 RENAL FUNCTION PANEL: CPT

## 2023-08-02 PROCEDURE — 3600007502: Performed by: INTERNAL MEDICINE

## 2023-08-02 PROCEDURE — 6360000002 HC RX W HCPCS: Performed by: NURSE ANESTHETIST, CERTIFIED REGISTERED

## 2023-08-02 RX ORDER — SODIUM CHLORIDE, SODIUM LACTATE, POTASSIUM CHLORIDE, CALCIUM CHLORIDE 600; 310; 30; 20 MG/100ML; MG/100ML; MG/100ML; MG/100ML
INJECTION, SOLUTION INTRAVENOUS CONTINUOUS PRN
Status: DISCONTINUED | OUTPATIENT
Start: 2023-08-02 | End: 2023-08-02 | Stop reason: SDUPTHER

## 2023-08-02 RX ORDER — DIPHENHYDRAMINE HCL 25 MG
25 CAPSULE ORAL EVERY 6 HOURS PRN
Status: DISCONTINUED | OUTPATIENT
Start: 2023-08-02 | End: 2023-08-03 | Stop reason: HOSPADM

## 2023-08-02 RX ORDER — DIPHENHYDRAMINE HCL 25 MG
25 CAPSULE ORAL EVERY 6 HOURS PRN
Status: DISCONTINUED | OUTPATIENT
Start: 2023-08-02 | End: 2023-08-02

## 2023-08-02 RX ORDER — LIDOCAINE HYDROCHLORIDE 20 MG/ML
INJECTION, SOLUTION EPIDURAL; INFILTRATION; INTRACAUDAL; PERINEURAL PRN
Status: DISCONTINUED | OUTPATIENT
Start: 2023-08-02 | End: 2023-08-02 | Stop reason: SDUPTHER

## 2023-08-02 RX ORDER — MORPHINE SULFATE 2 MG/ML
2 INJECTION, SOLUTION INTRAMUSCULAR; INTRAVENOUS ONCE
Status: COMPLETED | OUTPATIENT
Start: 2023-08-02 | End: 2023-08-02

## 2023-08-02 RX ADMIN — FERROUS SULFATE TAB 325 MG (65 MG ELEMENTAL FE) 325 MG: 325 (65 FE) TAB at 10:04

## 2023-08-02 RX ADMIN — OXYCODONE AND ACETAMINOPHEN 1 TABLET: 5; 325 TABLET ORAL at 10:53

## 2023-08-02 RX ADMIN — ATORVASTATIN CALCIUM 10 MG: 10 TABLET, FILM COATED ORAL at 20:36

## 2023-08-02 RX ADMIN — LIDOCAINE HYDROCHLORIDE 100 MG: 20 INJECTION, SOLUTION EPIDURAL; INFILTRATION; INTRACAUDAL; PERINEURAL at 13:55

## 2023-08-02 RX ADMIN — PROPOFOL 50 MG: 10 INJECTION, EMULSION INTRAVENOUS at 13:56

## 2023-08-02 RX ADMIN — POTASSIUM CHLORIDE 40 MEQ: 1500 TABLET, EXTENDED RELEASE ORAL at 20:36

## 2023-08-02 RX ADMIN — PROPOFOL 50 MG: 10 INJECTION, EMULSION INTRAVENOUS at 13:59

## 2023-08-02 RX ADMIN — PROPOFOL 50 MG: 10 INJECTION, EMULSION INTRAVENOUS at 13:55

## 2023-08-02 RX ADMIN — SODIUM CHLORIDE, PRESERVATIVE FREE 10 ML: 5 INJECTION INTRAVENOUS at 10:12

## 2023-08-02 RX ADMIN — LOSARTAN POTASSIUM 50 MG: 50 TABLET, FILM COATED ORAL at 10:05

## 2023-08-02 RX ADMIN — METOPROLOL SUCCINATE 50 MG: 50 TABLET, EXTENDED RELEASE ORAL at 10:05

## 2023-08-02 RX ADMIN — SPIRONOLACTONE 50 MG: 25 TABLET ORAL at 18:51

## 2023-08-02 RX ADMIN — CHOLESTYRAMINE 2 G: 4 POWDER, FOR SUSPENSION ORAL at 20:36

## 2023-08-02 RX ADMIN — MORPHINE SULFATE 2 MG: 2 INJECTION, SOLUTION INTRAMUSCULAR; INTRAVENOUS at 16:17

## 2023-08-02 RX ADMIN — POTASSIUM CHLORIDE 40 MEQ: 1500 TABLET, EXTENDED RELEASE ORAL at 02:30

## 2023-08-02 RX ADMIN — PROPOFOL 20 MG: 10 INJECTION, EMULSION INTRAVENOUS at 14:01

## 2023-08-02 RX ADMIN — OXYCODONE AND ACETAMINOPHEN 1 TABLET: 5; 325 TABLET ORAL at 00:38

## 2023-08-02 RX ADMIN — FERROUS SULFATE TAB 325 MG (65 MG ELEMENTAL FE) 325 MG: 325 (65 FE) TAB at 16:28

## 2023-08-02 RX ADMIN — CETIRIZINE HYDROCHLORIDE 10 MG: 10 TABLET, FILM COATED ORAL at 10:05

## 2023-08-02 RX ADMIN — FOLIC ACID 1 MG: 1 TABLET ORAL at 10:03

## 2023-08-02 RX ADMIN — POTASSIUM CHLORIDE 40 MEQ: 1500 TABLET, EXTENDED RELEASE ORAL at 10:14

## 2023-08-02 RX ADMIN — DIPHENHYDRAMINE HYDROCHLORIDE 25 MG: 25 CAPSULE ORAL at 23:00

## 2023-08-02 RX ADMIN — OXYCODONE AND ACETAMINOPHEN 1 TABLET: 5; 325 TABLET ORAL at 18:52

## 2023-08-02 RX ADMIN — SODIUM CHLORIDE, POTASSIUM CHLORIDE, SODIUM LACTATE AND CALCIUM CHLORIDE: 600; 310; 30; 20 INJECTION, SOLUTION INTRAVENOUS at 13:54

## 2023-08-02 RX ADMIN — SODIUM CHLORIDE, PRESERVATIVE FREE 10 ML: 5 INJECTION INTRAVENOUS at 20:38

## 2023-08-02 RX ADMIN — DULOXETINE 60 MG: 30 CAPSULE, DELAYED RELEASE ORAL at 10:04

## 2023-08-02 RX ADMIN — SPIRONOLACTONE 50 MG: 25 TABLET ORAL at 10:04

## 2023-08-02 RX ADMIN — POTASSIUM CHLORIDE, DEXTROSE MONOHYDRATE AND SODIUM CHLORIDE: 300; 5; 450 INJECTION, SOLUTION INTRAVENOUS at 10:11

## 2023-08-02 RX ADMIN — PROPOFOL 30 MG: 10 INJECTION, EMULSION INTRAVENOUS at 13:57

## 2023-08-02 ASSESSMENT — PAIN DESCRIPTION - DESCRIPTORS
DESCRIPTORS: ACHING
DESCRIPTORS: DULL
DESCRIPTORS: DULL

## 2023-08-02 ASSESSMENT — PAIN DESCRIPTION - ORIENTATION
ORIENTATION: RIGHT
ORIENTATION: ANTERIOR;POSTERIOR
ORIENTATION: RIGHT

## 2023-08-02 ASSESSMENT — PAIN SCALES - GENERAL
PAINLEVEL_OUTOF10: 3
PAINLEVEL_OUTOF10: 9
PAINLEVEL_OUTOF10: 7
PAINLEVEL_OUTOF10: 0
PAINLEVEL_OUTOF10: 7
PAINLEVEL_OUTOF10: 8
PAINLEVEL_OUTOF10: 9

## 2023-08-02 ASSESSMENT — PAIN DESCRIPTION - LOCATION
LOCATION: BACK;FLANK;ABDOMEN
LOCATION: ABDOMEN
LOCATION: HAND;LEG

## 2023-08-02 ASSESSMENT — LIFESTYLE VARIABLES: SMOKING_STATUS: 1

## 2023-08-02 ASSESSMENT — PAIN - FUNCTIONAL ASSESSMENT: PAIN_FUNCTIONAL_ASSESSMENT: ACTIVITIES ARE NOT PREVENTED

## 2023-08-02 NOTE — WOUND CARE
Informed by Rn that patient is complaining of wanting to be moved to a different bed. Offered to move patient to a gel surface (appropriate for her weight) but patient declined. If patient changes her mind, recommend a [place] with repositioning sheet.

## 2023-08-02 NOTE — CARE COORDINATION
0815: Chart reviewed. Per notes; patient followed via GI, nephrology and wound care nurse. GI procedure scheduled today. When medically cleared for discharge, patient will return to [facility]. Update attached in [address]     CM will continue to follow patient and recs of medical team.

## 2023-08-02 NOTE — ANESTHESIA POSTPROCEDURE EVALUATION
Department of Anesthesiology  Postprocedure Note    Patient: Gibson Sacks  MRN: [id number]  YOB: 1970  Date of evaluation: 8/2/2023      Procedure Summary     Date: 08/02/23 Room / Location: [facility] ENDO 04 / [facility] ENDOSCOPY    Anesthesia Start: 1354 Anesthesia Stop: 0214    Procedure: SIGMOIDOSCOPY BIOPSY if ok with anesthesiaFLEXIBLE (Lower GI Region) Diagnosis:       Diarrhea, unspecified type      (Diarrhea, unspecified type [R19.7])    Surgeons: Marco A Sin MD Responsible Provider: Wilian Wallace MD    Anesthesia Type: MAC, TIVA ASA Status: 3          Anesthesia Type: MAC, TIVA    Debby Phase I:      Debby Phase II:        Anesthesia Post Evaluation    Patient location during evaluation: bedside (Endoscopy Unit)  Patient participation: complete - patient participated  Level of consciousness: sleepy but conscious  Pain score: 0  Airway patency: patent  Nausea & Vomiting: no nausea and no vomiting  Complications: no  Cardiovascular status: hemodynamically stable  Respiratory status: acceptable  Hydration status: stable  Comments: This patient remained on the stretcher. The patient was handed off to the endoscopy nursing team.  All questions regarding pre-, intra-, and postoperative care were answered.   Multimodal analgesia pain management approach

## 2023-08-03 VITALS
BODY MASS INDEX: 38.83 KG/M2 | TEMPERATURE: 98.2 F | SYSTOLIC BLOOD PRESSURE: 133 MMHG | OXYGEN SATURATION: 97 % | HEART RATE: 73 BPM | HEIGHT: 71 IN | DIASTOLIC BLOOD PRESSURE: 80 MMHG | RESPIRATION RATE: 20 BRPM | WEIGHT: 277.34 LBS

## 2023-08-03 LAB
ALBUMIN SERPL-MCNC: 3.8 G/DL (ref 3.5–5)
ANION GAP SERPL CALC-SCNC: 4 MMOL/L (ref 5–15)
BUN SERPL-MCNC: 7 MG/DL (ref 6–20)
BUN/CREAT SERPL: 11 (ref 12–20)
CA-I BLD-MCNC: 9.4 MG/DL (ref 8.5–10.1)
CHLORIDE SERPL-SCNC: 108 MMOL/L (ref 97–108)
CO2 SERPL-SCNC: 27 MMOL/L (ref 21–32)
CREAT SERPL-MCNC: 0.62 MG/DL (ref 0.55–1.02)
GLUCOSE SERPL-MCNC: 92 MG/DL (ref 65–100)
MAGNESIUM SERPL-MCNC: 2.2 MG/DL (ref 1.6–2.4)
PHOSPHATE SERPL-MCNC: 4.5 MG/DL (ref 2.6–4.7)
POTASSIUM SERPL-SCNC: 4 MMOL/L (ref 3.5–5.1)
SODIUM SERPL-SCNC: 139 MMOL/L (ref 136–145)

## 2023-08-03 PROCEDURE — 36415 COLL VENOUS BLD VENIPUNCTURE: CPT

## 2023-08-03 PROCEDURE — 80069 RENAL FUNCTION PANEL: CPT

## 2023-08-03 PROCEDURE — 6370000000 HC RX 637 (ALT 250 FOR IP): Performed by: INTERNAL MEDICINE

## 2023-08-03 PROCEDURE — 6370000000 HC RX 637 (ALT 250 FOR IP): Performed by: HOSPITALIST

## 2023-08-03 PROCEDURE — 6360000002 HC RX W HCPCS: Performed by: INTERNAL MEDICINE

## 2023-08-03 PROCEDURE — 2580000003 HC RX 258: Performed by: INTERNAL MEDICINE

## 2023-08-03 PROCEDURE — 83735 ASSAY OF MAGNESIUM: CPT

## 2023-08-03 RX ORDER — SPIRONOLACTONE 50 MG/1
50 TABLET, FILM COATED ORAL 2 TIMES DAILY
Qty: 30 TABLET | Refills: 1 | Status: SHIPPED | OUTPATIENT
Start: 2023-08-03

## 2023-08-03 RX ORDER — CHOLESTYRAMINE LIGHT 4 G/5.7G
2 POWDER, FOR SUSPENSION ORAL 3 TIMES DAILY
Qty: 60 PACKET | Refills: 1 | Status: SHIPPED | OUTPATIENT
Start: 2023-08-03

## 2023-08-03 RX ORDER — POTASSIUM CHLORIDE 20 MEQ/1
20 TABLET, EXTENDED RELEASE ORAL 2 TIMES DAILY
Qty: 30 TABLET | Refills: 1 | Status: SHIPPED | OUTPATIENT
Start: 2023-08-03

## 2023-08-03 RX ORDER — LOSARTAN POTASSIUM 100 MG/1
100 TABLET ORAL DAILY
Qty: 30 TABLET | Refills: 1 | Status: SHIPPED | OUTPATIENT
Start: 2023-08-04

## 2023-08-03 RX ORDER — POTASSIUM CHLORIDE 20 MEQ/1
40 TABLET, EXTENDED RELEASE ORAL 2 TIMES DAILY
Status: DISCONTINUED | OUTPATIENT
Start: 2023-08-03 | End: 2023-08-03 | Stop reason: HOSPADM

## 2023-08-03 RX ORDER — LOSARTAN POTASSIUM 50 MG/1
100 TABLET ORAL DAILY
Status: DISCONTINUED | OUTPATIENT
Start: 2023-08-04 | End: 2023-08-03 | Stop reason: HOSPADM

## 2023-08-03 RX ADMIN — OXYCODONE AND ACETAMINOPHEN 1 TABLET: 5; 325 TABLET ORAL at 02:05

## 2023-08-03 RX ADMIN — SODIUM CHLORIDE, PRESERVATIVE FREE 10 ML: 5 INJECTION INTRAVENOUS at 09:08

## 2023-08-03 RX ADMIN — LOSARTAN POTASSIUM 50 MG: 50 TABLET, FILM COATED ORAL at 09:04

## 2023-08-03 RX ADMIN — ENOXAPARIN SODIUM 40 MG: 100 INJECTION SUBCUTANEOUS at 09:06

## 2023-08-03 RX ADMIN — OXYCODONE AND ACETAMINOPHEN 1 TABLET: 5; 325 TABLET ORAL at 16:47

## 2023-08-03 RX ADMIN — SPIRONOLACTONE 50 MG: 25 TABLET ORAL at 09:05

## 2023-08-03 RX ADMIN — POTASSIUM CHLORIDE 40 MEQ: 1500 TABLET, EXTENDED RELEASE ORAL at 02:40

## 2023-08-03 RX ADMIN — DULOXETINE 60 MG: 30 CAPSULE, DELAYED RELEASE ORAL at 09:02

## 2023-08-03 RX ADMIN — CHOLESTYRAMINE 2 G: 4 POWDER, FOR SUSPENSION ORAL at 13:36

## 2023-08-03 RX ADMIN — DIPHENHYDRAMINE HYDROCHLORIDE 25 MG: 25 CAPSULE ORAL at 11:16

## 2023-08-03 RX ADMIN — FOLIC ACID 1 MG: 1 TABLET ORAL at 09:10

## 2023-08-03 RX ADMIN — CETIRIZINE HYDROCHLORIDE 10 MG: 10 TABLET, FILM COATED ORAL at 09:06

## 2023-08-03 RX ADMIN — FERROUS SULFATE TAB 325 MG (65 MG ELEMENTAL FE) 325 MG: 325 (65 FE) TAB at 09:04

## 2023-08-03 RX ADMIN — OXYCODONE AND ACETAMINOPHEN 1 TABLET: 5; 325 TABLET ORAL at 09:03

## 2023-08-03 RX ADMIN — POTASSIUM CHLORIDE 40 MEQ: 1500 TABLET, EXTENDED RELEASE ORAL at 09:05

## 2023-08-03 RX ADMIN — ACETAMINOPHEN 650 MG: 325 TABLET ORAL at 13:37

## 2023-08-03 RX ADMIN — METOPROLOL SUCCINATE 50 MG: 50 TABLET, EXTENDED RELEASE ORAL at 09:08

## 2023-08-03 RX ADMIN — DIPHENHYDRAMINE HYDROCHLORIDE 25 MG: 25 CAPSULE ORAL at 05:00

## 2023-08-03 ASSESSMENT — PAIN SCALES - GENERAL
PAINLEVEL_OUTOF10: 7
PAINLEVEL_OUTOF10: 8
PAINLEVEL_OUTOF10: 8
PAINLEVEL_OUTOF10: 6
PAINLEVEL_OUTOF10: 0
PAINLEVEL_OUTOF10: 3

## 2023-08-03 ASSESSMENT — PAIN DESCRIPTION - ORIENTATION
ORIENTATION: RIGHT

## 2023-08-03 ASSESSMENT — PAIN SCALES - WONG BAKER: WONGBAKER_NUMERICALRESPONSE: 2

## 2023-08-03 ASSESSMENT — PAIN DESCRIPTION - LOCATION
LOCATION: FLANK;BACK
LOCATION: FLANK
LOCATION: FLANK

## 2023-08-03 ASSESSMENT — PAIN DESCRIPTION - DESCRIPTORS
DESCRIPTORS: DULL
DESCRIPTORS: ACHING;DULL
DESCRIPTORS: ACHING;SORE

## 2023-08-03 ASSESSMENT — PAIN - FUNCTIONAL ASSESSMENT: PAIN_FUNCTIONAL_ASSESSMENT: ACTIVITIES ARE NOT PREVENTED

## 2023-08-03 NOTE — DISCHARGE INSTR - COC
Encounters:   07/30/23 125.8 kg (277 lb 5.4 oz)     Mental Status:  oriented, alert, logical, and thought processes intact    IV Access:  - None    Nursing Mobility/ADLs:  Walking   Dependent  Transfer  Dependent  Bathing  Assisted  Dressing  Assisted  Toileting  Dependent  Feeding  Independent  Med Admin  Assisted  Med Delivery   whole    Wound Care Documentation and Therapy:        Elimination:  Continence: Bowel: No  Bladder: No  Urinary Catheter: None   Colostomy/Ileostomy/Ileal Conduit: No       Date of Last BM: 08/02/2023    Intake/Output Summary (Last 24 hours) at 8/3/2023 1157  Last data filed at 8/3/2023 0601  Gross per 24 hour   Intake 2050 ml   Output 800 ml   Net 1250 ml     I/O last 3 completed shifts: In: 2050 [P.O.:200; I.V.:1850]  Out: 2900 [Urine:2900]    Safety Concerns: At Risk for Falls    Impairments/Disabilities:      Unable to ambulate    Nutrition Therapy:  Current Nutrition Therapy:   - Oral Diet:  General    Routes of Feeding: Oral  Liquids: Thin Liquids  Daily Fluid Restriction: no  Last Modified Barium Swallow with Video (Video Swallowing Test): not done    Treatments at the Time of Hospital Discharge:   Respiratory Treatments: N/A  Oxygen Therapy:  is not on home oxygen therapy.   Ventilator:    - No ventilator support    Rehab Therapies: Physical Therapy and Occupational Therapy  Weight Bearing Status/Restrictions: No weight bearing restrictions  Other Medical Equipment (for information only, NOT a DME order):  Lift Equipment  Other Treatments: N/A    Patient's personal belongings (please select all that are sent with patient):  None    RN SIGNATURE:  Electronically signed by Africa Manzo RN on 8/3/23 at 4:05 PM EDT

## 2023-08-03 NOTE — CARE COORDINATION
0745: Chart reviewed. Per notes; patient followed via GI, nephrology and wound care nurse. When medically cleared for discharge, patient will return to [facility]. Update attached in [address] discharge pending GI clearance. CM will continue to follow patient and recs of medical team.    6978: Discharge summary/order noted and attached in [address] with labs and [facility] requesting room assignment. When transportation available, patient to discharge to: [facility]  [facility], [address] [place] [address]  Room # 223 provided via [facility] [employer] (phone)    Report to be called to [phone]. Discharge Checklist Completed. Transition of Care Plan:    RUR: 12%  Prior Level of Functioning: Assistance  Disposition: LTC  If SNF or IPR: Date FOC offered: [facility]  Date FOC received: 07/31/2023  Accepting facility: [facility]  Date authorization started with reference number: N/A  Date authorization received and expires: N/A  Follow up appointments: Discharge Summary  DME needed: None  Transportation at discharge: Medical   IM/IMM Medicare/ letter given: N/A  Is patient a [place] and connected with VA? No  If yes, was [employer] transfer form completed and VA notified? N/A  Caregiver Contact: Patient: CM offered to call family - declined  Discharge Caregiver contacted prior to discharge? Patient  Care Conference needed?  No  Barriers to discharge: None

## 2023-08-03 NOTE — DISCHARGE INSTRUCTIONS
Diet-soft cardiac  Activity-slowly increase to levels before  Check labs CBC/BMP/magnesium at PCPs office next visit  Return to emergency or call 911 immediately symptoms recur or get worse  Follow results of aldosterone and renin at the renal office  Follow-up: colon Biopsy results at GI office

## 2023-08-03 NOTE — DISCHARGE SUMMARY
minutes    This is dictation was done by dragon, computer voice recognition software. Quite often unanticipated grammatical, syntax, homophones and other interpretive errors or inadvertently transcribed by the computer software. Please excuse errors that have escaped final proofreading. Please contact me for any questions or details. Thank you.        Signed:  Sean Mckinney MD

## 2023-08-04 LAB
ALDOST SERPL-MCNC: <1 NG/DL (ref 0–30)
ALDOST/RENIN PLAS-RTO: ABNORMAL
RENIN PLAS-CCNC: <0.167 NG/ML/HR (ref 0.17–5.38)

## 2023-08-15 ENCOUNTER — APPOINTMENT (OUTPATIENT)
Facility: HOSPITAL | Age: 53
DRG: 247 | End: 2023-08-15
Payer: COMMERCIAL

## 2023-08-15 ENCOUNTER — HOSPITAL ENCOUNTER (INPATIENT)
Facility: HOSPITAL | Age: 53
LOS: 2 days | Discharge: HOME | DRG: 247 | End: 2023-08-17
Attending: EMERGENCY MEDICINE | Admitting: INTERNAL MEDICINE
Payer: COMMERCIAL

## 2023-08-15 DIAGNOSIS — K56.0 PARALYTIC ILEUS OF SMALL INTESTINE AND COLON (HCC): Primary | ICD-10-CM

## 2023-08-15 PROBLEM — K63.89 COLON DISTENTION: Status: ACTIVE | Noted: 2023-08-15

## 2023-08-15 LAB
ALBUMIN SERPL-MCNC: 4.2 G/DL (ref 3.5–5)
ALBUMIN/GLOB SERPL: 1.1 (ref 1.1–2.2)
ALP SERPL-CCNC: 95 U/L (ref 45–117)
ALT SERPL-CCNC: 15 U/L (ref 12–78)
ANION GAP SERPL CALC-SCNC: 8 MMOL/L (ref 5–15)
AST SERPL W P-5'-P-CCNC: 9 U/L (ref 15–37)
BASOPHILS # BLD: 0 K/UL (ref 0–0.1)
BASOPHILS NFR BLD: 0 % (ref 0–1)
BILIRUB SERPL-MCNC: 0.6 MG/DL (ref 0.2–1)
BUN SERPL-MCNC: 11 MG/DL (ref 6–20)
BUN/CREAT SERPL: 15 (ref 12–20)
CA-I BLD-MCNC: 9.7 MG/DL (ref 8.5–10.1)
CHLORIDE SERPL-SCNC: 105 MMOL/L (ref 97–108)
CO2 SERPL-SCNC: 26 MMOL/L (ref 21–32)
CREAT SERPL-MCNC: 0.74 MG/DL (ref 0.55–1.02)
DIFFERENTIAL METHOD BLD: NORMAL
EOSINOPHIL # BLD: 0 K/UL (ref 0–0.4)
EOSINOPHIL NFR BLD: 0 % (ref 0–7)
ERYTHROCYTE [DISTWIDTH] IN BLOOD BY AUTOMATED COUNT: 13.5 % (ref 11.5–14.5)
GLOBULIN SER CALC-MCNC: 4 G/DL (ref 2–4)
GLUCOSE SERPL-MCNC: 95 MG/DL (ref 65–100)
HCT VFR BLD AUTO: 40.2 % (ref 35–47)
HGB BLD-MCNC: 12.8 G/DL (ref 11.5–16)
IMM GRANULOCYTES # BLD AUTO: 0 K/UL (ref 0–0.04)
IMM GRANULOCYTES NFR BLD AUTO: 0 % (ref 0–0.5)
LYMPHOCYTES # BLD: 2.1 K/UL (ref 0.8–3.5)
LYMPHOCYTES NFR BLD: 26 % (ref 12–49)
MAGNESIUM SERPL-MCNC: 2.2 MG/DL (ref 1.6–2.4)
MCH RBC QN AUTO: 28.6 PG (ref 26–34)
MCHC RBC AUTO-ENTMCNC: 31.8 G/DL (ref 30–36.5)
MCV RBC AUTO: 89.9 FL (ref 80–99)
MONOCYTES # BLD: 0.5 K/UL (ref 0–1)
MONOCYTES NFR BLD: 6 % (ref 5–13)
NEUTS SEG # BLD: 5.2 K/UL (ref 1.8–8)
NEUTS SEG NFR BLD: 68 % (ref 32–75)
NRBC # BLD: 0 K/UL (ref 0–0.01)
NRBC BLD-RTO: 0 PER 100 WBC
PLATELET # BLD AUTO: 325 K/UL (ref 150–400)
PMV BLD AUTO: 8.9 FL (ref 8.9–12.9)
POTASSIUM SERPL-SCNC: 3 MMOL/L (ref 3.5–5.1)
PROT SERPL-MCNC: 8.2 G/DL (ref 6.4–8.2)
RBC # BLD AUTO: 4.47 M/UL (ref 3.8–5.2)
SODIUM SERPL-SCNC: 139 MMOL/L (ref 136–145)
WBC # BLD AUTO: 7.9 K/UL (ref 3.6–11)

## 2023-08-15 PROCEDURE — 6360000002 HC RX W HCPCS: Performed by: EMERGENCY MEDICINE

## 2023-08-15 PROCEDURE — 85025 COMPLETE CBC W/AUTO DIFF WBC: CPT

## 2023-08-15 PROCEDURE — 6370000000 HC RX 637 (ALT 250 FOR IP): Performed by: PHYSICIAN ASSISTANT

## 2023-08-15 PROCEDURE — 1100000000 HC RM PRIVATE

## 2023-08-15 PROCEDURE — 6360000002 HC RX W HCPCS: Performed by: PHYSICIAN ASSISTANT

## 2023-08-15 PROCEDURE — 80053 COMPREHEN METABOLIC PANEL: CPT

## 2023-08-15 PROCEDURE — 6370000000 HC RX 637 (ALT 250 FOR IP): Performed by: HOSPITALIST

## 2023-08-15 PROCEDURE — 96375 TX/PRO/DX INJ NEW DRUG ADDON: CPT

## 2023-08-15 PROCEDURE — 74177 CT ABD & PELVIS W/CONTRAST: CPT

## 2023-08-15 PROCEDURE — 36415 COLL VENOUS BLD VENIPUNCTURE: CPT

## 2023-08-15 PROCEDURE — 99285 EMERGENCY DEPT VISIT HI MDM: CPT

## 2023-08-15 PROCEDURE — 96365 THER/PROPH/DIAG IV INF INIT: CPT

## 2023-08-15 PROCEDURE — 6360000004 HC RX CONTRAST MEDICATION: Performed by: EMERGENCY MEDICINE

## 2023-08-15 PROCEDURE — 83735 ASSAY OF MAGNESIUM: CPT

## 2023-08-15 PROCEDURE — 6360000002 HC RX W HCPCS: Performed by: INTERNAL MEDICINE

## 2023-08-15 PROCEDURE — 96366 THER/PROPH/DIAG IV INF ADDON: CPT

## 2023-08-15 PROCEDURE — 6370000000 HC RX 637 (ALT 250 FOR IP): Performed by: EMERGENCY MEDICINE

## 2023-08-15 RX ORDER — PROCHLORPERAZINE EDISYLATE 5 MG/ML
10 INJECTION INTRAMUSCULAR; INTRAVENOUS EVERY 6 HOURS PRN
Status: DISCONTINUED | OUTPATIENT
Start: 2023-08-15 | End: 2023-08-17 | Stop reason: HOSPADM

## 2023-08-15 RX ORDER — ACETAMINOPHEN 650 MG/1
650 SUPPOSITORY RECTAL EVERY 6 HOURS PRN
Status: DISCONTINUED | OUTPATIENT
Start: 2023-08-15 | End: 2023-08-17 | Stop reason: HOSPADM

## 2023-08-15 RX ORDER — HYDRALAZINE HYDROCHLORIDE 20 MG/ML
10 INJECTION INTRAMUSCULAR; INTRAVENOUS EVERY 6 HOURS PRN
Status: DISCONTINUED | OUTPATIENT
Start: 2023-08-15 | End: 2023-08-16 | Stop reason: HOSPADM

## 2023-08-15 RX ORDER — SERTRALINE HYDROCHLORIDE 25 MG/1
TABLET, FILM COATED ORAL
COMMUNITY
Start: 2023-07-08

## 2023-08-15 RX ORDER — SODIUM CHLORIDE 0.9 % (FLUSH) 0.9 %
5-40 SYRINGE (ML) INJECTION PRN
Status: DISCONTINUED | OUTPATIENT
Start: 2023-08-15 | End: 2023-08-17 | Stop reason: HOSPADM

## 2023-08-15 RX ORDER — POTASSIUM CHLORIDE 7.45 MG/ML
10 INJECTION INTRAVENOUS
Status: ACTIVE | OUTPATIENT
Start: 2023-08-15 | End: 2023-08-15

## 2023-08-15 RX ORDER — POTASSIUM CHLORIDE 750 MG/1
40 TABLET, FILM COATED, EXTENDED RELEASE ORAL ONCE
Status: COMPLETED | OUTPATIENT
Start: 2023-08-15 | End: 2023-08-15

## 2023-08-15 RX ORDER — ONDANSETRON 4 MG/1
4 TABLET, ORALLY DISINTEGRATING ORAL EVERY 8 HOURS PRN
Status: DISCONTINUED | OUTPATIENT
Start: 2023-08-15 | End: 2023-08-17 | Stop reason: HOSPADM

## 2023-08-15 RX ORDER — KETOROLAC TROMETHAMINE 15 MG/ML
15 INJECTION, SOLUTION INTRAMUSCULAR; INTRAVENOUS EVERY 6 HOURS PRN
Status: DISCONTINUED | OUTPATIENT
Start: 2023-08-15 | End: 2023-08-17 | Stop reason: HOSPADM

## 2023-08-15 RX ORDER — SODIUM CHLORIDE 9 MG/ML
INJECTION, SOLUTION INTRAVENOUS PRN
Status: DISCONTINUED | OUTPATIENT
Start: 2023-08-15 | End: 2023-08-17 | Stop reason: HOSPADM

## 2023-08-15 RX ORDER — HYDROMORPHONE HYDROCHLORIDE 1 MG/ML
0.5 INJECTION, SOLUTION INTRAMUSCULAR; INTRAVENOUS; SUBCUTANEOUS EVERY 4 HOURS PRN
Status: ACTIVE | OUTPATIENT
Start: 2023-08-15 | End: 2023-08-17

## 2023-08-15 RX ORDER — POTASSIUM CHLORIDE 7.45 MG/ML
10 INJECTION INTRAVENOUS ONCE
Status: COMPLETED | OUTPATIENT
Start: 2023-08-15 | End: 2023-08-15

## 2023-08-15 RX ORDER — DULOXETIN HYDROCHLORIDE 30 MG/1
30 CAPSULE, DELAYED RELEASE ORAL DAILY
Status: DISCONTINUED | OUTPATIENT
Start: 2023-08-15 | End: 2023-08-17 | Stop reason: HOSPADM

## 2023-08-15 RX ORDER — ACETAMINOPHEN 325 MG/1
650 TABLET ORAL EVERY 6 HOURS PRN
Status: DISCONTINUED | OUTPATIENT
Start: 2023-08-15 | End: 2023-08-17 | Stop reason: HOSPADM

## 2023-08-15 RX ORDER — CHOLESTYRAMINE LIGHT 4 G/5.7G
2 POWDER, FOR SUSPENSION ORAL 3 TIMES DAILY
Status: DISCONTINUED | OUTPATIENT
Start: 2023-08-15 | End: 2023-08-17

## 2023-08-15 RX ORDER — ATORVASTATIN CALCIUM 10 MG/1
10 TABLET, FILM COATED ORAL DAILY
Status: DISCONTINUED | OUTPATIENT
Start: 2023-08-15 | End: 2023-08-17 | Stop reason: HOSPADM

## 2023-08-15 RX ORDER — SPIRONOLACTONE 25 MG/1
50 TABLET ORAL 2 TIMES DAILY
Status: DISCONTINUED | OUTPATIENT
Start: 2023-08-15 | End: 2023-08-17 | Stop reason: HOSPADM

## 2023-08-15 RX ORDER — FERROUS SULFATE 325(65) MG
325 TABLET ORAL
Status: DISCONTINUED | OUTPATIENT
Start: 2023-08-16 | End: 2023-08-17 | Stop reason: HOSPADM

## 2023-08-15 RX ORDER — ONDANSETRON 2 MG/ML
4 INJECTION INTRAMUSCULAR; INTRAVENOUS EVERY 6 HOURS PRN
Status: DISCONTINUED | OUTPATIENT
Start: 2023-08-15 | End: 2023-08-17 | Stop reason: HOSPADM

## 2023-08-15 RX ORDER — POTASSIUM CHLORIDE 20 MEQ/1
20 TABLET, EXTENDED RELEASE ORAL 2 TIMES DAILY
Status: DISCONTINUED | OUTPATIENT
Start: 2023-08-15 | End: 2023-08-17 | Stop reason: HOSPADM

## 2023-08-15 RX ORDER — FOLIC ACID 1 MG/1
1 TABLET ORAL DAILY
Status: DISCONTINUED | OUTPATIENT
Start: 2023-08-15 | End: 2023-08-17 | Stop reason: HOSPADM

## 2023-08-15 RX ORDER — LOSARTAN POTASSIUM 50 MG/1
100 TABLET ORAL DAILY
Status: DISCONTINUED | OUTPATIENT
Start: 2023-08-15 | End: 2023-08-17 | Stop reason: HOSPADM

## 2023-08-15 RX ORDER — POLYETHYLENE GLYCOL 3350 17 G/17G
17 POWDER, FOR SOLUTION ORAL DAILY PRN
Status: DISCONTINUED | OUTPATIENT
Start: 2023-08-15 | End: 2023-08-16

## 2023-08-15 RX ORDER — HYDROXYZINE HYDROCHLORIDE 25 MG/1
25 TABLET, FILM COATED ORAL EVERY 6 HOURS PRN
Status: DISCONTINUED | OUTPATIENT
Start: 2023-08-15 | End: 2023-08-17 | Stop reason: HOSPADM

## 2023-08-15 RX ORDER — DULOXETIN HYDROCHLORIDE 30 MG/1
CAPSULE, DELAYED RELEASE ORAL
COMMUNITY
Start: 2023-08-09

## 2023-08-15 RX ORDER — SODIUM CHLORIDE 0.9 % (FLUSH) 0.9 %
5-40 SYRINGE (ML) INJECTION EVERY 12 HOURS SCHEDULED
Status: DISCONTINUED | OUTPATIENT
Start: 2023-08-15 | End: 2023-08-17 | Stop reason: HOSPADM

## 2023-08-15 RX ORDER — SODIUM CHLORIDE AND POTASSIUM CHLORIDE 300; 900 MG/100ML; MG/100ML
INJECTION, SOLUTION INTRAVENOUS CONTINUOUS
Status: DISCONTINUED | OUTPATIENT
Start: 2023-08-15 | End: 2023-08-17 | Stop reason: HOSPADM

## 2023-08-15 RX ORDER — ENOXAPARIN SODIUM 100 MG/ML
30 INJECTION SUBCUTANEOUS 2 TIMES DAILY
Status: DISCONTINUED | OUTPATIENT
Start: 2023-08-15 | End: 2023-08-17 | Stop reason: HOSPADM

## 2023-08-15 RX ORDER — MONTELUKAST SODIUM 4 MG/1
TABLET, CHEWABLE ORAL
COMMUNITY
Start: 2023-07-17

## 2023-08-15 RX ORDER — DULOXETIN HYDROCHLORIDE 60 MG/1
CAPSULE, DELAYED RELEASE ORAL
COMMUNITY
Start: 2023-06-23 | End: 2023-08-15 | Stop reason: ALTCHOICE

## 2023-08-15 RX ORDER — KETOROLAC TROMETHAMINE 15 MG/ML
15 INJECTION, SOLUTION INTRAMUSCULAR; INTRAVENOUS
Status: COMPLETED | OUTPATIENT
Start: 2023-08-15 | End: 2023-08-15

## 2023-08-15 RX ADMIN — POTASSIUM CHLORIDE 10 MEQ: 7.46 INJECTION, SOLUTION INTRAVENOUS at 18:55

## 2023-08-15 RX ADMIN — POTASSIUM CHLORIDE 10 MEQ: 7.46 INJECTION, SOLUTION INTRAVENOUS at 11:54

## 2023-08-15 RX ADMIN — POTASSIUM CHLORIDE 10 MEQ: 7.46 INJECTION, SOLUTION INTRAVENOUS at 20:59

## 2023-08-15 RX ADMIN — POTASSIUM CHLORIDE 10 MEQ: 7.46 INJECTION, SOLUTION INTRAVENOUS at 15:47

## 2023-08-15 RX ADMIN — POLYETHYLENE GLYCOL 3350 17 G: 17 POWDER, FOR SOLUTION ORAL at 15:48

## 2023-08-15 RX ADMIN — HYDROXYZINE HYDROCHLORIDE 25 MG: 25 TABLET ORAL at 23:46

## 2023-08-15 RX ADMIN — HYDROMORPHONE HYDROCHLORIDE 0.5 MG: 1 INJECTION, SOLUTION INTRAMUSCULAR; INTRAVENOUS; SUBCUTANEOUS at 15:47

## 2023-08-15 RX ADMIN — POTASSIUM CHLORIDE AND SODIUM CHLORIDE 1000 ML: 900; 300 INJECTION, SOLUTION INTRAVENOUS at 18:54

## 2023-08-15 RX ADMIN — IOPAMIDOL 100 ML: 755 INJECTION, SOLUTION INTRAVENOUS at 11:36

## 2023-08-15 RX ADMIN — HYDROMORPHONE HYDROCHLORIDE 0.5 MG: 1 INJECTION, SOLUTION INTRAMUSCULAR; INTRAVENOUS; SUBCUTANEOUS at 20:01

## 2023-08-15 RX ADMIN — POTASSIUM CHLORIDE 10 MEQ: 7.46 INJECTION, SOLUTION INTRAVENOUS at 17:33

## 2023-08-15 RX ADMIN — KETOROLAC TROMETHAMINE 15 MG: 15 INJECTION, SOLUTION INTRAMUSCULAR; INTRAVENOUS at 14:12

## 2023-08-15 RX ADMIN — POTASSIUM CHLORIDE 40 MEQ: 750 TABLET, EXTENDED RELEASE ORAL at 11:02

## 2023-08-15 ASSESSMENT — ENCOUNTER SYMPTOMS
CONSTIPATION: 1
VOMITING: 0
DIARRHEA: 1
CONSTIPATION: 0
SHORTNESS OF BREATH: 0
COUGH: 0
ABDOMINAL PAIN: 1
NAUSEA: 1

## 2023-08-15 ASSESSMENT — LIFESTYLE VARIABLES
HOW OFTEN DO YOU HAVE A DRINK CONTAINING ALCOHOL: NEVER
HOW MANY STANDARD DRINKS CONTAINING ALCOHOL DO YOU HAVE ON A TYPICAL DAY: PATIENT DOES NOT DRINK

## 2023-08-15 ASSESSMENT — PAIN DESCRIPTION - DESCRIPTORS: DESCRIPTORS: PRESSURE

## 2023-08-15 ASSESSMENT — PAIN - FUNCTIONAL ASSESSMENT: PAIN_FUNCTIONAL_ASSESSMENT: 0-10

## 2023-08-15 ASSESSMENT — PAIN DESCRIPTION - LOCATION
LOCATION: ABDOMEN
LOCATION: ABDOMEN

## 2023-08-15 ASSESSMENT — PAIN DESCRIPTION - ORIENTATION: ORIENTATION: MID

## 2023-08-15 ASSESSMENT — PAIN SCALES - GENERAL
PAINLEVEL_OUTOF10: 9
PAINLEVEL_OUTOF10: 9

## 2023-08-15 NOTE — CONSULTS
Gastroenterology Consult     Referring Physician: ORTIZ Hawkins NP     Consult Date: 8/15/2023     Subjective:     Patient was admitted with yet another episode of abdominal distention associated with dilation of the: She also has some abdominal discomfort but not significant pain she recently had colonic decompression by Dr. Jessica Carrera. She has multiple medical problems including peripheral arterial disease and neuropathy. Past Medical History:   Diagnosis Date    Anxiety     CAD (coronary artery disease)     Fibromyalgia     Hypertension     Neuropathy     Peripheral artery disease ([address])     Psychiatric disorder     anxiety     Past Surgical History:   Procedure Laterality Date    ANKLE FRACTURE SURGERY Left     surgical implants     SECTION      SIGMOIDOSCOPY N/A 2023    SIGMOIDOSCOPY BIOPSY if ok with anesthesiaFLEXIBLE performed by Angelita Stone MD at [facility]      History reviewed. No pertinent family history. Social History     Tobacco Use    Smoking status: Former     Packs/day: 0.50     Types: Cigarettes     Quit date: 2022     Years since quittin.0    Smokeless tobacco: Never   Substance Use Topics    Alcohol use:  Yes      Allergies   Allergen Reactions    Codeine Nausea Only     Current Facility-Administered Medications   Medication Dose Route Frequency    sodium chloride flush 0.9 % injection 5-40 mL  5-40 mL IntraVENous 2 times per day    sodium chloride flush 0.9 % injection 5-40 mL  5-40 mL IntraVENous PRN    0.9 % sodium chloride infusion   IntraVENous PRN    [Held by provider] enoxaparin Sodium (LOVENOX) injection 30 mg  30 mg SubCUTAneous BID    [Held by provider] ondansetron (ZOFRAN-ODT) disintegrating tablet 4 mg  4 mg Oral Q8H PRN    Or    [Held by provider] ondansetron (ZOFRAN) injection 4 mg  4 mg IntraVENous Q6H PRN    polyethylene glycol (GLYCOLAX) packet 17 g  17 g Oral Daily PRN    acetaminophen (TYLENOL) tablet 650 mg  650 mg Oral Q6H PRN    Or

## 2023-08-15 NOTE — ED PROVIDER NOTES
patient was admitted on 728 and discharge recently on August 3. Diagnoses include Acute severe hypokalemia  Metabolic alkalosis  Suspected hyperaldosteronism- renin and aldosterone levels pending  Colonic ileus-: Biopsy pending  Anasarca  Acute on chronic diarrhea  HTN [JS]   1406 Left a perfect serve message for Dr. Sirena Alvarado as well as left a voicemail. Paged a couple of times with no response. [JS]      ED Course User Index  [JS] Bebeto Guillermo MD       Disposition Considerations (Tests not done, Shared Decision Making, Pt Expectation of Test or Treatment.): See ED Course    Patient was given the following medications:  Medications   ketorolac (TORADOL) injection 15 mg (has no administration in time range)   potassium chloride 10 mEq/100 mL IVPB (Peripheral Line) (10 mEq IntraVENous New Bag 8/15/23 1154)   potassium chloride (KLOR-CON) extended release tablet 40 mEq (40 mEq Oral Given 8/15/23 1102)   iopamidol (ISOVUE-370) 76 % injection 100 mL (100 mLs IntraVENous Given 8/15/23 1136)       CONSULTS: (Who and What was discussed)  IP CONSULT TO GI     Social Determinants affecting Dx or Tx: None    Smoking Cessation: Not Applicable    PROCEDURES   Unless otherwise noted above, none  Procedures      CRITICAL CARE TIME   Patient does not meet Critical Care Time, 0 minutes    ED FINAL IMPRESSION     1. Paralytic ileus of small intestine and colon [facility])          DISPOSITION/PLAN   DISPOSITION Decision To Admit 08/15/2023 12:03:19 PM    Admit Note: Pt is being admitted by hospitalist. The results of their tests and reason(s) for their admission have been discussed with pt and/or available family. They convey agreement and understanding for the need to be admitted and for the admission diagnosis. PATIENT REFERRED TO:  No follow-up provider specified.       DISCHARGE MEDICATIONS:     Medication List        ASK your doctor about these medications      atorvastatin 10 MG tablet  Commonly known as: LIPITOR

## 2023-08-15 NOTE — ED NOTES
Assumed care of patient from arias bed. Patient moved to ED04 and placed on hospital bed for comfort while boarding in ED.       Gisela Spaulding RN  08/15/23 6256

## 2023-08-16 ENCOUNTER — ANESTHESIA (OUTPATIENT)
Facility: HOSPITAL | Age: 53
DRG: 247 | End: 2023-08-16
Payer: COMMERCIAL

## 2023-08-16 ENCOUNTER — ANESTHESIA EVENT (OUTPATIENT)
Facility: HOSPITAL | Age: 53
DRG: 247 | End: 2023-08-16
Payer: COMMERCIAL

## 2023-08-16 LAB
ALBUMIN SERPL-MCNC: 3.9 G/DL (ref 3.5–5)
ALBUMIN/GLOB SERPL: 1.1 (ref 1.1–2.2)
ALP SERPL-CCNC: 88 U/L (ref 45–117)
ALT SERPL-CCNC: 12 U/L (ref 12–78)
ANION GAP SERPL CALC-SCNC: 6 MMOL/L (ref 5–15)
AST SERPL W P-5'-P-CCNC: 7 U/L (ref 15–37)
BASOPHILS # BLD: 0 K/UL (ref 0–0.1)
BASOPHILS NFR BLD: 0 % (ref 0–1)
BILIRUB SERPL-MCNC: 0.5 MG/DL (ref 0.2–1)
BUN SERPL-MCNC: 9 MG/DL (ref 6–20)
BUN/CREAT SERPL: 14 (ref 12–20)
CA-I BLD-MCNC: 9.4 MG/DL (ref 8.5–10.1)
CHLORIDE SERPL-SCNC: 112 MMOL/L (ref 97–108)
CO2 SERPL-SCNC: 24 MMOL/L (ref 21–32)
CREAT SERPL-MCNC: 0.65 MG/DL (ref 0.55–1.02)
DIFFERENTIAL METHOD BLD: ABNORMAL
EOSINOPHIL # BLD: 0 K/UL (ref 0–0.4)
EOSINOPHIL NFR BLD: 0 % (ref 0–7)
ERYTHROCYTE [DISTWIDTH] IN BLOOD BY AUTOMATED COUNT: 13.6 % (ref 11.5–14.5)
GLOBULIN SER CALC-MCNC: 3.7 G/DL (ref 2–4)
GLUCOSE SERPL-MCNC: 73 MG/DL (ref 65–100)
HCT VFR BLD AUTO: 37.7 % (ref 35–47)
HGB BLD-MCNC: 11.9 G/DL (ref 11.5–16)
IMM GRANULOCYTES # BLD AUTO: 0 K/UL (ref 0–0.04)
IMM GRANULOCYTES NFR BLD AUTO: 0 % (ref 0–0.5)
LYMPHOCYTES # BLD: 1.2 K/UL (ref 0.8–3.5)
LYMPHOCYTES NFR BLD: 17 % (ref 12–49)
MCH RBC QN AUTO: 28.7 PG (ref 26–34)
MCHC RBC AUTO-ENTMCNC: 31.6 G/DL (ref 30–36.5)
MCV RBC AUTO: 91.1 FL (ref 80–99)
MONOCYTES # BLD: 0.4 K/UL (ref 0–1)
MONOCYTES NFR BLD: 6 % (ref 5–13)
NEUTS SEG # BLD: 5.6 K/UL (ref 1.8–8)
NEUTS SEG NFR BLD: 77 % (ref 32–75)
NRBC # BLD: 0 K/UL (ref 0–0.01)
NRBC BLD-RTO: 0 PER 100 WBC
PLATELET # BLD AUTO: 247 K/UL (ref 150–400)
PMV BLD AUTO: 9 FL (ref 8.9–12.9)
POTASSIUM SERPL-SCNC: 3.7 MMOL/L (ref 3.5–5.1)
PROT SERPL-MCNC: 7.6 G/DL (ref 6.4–8.2)
RBC # BLD AUTO: 4.14 M/UL (ref 3.8–5.2)
SODIUM SERPL-SCNC: 142 MMOL/L (ref 136–145)
WBC # BLD AUTO: 7.3 K/UL (ref 3.6–11)

## 2023-08-16 PROCEDURE — 6370000000 HC RX 637 (ALT 250 FOR IP): Performed by: PHYSICIAN ASSISTANT

## 2023-08-16 PROCEDURE — 6360000002 HC RX W HCPCS: Performed by: INTERNAL MEDICINE

## 2023-08-16 PROCEDURE — 2580000003 HC RX 258: Performed by: PHYSICIAN ASSISTANT

## 2023-08-16 PROCEDURE — 7100000010 HC PHASE II RECOVERY - FIRST 15 MIN: Performed by: INTERNAL MEDICINE

## 2023-08-16 PROCEDURE — 0D7N8ZZ DILATION OF SIGMOID COLON, VIA NATURAL OR ARTIFICIAL OPENING ENDOSCOPIC: ICD-10-PCS | Performed by: INTERNAL MEDICINE

## 2023-08-16 PROCEDURE — 6370000000 HC RX 637 (ALT 250 FOR IP): Performed by: HOSPITALIST

## 2023-08-16 PROCEDURE — 85025 COMPLETE CBC W/AUTO DIFF WBC: CPT

## 2023-08-16 PROCEDURE — 80053 COMPREHEN METABOLIC PANEL: CPT

## 2023-08-16 PROCEDURE — 6360000002 HC RX W HCPCS: Performed by: PHYSICIAN ASSISTANT

## 2023-08-16 PROCEDURE — 7100000011 HC PHASE II RECOVERY - ADDTL 15 MIN: Performed by: INTERNAL MEDICINE

## 2023-08-16 PROCEDURE — 1100000000 HC RM PRIVATE

## 2023-08-16 PROCEDURE — 3600007512: Performed by: INTERNAL MEDICINE

## 2023-08-16 PROCEDURE — 2709999900 HC NON-CHARGEABLE SUPPLY: Performed by: INTERNAL MEDICINE

## 2023-08-16 PROCEDURE — 6360000002 HC RX W HCPCS: Performed by: ANESTHESIOLOGY

## 2023-08-16 PROCEDURE — 36415 COLL VENOUS BLD VENIPUNCTURE: CPT

## 2023-08-16 PROCEDURE — 3600007502: Performed by: INTERNAL MEDICINE

## 2023-08-16 PROCEDURE — 3700000001 HC ADD 15 MINUTES (ANESTHESIA): Performed by: INTERNAL MEDICINE

## 2023-08-16 PROCEDURE — 2500000003 HC RX 250 WO HCPCS: Performed by: ANESTHESIOLOGY

## 2023-08-16 PROCEDURE — 3700000000 HC ANESTHESIA ATTENDED CARE: Performed by: INTERNAL MEDICINE

## 2023-08-16 PROCEDURE — 99254 IP/OBS CNSLTJ NEW/EST MOD 60: CPT | Performed by: COLON & RECTAL SURGERY

## 2023-08-16 RX ORDER — DIPHENHYDRAMINE HYDROCHLORIDE 50 MG/ML
25 INJECTION INTRAMUSCULAR; INTRAVENOUS ONCE
Status: COMPLETED | OUTPATIENT
Start: 2023-08-16 | End: 2023-08-16

## 2023-08-16 RX ORDER — OXYCODONE HYDROCHLORIDE 5 MG/1
5 TABLET ORAL PRN
Status: DISCONTINUED | OUTPATIENT
Start: 2023-08-16 | End: 2023-08-16 | Stop reason: HOSPADM

## 2023-08-16 RX ORDER — MORPHINE SULFATE 15 MG/1
15 TABLET ORAL ONCE AS NEEDED
Status: DISCONTINUED | OUTPATIENT
Start: 2023-08-16 | End: 2023-08-16 | Stop reason: HOSPADM

## 2023-08-16 RX ORDER — SODIUM CHLORIDE 9 MG/ML
INJECTION, SOLUTION INTRAVENOUS PRN
Status: DISCONTINUED | OUTPATIENT
Start: 2023-08-16 | End: 2023-08-16 | Stop reason: HOSPADM

## 2023-08-16 RX ORDER — PROPOFOL 10 MG/ML
INJECTION, EMULSION INTRAVENOUS PRN
Status: DISCONTINUED | OUTPATIENT
Start: 2023-08-16 | End: 2023-08-16 | Stop reason: SDUPTHER

## 2023-08-16 RX ORDER — HYDRALAZINE HYDROCHLORIDE 20 MG/ML
10 INJECTION INTRAMUSCULAR; INTRAVENOUS EVERY 10 MIN PRN
Status: DISCONTINUED | OUTPATIENT
Start: 2023-08-16 | End: 2023-08-16 | Stop reason: HOSPADM

## 2023-08-16 RX ORDER — DIPHENHYDRAMINE HYDROCHLORIDE 50 MG/ML
25 INJECTION INTRAMUSCULAR; INTRAVENOUS EVERY 6 HOURS PRN
Status: DISCONTINUED | OUTPATIENT
Start: 2023-08-16 | End: 2023-08-17 | Stop reason: HOSPADM

## 2023-08-16 RX ORDER — METOCLOPRAMIDE HYDROCHLORIDE 5 MG/ML
10 INJECTION INTRAMUSCULAR; INTRAVENOUS
Status: DISCONTINUED | OUTPATIENT
Start: 2023-08-16 | End: 2023-08-16 | Stop reason: HOSPADM

## 2023-08-16 RX ORDER — ACETAMINOPHEN 500 MG
1000 TABLET ORAL ONCE AS NEEDED
Status: DISCONTINUED | OUTPATIENT
Start: 2023-08-16 | End: 2023-08-16 | Stop reason: HOSPADM

## 2023-08-16 RX ORDER — LIDOCAINE HYDROCHLORIDE 20 MG/ML
INJECTION, SOLUTION EPIDURAL; INFILTRATION; INTRACAUDAL; PERINEURAL PRN
Status: DISCONTINUED | OUTPATIENT
Start: 2023-08-16 | End: 2023-08-16 | Stop reason: SDUPTHER

## 2023-08-16 RX ORDER — LORAZEPAM 2 MG/ML
0.5 INJECTION INTRAMUSCULAR
Status: DISCONTINUED | OUTPATIENT
Start: 2023-08-16 | End: 2023-08-16 | Stop reason: HOSPADM

## 2023-08-16 RX ORDER — IPRATROPIUM BROMIDE AND ALBUTEROL SULFATE 2.5; .5 MG/3ML; MG/3ML
1 SOLUTION RESPIRATORY (INHALATION)
Status: DISCONTINUED | OUTPATIENT
Start: 2023-08-16 | End: 2023-08-16 | Stop reason: HOSPADM

## 2023-08-16 RX ORDER — DIPHENHYDRAMINE HYDROCHLORIDE 50 MG/ML
12.5 INJECTION INTRAMUSCULAR; INTRAVENOUS
Status: DISCONTINUED | OUTPATIENT
Start: 2023-08-16 | End: 2023-08-16 | Stop reason: HOSPADM

## 2023-08-16 RX ORDER — ONDANSETRON 2 MG/ML
4 INJECTION INTRAMUSCULAR; INTRAVENOUS
Status: DISCONTINUED | OUTPATIENT
Start: 2023-08-16 | End: 2023-08-16 | Stop reason: HOSPADM

## 2023-08-16 RX ORDER — LABETALOL HYDROCHLORIDE 5 MG/ML
10 INJECTION, SOLUTION INTRAVENOUS EVERY 10 MIN PRN
Status: DISCONTINUED | OUTPATIENT
Start: 2023-08-16 | End: 2023-08-16 | Stop reason: HOSPADM

## 2023-08-16 RX ORDER — OXYCODONE HYDROCHLORIDE 5 MG/1
5 TABLET ORAL ONCE AS NEEDED
Status: DISCONTINUED | OUTPATIENT
Start: 2023-08-16 | End: 2023-08-16 | Stop reason: HOSPADM

## 2023-08-16 RX ORDER — METOPROLOL TARTRATE 5 MG/5ML
5 INJECTION INTRAVENOUS EVERY 10 MIN PRN
Status: DISCONTINUED | OUTPATIENT
Start: 2023-08-16 | End: 2023-08-16 | Stop reason: HOSPADM

## 2023-08-16 RX ORDER — HYDROMORPHONE HYDROCHLORIDE 2 MG/1
1 TABLET ORAL ONCE AS NEEDED
Status: DISCONTINUED | OUTPATIENT
Start: 2023-08-16 | End: 2023-08-16 | Stop reason: HOSPADM

## 2023-08-16 RX ORDER — LABETALOL HYDROCHLORIDE 5 MG/ML
10 INJECTION, SOLUTION INTRAVENOUS
Status: DISCONTINUED | OUTPATIENT
Start: 2023-08-16 | End: 2023-08-16 | Stop reason: HOSPADM

## 2023-08-16 RX ORDER — SODIUM CHLORIDE, SODIUM LACTATE, POTASSIUM CHLORIDE, CALCIUM CHLORIDE 600; 310; 30; 20 MG/100ML; MG/100ML; MG/100ML; MG/100ML
INJECTION, SOLUTION INTRAVENOUS ONCE
Status: DISCONTINUED | OUTPATIENT
Start: 2023-08-16 | End: 2023-08-16 | Stop reason: HOSPADM

## 2023-08-16 RX ORDER — HYDRALAZINE HYDROCHLORIDE 20 MG/ML
10 INJECTION INTRAMUSCULAR; INTRAVENOUS
Status: DISCONTINUED | OUTPATIENT
Start: 2023-08-16 | End: 2023-08-16 | Stop reason: HOSPADM

## 2023-08-16 RX ORDER — SODIUM CHLORIDE 0.9 % (FLUSH) 0.9 %
5-40 SYRINGE (ML) INJECTION PRN
Status: DISCONTINUED | OUTPATIENT
Start: 2023-08-16 | End: 2023-08-16 | Stop reason: HOSPADM

## 2023-08-16 RX ORDER — FENTANYL CITRATE 50 UG/ML
50 INJECTION, SOLUTION INTRAMUSCULAR; INTRAVENOUS EVERY 5 MIN PRN
Status: DISCONTINUED | OUTPATIENT
Start: 2023-08-16 | End: 2023-08-16 | Stop reason: HOSPADM

## 2023-08-16 RX ORDER — OXYCODONE HYDROCHLORIDE 5 MG/1
10 TABLET ORAL PRN
Status: DISCONTINUED | OUTPATIENT
Start: 2023-08-16 | End: 2023-08-16 | Stop reason: HOSPADM

## 2023-08-16 RX ORDER — HYDROMORPHONE HYDROCHLORIDE 1 MG/ML
0.5 INJECTION, SOLUTION INTRAMUSCULAR; INTRAVENOUS; SUBCUTANEOUS EVERY 5 MIN PRN
Status: DISCONTINUED | OUTPATIENT
Start: 2023-08-16 | End: 2023-08-16 | Stop reason: HOSPADM

## 2023-08-16 RX ORDER — MEPERIDINE HYDROCHLORIDE 25 MG/ML
12.5 INJECTION INTRAMUSCULAR; INTRAVENOUS; SUBCUTANEOUS EVERY 5 MIN PRN
Status: DISCONTINUED | OUTPATIENT
Start: 2023-08-16 | End: 2023-08-16 | Stop reason: HOSPADM

## 2023-08-16 RX ORDER — SODIUM CHLORIDE 0.9 % (FLUSH) 0.9 %
5-40 SYRINGE (ML) INJECTION EVERY 12 HOURS SCHEDULED
Status: DISCONTINUED | OUTPATIENT
Start: 2023-08-16 | End: 2023-08-16 | Stop reason: HOSPADM

## 2023-08-16 RX ADMIN — PREGABALIN 150 MG: 100 CAPSULE ORAL at 22:47

## 2023-08-16 RX ADMIN — SODIUM CHLORIDE, PRESERVATIVE FREE 10 ML: 5 INJECTION INTRAVENOUS at 09:01

## 2023-08-16 RX ADMIN — DIPHENHYDRAMINE HYDROCHLORIDE 25 MG: 50 INJECTION, SOLUTION INTRAMUSCULAR; INTRAVENOUS at 03:58

## 2023-08-16 RX ADMIN — PROPOFOL 50 MG: 10 INJECTION, EMULSION INTRAVENOUS at 12:05

## 2023-08-16 RX ADMIN — HYDROXYZINE HYDROCHLORIDE 25 MG: 25 TABLET ORAL at 19:45

## 2023-08-16 RX ADMIN — SPIRONOLACTONE 50 MG: 25 TABLET ORAL at 17:14

## 2023-08-16 RX ADMIN — POTASSIUM CHLORIDE 20 MEQ: 1500 TABLET, EXTENDED RELEASE ORAL at 22:48

## 2023-08-16 RX ADMIN — HYDROMORPHONE HYDROCHLORIDE 0.5 MG: 1 INJECTION, SOLUTION INTRAMUSCULAR; INTRAVENOUS; SUBCUTANEOUS at 13:29

## 2023-08-16 RX ADMIN — CHOLESTYRAMINE 2 G: 4 POWDER, FOR SUSPENSION ORAL at 22:48

## 2023-08-16 RX ADMIN — LIDOCAINE HYDROCHLORIDE 100 MG: 20 INJECTION, SOLUTION EPIDURAL; INFILTRATION; INTRACAUDAL; PERINEURAL at 11:57

## 2023-08-16 RX ADMIN — PROPOFOL 50 MG: 10 INJECTION, EMULSION INTRAVENOUS at 12:09

## 2023-08-16 RX ADMIN — HYDROMORPHONE HYDROCHLORIDE 0.5 MG: 1 INJECTION, SOLUTION INTRAMUSCULAR; INTRAVENOUS; SUBCUTANEOUS at 09:01

## 2023-08-16 RX ADMIN — FENTANYL CITRATE 50 MCG: 50 INJECTION, SOLUTION INTRAMUSCULAR; INTRAVENOUS at 12:39

## 2023-08-16 RX ADMIN — POTASSIUM CHLORIDE AND SODIUM CHLORIDE: 900; 300 INJECTION, SOLUTION INTRAVENOUS at 17:14

## 2023-08-16 RX ADMIN — DIPHENHYDRAMINE HYDROCHLORIDE 25 MG: 50 INJECTION, SOLUTION INTRAMUSCULAR; INTRAVENOUS at 22:48

## 2023-08-16 RX ADMIN — PROPOFOL 50 MG: 10 INJECTION, EMULSION INTRAVENOUS at 12:01

## 2023-08-16 RX ADMIN — PROPOFOL 100 MG: 10 INJECTION, EMULSION INTRAVENOUS at 11:57

## 2023-08-16 RX ADMIN — HYDROMORPHONE HYDROCHLORIDE 0.5 MG: 1 INJECTION, SOLUTION INTRAMUSCULAR; INTRAVENOUS; SUBCUTANEOUS at 17:32

## 2023-08-16 RX ADMIN — POTASSIUM CHLORIDE AND SODIUM CHLORIDE: 900; 300 INJECTION, SOLUTION INTRAVENOUS at 04:05

## 2023-08-16 ASSESSMENT — LIFESTYLE VARIABLES: SMOKING_STATUS: 1

## 2023-08-16 ASSESSMENT — ENCOUNTER SYMPTOMS
ABDOMINAL DISTENTION: 1
VOMITING: 0
NAUSEA: 1
SHORTNESS OF BREATH: 0
COUGH: 0
ABDOMINAL PAIN: 1

## 2023-08-16 ASSESSMENT — PAIN SCALES - GENERAL
PAINLEVEL_OUTOF10: 10
PAINLEVEL_OUTOF10: 8
PAINLEVEL_OUTOF10: 7
PAINLEVEL_OUTOF10: 5
PAINLEVEL_OUTOF10: 8
PAINLEVEL_OUTOF10: 5

## 2023-08-16 ASSESSMENT — PAIN DESCRIPTION - LOCATION: LOCATION: ABDOMEN

## 2023-08-16 ASSESSMENT — PAIN DESCRIPTION - ORIENTATION: ORIENTATION: RIGHT

## 2023-08-16 NOTE — ADDENDUM NOTE
Addendum  created 08/16/23 1235 by Santana Harrison MD    Order list changed, Pharmacy for encounter modified

## 2023-08-16 NOTE — CONSULTS
Patient is supposed to see Dr. Lizzy Carrington as outpatient. Will defer management to Dr. Lizzy Carrington.   Thank you

## 2023-08-16 NOTE — ANESTHESIA POSTPROCEDURE EVALUATION
Department of Anesthesiology  Postprocedure Note    Patient: Kayla Saravia  MRN: [id number]  YOB: 1970  Date of evaluation: 8/16/2023      Procedure Summary     Date: 08/16/23 Room / Location: [facility] ENDO 04 / [facility] ENDOSCOPY    Anesthesia Start: 1150 Anesthesia Stop: 1213    Procedure: COLONOSCOPY FLEXIBLE W/ DECOMPRESSION (Lower GI Region) Diagnosis:       Abdominal distension      (Abdominal distension [R14.0])    Surgeons: Laura Saeed MD Responsible Provider: Della Lieberman MD    Anesthesia Type: MAC, TIVA ASA Status: 3          Anesthesia Type: No value filed. Debby Phase I: Debby Score: 10    Debby Phase II:        Anesthesia Post Evaluation    Patient location during evaluation: bedside (Endoscopy Unit)  Patient participation: complete - patient participated  Level of consciousness: sleepy but conscious  Pain score: 0  Airway patency: patent  Nausea & Vomiting: no nausea and no vomiting  Complications: no  Cardiovascular status: hemodynamically stable  Respiratory status: acceptable  Hydration status: stable  Comments: This patient remained on the stretcher. The patient was handed off to the endoscopy nursing team.  All questions regarding pre-, intra-, and postoperative care were answered.   Multimodal analgesia pain management approach

## 2023-08-16 NOTE — CONSULTS
Consult Date: 2023    Inpatient consult to General Surgery  Consult performed by: Ghanshyam Kennedy MD  Consult ordered by: Ntia Woodward MD        Subjective   Patient is a 76-year-old female who is a resident at [address] [place] [address] who presented to the emergency department with complaints of abdominal distention and pain and constipation. Interestingly she had been admitted to the hospital from  to August 3 for hypokalemia and abdominal distention colonic ileus. She was treated with sigmoidoscope decompression and rectal tube placement. Patient states she is doing well and having regular bowel movements until she became a resident at [facility] [employer]. She said after that started having problems with constipation as well as abdominal distention. In the emergency department she had blood work done was remarkable for significant hypokalemia potassium of 3.0. The remainder of her labs were unremarkable. She had a CT scan which demonstrated massive colonic distention through to the rectum. Earlier today she underwent a decompressive colonoscopy. At time my examination she was still distended however said that her abdomen felt better. I said goodbye  Past Medical History:   Diagnosis Date    Anxiety     CAD (coronary artery disease)     Fibromyalgia     Hypertension     Neuropathy     Peripheral artery disease ([address])     Psychiatric disorder     anxiety      Past Surgical History:   Procedure Laterality Date    ANKLE FRACTURE SURGERY Left     surgical implants     SECTION      COLONOSCOPY N/A 2023    COLONOSCOPY FLEXIBLE W/ DECOMPRESSION performed by Nita Woodward MD at [facility] N/A 2023    SIGMOIDOSCOPY BIOPSY if ok with anesthesiaFLEXIBLE performed by Luci Ayala MD at [address] [place] [address]     History reviewed. No pertinent family history.    Social History     Tobacco Use    Smoking status: Former     Packs/day: 0.50     Types: Cigarettes     Quit

## 2023-08-16 NOTE — ED NOTES
Called pharmacy to verify orders for runs of IV potassium. Informed pharmacist that there were duplicate orders for which three bags of potassium had been scanned between and now orders are  before fourth (last) bag was scanned. Pharmacist stated that a one time order would have to be entered to scan and hang this last bag. Order entered and waiting for it to be verified by a pharmacist at this time.      Char Ellison  98/97/30

## 2023-08-16 NOTE — PROGRESS NOTES
Patient complained of persistent body itch, Dr Ricardo Torres notified and ordered one time dose of IV benadryl 25 mg.

## 2023-08-16 NOTE — CARE COORDINATION
4865: CM has reviewed pt chart. CT abd showed massive distention of colon through rectum with colonic ileus.  GI and Gen surg consulted    DCP: D/c'd to [facility] at last admission

## 2023-08-16 NOTE — CARE COORDINATION
08/16/23 1101   Service Assessment   Patient Orientation Alert and Oriented   Cognition Alert   History Provided By Patient   Primary Caregiver Self   Accompanied By/Relationship alone   Support Systems Spouse/Significant Other;Children   Patient's Healthcare Decision Maker is: Legal Next of Kin   PCP Verified by CM Yes   Last Visit to PCP Within last 6 months   Prior Functional Level Independent in ADLs/IADLs;Assistance with the following:;Mobility; Toileting   Current Functional Level Mobility;Assistance with the following:;Independent in ADLs/IADLs; Toileting   Can patient return to prior living arrangement Yes   Ability to make needs known: Good   Family able to assist with home care needs: Yes   Would you like for me to discuss the discharge plan with any other family members/significant others, and if so, who? Yes  (Pancho Jimenez to speak with  Bhumi Destin (cousin))   Financial Resources Medicaid   Community Resources Transportation   Social/Functional History   Lives With Significant other;Daughter   Type of [facility]  Two level   [address] to enter with rails   Entrance Stairs - Rails Both   ADL Assistance Needs assistance   Bath Modified independent   Dressing Modified independent   Grooming Modified independent    Feeding Modified independent    Toileting Needs assistance   [facility]   Ambulation Assistance Needs assistance   Transfer Assistance Independent   Active  No   Occupation On disability   Discharge Planning   Type of [facility] Spouse/Significant Other;Children   Current Services Prior To Admission [address] Needed N/A   DME Ordered? No   Potential Assistance Purchasing Medications No   Type of Home Care Services None   Patient expects to be discharged to: Skilled nursing facility   One/Two Story Residence Two story   History of falls?  0   Services At/After

## 2023-08-16 NOTE — PROGRESS NOTES
Pt refusing to keep flexi seal in. Pt states \" Sudeep Wyatt rip it out, I can't stand it\".     Flexi removed

## 2023-08-16 NOTE — ED NOTES
Patient complaining of \"itching all over\" after last round of IV Potassium. Provider Veronica Vizcaino via [employer] about issue and requested that another provider be contacted. Spoke with ED hospitalist Dr. Shelia Tejeda and verbal orders for oral hydroxyzine were placed.       Rl choudhary RN  46/06/75 0626

## 2023-08-16 NOTE — CARE COORDINATION
08/16/23 1111   Readmission Assessment   Number of Days since last admission? 8-30 days   Previous Disposition SNF  ([facility])   Who is being Interviewed Patient   What was the patient's/caregiver's perception as to why they think they needed to return back to the hospital? Could not/did not make appointment with PCP  (Pt f/u GI appt 8/24)   Did you visit your Primary Care Physician after you left the hospital, before you returned this time? No   Why weren't you able to visit your PCP? Did not have an appointment   Did you see a specialist, such as Cardiac, Pulmonary, Orthopedic Physician, etc. after you left the hospital? No  (Pt g/u GI appt 8/24)   Who advised the patient to return to the hospital? Skilled Unit   Does the patient report anything that got in the way of taking their medications?  No

## 2023-08-17 ENCOUNTER — APPOINTMENT (OUTPATIENT)
Facility: HOSPITAL | Age: 53
DRG: 247 | End: 2023-08-17
Payer: COMMERCIAL

## 2023-08-17 VITALS
BODY MASS INDEX: 38.78 KG/M2 | DIASTOLIC BLOOD PRESSURE: 79 MMHG | RESPIRATION RATE: 16 BRPM | HEART RATE: 83 BPM | HEIGHT: 71 IN | TEMPERATURE: 97.9 F | SYSTOLIC BLOOD PRESSURE: 120 MMHG | WEIGHT: 277 LBS | OXYGEN SATURATION: 100 %

## 2023-08-17 PROBLEM — G62.9 PERIPHERAL NEUROPATHY: Status: ACTIVE | Noted: 2023-08-17

## 2023-08-17 LAB
ALBUMIN SERPL-MCNC: 3.5 G/DL (ref 3.5–5)
ALBUMIN/GLOB SERPL: 0.9 (ref 1.1–2.2)
ALP SERPL-CCNC: 79 U/L (ref 45–117)
ALT SERPL-CCNC: 10 U/L (ref 12–78)
ANION GAP SERPL CALC-SCNC: 5 MMOL/L (ref 5–15)
AST SERPL W P-5'-P-CCNC: 6 U/L (ref 15–37)
BASOPHILS # BLD: 0 K/UL (ref 0–0.1)
BASOPHILS NFR BLD: 0 % (ref 0–1)
BILIRUB SERPL-MCNC: 0.5 MG/DL (ref 0.2–1)
BUN SERPL-MCNC: 4 MG/DL (ref 6–20)
BUN/CREAT SERPL: 6 (ref 12–20)
CA-I BLD-MCNC: 9.3 MG/DL (ref 8.5–10.1)
CHLORIDE SERPL-SCNC: 115 MMOL/L (ref 97–108)
CO2 SERPL-SCNC: 27 MMOL/L (ref 21–32)
CREAT SERPL-MCNC: 0.66 MG/DL (ref 0.55–1.02)
DIFFERENTIAL METHOD BLD: NORMAL
EOSINOPHIL # BLD: 0 K/UL (ref 0–0.4)
EOSINOPHIL NFR BLD: 0 % (ref 0–7)
ERYTHROCYTE [DISTWIDTH] IN BLOOD BY AUTOMATED COUNT: 13.6 % (ref 11.5–14.5)
GLOBULIN SER CALC-MCNC: 3.7 G/DL (ref 2–4)
GLUCOSE SERPL-MCNC: 82 MG/DL (ref 65–100)
HCT VFR BLD AUTO: 37.9 % (ref 35–47)
HGB BLD-MCNC: 12.1 G/DL (ref 11.5–16)
IMM GRANULOCYTES # BLD AUTO: 0 K/UL (ref 0–0.04)
IMM GRANULOCYTES NFR BLD AUTO: 0 % (ref 0–0.5)
LYMPHOCYTES # BLD: 1.3 K/UL (ref 0.8–3.5)
LYMPHOCYTES NFR BLD: 23 % (ref 12–49)
MCH RBC QN AUTO: 28.6 PG (ref 26–34)
MCHC RBC AUTO-ENTMCNC: 31.9 G/DL (ref 30–36.5)
MCV RBC AUTO: 89.6 FL (ref 80–99)
MONOCYTES # BLD: 0.4 K/UL (ref 0–1)
MONOCYTES NFR BLD: 7 % (ref 5–13)
NEUTS SEG # BLD: 3.8 K/UL (ref 1.8–8)
NEUTS SEG NFR BLD: 70 % (ref 32–75)
NRBC # BLD: 0 K/UL (ref 0–0.01)
NRBC BLD-RTO: 0 PER 100 WBC
PLATELET # BLD AUTO: 229 K/UL (ref 150–400)
PMV BLD AUTO: 9 FL (ref 8.9–12.9)
POTASSIUM SERPL-SCNC: 3.5 MMOL/L (ref 3.5–5.1)
PROT SERPL-MCNC: 7.2 G/DL (ref 6.4–8.2)
RBC # BLD AUTO: 4.23 M/UL (ref 3.8–5.2)
SODIUM SERPL-SCNC: 147 MMOL/L (ref 136–145)
WBC # BLD AUTO: 5.6 K/UL (ref 3.6–11)

## 2023-08-17 PROCEDURE — 6370000000 HC RX 637 (ALT 250 FOR IP): Performed by: PHYSICIAN ASSISTANT

## 2023-08-17 PROCEDURE — 99232 SBSQ HOSP IP/OBS MODERATE 35: CPT | Performed by: COLON & RECTAL SURGERY

## 2023-08-17 PROCEDURE — 6360000002 HC RX W HCPCS: Performed by: PHYSICIAN ASSISTANT

## 2023-08-17 PROCEDURE — 36415 COLL VENOUS BLD VENIPUNCTURE: CPT

## 2023-08-17 PROCEDURE — 85025 COMPLETE CBC W/AUTO DIFF WBC: CPT

## 2023-08-17 PROCEDURE — 74018 RADEX ABDOMEN 1 VIEW: CPT

## 2023-08-17 PROCEDURE — 80053 COMPREHEN METABOLIC PANEL: CPT

## 2023-08-17 RX ORDER — GAUZE BANDAGE 2" X 2"
50 BANDAGE TOPICAL DAILY
Status: DISCONTINUED | OUTPATIENT
Start: 2023-08-17 | End: 2023-08-17 | Stop reason: HOSPADM

## 2023-08-17 RX ORDER — DIPHENHYDRAMINE HYDROCHLORIDE 50 MG/ML
25 INJECTION INTRAMUSCULAR; INTRAVENOUS EVERY 6 HOURS PRN
Status: CANCELLED | OUTPATIENT
Start: 2023-08-16

## 2023-08-17 RX ADMIN — POTASSIUM CHLORIDE 20 MEQ: 1500 TABLET, EXTENDED RELEASE ORAL at 09:22

## 2023-08-17 RX ADMIN — PREGABALIN 150 MG: 100 CAPSULE ORAL at 09:22

## 2023-08-17 RX ADMIN — PREGABALIN 150 MG: 100 CAPSULE ORAL at 14:30

## 2023-08-17 RX ADMIN — LOSARTAN POTASSIUM 100 MG: 50 TABLET, FILM COATED ORAL at 09:22

## 2023-08-17 RX ADMIN — HYDROMORPHONE HYDROCHLORIDE 0.5 MG: 1 INJECTION, SOLUTION INTRAMUSCULAR; INTRAVENOUS; SUBCUTANEOUS at 09:23

## 2023-08-17 RX ADMIN — DULOXETINE HYDROCHLORIDE 30 MG: 30 CAPSULE, DELAYED RELEASE ORAL at 09:23

## 2023-08-17 RX ADMIN — POTASSIUM CHLORIDE AND SODIUM CHLORIDE: 900; 300 INJECTION, SOLUTION INTRAVENOUS at 08:36

## 2023-08-17 RX ADMIN — THIAMINE HCL TAB 100 MG 50 MG: 100 TAB at 09:21

## 2023-08-17 RX ADMIN — ATORVASTATIN CALCIUM 10 MG: 10 TABLET, FILM COATED ORAL at 09:22

## 2023-08-17 RX ADMIN — FERROUS SULFATE TAB 325 MG (65 MG ELEMENTAL FE) 325 MG: 325 (65 FE) TAB at 09:21

## 2023-08-17 RX ADMIN — SERTRALINE HYDROCHLORIDE 25 MG: 50 TABLET ORAL at 09:22

## 2023-08-17 RX ADMIN — FOLIC ACID 1 MG: 1 TABLET ORAL at 09:22

## 2023-08-17 RX ADMIN — SPIRONOLACTONE 50 MG: 25 TABLET ORAL at 09:31

## 2023-08-17 ASSESSMENT — ENCOUNTER SYMPTOMS
DIARRHEA: 1
ABDOMINAL PAIN: 1
COUGH: 0
NAUSEA: 0
SHORTNESS OF BREATH: 0
BLOOD IN STOOL: 0
VOMITING: 0

## 2023-08-17 ASSESSMENT — PAIN SCALES - GENERAL
PAINLEVEL_OUTOF10: 0
PAINLEVEL_OUTOF10: 10

## 2023-08-17 ASSESSMENT — PAIN SCALES - WONG BAKER: WONGBAKER_NUMERICALRESPONSE: 0

## 2023-08-17 ASSESSMENT — PAIN DESCRIPTION - LOCATION: LOCATION: ABDOMEN

## 2023-08-17 NOTE — PROGRESS NOTES
Discharge plan of care/case management plan validated with provider discharge order. Report called to [facility] spoke to nurse Skye MENDEZ. Patient left via stretcher.

## 2023-08-17 NOTE — CARE COORDINATION
6330: CM has reviewed pt chart    Pt endoscopically decompressed yest. Gen surg rec total colectomy with end ileostomy, but pt has declined that surgery at this time. DCP: C @ [facility]    4313: DEBORA reached out to [facility] to let know dc order is in. Awaiting reply from [facility] to see what time they can accept.

## 2023-08-17 NOTE — CARE COORDINATION
CM to have stretcher transport back to [address]. Transport set for 2:30p. Nurse can call report to [phone]    Transition of Care Plan:    RUR: 17%  Prior Level of Functioning: LTC   Disposition: LTC  If SNF or IPR: Date FOC offered: 8/15/23  Date FOC received:   Accepting facility:   Date authorization started with reference number:   Date authorization received and expires: Follow up appointments: unit secretary will set up as needed  DME needed: none  Transportation at discharge: stretcher  IM/IMM Medicare/ letter given: n/a  Is patient a  and connected with VA? no   If yes, was [employer] transfer form completed and VA notified? Caregiver Contact: vm left with Les Barroso pt sister  Discharge Caregiver contacted prior to discharge?  VM left prior to [address] needed? no  Barriers to discharge: none

## 2023-09-13 ENCOUNTER — OFFICE VISIT (OUTPATIENT)
Age: 53
End: 2023-09-13
Payer: COMMERCIAL

## 2023-09-13 VITALS
HEART RATE: 83 BPM | RESPIRATION RATE: 18 BRPM | HEIGHT: 71 IN | BODY MASS INDEX: 38.63 KG/M2 | SYSTOLIC BLOOD PRESSURE: 114 MMHG | DIASTOLIC BLOOD PRESSURE: 78 MMHG | TEMPERATURE: 98.5 F | OXYGEN SATURATION: 98 %

## 2023-09-13 DIAGNOSIS — K63.89 COLON DISTENTION: Primary | ICD-10-CM

## 2023-09-13 PROCEDURE — 99213 OFFICE O/P EST LOW 20 MIN: CPT | Performed by: COLON & RECTAL SURGERY

## 2023-09-13 ASSESSMENT — PATIENT HEALTH QUESTIONNAIRE - PHQ9
SUM OF ALL RESPONSES TO PHQ9 QUESTIONS 1 & 2: 0
2. FEELING DOWN, DEPRESSED OR HOPELESS: 0
1. LITTLE INTEREST OR PLEASURE IN DOING THINGS: 0
SUM OF ALL RESPONSES TO PHQ QUESTIONS 1-9: 0

## 2023-09-13 NOTE — PROGRESS NOTES
OFFICE VISIT NOTE    Anjel Guillen is a 46 y.o. female who presents to the office today for:    Chief Complaint   Patient presents with    Follow-up     Ileus small bowel problem       Ms. Cristina Brady comes in today for follow-up of her recent hospitalization for colonic distention abdominal pain. She underwent a colonoscopic decompression and was discharged on hospital tolerating a diet having bowel movements. Today she states she continues to have bowel movements although states for the most part they are loose. She does state that her abdomen is distended but denies any pain. She is tolerating diet with no nausea or vomiting. Past Medical History:   Diagnosis Date    Anxiety     CAD (coronary artery disease)     Fibromyalgia     Hypertension     Neuropathy     Peripheral artery disease ([address])     Psychiatric disorder     anxiety       Past Surgical History:   Procedure Laterality Date    ANKLE FRACTURE SURGERY Left     surgical implants     SECTION      COLONOSCOPY N/A 2023    COLONOSCOPY FLEXIBLE W/ DECOMPRESSION performed by Harriet Lopez MD at [facility] N/A 2023    SIGMOIDOSCOPY BIOPSY if ok with anesthesiaFLEXIBLE performed by Mary Young MD at [address] [place] [address]       History reviewed. No pertinent family history.     Social History     Socioeconomic History    Marital status: Legally      Spouse name: Not on file    Number of children: Not on file    Years of education: Not on file    Highest education level: Not on file   Occupational History    Not on file   Tobacco Use    Smoking status: Former     Packs/day: .5     Types: Cigarettes     Quit date: 2022     Years since quittin.1    Smokeless tobacco: Never   Substance and Sexual Activity    Alcohol use: Yes    Drug use: Not Currently    Sexual activity: Not on file   Other Topics Concern

## 2023-11-02 ENCOUNTER — ANESTHESIA EVENT (OUTPATIENT)
Facility: HOSPITAL | Age: 53
End: 2023-11-02
Payer: COMMERCIAL

## 2023-11-02 ENCOUNTER — ANESTHESIA (OUTPATIENT)
Facility: HOSPITAL | Age: 53
End: 2023-11-02
Payer: COMMERCIAL

## 2023-11-02 ENCOUNTER — HOSPITAL ENCOUNTER (OUTPATIENT)
Facility: HOSPITAL | Age: 53
Setting detail: OUTPATIENT SURGERY
Discharge: HOME | End: 2023-11-02
Attending: INTERNAL MEDICINE | Admitting: INTERNAL MEDICINE
Payer: COMMERCIAL

## 2023-11-02 VITALS
SYSTOLIC BLOOD PRESSURE: 103 MMHG | HEART RATE: 77 BPM | TEMPERATURE: 97.6 F | DIASTOLIC BLOOD PRESSURE: 64 MMHG | OXYGEN SATURATION: 96 % | RESPIRATION RATE: 18 BRPM

## 2023-11-02 PROCEDURE — 2580000003 HC RX 258: Performed by: NURSE ANESTHETIST, CERTIFIED REGISTERED

## 2023-11-02 PROCEDURE — 2500000003 HC RX 250 WO HCPCS: Performed by: NURSE ANESTHETIST, CERTIFIED REGISTERED

## 2023-11-02 PROCEDURE — 7100000011 HC PHASE II RECOVERY - ADDTL 15 MIN: Performed by: INTERNAL MEDICINE

## 2023-11-02 PROCEDURE — 6360000002 HC RX W HCPCS: Performed by: NURSE ANESTHETIST, CERTIFIED REGISTERED

## 2023-11-02 PROCEDURE — 7100000010 HC PHASE II RECOVERY - FIRST 15 MIN: Performed by: INTERNAL MEDICINE

## 2023-11-02 PROCEDURE — 3700000000 HC ANESTHESIA ATTENDED CARE: Performed by: INTERNAL MEDICINE

## 2023-11-02 PROCEDURE — 3600007502: Performed by: INTERNAL MEDICINE

## 2023-11-02 PROCEDURE — 3700000001 HC ADD 15 MINUTES (ANESTHESIA): Performed by: INTERNAL MEDICINE

## 2023-11-02 PROCEDURE — 3600007512: Performed by: INTERNAL MEDICINE

## 2023-11-02 PROCEDURE — 2709999900 HC NON-CHARGEABLE SUPPLY: Performed by: INTERNAL MEDICINE

## 2023-11-02 RX ORDER — SODIUM CHLORIDE 9 MG/ML
INJECTION, SOLUTION INTRAVENOUS CONTINUOUS
Status: DISCONTINUED | OUTPATIENT
Start: 2023-11-02 | End: 2023-11-02 | Stop reason: HOSPADM

## 2023-11-02 RX ORDER — SODIUM CHLORIDE, SODIUM LACTATE, POTASSIUM CHLORIDE, CALCIUM CHLORIDE 600; 310; 30; 20 MG/100ML; MG/100ML; MG/100ML; MG/100ML
INJECTION, SOLUTION INTRAVENOUS CONTINUOUS PRN
Status: DISCONTINUED | OUTPATIENT
Start: 2023-11-02 | End: 2023-11-02 | Stop reason: SDUPTHER

## 2023-11-02 RX ORDER — PROPOFOL 10 MG/ML
INJECTION, EMULSION INTRAVENOUS PRN
Status: DISCONTINUED | OUTPATIENT
Start: 2023-11-02 | End: 2023-11-02 | Stop reason: SDUPTHER

## 2023-11-02 RX ORDER — SODIUM CHLORIDE, SODIUM LACTATE, POTASSIUM CHLORIDE, CALCIUM CHLORIDE 600; 310; 30; 20 MG/100ML; MG/100ML; MG/100ML; MG/100ML
INJECTION, SOLUTION INTRAVENOUS CONTINUOUS
Status: DISCONTINUED | OUTPATIENT
Start: 2023-11-02 | End: 2023-11-02 | Stop reason: HOSPADM

## 2023-11-02 RX ADMIN — LIDOCAINE HYDROCHLORIDE 80 MG: 20 INJECTION, SOLUTION EPIDURAL; INFILTRATION; INTRACAUDAL; PERINEURAL at 14:52

## 2023-11-02 RX ADMIN — PROPOFOL 50 MG: 10 INJECTION, EMULSION INTRAVENOUS at 14:53

## 2023-11-02 RX ADMIN — PROPOFOL 50 MG: 10 INJECTION, EMULSION INTRAVENOUS at 14:54

## 2023-11-02 RX ADMIN — SODIUM CHLORIDE, POTASSIUM CHLORIDE, SODIUM LACTATE AND CALCIUM CHLORIDE: 600; 310; 30; 20 INJECTION, SOLUTION INTRAVENOUS at 14:49

## 2023-11-02 RX ADMIN — PROPOFOL 50 MG: 10 INJECTION, EMULSION INTRAVENOUS at 14:51

## 2023-11-02 RX ADMIN — PROPOFOL 50 MG: 10 INJECTION, EMULSION INTRAVENOUS at 14:52

## 2023-11-02 ASSESSMENT — PAIN - FUNCTIONAL ASSESSMENT: PAIN_FUNCTIONAL_ASSESSMENT: NONE - DENIES PAIN

## 2023-11-02 ASSESSMENT — LIFESTYLE VARIABLES: SMOKING_STATUS: 1

## 2023-11-02 NOTE — PERIOP NOTE
Discharge instructions given at bedside to [address]. Pt vital signs stable and she shows no signs of distress. IV removed without complication. Pt verbalized understanding to follow up with Dr. Flynn Ferrara in 6 months. Patient was wheeled down by [employer] and left via transportation service.

## 2023-11-03 NOTE — ANESTHESIA POSTPROCEDURE EVALUATION
Department of Anesthesiology  Postprocedure Note    Patient: Syd Degroot  MRN: [id number]  YOB: 1970  Date of evaluation: 11/3/2023      Procedure Summary     Date: 11/02/23 Room / Location: [facility] 02 / [facility] ENDOSCOPY    Anesthesia Start: 1447 Anesthesia Stop: 1458    Procedure: COLONOSCOPY DIAGNOSTIC Diagnosis:       Abnormal CT of the abdomen      Constipation, unspecified constipation type      (Abnormal CT of the abdomen [R93.5])      (Constipation, unspecified constipation type [K59.00])    Surgeons: Juan Ball MD Responsible Provider: Luciana Allen MD    Anesthesia Type: MAC, TIVA ASA Status: 3          Anesthesia Type: No value filed. Debby Phase I: Debby Score: 10    Debby Phase II: Debby Score: 10      Anesthesia Post Evaluation    Patient location during evaluation: bedside (Endoscopy Unit)  Patient participation: complete - patient participated  Level of consciousness: sleepy but conscious  Pain score: 0  Airway patency: patent  Nausea & Vomiting: no nausea and no vomiting  Complications: no  Cardiovascular status: hemodynamically stable  Respiratory status: acceptable  Hydration status: stable  Comments: This patient remained on the stretcher. The patient was handed off to the endoscopy nursing team.  All questions regarding pre-, intra-, and postoperative care were answered.

## 2024-02-21 ENCOUNTER — TELEPHONE (OUTPATIENT)
Age: 54
End: 2024-02-21

## 2024-02-21 NOTE — TELEPHONE ENCOUNTER
Patient wanted to know if Dr. Duvall can call her to talk about a possible procedure for her bowle blockage procedure holliee she does not want colostomy bag but was told that she can have have part of her colon taken out and have it connected to small intestine and wanted to know more about it so she can possibly schedule. Advised patient was not sure if he would want to schedule appt for her to come in or will give her a call.     Patient call back number [phone]

## 2024-02-22 NOTE — TELEPHONE ENCOUNTER
Called patient multiple times at [phone] and received \"number you have dialed is not in service...\" message.     Called [phone] \"number dialed has calling restrictions that have prevented completed of your call\" message.     Patient will have to call back on her own to receive response as she also is not signed up for [employer].

## 2024-09-13 ENCOUNTER — TELEPHONE (OUTPATIENT)
Age: 54
End: 2024-09-13

## 2024-11-08 ENCOUNTER — APPOINTMENT (OUTPATIENT)
Facility: HOSPITAL | Age: 54
End: 2024-11-08
Payer: COMMERCIAL

## 2024-11-08 ENCOUNTER — APPOINTMENT (OUTPATIENT)
Facility: HOSPITAL | Age: 54
End: 2024-11-08
Attending: STUDENT IN AN ORGANIZED HEALTH CARE EDUCATION/TRAINING PROGRAM
Payer: COMMERCIAL

## 2024-11-08 ENCOUNTER — HOSPITAL ENCOUNTER (EMERGENCY)
Facility: HOSPITAL | Age: 54
Discharge: ANOTHER HOSPITAL | End: 2024-11-08
Attending: STUDENT IN AN ORGANIZED HEALTH CARE EDUCATION/TRAINING PROGRAM
Payer: COMMERCIAL

## 2024-11-08 ENCOUNTER — HOSPITAL ENCOUNTER (INPATIENT)
Facility: HOSPITAL | Age: 54
LOS: 10 days | Discharge: HOME HEALTH | DRG: 305 | End: 2024-11-19
Attending: STUDENT IN AN ORGANIZED HEALTH CARE EDUCATION/TRAINING PROGRAM | Admitting: STUDENT IN AN ORGANIZED HEALTH CARE EDUCATION/TRAINING PROGRAM
Payer: COMMERCIAL

## 2024-11-08 VITALS
OXYGEN SATURATION: 95 % | HEART RATE: 110 BPM | HEIGHT: 71 IN | TEMPERATURE: 98 F | SYSTOLIC BLOOD PRESSURE: 120 MMHG | BODY MASS INDEX: 41.02 KG/M2 | DIASTOLIC BLOOD PRESSURE: 97 MMHG | WEIGHT: 293 LBS | RESPIRATION RATE: 18 BRPM

## 2024-11-08 DIAGNOSIS — M79.604 RIGHT LEG PAIN: ICD-10-CM

## 2024-11-08 DIAGNOSIS — S88.111D BELOW-KNEE AMPUTATION OF RIGHT LOWER EXTREMITY, SUBSEQUENT ENCOUNTER (HCC): ICD-10-CM

## 2024-11-08 DIAGNOSIS — G57.91 ISCHEMIC NEUROPATHY OF RIGHT FOOT: ICD-10-CM

## 2024-11-08 DIAGNOSIS — I99.8 VASCULAR OCCLUSION: Primary | ICD-10-CM

## 2024-11-08 DIAGNOSIS — I73.9 PVD (PERIPHERAL VASCULAR DISEASE) (HCC): ICD-10-CM

## 2024-11-08 DIAGNOSIS — M79.669 LOWER LEG PAIN: ICD-10-CM

## 2024-11-08 LAB
ANION GAP SERPL CALC-SCNC: 6 MMOL/L (ref 2–12)
APTT PPP: 30.8 SEC (ref 21.2–34.1)
BASOPHILS # BLD: 0 K/UL (ref 0–0.1)
BASOPHILS NFR BLD: 0 % (ref 0–1)
BUN SERPL-MCNC: 4 MG/DL (ref 6–20)
BUN/CREAT SERPL: 6 (ref 12–20)
CA-I BLD-MCNC: 8.5 MG/DL (ref 8.5–10.1)
CHLORIDE SERPL-SCNC: 107 MMOL/L (ref 97–108)
CO2 SERPL-SCNC: 27 MMOL/L (ref 21–32)
COMMENT:: NORMAL
CREAT SERPL-MCNC: 0.65 MG/DL (ref 0.55–1.02)
DIFFERENTIAL METHOD BLD: ABNORMAL
EOSINOPHIL # BLD: 0 K/UL (ref 0–0.4)
EOSINOPHIL NFR BLD: 0 % (ref 0–7)
ERYTHROCYTE [DISTWIDTH] IN BLOOD BY AUTOMATED COUNT: 14.5 % (ref 11.5–14.5)
ERYTHROCYTE [DISTWIDTH] IN BLOOD BY AUTOMATED COUNT: 14.7 % (ref 11.5–14.5)
ERYTHROCYTE [DISTWIDTH] IN BLOOD BY AUTOMATED COUNT: 14.8 % (ref 11.5–14.5)
GLUCOSE SERPL-MCNC: 98 MG/DL (ref 65–100)
HCT VFR BLD AUTO: 37.4 % (ref 35–47)
HCT VFR BLD AUTO: 38.1 % (ref 35–47)
HCT VFR BLD AUTO: 38.1 % (ref 35–47)
HGB BLD-MCNC: 11.7 G/DL (ref 11.5–16)
HGB BLD-MCNC: 11.9 G/DL (ref 11.5–16)
HGB BLD-MCNC: 11.9 G/DL (ref 11.5–16)
IMM GRANULOCYTES # BLD AUTO: 0 K/UL (ref 0–0.04)
IMM GRANULOCYTES NFR BLD AUTO: 1 % (ref 0–0.5)
INR PPP: 1 (ref 0.9–1.1)
LACTATE SERPL-SCNC: 1.4 MMOL/L (ref 0.4–2)
LYMPHOCYTES # BLD: 1.8 K/UL (ref 0.8–3.5)
LYMPHOCYTES NFR BLD: 23 % (ref 12–49)
MCH RBC QN AUTO: 30 PG (ref 26–34)
MCH RBC QN AUTO: 30.1 PG (ref 26–34)
MCH RBC QN AUTO: 30.2 PG (ref 26–34)
MCHC RBC AUTO-ENTMCNC: 31.2 G/DL (ref 30–36.5)
MCHC RBC AUTO-ENTMCNC: 31.2 G/DL (ref 30–36.5)
MCHC RBC AUTO-ENTMCNC: 31.3 G/DL (ref 30–36.5)
MCV RBC AUTO: 95.9 FL (ref 80–99)
MCV RBC AUTO: 96.2 FL (ref 80–99)
MCV RBC AUTO: 96.7 FL (ref 80–99)
MONOCYTES # BLD: 0.4 K/UL (ref 0–1)
MONOCYTES NFR BLD: 5 % (ref 5–13)
NEUTS SEG # BLD: 5.8 K/UL (ref 1.8–8)
NEUTS SEG NFR BLD: 71 % (ref 32–75)
NRBC # BLD: 0 K/UL (ref 0–0.01)
NRBC BLD-RTO: 0 PER 100 WBC
PLATELET # BLD AUTO: 238 K/UL (ref 150–400)
PLATELET # BLD AUTO: 238 K/UL (ref 150–400)
PLATELET # BLD AUTO: 243 K/UL (ref 150–400)
PMV BLD AUTO: 8.9 FL (ref 8.9–12.9)
PMV BLD AUTO: 9 FL (ref 8.9–12.9)
PMV BLD AUTO: 9.1 FL (ref 8.9–12.9)
POTASSIUM SERPL-SCNC: 3.8 MMOL/L (ref 3.5–5.1)
PROTHROMBIN TIME: 13.7 SEC (ref 11.9–14.6)
RBC # BLD AUTO: 3.9 M/UL (ref 3.8–5.2)
RBC # BLD AUTO: 3.94 M/UL (ref 3.8–5.2)
RBC # BLD AUTO: 3.96 M/UL (ref 3.8–5.2)
SODIUM SERPL-SCNC: 140 MMOL/L (ref 136–145)
SPECIMEN HOLD: NORMAL
THERAPEUTIC RANGE: NORMAL SEC (ref 82–109)
UFH PPP CHRO-ACNC: 0.97 IU/ML
UFH PPP CHRO-ACNC: <0.1 IU/ML
WBC # BLD AUTO: 8.1 K/UL (ref 3.6–11)
WBC # BLD AUTO: 8.1 K/UL (ref 3.6–11)
WBC # BLD AUTO: 9.7 K/UL (ref 3.6–11)

## 2024-11-08 PROCEDURE — 96365 THER/PROPH/DIAG IV INF INIT: CPT

## 2024-11-08 PROCEDURE — 36415 COLL VENOUS BLD VENIPUNCTURE: CPT

## 2024-11-08 PROCEDURE — 6360000004 HC RX CONTRAST MEDICATION: Performed by: STUDENT IN AN ORGANIZED HEALTH CARE EDUCATION/TRAINING PROGRAM

## 2024-11-08 PROCEDURE — 85610 PROTHROMBIN TIME: CPT

## 2024-11-08 PROCEDURE — 85027 COMPLETE CBC AUTOMATED: CPT

## 2024-11-08 PROCEDURE — 85520 HEPARIN ASSAY: CPT

## 2024-11-08 PROCEDURE — 73706 CT ANGIO LWR EXTR W/O&W/DYE: CPT

## 2024-11-08 PROCEDURE — 99285 EMERGENCY DEPT VISIT HI MDM: CPT

## 2024-11-08 PROCEDURE — 80048 BASIC METABOLIC PNL TOTAL CA: CPT

## 2024-11-08 PROCEDURE — 6360000002 HC RX W HCPCS: Performed by: STUDENT IN AN ORGANIZED HEALTH CARE EDUCATION/TRAINING PROGRAM

## 2024-11-08 PROCEDURE — 96374 THER/PROPH/DIAG INJ IV PUSH: CPT

## 2024-11-08 PROCEDURE — 96375 TX/PRO/DX INJ NEW DRUG ADDON: CPT

## 2024-11-08 PROCEDURE — 93922 UPR/L XTREMITY ART 2 LEVELS: CPT

## 2024-11-08 PROCEDURE — 85025 COMPLETE CBC W/AUTO DIFF WBC: CPT

## 2024-11-08 PROCEDURE — 85730 THROMBOPLASTIN TIME PARTIAL: CPT

## 2024-11-08 PROCEDURE — 93926 LOWER EXTREMITY STUDY: CPT

## 2024-11-08 PROCEDURE — 83605 ASSAY OF LACTIC ACID: CPT

## 2024-11-08 PROCEDURE — 96366 THER/PROPH/DIAG IV INF ADDON: CPT

## 2024-11-08 RX ORDER — HEPARIN SODIUM 1000 [USP'U]/ML
10000 INJECTION, SOLUTION INTRAVENOUS; SUBCUTANEOUS PRN
Status: DISCONTINUED | OUTPATIENT
Start: 2024-11-08 | End: 2024-11-13

## 2024-11-08 RX ORDER — HEPARIN SODIUM 10000 [USP'U]/100ML
5-30 INJECTION, SOLUTION INTRAVENOUS CONTINUOUS
Status: DISCONTINUED | OUTPATIENT
Start: 2024-11-08 | End: 2024-11-08 | Stop reason: HOSPADM

## 2024-11-08 RX ORDER — HEPARIN SODIUM 1000 [USP'U]/ML
5000 INJECTION, SOLUTION INTRAVENOUS; SUBCUTANEOUS PRN
Status: DISCONTINUED | OUTPATIENT
Start: 2024-11-08 | End: 2024-11-13

## 2024-11-08 RX ORDER — HEPARIN SODIUM 1000 [USP'U]/ML
10000 INJECTION, SOLUTION INTRAVENOUS; SUBCUTANEOUS ONCE
Status: COMPLETED | OUTPATIENT
Start: 2024-11-08 | End: 2024-11-08

## 2024-11-08 RX ORDER — ONDANSETRON 2 MG/ML
4 INJECTION INTRAMUSCULAR; INTRAVENOUS ONCE
Status: COMPLETED | OUTPATIENT
Start: 2024-11-08 | End: 2024-11-08

## 2024-11-08 RX ORDER — HEPARIN SODIUM 1000 [USP'U]/ML
10000 INJECTION, SOLUTION INTRAVENOUS; SUBCUTANEOUS PRN
Status: DISCONTINUED | OUTPATIENT
Start: 2024-11-08 | End: 2024-11-08 | Stop reason: HOSPADM

## 2024-11-08 RX ORDER — MORPHINE SULFATE 4 MG/ML
4 INJECTION, SOLUTION INTRAMUSCULAR; INTRAVENOUS
Status: COMPLETED | OUTPATIENT
Start: 2024-11-08 | End: 2024-11-08

## 2024-11-08 RX ORDER — IOPAMIDOL 755 MG/ML
100 INJECTION, SOLUTION INTRAVASCULAR
Status: COMPLETED | OUTPATIENT
Start: 2024-11-08 | End: 2024-11-08

## 2024-11-08 RX ORDER — HEPARIN SODIUM 1000 [USP'U]/ML
5000 INJECTION, SOLUTION INTRAVENOUS; SUBCUTANEOUS PRN
Status: DISCONTINUED | OUTPATIENT
Start: 2024-11-08 | End: 2024-11-08 | Stop reason: HOSPADM

## 2024-11-08 RX ORDER — HEPARIN SODIUM 10000 [USP'U]/100ML
5-30 INJECTION, SOLUTION INTRAVENOUS CONTINUOUS
Status: DISCONTINUED | OUTPATIENT
Start: 2024-11-08 | End: 2024-11-13

## 2024-11-08 RX ADMIN — MORPHINE SULFATE 4 MG: 4 INJECTION, SOLUTION INTRAMUSCULAR; INTRAVENOUS at 18:57

## 2024-11-08 RX ADMIN — IOPAMIDOL 100 ML: 755 INJECTION, SOLUTION INTRAVENOUS at 18:48

## 2024-11-08 RX ADMIN — HEPARIN SODIUM 10000 UNITS: 1000 INJECTION INTRAVENOUS; SUBCUTANEOUS at 18:56

## 2024-11-08 RX ADMIN — ONDANSETRON 4 MG: 2 INJECTION INTRAMUSCULAR; INTRAVENOUS at 18:56

## 2024-11-08 RX ADMIN — HEPARIN SODIUM 15 UNITS/KG/HR: 10000 INJECTION, SOLUTION INTRAVENOUS at 18:55

## 2024-11-08 ASSESSMENT — PAIN SCALES - GENERAL
PAINLEVEL_OUTOF10: 10
PAINLEVEL_OUTOF10: 7
PAINLEVEL_OUTOF10: 10
PAINLEVEL_OUTOF10: 9

## 2024-11-08 ASSESSMENT — PAIN - FUNCTIONAL ASSESSMENT
PAIN_FUNCTIONAL_ASSESSMENT: PREVENTS OR INTERFERES SOME ACTIVE ACTIVITIES AND ADLS
PAIN_FUNCTIONAL_ASSESSMENT: 0-10
PAIN_FUNCTIONAL_ASSESSMENT: 0-10
PAIN_FUNCTIONAL_ASSESSMENT: PREVENTS OR INTERFERES SOME ACTIVE ACTIVITIES AND ADLS
PAIN_FUNCTIONAL_ASSESSMENT: INTOLERABLE, UNABLE TO DO ANY ACTIVE OR PASSIVE ACTIVITIES
PAIN_FUNCTIONAL_ASSESSMENT: 0-10

## 2024-11-08 ASSESSMENT — PAIN DESCRIPTION - DESCRIPTORS
DESCRIPTORS: DISCOMFORT;BURNING;SORE
DESCRIPTORS: THROBBING

## 2024-11-08 ASSESSMENT — PAIN DESCRIPTION - PAIN TYPE: TYPE: CHRONIC PAIN

## 2024-11-08 ASSESSMENT — PAIN DESCRIPTION - ORIENTATION
ORIENTATION: RIGHT

## 2024-11-08 ASSESSMENT — PAIN DESCRIPTION - LOCATION
LOCATION: FOOT

## 2024-11-08 ASSESSMENT — PAIN DESCRIPTION - FREQUENCY: FREQUENCY: CONTINUOUS

## 2024-11-08 ASSESSMENT — PAIN DESCRIPTION - ONSET: ONSET: ON-GOING

## 2024-11-08 NOTE — ED PROVIDER NOTES
Father     Cancer Brother        Social History:  Social History     Tobacco Use    Smoking status: Every Day     Current packs/day: 0.00     Types: Cigarettes     Last attempt to quit: 2022     Years since quittin.2    Smokeless tobacco: Never   Substance Use Topics    Alcohol use: Not Currently    Drug use: Not Currently       Allergies:  Allergies   Allergen Reactions    Codeine Nausea Only       PCP: Maricruz Vega APRN - NP    Current Meds:   Current Facility-Administered Medications   Medication Dose Route Frequency Provider Last Rate Last Admin    heparin (porcine) injection 10,000 Units  10,000 Units IntraVENous PRN Gloria Meléndez MD        heparin (porcine) injection 5,000 Units  5,000 Units IntraVENous PRN Gloria Meléndez MD        heparin 25,000 units in dextrose 5% 250 mL (premix) infusion  5-30 Units/kg/hr IntraVENous Continuous Gloria Meléndez MD 19.9 mL/hr at 24 15 Units/kg/hr at 24 1855     Current Outpatient Medications   Medication Sig Dispense Refill    colestipol (COLESTID) 1 g tablet       DULoxetine (CYMBALTA) 30 MG extended release capsule       sertraline (ZOLOFT) 25 MG tablet       potassium chloride (KLOR-CON M) 20 MEQ extended release tablet Take 1 tablet by mouth 2 times daily 30 tablet 1    spironolactone (ALDACTONE) 50 MG tablet Take 1 tablet by mouth in the morning and 1 tablet in the evening. 30 tablet 1    losartan (COZAAR) 100 MG tablet Take 1 tablet by mouth daily 30 tablet 1    atorvastatin (LIPITOR) 10 MG tablet Take 1 tablet by mouth daily      ferrous sulfate (IRON 325) 325 (65 Fe) MG tablet Take 1 tablet by mouth daily (with breakfast)      folic acid (FOLVITE) 1 MG tablet Take 1 tablet by mouth daily      pregabalin (LYRICA) 150 MG capsule Take 1 capsule by mouth 3 times daily.      thiamine 50 MG tablet Take 1 tablet by mouth daily         Social Determinants of Health:   Social Determinants of Health     Tobacco Use: High Risk (2024)  35 minutes of critical care time involved in lab review, consultations with specialist, family decision- making, bedside attention and documentation. Time is exclusive of EKG interpretation, imaging interpretation and separately billed procedures.  During this entire length of time I was immediately available to the patient.  Gloria Meléndez MD    ED IMPRESSION     1. Vascular occlusion    2. Lower leg pain          DISPOSITION/PLAN   DISPOSITION Decision To Transfer 11/08/2024 07:22:03 PM         Transfer: The patient is being transferred to [facility]. The results of their tests and reasons for their transfer have been discussed with the patient and/or available family. The patient/family has conveyed agreement and understanding for the need to be transferred and for their diagnosis. Consultation has been made with Dr. Renee, who agrees to accept the transfer.          I am the Primary Clinician of Record. Gloria Meélndez MD (electronically signed)    (Please note that parts of this dictation were completed with voice recognition software. Quite often unanticipated grammatical, syntax, homophones, and other interpretive errors are inadvertently transcribed by the computer software. Please disregards these errors. Please excuse any errors that have escaped final proofreading.)     Gloria Meléndez MD  11/08/24 2058

## 2024-11-08 NOTE — ED NOTES
Right foot swelling, black in color, has a area on right big toe with cream color oozing from it.

## 2024-11-08 NOTE — ED TRIAGE NOTES
From home right foot wound, has had this for a couple of months.  Started out as a scratch.  Patient is bed bound

## 2024-11-09 PROBLEM — I73.9 PVD (PERIPHERAL VASCULAR DISEASE) (HCC): Status: ACTIVE | Noted: 2024-11-09

## 2024-11-09 LAB
ALBUMIN SERPL-MCNC: 2.7 G/DL (ref 3.5–5)
ALBUMIN/GLOB SERPL: 0.6 (ref 1.1–2.2)
ALP SERPL-CCNC: 115 U/L (ref 45–117)
ALT SERPL-CCNC: 8 U/L (ref 12–78)
ANION GAP SERPL CALC-SCNC: 4 MMOL/L (ref 2–12)
APTT PPP: 77.4 SEC (ref 22.1–31)
AST SERPL-CCNC: 13 U/L (ref 15–37)
BILIRUB SERPL-MCNC: 0.5 MG/DL (ref 0.2–1)
BUN SERPL-MCNC: 6 MG/DL (ref 6–20)
BUN/CREAT SERPL: 7 (ref 12–20)
CALCIUM SERPL-MCNC: 8.4 MG/DL (ref 8.5–10.1)
CHLORIDE SERPL-SCNC: 104 MMOL/L (ref 97–108)
CO2 SERPL-SCNC: 30 MMOL/L (ref 21–32)
CREAT SERPL-MCNC: 0.82 MG/DL (ref 0.55–1.02)
ECHO BSA: 2.58 M2
ERYTHROCYTE [DISTWIDTH] IN BLOOD BY AUTOMATED COUNT: 14.9 % (ref 11.5–14.5)
GLOBULIN SER CALC-MCNC: 4.7 G/DL (ref 2–4)
GLUCOSE SERPL-MCNC: 105 MG/DL (ref 65–100)
HCT VFR BLD AUTO: 37.8 % (ref 35–47)
HGB BLD-MCNC: 11.6 G/DL (ref 11.5–16)
INR PPP: 1 (ref 0.9–1.1)
MCH RBC QN AUTO: 30.1 PG (ref 26–34)
MCHC RBC AUTO-ENTMCNC: 30.7 G/DL (ref 30–36.5)
MCV RBC AUTO: 98.2 FL (ref 80–99)
NRBC # BLD: 0 K/UL (ref 0–0.01)
NRBC BLD-RTO: 0 PER 100 WBC
PLATELET # BLD AUTO: 228 K/UL (ref 150–400)
PMV BLD AUTO: 9.2 FL (ref 8.9–12.9)
POTASSIUM SERPL-SCNC: 4 MMOL/L (ref 3.5–5.1)
PROT SERPL-MCNC: 7.4 G/DL (ref 6.4–8.2)
PROTHROMBIN TIME: 10.9 SEC (ref 9–11.1)
RBC # BLD AUTO: 3.85 M/UL (ref 3.8–5.2)
SODIUM SERPL-SCNC: 138 MMOL/L (ref 136–145)
THERAPEUTIC RANGE: ABNORMAL SECS (ref 58–77)
UFH PPP CHRO-ACNC: 0.42 IU/ML
UFH PPP CHRO-ACNC: 0.48 IU/ML
UFH PPP CHRO-ACNC: 0.77 IU/ML
VAS LEFT ABI: 0.95
VAS LEFT ARM BP: 125 MMHG
VAS LEFT DORSALIS PEDIS BP: 124 MMHG
VAS LEFT PTA BP: 131 MMHG
VAS RIGHT ARM BP: 138 MMHG
WBC # BLD AUTO: 7.9 K/UL (ref 3.6–11)

## 2024-11-09 PROCEDURE — 80053 COMPREHEN METABOLIC PANEL: CPT

## 2024-11-09 PROCEDURE — 6370000000 HC RX 637 (ALT 250 FOR IP): Performed by: STUDENT IN AN ORGANIZED HEALTH CARE EDUCATION/TRAINING PROGRAM

## 2024-11-09 PROCEDURE — 93922 UPR/L XTREMITY ART 2 LEVELS: CPT | Performed by: SURGERY

## 2024-11-09 PROCEDURE — 36415 COLL VENOUS BLD VENIPUNCTURE: CPT

## 2024-11-09 PROCEDURE — 85520 HEPARIN ASSAY: CPT

## 2024-11-09 PROCEDURE — 6360000002 HC RX W HCPCS: Performed by: STUDENT IN AN ORGANIZED HEALTH CARE EDUCATION/TRAINING PROGRAM

## 2024-11-09 PROCEDURE — 85027 COMPLETE CBC AUTOMATED: CPT

## 2024-11-09 PROCEDURE — 1200000000 HC SEMI PRIVATE

## 2024-11-09 PROCEDURE — 2580000003 HC RX 258: Performed by: STUDENT IN AN ORGANIZED HEALTH CARE EDUCATION/TRAINING PROGRAM

## 2024-11-09 PROCEDURE — 76937 US GUIDE VASCULAR ACCESS: CPT

## 2024-11-09 PROCEDURE — 2709999900 HC NON-CHARGEABLE SUPPLY

## 2024-11-09 PROCEDURE — 6360000002 HC RX W HCPCS: Performed by: HOSPITALIST

## 2024-11-09 PROCEDURE — 87040 BLOOD CULTURE FOR BACTERIA: CPT

## 2024-11-09 RX ORDER — SODIUM CHLORIDE 9 MG/ML
INJECTION, SOLUTION INTRAVENOUS PRN
Status: DISCONTINUED | OUTPATIENT
Start: 2024-11-09 | End: 2024-11-19 | Stop reason: HOSPADM

## 2024-11-09 RX ORDER — SODIUM CHLORIDE 9 MG/ML
INJECTION, SOLUTION INTRAVENOUS CONTINUOUS
Status: DISPENSED | OUTPATIENT
Start: 2024-11-09 | End: 2024-11-09

## 2024-11-09 RX ORDER — ACETAMINOPHEN 650 MG/1
650 SUPPOSITORY RECTAL EVERY 6 HOURS PRN
Status: DISCONTINUED | OUTPATIENT
Start: 2024-11-09 | End: 2024-11-19 | Stop reason: HOSPADM

## 2024-11-09 RX ORDER — OXYCODONE HYDROCHLORIDE 5 MG/1
5 TABLET ORAL EVERY 4 HOURS PRN
Status: DISCONTINUED | OUTPATIENT
Start: 2024-11-09 | End: 2024-11-15

## 2024-11-09 RX ORDER — ACETAMINOPHEN 325 MG/1
650 TABLET ORAL EVERY 6 HOURS PRN
Status: DISCONTINUED | OUTPATIENT
Start: 2024-11-09 | End: 2024-11-19 | Stop reason: HOSPADM

## 2024-11-09 RX ORDER — POTASSIUM CHLORIDE 750 MG/1
40 TABLET, EXTENDED RELEASE ORAL PRN
Status: DISCONTINUED | OUTPATIENT
Start: 2024-11-09 | End: 2024-11-18

## 2024-11-09 RX ORDER — ATORVASTATIN CALCIUM 10 MG/1
10 TABLET, FILM COATED ORAL DAILY
Status: DISCONTINUED | OUTPATIENT
Start: 2024-11-09 | End: 2024-11-19 | Stop reason: HOSPADM

## 2024-11-09 RX ORDER — LOSARTAN POTASSIUM 50 MG/1
100 TABLET ORAL DAILY
Status: DISCONTINUED | OUTPATIENT
Start: 2024-11-09 | End: 2024-11-13

## 2024-11-09 RX ORDER — ONDANSETRON 4 MG/1
4 TABLET, ORALLY DISINTEGRATING ORAL EVERY 8 HOURS PRN
Status: DISCONTINUED | OUTPATIENT
Start: 2024-11-09 | End: 2024-11-19 | Stop reason: HOSPADM

## 2024-11-09 RX ORDER — POTASSIUM CHLORIDE 7.45 MG/ML
10 INJECTION INTRAVENOUS PRN
Status: DISCONTINUED | OUTPATIENT
Start: 2024-11-09 | End: 2024-11-18

## 2024-11-09 RX ORDER — PREGABALIN 75 MG/1
150 CAPSULE ORAL 3 TIMES DAILY
Status: DISCONTINUED | OUTPATIENT
Start: 2024-11-09 | End: 2024-11-18

## 2024-11-09 RX ORDER — SODIUM CHLORIDE 0.9 % (FLUSH) 0.9 %
5-40 SYRINGE (ML) INJECTION PRN
Status: DISCONTINUED | OUTPATIENT
Start: 2024-11-09 | End: 2024-11-19 | Stop reason: HOSPADM

## 2024-11-09 RX ORDER — POLYETHYLENE GLYCOL 3350 17 G/17G
17 POWDER, FOR SOLUTION ORAL DAILY PRN
Status: DISCONTINUED | OUTPATIENT
Start: 2024-11-09 | End: 2024-11-19 | Stop reason: HOSPADM

## 2024-11-09 RX ORDER — DULOXETIN HYDROCHLORIDE 30 MG/1
30 CAPSULE, DELAYED RELEASE ORAL DAILY
Status: DISCONTINUED | OUTPATIENT
Start: 2024-11-09 | End: 2024-11-19 | Stop reason: HOSPADM

## 2024-11-09 RX ORDER — SPIRONOLACTONE 25 MG/1
50 TABLET ORAL 2 TIMES DAILY
Status: DISCONTINUED | OUTPATIENT
Start: 2024-11-09 | End: 2024-11-13

## 2024-11-09 RX ORDER — ONDANSETRON 2 MG/ML
4 INJECTION INTRAMUSCULAR; INTRAVENOUS EVERY 6 HOURS PRN
Status: DISCONTINUED | OUTPATIENT
Start: 2024-11-09 | End: 2024-11-19 | Stop reason: HOSPADM

## 2024-11-09 RX ORDER — MAGNESIUM SULFATE IN WATER 40 MG/ML
2000 INJECTION, SOLUTION INTRAVENOUS PRN
Status: DISCONTINUED | OUTPATIENT
Start: 2024-11-09 | End: 2024-11-18

## 2024-11-09 RX ORDER — SODIUM CHLORIDE 0.9 % (FLUSH) 0.9 %
5-40 SYRINGE (ML) INJECTION EVERY 12 HOURS SCHEDULED
Status: DISCONTINUED | OUTPATIENT
Start: 2024-11-09 | End: 2024-11-19 | Stop reason: HOSPADM

## 2024-11-09 RX ADMIN — PREGABALIN 150 MG: 75 CAPSULE ORAL at 21:05

## 2024-11-09 RX ADMIN — LOSARTAN POTASSIUM 100 MG: 50 TABLET, FILM COATED ORAL at 08:36

## 2024-11-09 RX ADMIN — PIPERACILLIN AND TAZOBACTAM 3375 MG: 3; .375 INJECTION, POWDER, FOR SOLUTION INTRAVENOUS at 22:56

## 2024-11-09 RX ADMIN — PIPERACILLIN AND TAZOBACTAM 4500 MG: 4; .5 INJECTION, POWDER, LYOPHILIZED, FOR SOLUTION INTRAVENOUS; PARENTERAL at 08:39

## 2024-11-09 RX ADMIN — SERTRALINE HYDROCHLORIDE 25 MG: 50 TABLET ORAL at 08:36

## 2024-11-09 RX ADMIN — HEPARIN SODIUM 14 UNITS/KG/HR: 10000 INJECTION, SOLUTION INTRAVENOUS at 19:52

## 2024-11-09 RX ADMIN — ATORVASTATIN CALCIUM 10 MG: 10 TABLET, FILM COATED ORAL at 08:36

## 2024-11-09 RX ADMIN — Medication 2500 MG: at 14:01

## 2024-11-09 RX ADMIN — OXYCODONE 5 MG: 5 TABLET ORAL at 11:46

## 2024-11-09 RX ADMIN — SODIUM CHLORIDE: 9 INJECTION, SOLUTION INTRAVENOUS at 00:58

## 2024-11-09 RX ADMIN — OXYCODONE 5 MG: 5 TABLET ORAL at 07:04

## 2024-11-09 RX ADMIN — OXYCODONE 5 MG: 5 TABLET ORAL at 16:58

## 2024-11-09 RX ADMIN — SPIRONOLACTONE 50 MG: 25 TABLET ORAL at 08:36

## 2024-11-09 RX ADMIN — OXYCODONE 5 MG: 5 TABLET ORAL at 21:06

## 2024-11-09 RX ADMIN — SPIRONOLACTONE 50 MG: 25 TABLET ORAL at 16:58

## 2024-11-09 RX ADMIN — DULOXETINE HYDROCHLORIDE 30 MG: 30 CAPSULE, DELAYED RELEASE ORAL at 08:43

## 2024-11-09 RX ADMIN — ACETAMINOPHEN 650 MG: 325 TABLET ORAL at 08:43

## 2024-11-09 RX ADMIN — PIPERACILLIN AND TAZOBACTAM 3375 MG: 3; .375 INJECTION, POWDER, FOR SOLUTION INTRAVENOUS at 14:14

## 2024-11-09 RX ADMIN — OXYCODONE 5 MG: 5 TABLET ORAL at 00:54

## 2024-11-09 RX ADMIN — PREGABALIN 150 MG: 75 CAPSULE ORAL at 14:12

## 2024-11-09 RX ADMIN — HEPARIN SODIUM 14 UNITS/KG/HR: 10000 INJECTION, SOLUTION INTRAVENOUS at 23:12

## 2024-11-09 RX ADMIN — HEPARIN SODIUM 14 UNITS/KG/HR: 10000 INJECTION, SOLUTION INTRAVENOUS at 08:22

## 2024-11-09 RX ADMIN — PREGABALIN 150 MG: 75 CAPSULE ORAL at 08:36

## 2024-11-09 ASSESSMENT — PAIN SCALES - GENERAL
PAINLEVEL_OUTOF10: 9
PAINLEVEL_OUTOF10: 10
PAINLEVEL_OUTOF10: 8
PAINLEVEL_OUTOF10: 8
PAINLEVEL_OUTOF10: 5
PAINLEVEL_OUTOF10: 9

## 2024-11-09 ASSESSMENT — PAIN DESCRIPTION - LOCATION
LOCATION: ABDOMEN
LOCATION: LEG;FOOT
LOCATION: FOOT

## 2024-11-09 ASSESSMENT — PAIN DESCRIPTION - ORIENTATION
ORIENTATION: RIGHT

## 2024-11-09 ASSESSMENT — PAIN DESCRIPTION - DESCRIPTORS
DESCRIPTORS: ACHING
DESCRIPTORS: PINS AND NEEDLES;PRESSURE
DESCRIPTORS: BURNING;TIGHTNESS;PRESSURE

## 2024-11-09 NOTE — PROCEDURES
Vascular Access Team - Extended dwell peripheral IV catheter insertion note    An extended dwell PIV (Accucath) was placed using ultrasound guidance. The IV flushed easily and had brisk blood return. The patient tolerated the procedure well.      Catheter Size: 20 g  Total length: 5.7 cm  External length: 0 cm  Location: left ventral forearm     Lot#: [id number]    May be used for blood draws--flush, waste 3ml, draw labs, flush post lab draw with at least 10ml NS and clamp if not infusing to maintain patency.     AccuCath 20 gauge catheter is power injectable to 6 mL/sec (300 psi max)    Primary RN notified      Gary Santos, RN  Vascular Access Nurse

## 2024-11-09 NOTE — ED PROVIDER NOTES
EMERGENCY DEPARTMENT PHYSICIAN NOTE     Patient: Amy Roberts     Time of Service: 2024 10:37 PM     Chief complaint:   Chief Complaint   Patient presents with    Wound Infection        HISTORY:  Patient is a 54 y.o. female who presents to the emergency department as a transfer from [place] for concern for acute limb ischemia.  Patient has heparin running arrival.  CT scan shows multiple occlusions but does have collaterals.  Patient is bedbound at baseline due to neuropathy.  Has not walked for several years.  Dr. Carmona from vascular surgery recommended transfer.  Vital signs stable.  Patient afebrile.      Past Medical History:   Diagnosis Date    Anxiety     CAD (coronary artery disease)     Fibromyalgia     Hypertension     Neuropathy     Peripheral artery disease (HCC)     Psychiatric disorder     anxiety        Past Surgical History:   Procedure Laterality Date    ANKLE FRACTURE SURGERY Left     surgical implants     SECTION      COLONOSCOPY N/A 2023    COLONOSCOPY FLEXIBLE W/ DECOMPRESSION performed by Donna Dorantes MD at [facility] ENDOSCOPY    COLONOSCOPY N/A 2023    COLONOSCOPY DIAGNOSTIC performed by Aniceto Alexander MD at [facility] ENDOSCOPY    SIGMOIDOSCOPY N/A 2023    SIGMOIDOSCOPY BIOPSY if ok with anesthesiaFLEXIBLE performed by Aniceto Alexander MD at [facility] ENDOSCOPY        Family History   Problem Relation Age of Onset    Diabetes Mother     Heart Disease Mother     Alzheimer's Disease Mother     Cancer Father     Cancer Brother         Social History     Socioeconomic History    Marital status: Legally    Tobacco Use    Smoking status: Every Day     Current packs/day: 0.00     Types: Cigarettes     Last attempt to quit: 2022     Years since quittin.2    Smokeless tobacco: Never   Substance and Sexual Activity    Alcohol use: Not Currently    Drug use: Not Currently        Current Medications: Reviewed in chart.    Allergies:   Allergies   Allergen Reactions    Codeine

## 2024-11-09 NOTE — H&P
0.65 0.55 - 1.02 mg/dL    BUN/Creatinine Ratio 6 (L) 12 - 20      Est, Glom Filt Rate >90 >60 ml/min/1.73m2    Calcium 8.5 8.5 - 10.1 mg/dL   CBC with Auto Differential    Collection Time: 11/08/24  4:57 PM   Result Value Ref Range    WBC 8.1 3.6 - 11.0 K/uL    RBC 3.94 3.80 - 5.20 M/uL    Hemoglobin 11.9 11.5 - 16.0 g/dL    Hematocrit 38.1 35.0 - 47.0 %    MCV 96.7 80.0 - 99.0 FL    MCH 30.2 26.0 - 34.0 PG    MCHC 31.2 30.0 - 36.5 g/dL    RDW 14.5 11.5 - 14.5 %    Platelets 243 150 - 400 K/uL    MPV 9.0 8.9 - 12.9 FL    Nucleated RBCs 0.0 0.0  WBC    nRBC 0.00 0.00 - 0.01 K/uL    Neutrophils % 71 32 - 75 %    Lymphocytes % 23 12 - 49 %    Monocytes % 5 5 - 13 %    Eosinophils % 0 0 - 7 %    Basophils % 0 0 - 1 %    Immature Granulocytes % 1 (H) 0 - 0.5 %    Neutrophils Absolute 5.8 1.8 - 8.0 K/UL    Lymphocytes Absolute 1.8 0.8 - 3.5 K/UL    Monocytes Absolute 0.4 0.0 - 1.0 K/UL    Eosinophils Absolute 0.0 0.0 - 0.4 K/UL    Basophils Absolute 0.0 0.0 - 0.1 K/UL    Immature Granulocytes Absolute 0.0 0.00 - 0.04 K/UL    Differential Type AUTOMATED     Lactic Acid    Collection Time: 11/08/24  4:57 PM   Result Value Ref Range    Lactic Acid, Plasma 1.4 0.4 - 2.0 mmol/L   Vascular ankle brachial index (MARIE)    Collection Time: 11/08/24  5:27 PM   Result Value Ref Range    Body Surface Area 2.58 m2    Right arm  mmHg    Left arm  mmHg    Left posterior tibial 131 mmHg    Left dorsalis pedis  mmHg    Left MARIE 0.95    CBC    Collection Time: 11/08/24  6:22 PM   Result Value Ref Range    WBC 8.1 3.6 - 11.0 K/uL    RBC 3.96 3.80 - 5.20 M/uL    Hemoglobin 11.9 11.5 - 16.0 g/dL    Hematocrit 38.1 35.0 - 47.0 %    MCV 96.2 80.0 - 99.0 FL    MCH 30.1 26.0 - 34.0 PG    MCHC 31.2 30.0 - 36.5 g/dL    RDW 14.7 (H) 11.5 - 14.5 %    Platelets 238 150 - 400 K/uL    MPV 8.9 8.9 - 12.9 FL    Nucleated RBCs 0.0 0.0  WBC    nRBC 0.00 0.00 - 0.01 K/uL   APTT    Collection Time: 11/08/24  6:22 PM   Result

## 2024-11-09 NOTE — ED NOTES
Pt was cleansed of urine, placed in clean brief, and all dirty linens replaced  
  SpO2: 94% 96% 97% 98%     DI:   Predictive Model Details          24 (Normal)  Factor Value    Calculated 11/9/2024 06:18 40% Age 54 years old    Deterioration Index Model 40% Respiratory rate 12     6% Systolic 137     5% WBC count 9.7 K/uL     4% BUN abnormal (4 mg/dL)     4% Pulse 93     1% Potassium 3.8 mmol/L     1% Pulse oximetry 98 %     0% Sodium 140 mmol/L     0% Temperature 98.3 °F (36.8 °C)     0% Hematocrit 37.4 %         FiO2 (%): n/o  O2 Flow Rate: O2 Device: None (Room air)    Cardiac Rhythm:      Pain Scale: Pain Assessment  Pain Assessment: 0-10  Pain Level: 9  Patient's Stated Pain Goal: 0 - No pain  Pain Location: Leg, Foot  Pain Orientation: Right  Pain Descriptors: Burning, Tightness, Pressure  Functional Pain Assessment: Intolerable, unable to do any active or passive activities  Pain Type: Chronic pain  Pain Frequency: Continuous  Pain Onset: On-going  Non-Pharmaceutical Pain Intervention(s): Rest, Repositioned  Response to Pain Intervention: None (pain unchanged or no improvement)  Side Effects: No reported side effects  Last documented pain score: (0-10 scale) Pain Level: 9  Last documented pain medication administered: 0100     Mental Status: oriented and alert  Orientation Level:    NIH Score:    C-SSRS: Risk of Suicide: No Risk    PO Status: Nothing by Mouth  Bedside swallow:    [place] Coma Scale (GCS): [place] Coma Scale  Eye Opening: Spontaneous  Best Verbal Response: Oriented  Best Motor Response: Obeys commands  [place] Coma Scale Score: 15    Active LDA's:   Peripheral IV 11/08/24 Right;Anterior Forearm (Active)       Peripheral IV 11/08/24 Left Forearm (Active)     Pertinent or High Risk Medications/Drips: yes   If Yes, please provide details: heparin  Titratable drips: yes   Pending Blood Product Administration: no     Sepsis: Sepsis Risk Score   Predictive Model Details          6 (Low)  Factor Value    Calculated 11/9/2024 06:04 12% Total Active Inpatient Meds 23    [facility]

## 2024-11-10 ENCOUNTER — APPOINTMENT (OUTPATIENT)
Facility: HOSPITAL | Age: 54
DRG: 305 | End: 2024-11-10
Attending: SURGERY
Payer: COMMERCIAL

## 2024-11-10 LAB
ANION GAP SERPL CALC-SCNC: 5 MMOL/L (ref 2–12)
BUN SERPL-MCNC: 13 MG/DL (ref 6–20)
BUN/CREAT SERPL: 10 (ref 12–20)
CALCIUM SERPL-MCNC: 7.8 MG/DL (ref 8.5–10.1)
CHLORIDE SERPL-SCNC: 103 MMOL/L (ref 97–108)
CHOLEST SERPL-MCNC: 99 MG/DL
CO2 SERPL-SCNC: 25 MMOL/L (ref 21–32)
CREAT SERPL-MCNC: 1.3 MG/DL (ref 0.55–1.02)
ERYTHROCYTE [DISTWIDTH] IN BLOOD BY AUTOMATED COUNT: 14.8 % (ref 11.5–14.5)
EST. AVERAGE GLUCOSE BLD GHB EST-MCNC: 128 MG/DL
GLUCOSE SERPL-MCNC: 123 MG/DL (ref 65–100)
HBA1C MFR BLD: 6.1 % (ref 4–5.6)
HCT VFR BLD AUTO: 32.5 % (ref 35–47)
HDLC SERPL-MCNC: 35 MG/DL
HDLC SERPL: 2.8 (ref 0–5)
HGB BLD-MCNC: 10 G/DL (ref 11.5–16)
LDLC SERPL CALC-MCNC: 42.4 MG/DL (ref 0–100)
MCH RBC QN AUTO: 29.9 PG (ref 26–34)
MCHC RBC AUTO-ENTMCNC: 30.8 G/DL (ref 30–36.5)
MCV RBC AUTO: 97 FL (ref 80–99)
NRBC # BLD: 0 K/UL (ref 0–0.01)
NRBC BLD-RTO: 0 PER 100 WBC
PLATELET # BLD AUTO: 230 K/UL (ref 150–400)
PMV BLD AUTO: 9.3 FL (ref 8.9–12.9)
POTASSIUM SERPL-SCNC: 3.7 MMOL/L (ref 3.5–5.1)
RBC # BLD AUTO: 3.35 M/UL (ref 3.8–5.2)
SODIUM SERPL-SCNC: 133 MMOL/L (ref 136–145)
TRIGL SERPL-MCNC: 108 MG/DL
UFH PPP CHRO-ACNC: 0.25 IU/ML
UFH PPP CHRO-ACNC: 0.51 IU/ML
UFH PPP CHRO-ACNC: 0.64 IU/ML
VLDLC SERPL CALC-MCNC: 21.6 MG/DL
WBC # BLD AUTO: 9.3 K/UL (ref 3.6–11)

## 2024-11-10 PROCEDURE — 80048 BASIC METABOLIC PNL TOTAL CA: CPT

## 2024-11-10 PROCEDURE — 2580000003 HC RX 258: Performed by: HOSPITALIST

## 2024-11-10 PROCEDURE — 97530 THERAPEUTIC ACTIVITIES: CPT

## 2024-11-10 PROCEDURE — 6360000002 HC RX W HCPCS: Performed by: HOSPITALIST

## 2024-11-10 PROCEDURE — 97161 PT EVAL LOW COMPLEX 20 MIN: CPT | Performed by: PHYSICAL THERAPIST

## 2024-11-10 PROCEDURE — 97165 OT EVAL LOW COMPLEX 30 MIN: CPT

## 2024-11-10 PROCEDURE — 51798 US URINE CAPACITY MEASURE: CPT

## 2024-11-10 PROCEDURE — 6360000002 HC RX W HCPCS: Performed by: STUDENT IN AN ORGANIZED HEALTH CARE EDUCATION/TRAINING PROGRAM

## 2024-11-10 PROCEDURE — 80061 LIPID PANEL: CPT

## 2024-11-10 PROCEDURE — 85027 COMPLETE CBC AUTOMATED: CPT

## 2024-11-10 PROCEDURE — 36415 COLL VENOUS BLD VENIPUNCTURE: CPT

## 2024-11-10 PROCEDURE — 83036 HEMOGLOBIN GLYCOSYLATED A1C: CPT

## 2024-11-10 PROCEDURE — 2580000003 HC RX 258: Performed by: STUDENT IN AN ORGANIZED HEALTH CARE EDUCATION/TRAINING PROGRAM

## 2024-11-10 PROCEDURE — 1200000000 HC SEMI PRIVATE

## 2024-11-10 PROCEDURE — 85520 HEPARIN ASSAY: CPT

## 2024-11-10 PROCEDURE — 6370000000 HC RX 637 (ALT 250 FOR IP): Performed by: STUDENT IN AN ORGANIZED HEALTH CARE EDUCATION/TRAINING PROGRAM

## 2024-11-10 RX ADMIN — LOSARTAN POTASSIUM 100 MG: 50 TABLET, FILM COATED ORAL at 08:46

## 2024-11-10 RX ADMIN — ACETAMINOPHEN 650 MG: 325 TABLET ORAL at 01:40

## 2024-11-10 RX ADMIN — HEPARIN SODIUM 5000 UNITS: 1000 INJECTION INTRAVENOUS; SUBCUTANEOUS at 04:54

## 2024-11-10 RX ADMIN — SODIUM CHLORIDE 1500 MG: 9 INJECTION, SOLUTION INTRAVENOUS at 01:54

## 2024-11-10 RX ADMIN — PIPERACILLIN AND TAZOBACTAM 3375 MG: 3; .375 INJECTION, POWDER, FOR SOLUTION INTRAVENOUS at 22:30

## 2024-11-10 RX ADMIN — PREGABALIN 150 MG: 75 CAPSULE ORAL at 21:01

## 2024-11-10 RX ADMIN — Medication 10 ML: at 21:57

## 2024-11-10 RX ADMIN — OXYCODONE 5 MG: 5 TABLET ORAL at 16:05

## 2024-11-10 RX ADMIN — PREGABALIN 150 MG: 75 CAPSULE ORAL at 08:46

## 2024-11-10 RX ADMIN — SPIRONOLACTONE 50 MG: 25 TABLET ORAL at 08:46

## 2024-11-10 RX ADMIN — OXYCODONE 5 MG: 5 TABLET ORAL at 08:50

## 2024-11-10 RX ADMIN — PIPERACILLIN AND TAZOBACTAM 3375 MG: 3; .375 INJECTION, POWDER, FOR SOLUTION INTRAVENOUS at 14:00

## 2024-11-10 RX ADMIN — HEPARIN SODIUM 16 UNITS/KG/HR: 10000 INJECTION, SOLUTION INTRAVENOUS at 11:23

## 2024-11-10 RX ADMIN — SERTRALINE HYDROCHLORIDE 25 MG: 50 TABLET ORAL at 08:46

## 2024-11-10 RX ADMIN — DULOXETINE HYDROCHLORIDE 30 MG: 30 CAPSULE, DELAYED RELEASE ORAL at 08:46

## 2024-11-10 RX ADMIN — ATORVASTATIN CALCIUM 10 MG: 10 TABLET, FILM COATED ORAL at 08:46

## 2024-11-10 RX ADMIN — HEPARIN SODIUM 16 UNITS/KG/HR: 10000 INJECTION, SOLUTION INTRAVENOUS at 23:33

## 2024-11-10 RX ADMIN — PIPERACILLIN AND TAZOBACTAM 3375 MG: 3; .375 INJECTION, POWDER, FOR SOLUTION INTRAVENOUS at 07:10

## 2024-11-10 RX ADMIN — OXYCODONE 5 MG: 5 TABLET ORAL at 01:40

## 2024-11-10 RX ADMIN — OXYCODONE 5 MG: 5 TABLET ORAL at 22:35

## 2024-11-10 RX ADMIN — PREGABALIN 150 MG: 75 CAPSULE ORAL at 13:59

## 2024-11-10 ASSESSMENT — PAIN DESCRIPTION - DESCRIPTORS
DESCRIPTORS: BURNING;ACHING
DESCRIPTORS: ACHING
DESCRIPTORS: ACHING

## 2024-11-10 ASSESSMENT — PAIN DESCRIPTION - ORIENTATION
ORIENTATION: RIGHT

## 2024-11-10 ASSESSMENT — PAIN DESCRIPTION - LOCATION
LOCATION: LEG
LOCATION: FOOT
LOCATION: FOOT

## 2024-11-10 ASSESSMENT — PAIN SCALES - GENERAL
PAINLEVEL_OUTOF10: 8
PAINLEVEL_OUTOF10: 9
PAINLEVEL_OUTOF10: 7
PAINLEVEL_OUTOF10: 10
PAINLEVEL_OUTOF10: 0
PAINLEVEL_OUTOF10: 4
PAINLEVEL_OUTOF10: 9
PAINLEVEL_OUTOF10: 6

## 2024-11-10 NOTE — CONSULTS
[address]  [address]                              CONSULTATION      PATIENT NAME: ALICE PARRA                  : 1970  MED REC NO: [id number]                       ROOM: 511  ACCOUNT NO: [id number]                       ADMIT DATE: 2024  PROVIDER: Sourav Zuñiga MD    DATE OF SERVICE:  2024    ATTENDING PHYSICIAN:  NAVARRO ROTH    REASON FOR CONSULTATION:  Right leg peripheral vascular disease with foot necrosis.    HISTORY OF PRESENT ILLNESS:  The patient is a very pleasant, but severely debilitated 54-year-old female, who was transferred to the [place] [facility] Room after a very brief evaluation in [facility].  There, she was found to have 2 months of worsening skin changes and necrosis to her right foot.  She has a chronic foot drop and is nonambulatory due to supposedly neuropathy.  She reports a fall 2 months ago that worsened this issue.  She has a CAT scan done at [facility] that was not read, but obviously shows a long chronic total occlusion from the superficial femoral artery down to the tibial vessels.  Currently, she is in no pain at rest, but she does report tenderness at times.    PAST MEDICAL HISTORY:  Significant for coronary artery disease, hypertension, neuropathy, severe debility, colonic problems.    HOME MEDICATIONS:  Include Colestid, Cymbalta, Zoloft, potassium, Aldactone, Cozaar, Lipitor, iron, folate, Lyrica, and thiamine.    ALLERGIES:  SHE IS ALLERGIC TO CODEINE.      SOCIAL HISTORY:  Positive for tobacco.  No daily alcohol.    PHYSICAL EXAMINATION:  VITAL SIGNS: Her temperature is afebrile at 97.5, heart rate 96, blood pressure is 115/77, 98% on room air.  GENERAL:  Currently, in no acute distress.  She is alert and oriented.  LUNGS:  Clear to auscultation bilaterally.  HEART:  Regular rate and rhythm.  ABDOMEN:  Soft, nontender.  EXTREMITIES:

## 2024-11-11 ENCOUNTER — APPOINTMENT (OUTPATIENT)
Facility: HOSPITAL | Age: 54
DRG: 305 | End: 2024-11-11
Payer: COMMERCIAL

## 2024-11-11 ENCOUNTER — APPOINTMENT (OUTPATIENT)
Facility: HOSPITAL | Age: 54
DRG: 305 | End: 2024-11-11
Attending: SURGERY
Payer: COMMERCIAL

## 2024-11-11 LAB
ANION GAP SERPL CALC-SCNC: 8 MMOL/L (ref 2–12)
BUN SERPL-MCNC: 20 MG/DL (ref 6–20)
BUN/CREAT SERPL: 6 (ref 12–20)
CALCIUM SERPL-MCNC: 8.1 MG/DL (ref 8.5–10.1)
CHLORIDE SERPL-SCNC: 100 MMOL/L (ref 97–108)
CO2 SERPL-SCNC: 25 MMOL/L (ref 21–32)
CREAT SERPL-MCNC: 3.3 MG/DL (ref 0.55–1.02)
ERYTHROCYTE [DISTWIDTH] IN BLOOD BY AUTOMATED COUNT: 15.1 % (ref 11.5–14.5)
GLUCOSE SERPL-MCNC: 89 MG/DL (ref 65–100)
HCT VFR BLD AUTO: 30.2 % (ref 35–47)
HGB BLD-MCNC: 9.3 G/DL (ref 11.5–16)
MCH RBC QN AUTO: 29.6 PG (ref 26–34)
MCHC RBC AUTO-ENTMCNC: 30.8 G/DL (ref 30–36.5)
MCV RBC AUTO: 96.2 FL (ref 80–99)
NRBC # BLD: 0 K/UL (ref 0–0.01)
NRBC BLD-RTO: 0 PER 100 WBC
PLATELET # BLD AUTO: 248 K/UL (ref 150–400)
PMV BLD AUTO: 9.4 FL (ref 8.9–12.9)
POTASSIUM SERPL-SCNC: 4.2 MMOL/L (ref 3.5–5.1)
RBC # BLD AUTO: 3.14 M/UL (ref 3.8–5.2)
SODIUM SERPL-SCNC: 133 MMOL/L (ref 136–145)
UFH PPP CHRO-ACNC: 0.46 IU/ML
UFH PPP CHRO-ACNC: 0.46 IU/ML
VANCOMYCIN SERPL-MCNC: 25.2 UG/ML
WBC # BLD AUTO: 10.8 K/UL (ref 3.6–11)

## 2024-11-11 PROCEDURE — 2580000003 HC RX 258

## 2024-11-11 PROCEDURE — 85027 COMPLETE CBC AUTOMATED: CPT

## 2024-11-11 PROCEDURE — 97110 THERAPEUTIC EXERCISES: CPT

## 2024-11-11 PROCEDURE — 85520 HEPARIN ASSAY: CPT

## 2024-11-11 PROCEDURE — 97110 THERAPEUTIC EXERCISES: CPT | Performed by: OCCUPATIONAL THERAPIST

## 2024-11-11 PROCEDURE — 80048 BASIC METABOLIC PNL TOTAL CA: CPT

## 2024-11-11 PROCEDURE — 1200000000 HC SEMI PRIVATE

## 2024-11-11 PROCEDURE — 6360000002 HC RX W HCPCS

## 2024-11-11 PROCEDURE — 51798 US URINE CAPACITY MEASURE: CPT

## 2024-11-11 PROCEDURE — 6370000000 HC RX 637 (ALT 250 FOR IP): Performed by: STUDENT IN AN ORGANIZED HEALTH CARE EDUCATION/TRAINING PROGRAM

## 2024-11-11 PROCEDURE — 97530 THERAPEUTIC ACTIVITIES: CPT | Performed by: OCCUPATIONAL THERAPIST

## 2024-11-11 PROCEDURE — 6360000002 HC RX W HCPCS: Performed by: STUDENT IN AN ORGANIZED HEALTH CARE EDUCATION/TRAINING PROGRAM

## 2024-11-11 PROCEDURE — 36415 COLL VENOUS BLD VENIPUNCTURE: CPT

## 2024-11-11 PROCEDURE — 76770 US EXAM ABDO BACK WALL COMP: CPT

## 2024-11-11 PROCEDURE — 93922 UPR/L XTREMITY ART 2 LEVELS: CPT

## 2024-11-11 PROCEDURE — 2580000003 HC RX 258: Performed by: STUDENT IN AN ORGANIZED HEALTH CARE EDUCATION/TRAINING PROGRAM

## 2024-11-11 PROCEDURE — 97530 THERAPEUTIC ACTIVITIES: CPT

## 2024-11-11 PROCEDURE — 80202 ASSAY OF VANCOMYCIN: CPT

## 2024-11-11 RX ORDER — SODIUM CHLORIDE 9 MG/ML
INJECTION, SOLUTION INTRAVENOUS CONTINUOUS
Status: DISPENSED | OUTPATIENT
Start: 2024-11-12 | End: 2024-11-12

## 2024-11-11 RX ORDER — METRONIDAZOLE 500 MG/100ML
500 INJECTION, SOLUTION INTRAVENOUS EVERY 8 HOURS
Status: DISCONTINUED | OUTPATIENT
Start: 2024-11-11 | End: 2024-11-14

## 2024-11-11 RX ORDER — SODIUM CHLORIDE, SODIUM LACTATE, POTASSIUM CHLORIDE, CALCIUM CHLORIDE 600; 310; 30; 20 MG/100ML; MG/100ML; MG/100ML; MG/100ML
INJECTION, SOLUTION INTRAVENOUS CONTINUOUS
Status: DISCONTINUED | OUTPATIENT
Start: 2024-11-11 | End: 2024-11-11

## 2024-11-11 RX ADMIN — SODIUM CHLORIDE, POTASSIUM CHLORIDE, SODIUM LACTATE AND CALCIUM CHLORIDE: 600; 310; 30; 20 INJECTION, SOLUTION INTRAVENOUS at 10:08

## 2024-11-11 RX ADMIN — OXYCODONE 5 MG: 5 TABLET ORAL at 21:29

## 2024-11-11 RX ADMIN — OXYCODONE 5 MG: 5 TABLET ORAL at 09:19

## 2024-11-11 RX ADMIN — HEPARIN SODIUM 16 UNITS/KG/HR: 10000 INJECTION, SOLUTION INTRAVENOUS at 13:42

## 2024-11-11 RX ADMIN — PREGABALIN 150 MG: 75 CAPSULE ORAL at 21:29

## 2024-11-11 RX ADMIN — PREGABALIN 150 MG: 75 CAPSULE ORAL at 09:01

## 2024-11-11 RX ADMIN — METRONIDAZOLE 500 MG: 500 INJECTION, SOLUTION INTRAVENOUS at 12:34

## 2024-11-11 RX ADMIN — PIPERACILLIN AND TAZOBACTAM 3375 MG: 3; .375 INJECTION, POWDER, FOR SOLUTION INTRAVENOUS at 06:38

## 2024-11-11 RX ADMIN — SERTRALINE HYDROCHLORIDE 25 MG: 50 TABLET ORAL at 09:02

## 2024-11-11 RX ADMIN — METRONIDAZOLE 500 MG: 500 INJECTION, SOLUTION INTRAVENOUS at 19:30

## 2024-11-11 RX ADMIN — WATER 2000 MG: 1 INJECTION INTRAMUSCULAR; INTRAVENOUS; SUBCUTANEOUS at 11:15

## 2024-11-11 RX ADMIN — OXYCODONE 5 MG: 5 TABLET ORAL at 05:04

## 2024-11-11 RX ADMIN — SODIUM CHLORIDE: 9 INJECTION, SOLUTION INTRAVENOUS at 23:54

## 2024-11-11 RX ADMIN — DULOXETINE HYDROCHLORIDE 30 MG: 30 CAPSULE, DELAYED RELEASE ORAL at 09:02

## 2024-11-11 RX ADMIN — ATORVASTATIN CALCIUM 10 MG: 10 TABLET, FILM COATED ORAL at 09:02

## 2024-11-11 RX ADMIN — PREGABALIN 150 MG: 75 CAPSULE ORAL at 15:06

## 2024-11-11 ASSESSMENT — PAIN DESCRIPTION - DESCRIPTORS
DESCRIPTORS: ACHING;DISCOMFORT
DESCRIPTORS: ACHING;DISCOMFORT

## 2024-11-11 ASSESSMENT — PAIN SCALES - GENERAL
PAINLEVEL_OUTOF10: 0
PAINLEVEL_OUTOF10: 3
PAINLEVEL_OUTOF10: 7
PAINLEVEL_OUTOF10: 8
PAINLEVEL_OUTOF10: 9

## 2024-11-11 ASSESSMENT — PAIN DESCRIPTION - LOCATION
LOCATION: FOOT;LEG
LOCATION: LEG
LOCATION: FOOT

## 2024-11-11 ASSESSMENT — PAIN DESCRIPTION - ORIENTATION
ORIENTATION: RIGHT

## 2024-11-11 ASSESSMENT — PAIN DESCRIPTION - ONSET: ONSET: ON-GOING

## 2024-11-11 NOTE — WOUND CARE
Consulted for right foot.  Followed by Dr. Zuñiga and his note indicated rec for BKA.  She reports today that she plans to \"go ahead\" with the surgery.      Right foot is elevated on pillow.   Deep tissue injury to right heel that she reports has been hurting for awhile.  Dry gangrene to toes of right foot.           Buttocks intact with dry skin - petrolatum applied.      Dry, hypopigmented linear trauma marks to inside of right thigh.  Brief removed.  Petrolatum applied        Available as needed per Dr. Zuñiga.    Isatu Morrissey,ARUNAN

## 2024-11-11 NOTE — CONSULTS
[address]  [address]                              CONSULTATION      PATIENT NAME: ALICE PARRA                  : 1970  MED REC NO: [id number]                       ROOM: 511  ACCOUNT NO: [id number]                       ADMIT DATE: 2024  PROVIDER: Sourav Zuñiga MD    DATE OF SERVICE:  11/10/2024    ATTENDING PHYSICIAN:  NAVARRO ROTH    REASON FOR CONSULTATION:  Severe peripheral vascular disease with right foot necrosis.    HISTORY OF PRESENT ILLNESS:  The patient is a very pleasant, but severely debilitated 54-year-old, obese, nonambulatory, smoker, who was transferred after a very brief stop at [facility] for long chronic arterial occlusion and foot necrosis.  She has minimal complaints of pain at baseline, but reports 2 to 3 months of necrotic changes to her right foot along with tenderness.  She suffered a fall, she reports from her hospital bed 2 months ago.  She is nonambulatory for unclear reasons.  She says it is due to her neuropathy.    PAST MEDICAL HISTORY:  Significant for coronary artery disease, morbid obesity, fibromyalgia, anxiety, unclear type of neuropathy, nonambulatory state, and colonic issues.    SOCIAL HISTORY:  Positive for tobacco.  No daily alcohol.    ALLERGIES:  SHE IS ALLERGIC TO CODEINE.      REVIEW OF SYSTEMS:  Negative for a 10-system review.    PHYSICAL EXAMINATION:  VITAL SIGNS:  Temperature is 97.5.  She is afebrile.  Heart rate 90, blood pressure is 98/73, saturating 96% on room air.  GENERAL:  She is alert and oriented, in no acute distress.  LUNGS:  Clear to auscultation bilaterally.  HEART:  Regular rate and rhythm.  ABDOMEN:  Soft, nontender.  EXTREMITIES:  Palpable bilateral femoral pulses.  The left lower extremity is warm and well perfused.  The right lower extremity has necrotic changes from the midshin down with darkened skin and necrotic forefoot.  The foot

## 2024-11-11 NOTE — CONSULTS
[facility]   [address], [place] [address]  Phone: [phone]   [phone]     [email or web address]    NEPHROLOGY CONSULT NOTE     Patient: Amy Roberts MRN: [id number]  PCP: Maricruz Vega APRN - NP   :     1970  Age:   54 y.o.  Sex:  female      Referring physician: aH Quezada MD  Reason for consultation: 54 y.o. female with PVD (peripheral vascular disease) (HCC) [I73.9]  Right leg pain [M79.604]  Vascular occlusion [I99.8]  Ischemic neuropathy of right foot [G57.91] complicated by ROSEMARIE  Admission Date: 2024 10:37 PM  LOS: 2 days      ASSESSMENT and PLAN :   ROSEMARIE :  - suspect ATN from IV vanc/ zosyn, IV contrast, NSAID use  - significant and rapid decline in GFR : AKIN stage III  - oliguric   - Place caraballo catheter for strict I.O  - agree w/ IVF, holding Losartan   - no emergent need for RRT-- discussed potential risk w/ Pt   - dose vanc by levels   - labs again in am     Hyponatremia   - 2/2 Hypervolemia   - watch for now     PAD with RLE gangrene   - for BKA  - abx per ID    HTN   Obesity   FMG / Neuropathy   CAD    Care Plan discussed with:  pt and RN         Thank you for consulting [facility] in the care of your patient.      Subjective:   HPI: Amy Roberts is a 54 y.o.  female who has been admitted to the hospital for RLE pain and noted to have gangrene. She has neuropathy and sustained injury to the foot that progressed to gangrene over the last 2 months   She was taking Motrin 4-5 tabs daily for last week PTA  Since admission had CTA on   Followed by Vanc loading and Zosyn, along w/ losartan   She developed oligo anuria by yesterday and made some urine so far today   Cr increased from Normal to 1.3 to 3.1 in 48 hrs   Denies any prior renal issues    Past Medical Hx:   Past Medical History:   Diagnosis Date    Anxiety     CAD (coronary artery disease)     Fibromyalgia     Hypertension

## 2024-11-12 LAB
ANION GAP SERPL CALC-SCNC: 9 MMOL/L (ref 2–12)
BUN SERPL-MCNC: 27 MG/DL (ref 6–20)
BUN/CREAT SERPL: 6 (ref 12–20)
CALCIUM SERPL-MCNC: 8.4 MG/DL (ref 8.5–10.1)
CHLORIDE SERPL-SCNC: 98 MMOL/L (ref 97–108)
CO2 SERPL-SCNC: 23 MMOL/L (ref 21–32)
CREAT SERPL-MCNC: 4.77 MG/DL (ref 0.55–1.02)
ECHO BSA: 2.58 M2
ERYTHROCYTE [DISTWIDTH] IN BLOOD BY AUTOMATED COUNT: 15.2 % (ref 11.5–14.5)
GLUCOSE SERPL-MCNC: 98 MG/DL (ref 65–100)
HCT VFR BLD AUTO: 31.1 % (ref 35–47)
HGB BLD-MCNC: 9.6 G/DL (ref 11.5–16)
MCH RBC QN AUTO: 29.4 PG (ref 26–34)
MCHC RBC AUTO-ENTMCNC: 30.9 G/DL (ref 30–36.5)
MCV RBC AUTO: 95.1 FL (ref 80–99)
NRBC # BLD: 0 K/UL (ref 0–0.01)
NRBC BLD-RTO: 0 PER 100 WBC
PLATELET # BLD AUTO: 196 K/UL (ref 150–400)
PMV BLD AUTO: 9.4 FL (ref 8.9–12.9)
POTASSIUM SERPL-SCNC: 4.6 MMOL/L (ref 3.5–5.1)
RBC # BLD AUTO: 3.27 M/UL (ref 3.8–5.2)
SODIUM SERPL-SCNC: 130 MMOL/L (ref 136–145)
UFH PPP CHRO-ACNC: 0.31 IU/ML
VANCOMYCIN SERPL-MCNC: 20 UG/ML
VAS LEFT ABI: 0.76
VAS LEFT ARM BP: 120 MMHG
VAS LEFT DORSALIS PEDIS BP: 95 MMHG
VAS RIGHT ARM BP: 125 MMHG
WBC # BLD AUTO: 9 K/UL (ref 3.6–11)

## 2024-11-12 PROCEDURE — 85520 HEPARIN ASSAY: CPT

## 2024-11-12 PROCEDURE — 6370000000 HC RX 637 (ALT 250 FOR IP): Performed by: STUDENT IN AN ORGANIZED HEALTH CARE EDUCATION/TRAINING PROGRAM

## 2024-11-12 PROCEDURE — 36415 COLL VENOUS BLD VENIPUNCTURE: CPT

## 2024-11-12 PROCEDURE — 6360000002 HC RX W HCPCS: Performed by: STUDENT IN AN ORGANIZED HEALTH CARE EDUCATION/TRAINING PROGRAM

## 2024-11-12 PROCEDURE — 1200000000 HC SEMI PRIVATE

## 2024-11-12 PROCEDURE — 80202 ASSAY OF VANCOMYCIN: CPT

## 2024-11-12 PROCEDURE — 2580000003 HC RX 258

## 2024-11-12 PROCEDURE — 6360000002 HC RX W HCPCS

## 2024-11-12 PROCEDURE — 2580000003 HC RX 258: Performed by: NURSE PRACTITIONER

## 2024-11-12 PROCEDURE — 80048 BASIC METABOLIC PNL TOTAL CA: CPT

## 2024-11-12 PROCEDURE — 85027 COMPLETE CBC AUTOMATED: CPT

## 2024-11-12 RX ORDER — SODIUM CHLORIDE 9 MG/ML
INJECTION, SOLUTION INTRAVENOUS CONTINUOUS
Status: DISCONTINUED | OUTPATIENT
Start: 2024-11-12 | End: 2024-11-19 | Stop reason: HOSPADM

## 2024-11-12 RX ORDER — HYDRALAZINE HYDROCHLORIDE 20 MG/ML
10 INJECTION INTRAMUSCULAR; INTRAVENOUS ONCE
Status: DISCONTINUED | OUTPATIENT
Start: 2024-11-12 | End: 2024-11-12

## 2024-11-12 RX ADMIN — OXYCODONE 5 MG: 5 TABLET ORAL at 17:59

## 2024-11-12 RX ADMIN — PREGABALIN 150 MG: 75 CAPSULE ORAL at 20:34

## 2024-11-12 RX ADMIN — OXYCODONE 5 MG: 5 TABLET ORAL at 08:39

## 2024-11-12 RX ADMIN — SERTRALINE HYDROCHLORIDE 25 MG: 50 TABLET ORAL at 08:40

## 2024-11-12 RX ADMIN — METRONIDAZOLE 500 MG: 500 INJECTION, SOLUTION INTRAVENOUS at 11:22

## 2024-11-12 RX ADMIN — HEPARIN SODIUM 16 UNITS/KG/HR: 10000 INJECTION, SOLUTION INTRAVENOUS at 01:37

## 2024-11-12 RX ADMIN — HEPARIN SODIUM 16 UNITS/KG/HR: 10000 INJECTION, SOLUTION INTRAVENOUS at 13:43

## 2024-11-12 RX ADMIN — DULOXETINE HYDROCHLORIDE 30 MG: 30 CAPSULE, DELAYED RELEASE ORAL at 08:41

## 2024-11-12 RX ADMIN — METRONIDAZOLE 500 MG: 500 INJECTION, SOLUTION INTRAVENOUS at 03:43

## 2024-11-12 RX ADMIN — CEFEPIME 1000 MG: 1 INJECTION, POWDER, FOR SOLUTION INTRAMUSCULAR; INTRAVENOUS at 12:52

## 2024-11-12 RX ADMIN — METRONIDAZOLE 500 MG: 500 INJECTION, SOLUTION INTRAVENOUS at 20:33

## 2024-11-12 RX ADMIN — ATORVASTATIN CALCIUM 10 MG: 10 TABLET, FILM COATED ORAL at 08:41

## 2024-11-12 RX ADMIN — PREGABALIN 150 MG: 75 CAPSULE ORAL at 14:54

## 2024-11-12 RX ADMIN — PREGABALIN 150 MG: 75 CAPSULE ORAL at 08:40

## 2024-11-12 RX ADMIN — SODIUM CHLORIDE: 9 INJECTION, SOLUTION INTRAVENOUS at 13:03

## 2024-11-12 ASSESSMENT — PAIN DESCRIPTION - LOCATION
LOCATION: FOOT
LOCATION: FOOT;LEG

## 2024-11-12 ASSESSMENT — PAIN DESCRIPTION - DESCRIPTORS: DESCRIPTORS: ACHING

## 2024-11-12 ASSESSMENT — PAIN DESCRIPTION - ORIENTATION: ORIENTATION: RIGHT

## 2024-11-12 ASSESSMENT — PAIN SCALES - GENERAL
PAINLEVEL_OUTOF10: 8
PAINLEVEL_OUTOF10: 0
PAINLEVEL_OUTOF10: 10
PAINLEVEL_OUTOF10: 0

## 2024-11-13 ENCOUNTER — ANESTHESIA EVENT (OUTPATIENT)
Facility: HOSPITAL | Age: 54
End: 2024-11-13
Payer: COMMERCIAL

## 2024-11-13 ENCOUNTER — ANESTHESIA (OUTPATIENT)
Facility: HOSPITAL | Age: 54
End: 2024-11-13
Payer: COMMERCIAL

## 2024-11-13 LAB
ABO + RH BLD: NORMAL
ANION GAP SERPL CALC-SCNC: 9 MMOL/L (ref 2–12)
BLOOD GROUP ANTIBODIES SERPL: NORMAL
BUN SERPL-MCNC: 32 MG/DL (ref 6–20)
BUN/CREAT SERPL: 6 (ref 12–20)
CALCIUM SERPL-MCNC: 7.7 MG/DL (ref 8.5–10.1)
CHLORIDE SERPL-SCNC: 103 MMOL/L (ref 97–108)
CK SERPL-CCNC: 150 U/L (ref 26–192)
CO2 SERPL-SCNC: 22 MMOL/L (ref 21–32)
CREAT SERPL-MCNC: 5.63 MG/DL (ref 0.55–1.02)
ERYTHROCYTE [DISTWIDTH] IN BLOOD BY AUTOMATED COUNT: 15.1 % (ref 11.5–14.5)
GLUCOSE SERPL-MCNC: 89 MG/DL (ref 65–100)
HCT VFR BLD AUTO: 29.6 % (ref 35–47)
HGB BLD-MCNC: 9.2 G/DL (ref 11.5–16)
MCH RBC QN AUTO: 29.6 PG (ref 26–34)
MCHC RBC AUTO-ENTMCNC: 31.1 G/DL (ref 30–36.5)
MCV RBC AUTO: 95.2 FL (ref 80–99)
NRBC # BLD: 0 K/UL (ref 0–0.01)
NRBC BLD-RTO: 0 PER 100 WBC
PLATELET # BLD AUTO: 218 K/UL (ref 150–400)
PMV BLD AUTO: 9.2 FL (ref 8.9–12.9)
POTASSIUM SERPL-SCNC: 4.5 MMOL/L (ref 3.5–5.1)
RBC # BLD AUTO: 3.11 M/UL (ref 3.8–5.2)
SODIUM SERPL-SCNC: 134 MMOL/L (ref 136–145)
SPECIMEN EXP DATE BLD: NORMAL
UFH PPP CHRO-ACNC: 0.33 IU/ML
VANCOMYCIN SERPL-MCNC: 16.6 UG/ML
WBC # BLD AUTO: 9.6 K/UL (ref 3.6–11)

## 2024-11-13 PROCEDURE — 82550 ASSAY OF CK (CPK): CPT

## 2024-11-13 PROCEDURE — 6360000002 HC RX W HCPCS: Performed by: STUDENT IN AN ORGANIZED HEALTH CARE EDUCATION/TRAINING PROGRAM

## 2024-11-13 PROCEDURE — 6370000000 HC RX 637 (ALT 250 FOR IP): Performed by: SURGERY

## 2024-11-13 PROCEDURE — 80202 ASSAY OF VANCOMYCIN: CPT

## 2024-11-13 PROCEDURE — 7100000000 HC PACU RECOVERY - FIRST 15 MIN: Performed by: SURGERY

## 2024-11-13 PROCEDURE — 36415 COLL VENOUS BLD VENIPUNCTURE: CPT

## 2024-11-13 PROCEDURE — 6360000002 HC RX W HCPCS

## 2024-11-13 PROCEDURE — 0Y6H0Z1 DETACHMENT AT RIGHT LOWER LEG, HIGH, OPEN APPROACH: ICD-10-PCS | Performed by: SURGERY

## 2024-11-13 PROCEDURE — 3700000001 HC ADD 15 MINUTES (ANESTHESIA): Performed by: SURGERY

## 2024-11-13 PROCEDURE — 6360000002 HC RX W HCPCS: Performed by: NURSE ANESTHETIST, CERTIFIED REGISTERED

## 2024-11-13 PROCEDURE — 3600000013 HC SURGERY LEVEL 3 ADDTL 15MIN: Performed by: SURGERY

## 2024-11-13 PROCEDURE — 88311 DECALCIFY TISSUE: CPT

## 2024-11-13 PROCEDURE — 7100000001 HC PACU RECOVERY - ADDTL 15 MIN: Performed by: SURGERY

## 2024-11-13 PROCEDURE — 86850 RBC ANTIBODY SCREEN: CPT

## 2024-11-13 PROCEDURE — 6370000000 HC RX 637 (ALT 250 FOR IP): Performed by: STUDENT IN AN ORGANIZED HEALTH CARE EDUCATION/TRAINING PROGRAM

## 2024-11-13 PROCEDURE — 86901 BLOOD TYPING SEROLOGIC RH(D): CPT

## 2024-11-13 PROCEDURE — 88307 TISSUE EXAM BY PATHOLOGIST: CPT

## 2024-11-13 PROCEDURE — 3600000003 HC SURGERY LEVEL 3 BASE: Performed by: SURGERY

## 2024-11-13 PROCEDURE — 2580000003 HC RX 258: Performed by: SURGERY

## 2024-11-13 PROCEDURE — 86900 BLOOD TYPING SEROLOGIC ABO: CPT

## 2024-11-13 PROCEDURE — 64445 NJX AA&/STRD SCIATIC NRV IMG: CPT | Performed by: STUDENT IN AN ORGANIZED HEALTH CARE EDUCATION/TRAINING PROGRAM

## 2024-11-13 PROCEDURE — 2580000003 HC RX 258: Performed by: STUDENT IN AN ORGANIZED HEALTH CARE EDUCATION/TRAINING PROGRAM

## 2024-11-13 PROCEDURE — 2580000003 HC RX 258: Performed by: NURSE ANESTHETIST, CERTIFIED REGISTERED

## 2024-11-13 PROCEDURE — 85520 HEPARIN ASSAY: CPT

## 2024-11-13 PROCEDURE — 80048 BASIC METABOLIC PNL TOTAL CA: CPT

## 2024-11-13 PROCEDURE — 2500000003 HC RX 250 WO HCPCS: Performed by: NURSE ANESTHETIST, CERTIFIED REGISTERED

## 2024-11-13 PROCEDURE — 1200000000 HC SEMI PRIVATE

## 2024-11-13 PROCEDURE — 64447 NJX AA&/STRD FEMORAL NRV IMG: CPT | Performed by: STUDENT IN AN ORGANIZED HEALTH CARE EDUCATION/TRAINING PROGRAM

## 2024-11-13 PROCEDURE — 2709999900 HC NON-CHARGEABLE SUPPLY: Performed by: SURGERY

## 2024-11-13 PROCEDURE — 3700000000 HC ANESTHESIA ATTENDED CARE: Performed by: SURGERY

## 2024-11-13 PROCEDURE — 2720000010 HC SURG SUPPLY STERILE: Performed by: SURGERY

## 2024-11-13 PROCEDURE — 6360000002 HC RX W HCPCS: Performed by: SURGERY

## 2024-11-13 PROCEDURE — 85027 COMPLETE CBC AUTOMATED: CPT

## 2024-11-13 DEVICE — POWDER SURG CELLERATE RX 1 GM HYDROL COLLEGEN: Type: IMPLANTABLE DEVICE | Status: FUNCTIONAL

## 2024-11-13 RX ORDER — SODIUM CHLORIDE 0.9 % (FLUSH) 0.9 %
5-40 SYRINGE (ML) INJECTION PRN
Status: DISCONTINUED | OUTPATIENT
Start: 2024-11-13 | End: 2024-11-13 | Stop reason: HOSPADM

## 2024-11-13 RX ORDER — PROCHLORPERAZINE EDISYLATE 5 MG/ML
5 INJECTION INTRAMUSCULAR; INTRAVENOUS
Status: DISCONTINUED | OUTPATIENT
Start: 2024-11-13 | End: 2024-11-13 | Stop reason: HOSPADM

## 2024-11-13 RX ORDER — OXYCODONE HYDROCHLORIDE 5 MG/1
5 TABLET ORAL
Status: DISCONTINUED | OUTPATIENT
Start: 2024-11-13 | End: 2024-11-13 | Stop reason: HOSPADM

## 2024-11-13 RX ORDER — LIDOCAINE HYDROCHLORIDE 10 MG/ML
1 INJECTION, SOLUTION EPIDURAL; INFILTRATION; INTRACAUDAL; PERINEURAL
Status: DISCONTINUED | OUTPATIENT
Start: 2024-11-13 | End: 2024-11-13 | Stop reason: HOSPADM

## 2024-11-13 RX ORDER — ONDANSETRON 2 MG/ML
INJECTION INTRAMUSCULAR; INTRAVENOUS
Status: DISCONTINUED | OUTPATIENT
Start: 2024-11-13 | End: 2024-11-13 | Stop reason: SDUPTHER

## 2024-11-13 RX ORDER — SUCCINYLCHOLINE/SOD CL,ISO/PF 200MG/10ML
SYRINGE (ML) INTRAVENOUS
Status: DISCONTINUED | OUTPATIENT
Start: 2024-11-13 | End: 2024-11-13 | Stop reason: SDUPTHER

## 2024-11-13 RX ORDER — ACETAMINOPHEN 500 MG
1000 TABLET ORAL ONCE
Status: COMPLETED | OUTPATIENT
Start: 2024-11-13 | End: 2024-11-13

## 2024-11-13 RX ORDER — ROPIVACAINE HYDROCHLORIDE 5 MG/ML
30 INJECTION, SOLUTION EPIDURAL; INFILTRATION; PERINEURAL ONCE
Status: DISCONTINUED | OUTPATIENT
Start: 2024-11-13 | End: 2024-11-13 | Stop reason: HOSPADM

## 2024-11-13 RX ORDER — HYDROMORPHONE HYDROCHLORIDE 1 MG/ML
0.5 INJECTION, SOLUTION INTRAMUSCULAR; INTRAVENOUS; SUBCUTANEOUS EVERY 5 MIN PRN
Status: DISCONTINUED | OUTPATIENT
Start: 2024-11-13 | End: 2024-11-13 | Stop reason: HOSPADM

## 2024-11-13 RX ORDER — PROPOFOL 10 MG/ML
INJECTION, EMULSION INTRAVENOUS
Status: DISCONTINUED | OUTPATIENT
Start: 2024-11-13 | End: 2024-11-13 | Stop reason: SDUPTHER

## 2024-11-13 RX ORDER — OXYCODONE HYDROCHLORIDE 5 MG/1
5 TABLET ORAL EVERY 4 HOURS PRN
Status: DISCONTINUED | OUTPATIENT
Start: 2024-11-13 | End: 2024-11-14 | Stop reason: ALTCHOICE

## 2024-11-13 RX ORDER — PHENYLEPHRINE HCL IN 0.9% NACL 0.4MG/10ML
SYRINGE (ML) INTRAVENOUS
Status: DISCONTINUED | OUTPATIENT
Start: 2024-11-13 | End: 2024-11-13 | Stop reason: SDUPTHER

## 2024-11-13 RX ORDER — FENTANYL CITRATE 50 UG/ML
100 INJECTION, SOLUTION INTRAMUSCULAR; INTRAVENOUS
Status: COMPLETED | OUTPATIENT
Start: 2024-11-13 | End: 2024-11-13

## 2024-11-13 RX ORDER — ROCURONIUM BROMIDE 10 MG/ML
INJECTION, SOLUTION INTRAVENOUS
Status: DISCONTINUED | OUTPATIENT
Start: 2024-11-13 | End: 2024-11-13 | Stop reason: SDUPTHER

## 2024-11-13 RX ORDER — MIDAZOLAM HYDROCHLORIDE 2 MG/2ML
2 INJECTION, SOLUTION INTRAMUSCULAR; INTRAVENOUS PRN
Status: DISCONTINUED | OUTPATIENT
Start: 2024-11-13 | End: 2024-11-13 | Stop reason: HOSPADM

## 2024-11-13 RX ORDER — FENTANYL CITRATE 50 UG/ML
25 INJECTION, SOLUTION INTRAMUSCULAR; INTRAVENOUS EVERY 5 MIN PRN
Status: DISCONTINUED | OUTPATIENT
Start: 2024-11-13 | End: 2024-11-13 | Stop reason: HOSPADM

## 2024-11-13 RX ORDER — NALOXONE HYDROCHLORIDE 0.4 MG/ML
INJECTION, SOLUTION INTRAMUSCULAR; INTRAVENOUS; SUBCUTANEOUS PRN
Status: DISCONTINUED | OUTPATIENT
Start: 2024-11-13 | End: 2024-11-13 | Stop reason: HOSPADM

## 2024-11-13 RX ORDER — ROPIVACAINE HYDROCHLORIDE 5 MG/ML
INJECTION, SOLUTION EPIDURAL; INFILTRATION; PERINEURAL
Status: COMPLETED | OUTPATIENT
Start: 2024-11-13 | End: 2024-11-13

## 2024-11-13 RX ORDER — ONDANSETRON 2 MG/ML
4 INJECTION INTRAMUSCULAR; INTRAVENOUS
Status: DISCONTINUED | OUTPATIENT
Start: 2024-11-13 | End: 2024-11-13 | Stop reason: HOSPADM

## 2024-11-13 RX ORDER — HYDRALAZINE HYDROCHLORIDE 20 MG/ML
10 INJECTION INTRAMUSCULAR; INTRAVENOUS ONCE
Status: DISCONTINUED | OUTPATIENT
Start: 2024-11-13 | End: 2024-11-13 | Stop reason: HOSPADM

## 2024-11-13 RX ORDER — SODIUM CHLORIDE 0.9 % (FLUSH) 0.9 %
5-40 SYRINGE (ML) INJECTION EVERY 12 HOURS SCHEDULED
Status: DISCONTINUED | OUTPATIENT
Start: 2024-11-13 | End: 2024-11-13 | Stop reason: HOSPADM

## 2024-11-13 RX ORDER — LIDOCAINE HYDROCHLORIDE 20 MG/ML
INJECTION, SOLUTION EPIDURAL; INFILTRATION; INTRACAUDAL; PERINEURAL
Status: DISCONTINUED | OUTPATIENT
Start: 2024-11-13 | End: 2024-11-13 | Stop reason: SDUPTHER

## 2024-11-13 RX ORDER — FENTANYL CITRATE 50 UG/ML
50 INJECTION, SOLUTION INTRAMUSCULAR; INTRAVENOUS
Status: DISCONTINUED | OUTPATIENT
Start: 2024-11-13 | End: 2024-11-15

## 2024-11-13 RX ORDER — SODIUM CHLORIDE, SODIUM LACTATE, POTASSIUM CHLORIDE, CALCIUM CHLORIDE 600; 310; 30; 20 MG/100ML; MG/100ML; MG/100ML; MG/100ML
INJECTION, SOLUTION INTRAVENOUS CONTINUOUS
Status: DISCONTINUED | OUTPATIENT
Start: 2024-11-13 | End: 2024-11-13

## 2024-11-13 RX ORDER — SODIUM CHLORIDE 9 MG/ML
INJECTION, SOLUTION INTRAVENOUS PRN
Status: DISCONTINUED | OUTPATIENT
Start: 2024-11-13 | End: 2024-11-13 | Stop reason: HOSPADM

## 2024-11-13 RX ORDER — FENTANYL CITRATE 50 UG/ML
INJECTION, SOLUTION INTRAMUSCULAR; INTRAVENOUS
Status: DISCONTINUED | OUTPATIENT
Start: 2024-11-13 | End: 2024-11-13 | Stop reason: SDUPTHER

## 2024-11-13 RX ORDER — SODIUM CHLORIDE, SODIUM LACTATE, POTASSIUM CHLORIDE, CALCIUM CHLORIDE 600; 310; 30; 20 MG/100ML; MG/100ML; MG/100ML; MG/100ML
INJECTION, SOLUTION INTRAVENOUS
Status: DISCONTINUED | OUTPATIENT
Start: 2024-11-13 | End: 2024-11-13 | Stop reason: SDUPTHER

## 2024-11-13 RX ORDER — DEXAMETHASONE SODIUM PHOSPHATE 4 MG/ML
INJECTION, SOLUTION INTRA-ARTICULAR; INTRALESIONAL; INTRAMUSCULAR; INTRAVENOUS; SOFT TISSUE
Status: DISCONTINUED | OUTPATIENT
Start: 2024-11-13 | End: 2024-11-13 | Stop reason: SDUPTHER

## 2024-11-13 RX ADMIN — METRONIDAZOLE 500 MG: 500 INJECTION, SOLUTION INTRAVENOUS at 04:49

## 2024-11-13 RX ADMIN — ONDANSETRON 4 MG: 2 INJECTION INTRAMUSCULAR; INTRAVENOUS at 14:17

## 2024-11-13 RX ADMIN — SODIUM CHLORIDE, POTASSIUM CHLORIDE, SODIUM LACTATE AND CALCIUM CHLORIDE: 600; 310; 30; 20 INJECTION, SOLUTION INTRAVENOUS at 11:45

## 2024-11-13 RX ADMIN — OXYCODONE 5 MG: 5 TABLET ORAL at 22:41

## 2024-11-13 RX ADMIN — DEXAMETHASONE SODIUM PHOSPHATE 6 MG: 4 INJECTION INTRA-ARTICULAR; INTRALESIONAL; INTRAMUSCULAR; INTRAVENOUS; SOFT TISSUE at 12:36

## 2024-11-13 RX ADMIN — METRONIDAZOLE 500 MG: 500 INJECTION, SOLUTION INTRAVENOUS at 15:09

## 2024-11-13 RX ADMIN — HEPARIN SODIUM 16 UNITS/KG/HR: 10000 INJECTION, SOLUTION INTRAVENOUS at 03:12

## 2024-11-13 RX ADMIN — Medication 80 MCG: at 12:35

## 2024-11-13 RX ADMIN — Medication 10 ML: at 22:12

## 2024-11-13 RX ADMIN — ROPIVACAINE HYDROCHLORIDE 30 ML: 5 INJECTION, SOLUTION EPIDURAL; INFILTRATION; PERINEURAL at 11:45

## 2024-11-13 RX ADMIN — METRONIDAZOLE 500 MG: 500 INJECTION, SOLUTION INTRAVENOUS at 22:12

## 2024-11-13 RX ADMIN — ACETAMINOPHEN 1000 MG: 500 TABLET ORAL at 11:34

## 2024-11-13 RX ADMIN — CEFEPIME 1000 MG: 1 INJECTION, POWDER, FOR SOLUTION INTRAMUSCULAR; INTRAVENOUS at 12:32

## 2024-11-13 RX ADMIN — ROPIVACAINE HYDROCHLORIDE 20 ML: 5 INJECTION, SOLUTION EPIDURAL; INFILTRATION; PERINEURAL at 11:50

## 2024-11-13 RX ADMIN — PREGABALIN 150 MG: 75 CAPSULE ORAL at 22:09

## 2024-11-13 RX ADMIN — ROCURONIUM BROMIDE 5 MG: 10 INJECTION, SOLUTION INTRAVENOUS at 12:25

## 2024-11-13 RX ADMIN — PROPOFOL 100 MG: 10 INJECTION, EMULSION INTRAVENOUS at 12:25

## 2024-11-13 RX ADMIN — MIDAZOLAM 2 MG: 1 INJECTION INTRAMUSCULAR; INTRAVENOUS at 11:45

## 2024-11-13 RX ADMIN — PHENYLEPHRINE HYDROCHLORIDE 50 MCG/MIN: 10 INJECTION INTRAVENOUS at 12:35

## 2024-11-13 RX ADMIN — FENTANYL CITRATE 25 MCG: 50 INJECTION, SOLUTION INTRAMUSCULAR; INTRAVENOUS at 12:28

## 2024-11-13 RX ADMIN — Medication 120 MCG: at 12:48

## 2024-11-13 RX ADMIN — Medication 200 MG: at 12:26

## 2024-11-13 RX ADMIN — Medication 10 ML: at 11:00

## 2024-11-13 RX ADMIN — FENTANYL CITRATE 100 MCG: 50 INJECTION INTRAMUSCULAR; INTRAVENOUS at 11:46

## 2024-11-13 RX ADMIN — FENTANYL CITRATE 25 MCG: 50 INJECTION, SOLUTION INTRAMUSCULAR; INTRAVENOUS at 12:43

## 2024-11-13 RX ADMIN — LIDOCAINE HYDROCHLORIDE 50 MG: 20 INJECTION, SOLUTION EPIDURAL; INFILTRATION; INTRACAUDAL; PERINEURAL at 12:25

## 2024-11-13 ASSESSMENT — PAIN DESCRIPTION - DESCRIPTORS
DESCRIPTORS: ACHING;DULL
DESCRIPTORS: THROBBING
DESCRIPTORS: THROBBING

## 2024-11-13 ASSESSMENT — PAIN - FUNCTIONAL ASSESSMENT
PAIN_FUNCTIONAL_ASSESSMENT: NONE - DENIES PAIN
PAIN_FUNCTIONAL_ASSESSMENT: 0-10
PAIN_FUNCTIONAL_ASSESSMENT: NONE - DENIES PAIN

## 2024-11-13 ASSESSMENT — LIFESTYLE VARIABLES: SMOKING_STATUS: 0

## 2024-11-13 ASSESSMENT — PAIN SCALES - GENERAL
PAINLEVEL_OUTOF10: 5
PAINLEVEL_OUTOF10: 7
PAINLEVEL_OUTOF10: 7

## 2024-11-13 ASSESSMENT — PAIN DESCRIPTION - LOCATION
LOCATION: FOOT
LOCATION: LEG

## 2024-11-13 ASSESSMENT — PAIN DESCRIPTION - ORIENTATION
ORIENTATION: RIGHT
ORIENTATION: RIGHT

## 2024-11-13 NOTE — PROCEDURES
PROCEDURE NOTE  Date: 11/13/2024   Name: Amy Roberts  YOB: 1970    Procedures    Ultrasound guided peripheral IV #22 1.75 inch placed on R forearm See LDA.  Dark, non pulsatile blood return noted; pt tolerated well.

## 2024-11-13 NOTE — OP NOTE
Operative note       Pre Op Dx:  Ischemic limb      Post Op Dx:  Same    Indications:  gangrene      Procedure:  Right Below Knee Amputation      Anesthesia:  General      Description of Procedure:  With the patient in the supine position the right leg was prepped and draped as a sterile field after the uneventful induction of general anesthesia. A TIMEOUT was performed and antibiotic timing confirmed.     A tourniquet was applied and the leg was exsanguinated with an Esmark band.  The below knee segment of the limb was marked with a pencil utilizing a long posterior flap technique. The skin flaps were then developed with a knife and cautery. The tibia was divided with a power saw and beveled anteriorly. It was smoothed with a rasp. The fibula was divided with a bone cutter. The neurovascular bundles were divided and ligated with silk ties.    The posterior flap was then rendered hemostatic with cautery and ties and sculpted. The fascial envelopes of the anterior and posterior flaps were then closed with heavy Vicryl suture. The skin was stapled.    A dressing was applied and the patient was awakened and returned to recovery in stable condition.       EBL:  minimal    Drains:  none    Condition on Transfer:  stable    Comments:  severe edema/atrophy/ calcified vessels    COPY:Maricruz Vega APRN - NP, Sourav Zuñiga MD

## 2024-11-13 NOTE — BRIEF OP NOTE
Brief Postoperative Note      Patient: Amy Roberts  YOB: 1970  MRN: [id number]    Date of Procedure: 11/13/2024    Pre-Op Diagnosis Codes:      * Gangrene (HCC) [I96]    Post-Op Diagnosis: Same       Procedure(s):  RIGHT BELOW KNEE AMPUTATION    Surgeon(s):  Sourav Zuñiga MD    Assistant:  Surgical Assistant: Kylie Marin    Anesthesia: General    Estimated Blood Loss (mL): Minimal    Complications: None    Specimens:   ID Type Source Tests Collected by Time Destination   1 : RIGHT LOWER LEG Tissue Leg SURGICAL PATHOLOGY Sourav Zuñiga MD 11/13/2024 1344        Implants:  * No implants in log *      Drains:   Urinary Catheter 11/11/24 (Active)   Catheter Indications Need for fluid volume management of the critically ill patient in a critical care setting;Urinary retention (acute or chronic), continuous bladder irrigation or bladder outlet obstruction 11/13/24 1019   Site Assessment No urethral drainage 11/13/24 1019   Urine Color Yellow 11/13/24 1019   Urine Appearance Clear 11/13/24 1019   Collection Container Standard 11/13/24 1019   Securement Method Securing device (Describe) 11/13/24 1019   Catheter Care  Perineal wipes 11/13/24 1019   Catheter Best Practices  Drainage tube clipped to bed;Catheter secured to thigh;Lack of dependent loop in tubing;Bag not on floor 11/13/24 1019   Status Patent;Draining 11/13/24 1019   Output (mL) 1000 mL 11/13/24 1114       [REMOVED] External Urinary Catheter (Removed)   Site Assessment Clean,dry & intact 11/10/24 0021   Placement Replaced 11/10/24 0021   Catheter Care Suction Canister/Tubing changed;Catheter/Wick replaced 11/10/24 0021   Perineal Care Yes 11/10/24 0021   Suction 40 mmgHg continuous 11/10/24 0021   Urine Color Yellow 11/10/24 0021   Urine Appearance Clear 11/09/24 1554   Output (mL) 150 mL 11/10/24 0021       Findings:  Infection Present At Time Of Surgery (PATOS) (choose all levels that have infection present):  No infection present  Other

## 2024-11-13 NOTE — WOUND CARE
Following for right foot wounds  BKA today by Dr. Zuñiga.  No other wound care needs - will sign off.     Recommend Darwin Protocol with consistent heel offloading of residual foot.     Isatu Morrissey, ARUNAN

## 2024-11-13 NOTE — ANESTHESIA PROCEDURE NOTES
Peripheral Block    Patient location during procedure: pre-op  Reason for block: post-op pain management  Start time: 11/13/2024 11:50 AM  End time: 11/13/2024 11:54 AM  Staffing  Performed: anesthesiologist   Anesthesiologist: Luther Morrison DO  Performed by: Luther Morrison DO  Authorized by: Luther Morrison DO    Preanesthetic Checklist  Completed: patient identified, IV checked, site marked, risks and benefits discussed, surgical/procedural consents, equipment checked, pre-op evaluation, timeout performed, anesthesia consent given, oxygen available and monitors applied/VS acknowledged  Peripheral Block   Patient position: supine  Prep: ChloraPrep  Provider prep: mask and sterile gloves  Patient monitoring: continuous pulse ox, continuous capnometry, frequent blood pressure checks, IV access, oxygen and responsive to questions  Block type: Sciatic  Popliteal  Laterality: right  Injection technique: single-shot  Guidance: ultrasound guided    Needle   Needle type: insulated echogenic nerve stimulator needle   Needle gauge: 21 G  Needle localization: ultrasound guidance  Needle length: 10 cm  Assessment   Injection assessment: negative aspiration for heme, no paresthesia on injection, local visualized surrounding nerve on ultrasound and no intravascular symptoms  Paresthesia pain: none  Slow fractionated injection: yes  Hemodynamics: stable  Outcomes: uncomplicated and patient tolerated procedure well    Medications Administered  ropivacaine (NAROPIN) injection 0.5% - Perineural   20 mL - 11/13/2024 11:50:00 AM

## 2024-11-13 NOTE — ANESTHESIA PROCEDURE NOTES
Peripheral Block    Patient location during procedure: pre-op  Reason for block: post-op pain management  Start time: 11/13/2024 11:45 AM  End time: 11/13/2024 11:50 AM  Staffing  Performed: anesthesiologist   Anesthesiologist: Luther Morrison DO  Performed by: Luther Morrison DO  Authorized by: Luther Morrison DO    Preanesthetic Checklist  Completed: patient identified, IV checked, site marked, risks and benefits discussed, surgical/procedural consents, equipment checked, pre-op evaluation, timeout performed, anesthesia consent given, oxygen available and monitors applied/VS acknowledged  Peripheral Block   Patient position: supine  Prep: ChloraPrep  Provider prep: mask and sterile gloves  Patient monitoring: continuous pulse ox, continuous capnometry, frequent blood pressure checks, IV access, responsive to questions and oxygen  Block type: Femoral  Adductor canal  Laterality: right  Injection technique: single-shot  Guidance: ultrasound guided    Needle   Needle type: insulated echogenic nerve stimulator needle   Needle gauge: 21 G  Needle localization: ultrasound guidance  Needle length: 10 cm  Assessment   Injection assessment: negative aspiration for heme, no paresthesia on injection, local visualized surrounding nerve on ultrasound and no intravascular symptoms  Paresthesia pain: none  Slow fractionated injection: yes  Hemodynamics: stable  Outcomes: uncomplicated and patient tolerated procedure well    Medications Administered  ropivacaine (NAROPIN) injection 0.5% - Perineural   30 mL - 11/13/2024 11:45:00 AM

## 2024-11-13 NOTE — OR NURSING
CELLERATE RX OPENED TO THE STERILE FIELD AND USED BY MD DURING THE PROCEDURE.  REF:[id number]  LOT:  EXP:12/18/2026

## 2024-11-13 NOTE — PERIOP NOTE
TRANSFER - OUT REPORT:    Verbal report given to RN on Amy Roberts  being transferred to [facility]   for routine post-op       Report consisted of patient’s Situation, Background, Assessment and   Recommendations(SBAR).     Time Pre op antibiotic given:1232  Anesthesia Stop time: 1455    Information from the following report(s) SBAR, OR Summary, Intake/Output, and MAR was reviewed with the receiving nurse.    Opportunity for questions and clarification was provided.     Is the patient on 02? Yes       L/Min 2       Other x    Is the patient on a monitor? No    Is the nurse transporting with the patient? No    At transfer, are there Patient Belongings with the patient?  If Yes, please note/list:    Surgical Waiting Area notified of patient's transfer from PACU? Yes

## 2024-11-13 NOTE — FLOWSHEET NOTE
11/13/24 1258   Family Communication    Relationship to Patient Other (Comment)    Phone Number Brittny Cj(Niece) [phone]   Family/Significant Other Update Called   Delivery Origin Nurse   Message Disposition Family present - message delivered   Update Given Yes   Family Communication   Family Update Message Procedure started;Surgeon working;Patient stable

## 2024-11-13 NOTE — ANESTHESIA POSTPROCEDURE EVALUATION
Department of Anesthesiology  Postprocedure Note    Patient: Amy Roberts  MRN: [id number]  YOB: 1970  Date of evaluation: 11/13/2024    Procedure Summary       Date: 11/13/24 Room / Location: [facility] MAIN OR 06 / [facility] MAIN OR    Anesthesia Start: 1200 Anesthesia Stop: 1454    Procedure: RIGHT BELOW KNEE AMPUTATION (Right: Leg Upper) Diagnosis:       Gangrene (HCC)      (Gangrene (HCC) [I96])    Providers: Sourav Zuñiga MD Responsible Provider: Luther Morrison DO    Anesthesia Type: General, Regional ASA Status: 3            Anesthesia Type: General, Regional    Debby Phase I: Debby Score: 9    Debby Phase II:      Anesthesia Post Evaluation    Patient location during evaluation: PACU  Patient participation: complete - patient participated  Level of consciousness: awake  Pain score: 0  Airway patency: patent  Cardiovascular status: hemodynamically stable  Respiratory status: acceptable  Pain management: adequate    No notable events documented.

## 2024-11-13 NOTE — ANESTHESIA PRE PROCEDURE
98%                                              BP Readings from Last 3 Encounters:   11/13/24 128/85   11/08/24 (!) 120/97   11/02/23 103/64       NPO Status: Time of last liquid consumption: 2300                        Time of last solid consumption: 1900                        Date of last liquid consumption: 11/12/24                        Date of last solid food consumption: 11/12/24    BMI:   Wt Readings from Last 3 Encounters:   11/08/24 132.9 kg (293 lb)   08/15/23 125.6 kg (277 lb)   07/30/23 125.8 kg (277 lb 5.4 oz)     There is no height or weight on file to calculate BMI.    CBC:   Lab Results   Component Value Date/Time    WBC 9.6 11/13/2024 05:08 AM    RBC 3.11 11/13/2024 05:08 AM    HGB 9.2 11/13/2024 05:08 AM    HCT 29.6 11/13/2024 05:08 AM    MCV 95.2 11/13/2024 05:08 AM    RDW 15.1 11/13/2024 05:08 AM     11/13/2024 05:08 AM       CMP:   Lab Results   Component Value Date/Time     11/13/2024 05:08 AM    K 4.5 11/13/2024 05:08 AM     11/13/2024 05:08 AM    CO2 22 11/13/2024 05:08 AM    BUN 32 11/13/2024 05:08 AM    CREATININE 5.63 11/13/2024 05:08 AM    GFRAA >60 09/02/2022 05:23 AM    AGRATIO 0.7 10/06/2022 06:52 AM    LABGLOM 8 11/13/2024 05:08 AM    LABGLOM >60 08/17/2023 06:45 AM    LABGLOM >60 10/06/2022 06:52 AM    GLUCOSE 89 11/13/2024 05:08 AM    CALCIUM 7.7 11/13/2024 05:08 AM    BILITOT 0.5 11/09/2024 07:56 AM    ALKPHOS 115 11/09/2024 07:56 AM    ALKPHOS 89 10/06/2022 06:52 AM    AST 13 11/09/2024 07:56 AM    ALT 8 11/09/2024 07:56 AM       POC Tests: No results for input(s): \"POCGLU\", \"POCNA\", \"POCK\", \"POCCL\", \"POCBUN\", \"POCHEMO\", \"POCHCT\" in the last 72 hours.    Coags:   Lab Results   Component Value Date/Time    PROTIME 10.9 11/08/2024 10:51 PM    INR 1.0 11/08/2024 10:51 PM    APTT 77.4 11/08/2024 10:51 PM       HCG (If Applicable): No results found for: \"PREGTESTUR\", \"PREGSERUM\", \"HCG\", \"HCGQUANT\"     ABGs: No results found for: \"PHART\", \"PO2ART\", \"LCM4FWB\",

## 2024-11-14 LAB
ANION GAP SERPL CALC-SCNC: 7 MMOL/L (ref 2–12)
BACTERIA SPEC CULT: NORMAL
BACTERIA SPEC CULT: NORMAL
BASOPHILS # BLD: 0 K/UL (ref 0–0.1)
BASOPHILS NFR BLD: 0 % (ref 0–1)
BUN SERPL-MCNC: 36 MG/DL (ref 6–20)
BUN/CREAT SERPL: 6 (ref 12–20)
CALCIUM SERPL-MCNC: 8.1 MG/DL (ref 8.5–10.1)
CHLORIDE SERPL-SCNC: 107 MMOL/L (ref 97–108)
CO2 SERPL-SCNC: 21 MMOL/L (ref 21–32)
CREAT SERPL-MCNC: 6 MG/DL (ref 0.55–1.02)
DIFFERENTIAL METHOD BLD: ABNORMAL
EOSINOPHIL # BLD: 0 K/UL (ref 0–0.4)
EOSINOPHIL NFR BLD: 0 % (ref 0–7)
ERYTHROCYTE [DISTWIDTH] IN BLOOD BY AUTOMATED COUNT: 15.1 % (ref 11.5–14.5)
GLUCOSE SERPL-MCNC: 115 MG/DL (ref 65–100)
HCT VFR BLD AUTO: 31.4 % (ref 35–47)
HGB BLD-MCNC: 9.9 G/DL (ref 11.5–16)
IMM GRANULOCYTES # BLD AUTO: 0.1 K/UL (ref 0–0.04)
IMM GRANULOCYTES NFR BLD AUTO: 1 % (ref 0–0.5)
LYMPHOCYTES # BLD: 0.8 K/UL (ref 0.8–3.5)
LYMPHOCYTES NFR BLD: 7 % (ref 12–49)
MCH RBC QN AUTO: 30.1 PG (ref 26–34)
MCHC RBC AUTO-ENTMCNC: 31.5 G/DL (ref 30–36.5)
MCV RBC AUTO: 95.4 FL (ref 80–99)
MONOCYTES # BLD: 0.5 K/UL (ref 0–1)
MONOCYTES NFR BLD: 4 % (ref 5–13)
NEUTS SEG # BLD: 11.1 K/UL (ref 1.8–8)
NEUTS SEG NFR BLD: 88 % (ref 32–75)
NRBC # BLD: 0 K/UL (ref 0–0.01)
NRBC BLD-RTO: 0 PER 100 WBC
PLATELET # BLD AUTO: 264 K/UL (ref 150–400)
PMV BLD AUTO: 9.4 FL (ref 8.9–12.9)
POTASSIUM SERPL-SCNC: 5.3 MMOL/L (ref 3.5–5.1)
RBC # BLD AUTO: 3.29 M/UL (ref 3.8–5.2)
SERVICE CMNT-IMP: NORMAL
SERVICE CMNT-IMP: NORMAL
SODIUM SERPL-SCNC: 135 MMOL/L (ref 136–145)
VANCOMYCIN SERPL-MCNC: 13.2 UG/ML
WBC # BLD AUTO: 12.6 K/UL (ref 3.6–11)

## 2024-11-14 PROCEDURE — 80048 BASIC METABOLIC PNL TOTAL CA: CPT

## 2024-11-14 PROCEDURE — 36415 COLL VENOUS BLD VENIPUNCTURE: CPT

## 2024-11-14 PROCEDURE — 85025 COMPLETE CBC W/AUTO DIFF WBC: CPT

## 2024-11-14 PROCEDURE — 6370000000 HC RX 637 (ALT 250 FOR IP): Performed by: SURGERY

## 2024-11-14 PROCEDURE — 2700000000 HC OXYGEN THERAPY PER DAY

## 2024-11-14 PROCEDURE — 97530 THERAPEUTIC ACTIVITIES: CPT

## 2024-11-14 PROCEDURE — 80202 ASSAY OF VANCOMYCIN: CPT

## 2024-11-14 PROCEDURE — 97168 OT RE-EVAL EST PLAN CARE: CPT

## 2024-11-14 PROCEDURE — 2580000003 HC RX 258: Performed by: SURGERY

## 2024-11-14 PROCEDURE — 1200000000 HC SEMI PRIVATE

## 2024-11-14 PROCEDURE — 6360000002 HC RX W HCPCS: Performed by: SURGERY

## 2024-11-14 PROCEDURE — 6370000000 HC RX 637 (ALT 250 FOR IP): Performed by: INTERNAL MEDICINE

## 2024-11-14 PROCEDURE — 97164 PT RE-EVAL EST PLAN CARE: CPT

## 2024-11-14 RX ORDER — HEPARIN SODIUM 5000 [USP'U]/ML
5000 INJECTION, SOLUTION INTRAVENOUS; SUBCUTANEOUS EVERY 8 HOURS SCHEDULED
Status: DISCONTINUED | OUTPATIENT
Start: 2024-11-14 | End: 2024-11-19 | Stop reason: HOSPADM

## 2024-11-14 RX ADMIN — DULOXETINE HYDROCHLORIDE 30 MG: 30 CAPSULE, DELAYED RELEASE ORAL at 09:10

## 2024-11-14 RX ADMIN — PREGABALIN 150 MG: 75 CAPSULE ORAL at 09:09

## 2024-11-14 RX ADMIN — OXYCODONE 5 MG: 5 TABLET ORAL at 07:03

## 2024-11-14 RX ADMIN — Medication 10 ML: at 12:31

## 2024-11-14 RX ADMIN — ATORVASTATIN CALCIUM 10 MG: 10 TABLET, FILM COATED ORAL at 09:10

## 2024-11-14 RX ADMIN — Medication 10 ML: at 21:13

## 2024-11-14 RX ADMIN — PREGABALIN 150 MG: 75 CAPSULE ORAL at 21:13

## 2024-11-14 RX ADMIN — OXYCODONE 5 MG: 5 TABLET ORAL at 17:03

## 2024-11-14 RX ADMIN — PREGABALIN 150 MG: 75 CAPSULE ORAL at 14:23

## 2024-11-14 RX ADMIN — OXYCODONE 5 MG: 5 TABLET ORAL at 12:29

## 2024-11-14 RX ADMIN — OXYCODONE 5 MG: 5 TABLET ORAL at 21:13

## 2024-11-14 RX ADMIN — SODIUM ZIRCONIUM CYCLOSILICATE 10 G: 10 POWDER, FOR SUSPENSION ORAL at 09:11

## 2024-11-14 RX ADMIN — SERTRALINE HYDROCHLORIDE 25 MG: 50 TABLET ORAL at 09:10

## 2024-11-14 RX ADMIN — SODIUM CHLORIDE: 9 INJECTION, SOLUTION INTRAVENOUS at 13:06

## 2024-11-14 RX ADMIN — SODIUM CHLORIDE: 9 INJECTION, SOLUTION INTRAVENOUS at 01:47

## 2024-11-14 RX ADMIN — METRONIDAZOLE 500 MG: 500 INJECTION, SOLUTION INTRAVENOUS at 05:30

## 2024-11-14 ASSESSMENT — PAIN SCALES - GENERAL
PAINLEVEL_OUTOF10: 6
PAINLEVEL_OUTOF10: 8
PAINLEVEL_OUTOF10: 7

## 2024-11-14 ASSESSMENT — PAIN DESCRIPTION - DESCRIPTORS
DESCRIPTORS: ACHING
DESCRIPTORS: SORE
DESCRIPTORS: ACHING
DESCRIPTORS: ACHING

## 2024-11-14 ASSESSMENT — PAIN DESCRIPTION - LOCATION
LOCATION: LEG

## 2024-11-14 ASSESSMENT — PAIN DESCRIPTION - ORIENTATION
ORIENTATION: RIGHT
ORIENTATION: RIGHT
ORIENTATION: RIGHT;POSTERIOR
ORIENTATION: RIGHT

## 2024-11-15 LAB
ANION GAP SERPL CALC-SCNC: 6 MMOL/L (ref 2–12)
BASOPHILS # BLD: 0 K/UL (ref 0–0.1)
BASOPHILS NFR BLD: 0 % (ref 0–1)
BUN SERPL-MCNC: 38 MG/DL (ref 6–20)
BUN/CREAT SERPL: 7 (ref 12–20)
CALCIUM SERPL-MCNC: 7.4 MG/DL (ref 8.5–10.1)
CHLORIDE SERPL-SCNC: 109 MMOL/L (ref 97–108)
CO2 SERPL-SCNC: 24 MMOL/L (ref 21–32)
CREAT SERPL-MCNC: 5.39 MG/DL (ref 0.55–1.02)
DIFFERENTIAL METHOD BLD: ABNORMAL
EOSINOPHIL # BLD: 0 K/UL (ref 0–0.4)
EOSINOPHIL NFR BLD: 0 % (ref 0–7)
ERYTHROCYTE [DISTWIDTH] IN BLOOD BY AUTOMATED COUNT: 15.4 % (ref 11.5–14.5)
GLUCOSE SERPL-MCNC: 96 MG/DL (ref 65–100)
HCT VFR BLD AUTO: 26.6 % (ref 35–47)
HGB BLD-MCNC: 8.3 G/DL (ref 11.5–16)
IMM GRANULOCYTES # BLD AUTO: 0.1 K/UL (ref 0–0.04)
IMM GRANULOCYTES NFR BLD AUTO: 1 % (ref 0–0.5)
LYMPHOCYTES # BLD: 1.5 K/UL (ref 0.8–3.5)
LYMPHOCYTES NFR BLD: 12 % (ref 12–49)
MCH RBC QN AUTO: 29.7 PG (ref 26–34)
MCHC RBC AUTO-ENTMCNC: 31.2 G/DL (ref 30–36.5)
MCV RBC AUTO: 95.3 FL (ref 80–99)
MONOCYTES # BLD: 1.4 K/UL (ref 0–1)
MONOCYTES NFR BLD: 11 % (ref 5–13)
NEUTS SEG # BLD: 9.8 K/UL (ref 1.8–8)
NEUTS SEG NFR BLD: 76 % (ref 32–75)
NRBC # BLD: 0 K/UL (ref 0–0.01)
NRBC BLD-RTO: 0 PER 100 WBC
PLATELET # BLD AUTO: 272 K/UL (ref 150–400)
PMV BLD AUTO: 9.1 FL (ref 8.9–12.9)
POTASSIUM SERPL-SCNC: 4.4 MMOL/L (ref 3.5–5.1)
RBC # BLD AUTO: 2.79 M/UL (ref 3.8–5.2)
SODIUM SERPL-SCNC: 139 MMOL/L (ref 136–145)
WBC # BLD AUTO: 12.9 K/UL (ref 3.6–11)

## 2024-11-15 PROCEDURE — 36415 COLL VENOUS BLD VENIPUNCTURE: CPT

## 2024-11-15 PROCEDURE — C1751 CATH, INF, PER/CENT/MIDLINE: HCPCS

## 2024-11-15 PROCEDURE — 05HY33Z INSERTION OF INFUSION DEVICE INTO UPPER VEIN, PERCUTANEOUS APPROACH: ICD-10-PCS | Performed by: FAMILY MEDICINE

## 2024-11-15 PROCEDURE — 6370000000 HC RX 637 (ALT 250 FOR IP): Performed by: SURGERY

## 2024-11-15 PROCEDURE — 2709999900 HC NON-CHARGEABLE SUPPLY

## 2024-11-15 PROCEDURE — 76937 US GUIDE VASCULAR ACCESS: CPT

## 2024-11-15 PROCEDURE — 1200000000 HC SEMI PRIVATE

## 2024-11-15 PROCEDURE — 85025 COMPLETE CBC W/AUTO DIFF WBC: CPT

## 2024-11-15 PROCEDURE — 6370000000 HC RX 637 (ALT 250 FOR IP)

## 2024-11-15 PROCEDURE — 2580000003 HC RX 258: Performed by: INTERNAL MEDICINE

## 2024-11-15 PROCEDURE — 6360000002 HC RX W HCPCS: Performed by: NURSE PRACTITIONER

## 2024-11-15 PROCEDURE — 2580000003 HC RX 258: Performed by: SURGERY

## 2024-11-15 PROCEDURE — 97110 THERAPEUTIC EXERCISES: CPT

## 2024-11-15 PROCEDURE — 97530 THERAPEUTIC ACTIVITIES: CPT

## 2024-11-15 PROCEDURE — 80048 BASIC METABOLIC PNL TOTAL CA: CPT

## 2024-11-15 RX ORDER — OXYCODONE HYDROCHLORIDE 5 MG/1
5 TABLET ORAL EVERY 4 HOURS PRN
Status: DISCONTINUED | OUTPATIENT
Start: 2024-11-15 | End: 2024-11-19 | Stop reason: HOSPADM

## 2024-11-15 RX ORDER — OXYCODONE HYDROCHLORIDE 5 MG/1
10 TABLET ORAL EVERY 4 HOURS PRN
Status: DISCONTINUED | OUTPATIENT
Start: 2024-11-15 | End: 2024-11-19 | Stop reason: HOSPADM

## 2024-11-15 RX ADMIN — Medication 10 ML: at 22:13

## 2024-11-15 RX ADMIN — OXYCODONE 10 MG: 5 TABLET ORAL at 13:35

## 2024-11-15 RX ADMIN — OXYCODONE 5 MG: 5 TABLET ORAL at 09:35

## 2024-11-15 RX ADMIN — HEPARIN SODIUM 5000 UNITS: 5000 INJECTION INTRAVENOUS; SUBCUTANEOUS at 05:49

## 2024-11-15 RX ADMIN — SERTRALINE HYDROCHLORIDE 25 MG: 50 TABLET ORAL at 09:35

## 2024-11-15 RX ADMIN — HEPARIN SODIUM 5000 UNITS: 5000 INJECTION INTRAVENOUS; SUBCUTANEOUS at 22:12

## 2024-11-15 RX ADMIN — OXYCODONE 5 MG: 5 TABLET ORAL at 05:49

## 2024-11-15 RX ADMIN — PREGABALIN 150 MG: 75 CAPSULE ORAL at 09:35

## 2024-11-15 RX ADMIN — PREGABALIN 150 MG: 75 CAPSULE ORAL at 13:35

## 2024-11-15 RX ADMIN — Medication 10 ML: at 09:33

## 2024-11-15 RX ADMIN — PREGABALIN 150 MG: 75 CAPSULE ORAL at 22:11

## 2024-11-15 RX ADMIN — ATORVASTATIN CALCIUM 10 MG: 10 TABLET, FILM COATED ORAL at 09:35

## 2024-11-15 RX ADMIN — HEPARIN SODIUM 5000 UNITS: 5000 INJECTION INTRAVENOUS; SUBCUTANEOUS at 13:35

## 2024-11-15 RX ADMIN — SODIUM CHLORIDE: 9 INJECTION, SOLUTION INTRAVENOUS at 16:00

## 2024-11-15 RX ADMIN — OXYCODONE 10 MG: 5 TABLET ORAL at 20:12

## 2024-11-15 RX ADMIN — DULOXETINE HYDROCHLORIDE 30 MG: 30 CAPSULE, DELAYED RELEASE ORAL at 09:35

## 2024-11-15 ASSESSMENT — PAIN DESCRIPTION - LOCATION: LOCATION: KNEE

## 2024-11-15 ASSESSMENT — PAIN SCALES - GENERAL
PAINLEVEL_OUTOF10: 8
PAINLEVEL_OUTOF10: 7
PAINLEVEL_OUTOF10: 7
PAINLEVEL_OUTOF10: 10
PAINLEVEL_OUTOF10: 10

## 2024-11-15 ASSESSMENT — PAIN DESCRIPTION - ORIENTATION: ORIENTATION: RIGHT

## 2024-11-15 NOTE — PROCEDURES
PROCEDURE NOTE  Date: 11/15/2024   Name: Amy Roberts  YOB: 1970    Procedures    An extended dwell PIV (PowerGlide) was placed using ultrasound guidance. The IV flushed easily and had brisk blood return. The patient tolerated the procedure well.      Catheter Size: 20 g  Total length: 10 cm  External length: 0 cm  Location: left ventral forearm     Lot#: [id number]    May be used for blood draws--flush, waste 3ml, draw labs, flush post lab draw with at least 10ml NS and clamp if not infusing to maintain patency.   PowerGlide 20 gauge catheter is power injectable to 5 mL/s (325 psi max)     Primary RN notified      Robyn Milligan RN  Vascular Access Nurse

## 2024-11-16 LAB
ANION GAP SERPL CALC-SCNC: 6 MMOL/L (ref 2–12)
BASOPHILS # BLD: 0 K/UL (ref 0–0.1)
BASOPHILS NFR BLD: 0 % (ref 0–1)
BUN SERPL-MCNC: 35 MG/DL (ref 6–20)
BUN/CREAT SERPL: 7 (ref 12–20)
CALCIUM SERPL-MCNC: 7.6 MG/DL (ref 8.5–10.1)
CHLORIDE SERPL-SCNC: 113 MMOL/L (ref 97–108)
CO2 SERPL-SCNC: 23 MMOL/L (ref 21–32)
CREAT SERPL-MCNC: 4.93 MG/DL (ref 0.55–1.02)
DIFFERENTIAL METHOD BLD: ABNORMAL
EOSINOPHIL # BLD: 0 K/UL (ref 0–0.4)
EOSINOPHIL NFR BLD: 0 % (ref 0–7)
ERYTHROCYTE [DISTWIDTH] IN BLOOD BY AUTOMATED COUNT: 15.6 % (ref 11.5–14.5)
GLUCOSE SERPL-MCNC: 93 MG/DL (ref 65–100)
HCT VFR BLD AUTO: 32.9 % (ref 35–47)
HGB BLD-MCNC: 10 G/DL (ref 11.5–16)
IMM GRANULOCYTES # BLD AUTO: 0 K/UL
IMM GRANULOCYTES NFR BLD AUTO: 0 %
LYMPHOCYTES # BLD: 2.3 K/UL (ref 0.8–3.5)
LYMPHOCYTES NFR BLD: 24 % (ref 12–49)
MCH RBC QN AUTO: 29.6 PG (ref 26–34)
MCHC RBC AUTO-ENTMCNC: 30.4 G/DL (ref 30–36.5)
MCV RBC AUTO: 97.3 FL (ref 80–99)
MONOCYTES # BLD: 0.9 K/UL (ref 0–1)
MONOCYTES NFR BLD: 9 % (ref 5–13)
NEUTS BAND NFR BLD MANUAL: 2 % (ref 0–6)
NEUTS SEG # BLD: 6.3 K/UL (ref 1.8–8)
NEUTS SEG NFR BLD: 65 % (ref 32–75)
NRBC # BLD: 0 K/UL (ref 0–0.01)
NRBC BLD-RTO: 0 PER 100 WBC
PLATELET # BLD AUTO: 272 K/UL (ref 150–400)
PLATELET COMMENT: ABNORMAL
PMV BLD AUTO: 8.9 FL (ref 8.9–12.9)
POTASSIUM SERPL-SCNC: 4.5 MMOL/L (ref 3.5–5.1)
RBC # BLD AUTO: 3.38 M/UL (ref 3.8–5.2)
RBC MORPH BLD: ABNORMAL
RBC MORPH BLD: ABNORMAL
SODIUM SERPL-SCNC: 142 MMOL/L (ref 136–145)
WBC # BLD AUTO: 9.5 K/UL (ref 3.6–11)

## 2024-11-16 PROCEDURE — 1200000000 HC SEMI PRIVATE

## 2024-11-16 PROCEDURE — 6370000000 HC RX 637 (ALT 250 FOR IP)

## 2024-11-16 PROCEDURE — 85025 COMPLETE CBC W/AUTO DIFF WBC: CPT

## 2024-11-16 PROCEDURE — 36415 COLL VENOUS BLD VENIPUNCTURE: CPT

## 2024-11-16 PROCEDURE — 2580000003 HC RX 258: Performed by: SURGERY

## 2024-11-16 PROCEDURE — 6360000002 HC RX W HCPCS: Performed by: NURSE PRACTITIONER

## 2024-11-16 PROCEDURE — 6370000000 HC RX 637 (ALT 250 FOR IP): Performed by: SURGERY

## 2024-11-16 PROCEDURE — 80048 BASIC METABOLIC PNL TOTAL CA: CPT

## 2024-11-16 RX ADMIN — OXYCODONE 10 MG: 5 TABLET ORAL at 11:50

## 2024-11-16 RX ADMIN — HEPARIN SODIUM 5000 UNITS: 5000 INJECTION INTRAVENOUS; SUBCUTANEOUS at 06:02

## 2024-11-16 RX ADMIN — HEPARIN SODIUM 5000 UNITS: 5000 INJECTION INTRAVENOUS; SUBCUTANEOUS at 16:11

## 2024-11-16 RX ADMIN — OXYCODONE 10 MG: 5 TABLET ORAL at 16:16

## 2024-11-16 RX ADMIN — DULOXETINE HYDROCHLORIDE 30 MG: 30 CAPSULE, DELAYED RELEASE ORAL at 11:28

## 2024-11-16 RX ADMIN — ATORVASTATIN CALCIUM 10 MG: 10 TABLET, FILM COATED ORAL at 11:28

## 2024-11-16 RX ADMIN — HEPARIN SODIUM 5000 UNITS: 5000 INJECTION INTRAVENOUS; SUBCUTANEOUS at 21:43

## 2024-11-16 RX ADMIN — PREGABALIN 150 MG: 75 CAPSULE ORAL at 21:44

## 2024-11-16 RX ADMIN — PREGABALIN 150 MG: 75 CAPSULE ORAL at 16:11

## 2024-11-16 RX ADMIN — Medication 10 ML: at 21:44

## 2024-11-16 RX ADMIN — OXYCODONE 10 MG: 5 TABLET ORAL at 04:54

## 2024-11-16 RX ADMIN — PREGABALIN 150 MG: 75 CAPSULE ORAL at 11:28

## 2024-11-16 RX ADMIN — SERTRALINE HYDROCHLORIDE 25 MG: 50 TABLET ORAL at 11:28

## 2024-11-16 ASSESSMENT — PAIN SCALES - GENERAL
PAINLEVEL_OUTOF10: 0
PAINLEVEL_OUTOF10: 10
PAINLEVEL_OUTOF10: 8
PAINLEVEL_OUTOF10: 8

## 2024-11-16 ASSESSMENT — PAIN DESCRIPTION - DESCRIPTORS
DESCRIPTORS: ACHING;SHARP
DESCRIPTORS: ACHING
DESCRIPTORS: ACHING

## 2024-11-16 ASSESSMENT — PAIN DESCRIPTION - LOCATION
LOCATION: LEG

## 2024-11-16 ASSESSMENT — PAIN DESCRIPTION - ORIENTATION
ORIENTATION: LEFT
ORIENTATION: RIGHT
ORIENTATION: RIGHT

## 2024-11-17 LAB
ANION GAP SERPL CALC-SCNC: 7 MMOL/L (ref 2–12)
BASOPHILS # BLD: 0 K/UL (ref 0–0.1)
BASOPHILS NFR BLD: 0 % (ref 0–1)
BUN SERPL-MCNC: 34 MG/DL (ref 6–20)
BUN/CREAT SERPL: 8 (ref 12–20)
CALCIUM SERPL-MCNC: 7.9 MG/DL (ref 8.5–10.1)
CHLORIDE SERPL-SCNC: 112 MMOL/L (ref 97–108)
CO2 SERPL-SCNC: 22 MMOL/L (ref 21–32)
CREAT SERPL-MCNC: 4.31 MG/DL (ref 0.55–1.02)
DIFFERENTIAL METHOD BLD: ABNORMAL
EOSINOPHIL # BLD: 0 K/UL (ref 0–0.4)
EOSINOPHIL NFR BLD: 0 % (ref 0–7)
ERYTHROCYTE [DISTWIDTH] IN BLOOD BY AUTOMATED COUNT: 15.9 % (ref 11.5–14.5)
GLUCOSE SERPL-MCNC: 104 MG/DL (ref 65–100)
HCT VFR BLD AUTO: 31.6 % (ref 35–47)
HGB BLD-MCNC: 9.5 G/DL (ref 11.5–16)
IMM GRANULOCYTES # BLD AUTO: 0.2 K/UL (ref 0–0.04)
IMM GRANULOCYTES NFR BLD AUTO: 1 % (ref 0–0.5)
LYMPHOCYTES # BLD: 2.1 K/UL (ref 0.8–3.5)
LYMPHOCYTES NFR BLD: 18 % (ref 12–49)
MCH RBC QN AUTO: 29.4 PG (ref 26–34)
MCHC RBC AUTO-ENTMCNC: 30.1 G/DL (ref 30–36.5)
MCV RBC AUTO: 97.8 FL (ref 80–99)
MONOCYTES # BLD: 0.6 K/UL (ref 0–1)
MONOCYTES NFR BLD: 6 % (ref 5–13)
NEUTS SEG # BLD: 8.3 K/UL (ref 1.8–8)
NEUTS SEG NFR BLD: 75 % (ref 32–75)
NRBC # BLD: 0 K/UL (ref 0–0.01)
NRBC BLD-RTO: 0 PER 100 WBC
PLATELET # BLD AUTO: 299 K/UL (ref 150–400)
PMV BLD AUTO: 9.1 FL (ref 8.9–12.9)
POTASSIUM SERPL-SCNC: 4.3 MMOL/L (ref 3.5–5.1)
RBC # BLD AUTO: 3.23 M/UL (ref 3.8–5.2)
SODIUM SERPL-SCNC: 141 MMOL/L (ref 136–145)
WBC # BLD AUTO: 11.1 K/UL (ref 3.6–11)

## 2024-11-17 PROCEDURE — 80048 BASIC METABOLIC PNL TOTAL CA: CPT

## 2024-11-17 PROCEDURE — 6370000000 HC RX 637 (ALT 250 FOR IP): Performed by: SURGERY

## 2024-11-17 PROCEDURE — 85025 COMPLETE CBC W/AUTO DIFF WBC: CPT

## 2024-11-17 PROCEDURE — 6370000000 HC RX 637 (ALT 250 FOR IP)

## 2024-11-17 PROCEDURE — 6360000002 HC RX W HCPCS: Performed by: NURSE PRACTITIONER

## 2024-11-17 PROCEDURE — 1200000000 HC SEMI PRIVATE

## 2024-11-17 PROCEDURE — 36415 COLL VENOUS BLD VENIPUNCTURE: CPT

## 2024-11-17 RX ADMIN — OXYCODONE 10 MG: 5 TABLET ORAL at 20:59

## 2024-11-17 RX ADMIN — HEPARIN SODIUM 5000 UNITS: 5000 INJECTION INTRAVENOUS; SUBCUTANEOUS at 05:32

## 2024-11-17 RX ADMIN — OXYCODONE 10 MG: 5 TABLET ORAL at 03:11

## 2024-11-17 RX ADMIN — ATORVASTATIN CALCIUM 10 MG: 10 TABLET, FILM COATED ORAL at 11:37

## 2024-11-17 RX ADMIN — DULOXETINE HYDROCHLORIDE 30 MG: 30 CAPSULE, DELAYED RELEASE ORAL at 11:37

## 2024-11-17 RX ADMIN — PREGABALIN 150 MG: 75 CAPSULE ORAL at 20:59

## 2024-11-17 RX ADMIN — SERTRALINE HYDROCHLORIDE 25 MG: 50 TABLET ORAL at 11:37

## 2024-11-17 RX ADMIN — HEPARIN SODIUM 5000 UNITS: 5000 INJECTION INTRAVENOUS; SUBCUTANEOUS at 14:19

## 2024-11-17 RX ADMIN — OXYCODONE 10 MG: 5 TABLET ORAL at 14:18

## 2024-11-17 RX ADMIN — PREGABALIN 150 MG: 75 CAPSULE ORAL at 11:37

## 2024-11-17 RX ADMIN — PREGABALIN 150 MG: 75 CAPSULE ORAL at 18:01

## 2024-11-17 RX ADMIN — HEPARIN SODIUM 5000 UNITS: 5000 INJECTION INTRAVENOUS; SUBCUTANEOUS at 21:00

## 2024-11-17 ASSESSMENT — PAIN SCALES - GENERAL
PAINLEVEL_OUTOF10: 8
PAINLEVEL_OUTOF10: 2
PAINLEVEL_OUTOF10: 7
PAINLEVEL_OUTOF10: 7

## 2024-11-17 ASSESSMENT — PAIN DESCRIPTION - LOCATION
LOCATION: LEG

## 2024-11-17 ASSESSMENT — PAIN DESCRIPTION - ORIENTATION
ORIENTATION: RIGHT

## 2024-11-17 ASSESSMENT — PAIN DESCRIPTION - DESCRIPTORS
DESCRIPTORS: ACHING

## 2024-11-17 ASSESSMENT — PAIN DESCRIPTION - ONSET: ONSET: ON-GOING

## 2024-11-17 ASSESSMENT — PAIN DESCRIPTION - FREQUENCY: FREQUENCY: INTERMITTENT

## 2024-11-17 ASSESSMENT — PAIN DESCRIPTION - PAIN TYPE: TYPE: SURGICAL PAIN

## 2024-11-18 LAB
ANION GAP SERPL CALC-SCNC: 4 MMOL/L (ref 2–12)
BASOPHILS # BLD: 0 K/UL (ref 0–0.1)
BASOPHILS NFR BLD: 0 % (ref 0–1)
BUN SERPL-MCNC: 29 MG/DL (ref 6–20)
BUN/CREAT SERPL: 8 (ref 12–20)
CALCIUM SERPL-MCNC: 8 MG/DL (ref 8.5–10.1)
CHLORIDE SERPL-SCNC: 111 MMOL/L (ref 97–108)
CO2 SERPL-SCNC: 26 MMOL/L (ref 21–32)
CREAT SERPL-MCNC: 3.76 MG/DL (ref 0.55–1.02)
DIFFERENTIAL METHOD BLD: ABNORMAL
EOSINOPHIL # BLD: 0.1 K/UL (ref 0–0.4)
EOSINOPHIL NFR BLD: 1 % (ref 0–7)
ERYTHROCYTE [DISTWIDTH] IN BLOOD BY AUTOMATED COUNT: 15.8 % (ref 11.5–14.5)
GLUCOSE SERPL-MCNC: 88 MG/DL (ref 65–100)
HCT VFR BLD AUTO: 29.2 % (ref 35–47)
HGB BLD-MCNC: 8.8 G/DL (ref 11.5–16)
IMM GRANULOCYTES # BLD AUTO: 0.2 K/UL (ref 0–0.04)
IMM GRANULOCYTES NFR BLD AUTO: 2 % (ref 0–0.5)
LYMPHOCYTES # BLD: 1.9 K/UL (ref 0.8–3.5)
LYMPHOCYTES NFR BLD: 18 % (ref 12–49)
MCH RBC QN AUTO: 29.7 PG (ref 26–34)
MCHC RBC AUTO-ENTMCNC: 30.1 G/DL (ref 30–36.5)
MCV RBC AUTO: 98.6 FL (ref 80–99)
MONOCYTES # BLD: 0.4 K/UL (ref 0–1)
MONOCYTES NFR BLD: 4 % (ref 5–13)
NEUTS SEG # BLD: 7.7 K/UL (ref 1.8–8)
NEUTS SEG NFR BLD: 75 % (ref 32–75)
NRBC # BLD: 0 K/UL (ref 0–0.01)
NRBC BLD-RTO: 0 PER 100 WBC
PLATELET # BLD AUTO: 296 K/UL (ref 150–400)
PMV BLD AUTO: 8.9 FL (ref 8.9–12.9)
POTASSIUM SERPL-SCNC: 4.1 MMOL/L (ref 3.5–5.1)
RBC # BLD AUTO: 2.96 M/UL (ref 3.8–5.2)
SODIUM SERPL-SCNC: 141 MMOL/L (ref 136–145)
WBC # BLD AUTO: 10.3 K/UL (ref 3.6–11)

## 2024-11-18 PROCEDURE — 6370000000 HC RX 637 (ALT 250 FOR IP): Performed by: SURGERY

## 2024-11-18 PROCEDURE — 6360000002 HC RX W HCPCS: Performed by: NURSE PRACTITIONER

## 2024-11-18 PROCEDURE — 80048 BASIC METABOLIC PNL TOTAL CA: CPT

## 2024-11-18 PROCEDURE — 2580000003 HC RX 258: Performed by: INTERNAL MEDICINE

## 2024-11-18 PROCEDURE — 2580000003 HC RX 258: Performed by: SURGERY

## 2024-11-18 PROCEDURE — 36415 COLL VENOUS BLD VENIPUNCTURE: CPT

## 2024-11-18 PROCEDURE — 6370000000 HC RX 637 (ALT 250 FOR IP)

## 2024-11-18 PROCEDURE — 6370000000 HC RX 637 (ALT 250 FOR IP): Performed by: NURSE PRACTITIONER

## 2024-11-18 PROCEDURE — 1200000000 HC SEMI PRIVATE

## 2024-11-18 PROCEDURE — 85025 COMPLETE CBC W/AUTO DIFF WBC: CPT

## 2024-11-18 PROCEDURE — 97530 THERAPEUTIC ACTIVITIES: CPT

## 2024-11-18 RX ORDER — PREGABALIN 75 MG/1
150 CAPSULE ORAL 2 TIMES DAILY
Status: DISCONTINUED | OUTPATIENT
Start: 2024-11-18 | End: 2024-11-19 | Stop reason: HOSPADM

## 2024-11-18 RX ADMIN — SODIUM CHLORIDE: 9 INJECTION, SOLUTION INTRAVENOUS at 19:23

## 2024-11-18 RX ADMIN — OXYCODONE 10 MG: 5 TABLET ORAL at 05:06

## 2024-11-18 RX ADMIN — Medication 10 ML: at 20:25

## 2024-11-18 RX ADMIN — SERTRALINE HYDROCHLORIDE 25 MG: 50 TABLET ORAL at 09:32

## 2024-11-18 RX ADMIN — HEPARIN SODIUM 5000 UNITS: 5000 INJECTION INTRAVENOUS; SUBCUTANEOUS at 22:32

## 2024-11-18 RX ADMIN — ATORVASTATIN CALCIUM 10 MG: 10 TABLET, FILM COATED ORAL at 09:32

## 2024-11-18 RX ADMIN — ACETAMINOPHEN 650 MG: 325 TABLET ORAL at 21:47

## 2024-11-18 RX ADMIN — SODIUM CHLORIDE: 9 INJECTION, SOLUTION INTRAVENOUS at 12:17

## 2024-11-18 RX ADMIN — SODIUM CHLORIDE: 9 INJECTION, SOLUTION INTRAVENOUS at 16:18

## 2024-11-18 RX ADMIN — OXYCODONE 10 MG: 5 TABLET ORAL at 10:58

## 2024-11-18 RX ADMIN — PREGABALIN 150 MG: 75 CAPSULE ORAL at 20:30

## 2024-11-18 RX ADMIN — HEPARIN SODIUM 5000 UNITS: 5000 INJECTION INTRAVENOUS; SUBCUTANEOUS at 14:50

## 2024-11-18 RX ADMIN — PREGABALIN 150 MG: 75 CAPSULE ORAL at 09:32

## 2024-11-18 RX ADMIN — Medication 10 ML: at 09:32

## 2024-11-18 RX ADMIN — HEPARIN SODIUM 5000 UNITS: 5000 INJECTION INTRAVENOUS; SUBCUTANEOUS at 05:03

## 2024-11-18 RX ADMIN — OXYCODONE 10 MG: 5 TABLET ORAL at 20:30

## 2024-11-18 RX ADMIN — DULOXETINE HYDROCHLORIDE 30 MG: 30 CAPSULE, DELAYED RELEASE ORAL at 09:32

## 2024-11-18 ASSESSMENT — PAIN DESCRIPTION - LOCATION
LOCATION: LEG;INCISION
LOCATION: LEG

## 2024-11-18 ASSESSMENT — PAIN SCALES - GENERAL
PAINLEVEL_OUTOF10: 7
PAINLEVEL_OUTOF10: 5
PAINLEVEL_OUTOF10: 6
PAINLEVEL_OUTOF10: 8
PAINLEVEL_OUTOF10: 8
PAINLEVEL_OUTOF10: 3
PAINLEVEL_OUTOF10: 5

## 2024-11-18 ASSESSMENT — PAIN DESCRIPTION - DESCRIPTORS
DESCRIPTORS: ACHING
DESCRIPTORS: DULL;ACHING;SORE
DESCRIPTORS: ACHING

## 2024-11-18 ASSESSMENT — PAIN DESCRIPTION - ORIENTATION
ORIENTATION: RIGHT

## 2024-11-18 NOTE — PLAN OF CARE
Problem: Discharge Planning  Goal: Discharge to home or other facility with appropriate resources  Outcome: Progressing  Flowsheets (Taken 11/13/2024 2215)  Discharge to home or other facility with appropriate resources:   Identify barriers to discharge with patient and caregiver   Arrange for needed discharge resources and transportation as appropriate   Identify discharge learning needs (meds, wound care, etc)     Problem: Pain  Goal: Verbalizes/displays adequate comfort level or baseline comfort level  Outcome: Progressing  Flowsheets (Taken 11/13/2024 2215)  Verbalizes/displays adequate comfort level or baseline comfort level:   Encourage patient to monitor pain and request assistance   Assess pain using appropriate pain scale   Administer analgesics based on type and severity of pain and evaluate response   Implement non-pharmacological measures as appropriate and evaluate response   Consider cultural and social influences on pain and pain management   Notify Licensed Independent Practitioner if interventions unsuccessful or patient reports new pain     Problem: Skin/Tissue Integrity  Goal: Absence of new skin breakdown  Description: 1.  Monitor for areas of redness and/or skin breakdown  2.  Assess vascular access sites hourly  3.  Every 4-6 hours minimum:  Change oxygen saturation probe site  4.  Every 4-6 hours:  If on nasal continuous positive airway pressure, respiratory therapy assess nares and determine need for appliance change or resting period.  Outcome: Progressing     Problem: Safety - Adult  Goal: Free from fall injury  Outcome: Progressing  Flowsheets (Taken 11/13/2024 2215)  Free From Fall Injury:   Instruct family/caregiver on patient safety   Based on caregiver fall risk screen, instruct family/caregiver to ask for assistance with transferring infant if caregiver noted to have fall risk factors     
  Problem: Discharge Planning  Goal: Discharge to home or other facility with appropriate resources  Outcome: Progressing  Flowsheets (Taken 11/14/2024 1950)  Discharge to home or other facility with appropriate resources:   Identify barriers to discharge with patient and caregiver   Arrange for needed discharge resources and transportation as appropriate   Identify discharge learning needs (meds, wound care, etc)     Problem: Pain  Goal: Verbalizes/displays adequate comfort level or baseline comfort level  Outcome: Progressing  Flowsheets (Taken 11/14/2024 1950)  Verbalizes/displays adequate comfort level or baseline comfort level:   Encourage patient to monitor pain and request assistance   Assess pain using appropriate pain scale   Administer analgesics based on type and severity of pain and evaluate response   Implement non-pharmacological measures as appropriate and evaluate response   Consider cultural and social influences on pain and pain management   Notify Licensed Independent Practitioner if interventions unsuccessful or patient reports new pain     Problem: Skin/Tissue Integrity  Goal: Absence of new skin breakdown  Description: 1.  Monitor for areas of redness and/or skin breakdown  2.  Assess vascular access sites hourly  3.  Every 4-6 hours minimum:  Change oxygen saturation probe site  4.  Every 4-6 hours:  If on nasal continuous positive airway pressure, respiratory therapy assess nares and determine need for appliance change or resting period.  Outcome: Progressing     Problem: Safety - Adult  Goal: Free from fall injury  Outcome: Progressing  Flowsheets (Taken 11/14/2024 1950)  Free From Fall Injury:   Instruct family/caregiver on patient safety   Based on caregiver fall risk screen, instruct family/caregiver to ask for assistance with transferring infant if caregiver noted to have fall risk factors     
  Problem: Occupational Therapy - Adult  Goal: By Discharge: Performs self-care activities at highest level of function for planned discharge setting.  See evaluation for individualized goals.  Description: FUNCTIONAL STATUS PRIOR TO ADMISSION:  Patient was not ambulatory; Reports scooting into w/c and having orly lift to assit as needed   , ADL Assistance: Needs assistance,  Transfer Assistance: Independent,       HOME SUPPORT: Patient lived with family and reports having caregivers to assist with ADLS/IADLS .    Occupational Therapy Goals:  Initiated 11/10/2024  1.  Patient will perform grooming with Minimal Assist sitting EOB within 7 day(s).  2.  Patient will perform bathing with Moderate Assist within 7 day(s).  3.  Patient will perform lower body dressing with Moderate Assist and use of AE within 7 day(s).  4.  Patient will perform toilet transfers with Maximal [place]ist [place] [facility] [place] 7 day(s).  5.  Patient will perform all aspects of toileting with Moderate Assist within 7 day(s).  6.  Patient will participate in upper extremity therapeutic exercise/activities with Minimal Assist for 15 minutes within 7 day(s).    7.  Patient will utilize energy conservation techniques during functional activities with verbal cues within 7 day(s).   Outcome: Progressing     OCCUPATIONAL THERAPY TREATMENT  Patient: Amy Roberts (54 y.o. female)  Date: 11/11/2024  Primary Diagnosis: PVD (peripheral vascular disease) ([facility]) [I73.9]  Right leg pain [M79.604]  Vascular occlusion [I99.8]  Ischemic neuropathy of right foot [G57.91]       Precautions: Fall Risk                Chart, occupational therapy assessment, plan of care, and goals were reviewed.    ASSESSMENT  Patient continues to benefit from skilled OT services and is slowly progressing towards goals. Pt declined EOB and OOB mobility this session due to RLE pain 10/10, however agreeable to bed level HEP.  Pt performed BUE AROM HEP x20 reps each exer to improve her 
  Problem: Occupational Therapy - Adult  Goal: By Discharge: Performs self-care activities at highest level of function for planned discharge setting.  See evaluation for individualized goals.  Description: FUNCTIONAL STATUS PRIOR TO ADMISSION:  Patient was not ambulatory; Reports scooting into w/c and having orly lift to assit as needed. Addendum 11/14: Patient reports spending more time in the bed than the wheelchair, however would occasionally transfer to wheelchair using lateral scoot approach. She endorses use of briefs for bed level toileting. She typically does not wear socks, wears slide on shoes.    ADL Assistance: Needs assistance, Transfer Assistance: Independent,       HOME SUPPORT: Patient lived with family and reports having caregivers to assist with ADLS/IADLS .    Occupational Therapy Goals:  Weekly Reassessment 11/14/2024 s/p R BKA   1.  Patient will perform seated upper-body ADLs with Set-up/Supervision within 7 day(s).  2.  Patient will perform supine/seated bathing with Moderate Assist within 7 day(s).  3.  Patient will perform rolling L/R for bed-level toileting with Supervision within 7 day(s).  4.  Patient will perform lateral transfer to/from drop-arm surface to facilitate OOB ADL engagement with Minimal Assist within 7 day(s).  5.  Patient will participate in upper extremity therapeutic exercise/activities with Minimal Assist for 15 minutes within 7 day(s).    6.  Patient will utilize energy conservation techniques during functional activities with verbal cues within 7 day(s).     Initiated 11/10/2024  1.  Patient will perform grooming with Minimal Assist sitting EOB within 7 day(s).  2.  Patient will perform bathing with Moderate Assist within 7 day(s).  3.  Patient will perform lower body dressing with Moderate Assist and use of AE within 7 day(s).  4.  Patient will perform toilet transfers with Maximal Assist to BSC with drop arm within 7 day(s).  5.  Patient will perform all aspects of 
  Problem: Safety - Adult  Goal: Free from fall injury  Outcome: Progressing     Problem: Skin/Tissue Integrity  Goal: Absence of new skin breakdown  Description: 1.  Monitor for areas of redness and/or skin breakdown  2.  Assess vascular access sites hourly  3.  Every 4-6 hours minimum:  Change oxygen saturation probe site  4.  Every 4-6 hours:  If on nasal continuous positive airway pressure, respiratory therapy assess nares and determine need for appliance change or resting period.  Outcome: Progressing     Problem: Pain  Goal: Verbalizes/displays adequate comfort level or baseline comfort level  Outcome: Progressing     
  Problem: Safety - Adult  Goal: Free from fall injury  Outcome: Progressing     Problem: Skin/Tissue Integrity  Goal: Absence of new skin breakdown  Description: 1.  Monitor for areas of redness and/or skin breakdown  2.  Assess vascular access sites hourly  3.  Every 4-6 hours minimum:  Change oxygen saturation probe site  4.  Every 4-6 hours:  If on nasal continuous positive airway pressure, respiratory therapy assess nares and determine need for appliance change or resting period.  Outcome: Progressing     Problem: Pain  Goal: Verbalizes/displays adequate comfort level or baseline comfort level  Outcome: Progressing     
  Problem: Skin/Tissue Integrity  Goal: Absence of new skin breakdown  Description: 1.  Monitor for areas of redness and/or skin breakdown  2.  Assess vascular access sites hourly  3.  Every 4-6 hours minimum:  Change oxygen saturation probe site  4.  Every 4-6 hours:  If on nasal continuous positive airway pressure, respiratory therapy assess nares and determine need for appliance change or resting period.  Outcome: Progressing     Problem: Safety - Adult  Goal: Free from fall injury  Outcome: Progressing     
  Problem: Skin/Tissue Integrity  Goal: Absence of new skin breakdown  Description: 1.  Monitor for areas of redness and/or skin breakdown  2.  Assess vascular access sites hourly  3.  Every 4-6 hours minimum:  Change oxygen saturation probe site  4.  Every 4-6 hours:  If on nasal continuous positive airway pressure, respiratory therapy assess nares and determine need for appliance change or resting period.  Outcome: Progressing     Problem: Safety - Adult  Goal: Free from fall injury  Outcome: Progressing     Problem: Pain  Goal: Verbalizes/displays adequate comfort level or baseline comfort level  Outcome: Not Progressing     
  Problem: Skin/Tissue Integrity  Goal: Absence of new skin breakdown  Description: 1.  Monitor for areas of redness and/or skin breakdown  2.  Assess vascular access sites hourly  3.  Every 4-6 hours minimum:  Change oxygen saturation probe site  4.  Every 4-6 hours:  If on nasal continuous positive airway pressure, respiratory therapy assess nares and determine need for appliance change or resting period.  Outcome: Progressing     Problem: Safety - Adult  Goal: Free from fall injury  Outcome: Progressing     Problem: Pain  Goal: Verbalizes/displays adequate comfort level or baseline comfort level  Outcome: Not Progressing     
Problem: Occupational Therapy - Adult  Goal: By Discharge: Performs self-care activities at highest level of function for planned discharge setting.  See evaluation for individualized goals.  Description: FUNCTIONAL STATUS PRIOR TO ADMISSION:  Patient was not ambulatory; Reports scooting into w/c and having orly lift to assit as needed. Addendum 11/14: Patient reports spending more time in the bed than the wheelchair, however would occasionally transfer to wheelchair using lateral scoot approach. She endorses use of briefs for bed level toileting. She typically does not wear socks, wears slide on shoes.    ADL Assistance: Needs assistance, Transfer Assistance: Independent,       HOME SUPPORT: Patient lived with family and reports having caregivers to assist with ADLS/IADLS .    Occupational Therapy Goals:  Weekly Reassessment 11/14/2024 s/p R BKA   1.  Patient will perform seated upper-body ADLs with Set-up/Supervision within 7 day(s).  2.  Patient will perform supine/seated bathing with Moderate Assist within 7 day(s).  3.  Patient will perform rolling L/R for bed-level toileting with Supervision within 7 day(s).  4.  Patient will perform lateral transfer to/from drop-arm surface to facilitate OOB ADL engagement with Minimal Assist within 7 day(s).  5.  Patient will participate in upper extremity therapeutic exercise/activities with Minimal Assist for 15 minutes within 7 day(s).    6.  Patient will utilize energy conservation techniques during functional activities with verbal cues within 7 day(s).     Initiated 11/10/2024  1.  Patient will perform grooming with Minimal Assist sitting EOB within 7 day(s).  2.  Patient will perform bathing with Moderate Assist within 7 day(s).  3.  Patient will perform lower body dressing with Moderate Assist and use of AE within 7 day(s).  4.  Patient will perform toilet transfers with Maximal Assist to BSC with drop arm within 7 day(s).  5.  Patient will perform all aspects of 
considering R BKA    Other factors to consider for discharge: patient's current support system is unable to meet their requirements for physical assistance, high risk for falls, not safe to be alone, and concern for safely navigating or managing the home environment    IF patient discharges home will need the following DME: continuing to assess with progress       SUBJECTIVE:   Patient stated, \"I can't get out of bed with this pain.\"    OBJECTIVE DATA SUMMARY:   Critical Behavior:  Orientation  Overall Orientation Status: Within Functional Limits  Orientation Level: Oriented X4  Cognition  Overall Cognitive Status: WFL    Functional Mobility Training:  Bed Mobility:  Bed Mobility Training  Bed Mobility Training: No                 Pain Rating:  10/10, R foot  Pain Intervention(s):   rest and elevation    Activity Tolerance:   Fair for exercises  and Poor for mobility, declined sitting EOB    After treatment:   Patient left in no apparent distress in bed and Call bell within reach      COMMUNICATION/EDUCATION:   The patient's plan of care was discussed with: registered nurse           PROSPER PIERRE, PT  Minutes: 23    
family training/education, and therapeutic activities    Frequency/Duration: Patient will be followed by physical therapy to address goals, PT Plan of Care: 3 times/week to address goals.        Recommendation for discharge: (in order for the patient to meet his/her long term goals):   Continue to assess pending progress    Other factors to consider for discharge: high risk for falls and concern for safely navigating or managing the home environment    IF patient discharges home will need the following DME: continuing to assess with progress                SUBJECTIVE:   Patient stated “I spend most of my time in the bed.”    OBJECTIVE DATA SUMMARY:       Past Medical History:   Diagnosis Date    Anxiety     CAD (coronary artery disease)     Fibromyalgia     Hypertension     Neuropathy     Peripheral artery disease (HCC)     Psychiatric disorder     anxiety     Past Surgical History:   Procedure Laterality Date    ANKLE FRACTURE SURGERY Left     surgical implants     SECTION      COLONOSCOPY N/A 2023    COLONOSCOPY FLEXIBLE W/ DECOMPRESSION performed by Donna Dorantes MD at [facility] ENDOSCOPY    COLONOSCOPY N/A 2023    COLONOSCOPY DIAGNOSTIC performed by Aniceto Alexander MD at [facility] ENDOSCOPY    SIGMOIDOSCOPY N/A 2023    SIGMOIDOSCOPY BIOPSY if ok with anesthesiaFLEXIBLE performed by Aniceto Alexander MD at [facility] ENDOSCOPY       Home Situation:  Social/Functional History  Lives With: Spouse, Home care staff  Type of Home: House  Home Layout: One level  Home Access: Ramped entrance  Bathroom Shower/Tub:  (Pt is assisted with sponge baths by aides.)  Home Equipment: Hospital bed, Mechanical lift, Wheelchair - Manual, Wheelchair - Electric  ADL Assistance: Needs assistance  Transfer Assistance: Independent    Cognitive/Behavioral Status:  Orientation  Orientation Level: Oriented X4           Strength:    Strength: Generally decreased, functional    Tone & Sensation:   Tone: Normal  Sensation: 
needs for physical assistance upon discharge.          PLAN:  Patient continues to benefit from skilled intervention to address the above impairments.  Continue treatment per established plan of care.        Recommendation for discharge: (in order for the patient to meet his/her long term goals):   Moderate intensity short-term skilled physical therapy up to 5x/week  Pt likely to declined. If pt were to d/c home, would benefit from HHPT and require assistance/supervision for bed mobility and transfers as pt is a fall risk      Other factors to consider for discharge: high risk for falls and not safe to be alone    IF patient discharges home will need the following DME: patient owns DME required for discharge       SUBJECTIVE:   Patient stated, \"It does not align like how mine does at home.\" Pt referring to recliner and hospital bed    OBJECTIVE DATA SUMMARY:   Critical Behavior:  Orientation  Overall Orientation Status: Within Normal Limits  Orientation Level: Oriented X4  Cognition  Overall Cognitive Status: WNL    Functional Mobility Training:  Bed Mobility:  Bed Mobility Training  Bed Mobility Training: Yes  Supine to Sit: Minimum assistance  Sit to Supine: Contact-guard assistance  Scooting: Contact-guard assistance  Transfers:     Balance:  Balance  Sitting: Impaired  Sitting - Static: Good (unsupported)  Sitting - Dynamic: Fair (occasional)       Activity Tolerance:   Fair     After treatment:   Patient left in no apparent distress in bed and Call bell within reach      COMMUNICATION/EDUCATION:   The patient's plan of care was discussed with: occupational therapist and registered nurse    Patient Education  Education Given To: Patient  Education Provided: Role of Therapy;Plan of Care;Fall Prevention Strategies;Home Exercise Program  Education Method: Verbal  Barriers to Learning: None  Education Outcome: Verbalized understanding;Continued education needed      Radha Villalta PT  Minutes: 15    
toileting with Moderate Assist within 7 day(s).  6.  Patient will participate in upper extremity therapeutic exercise/activities with Minimal Assist for 15 minutes within 7 day(s).    7.  Patient will utilize energy conservation techniques during functional activities with verbal cues within 7 day(s).   Outcome: Progressing    OCCUPATIONAL THERAPY TREATMENT  Patient: Amy Roberts (54 y.o. female)  Date: 11/15/2024  Primary Diagnosis: PVD (peripheral vascular disease) ([facility]) [I73.9]  Right leg pain [M79.604]  Vascular occlusion [I99.8]  Ischemic neuropathy of right foot [G57.91]  Procedure(s) (LRB):  RIGHT BELOW KNEE AMPUTATION (Right) 2 Days Post-Op   Precautions: Fall Risk                Chart, occupational therapy assessment, plan of care, and goals were reviewed.    ASSESSMENT  Patient continues to benefit from skilled OT services and is slowly progressing towards goals. Pt declined EOB engagement this date; however, agreeable to bed level HEP training.  Good engagement with Supervision against green theraband.  Pt educated on RLE positioning to avoid flexion contracture; good understanding verbalized.  Continue skilled OT during acute hospitalization.       PLAN :  Patient continues to benefit from skilled intervention to address the above impairments.  Continue treatment per established plan of care to address goals.    Recommend with staff: Frequent positional changes, bed level ADL engagement    Recommend next OT session: POC progression    Recommendation for discharge: (in order for the patient to meet his/her long term goals):   Moderate intensity short-term skilled occupational therapy up to 5x/week    Other factors to consider for discharge:  new BKA    IF patient discharges home will need the following DME:  TBD       SUBJECTIVE:   Patient stated “Thanks for your help.”    OBJECTIVE DATA SUMMARY:   Cognitive/Behavioral Status:  Orientation  Overall Orientation Status: Within Normal Limits  Orientation 
SIGMOIDOSCOPY BIOPSY if ok with anesthesiaFLEXIBLE performed by Aniceto Alexander MD at [facility] ENDOSCOPY          Expanded or extensive additional review of patient history:   Social/Functional History  Lives With: Spouse, Home care staff  Type of Home: House  Home Layout: One level  Home Access: Ramped entrance  Bathroom Shower/Tub:  (Pt is assisted with sponge baths by aides.)  Home Equipment: Hospital bed, Mechanical lift, Wheelchair - Manual, Wheelchair - Electric  ADL Assistance: Needs assistance  Transfer Assistance: Independent          EXAMINATION OF PERFORMANCE DEFICITS:    Cognitive/Behavioral Status:  Orientation  Orientation Level: Oriented X4         Range of Motion:   AROM: Generally decreased, functional         Strength:  Strength: Generally decreased, functional      Coordination:  Coordination: Generally decreased, functional            Tone & Sensation:   Tone: Normal  Sensation: Intact          Functional Mobility and Transfers for ADLs:    Bed Mobility:     Bed Mobility Training  Bed Mobility Training: Yes  Supine to Sit: Contact-guard assistance  Sit to Supine: Contact-guard assistance  Scooting: Contact-guard assistance    Transfers:      Transfer Training  Transfer Training: No                     Balance:      Balance  Sitting: Intact      ADL Assessment:          Feeding: Setup       Grooming: Minimal assistance  Grooming Skilled Clinical Factors: EOB    UE Bathing: Minimal assistance            LE Bathing: Moderate assistance       UE Dressing: Minimal assistance       LE Dressing: Maximum assistance       Toileting: Maximum assistance                         ADL Intervention and task modifications:                  [facility] AM-PACTM \"6 Clicks\"                                                       Daily Activity Inpatient Short Form  How much help from another person does the patient currently need... Total; A Lot A Little None   1.  Putting on and taking off regular lower body clothing? 
Discharge Destination. Phys Ther. 2021 Apr 4;101(4):[id number]. doi: [email or web address]. PMID: 85673596.  3. Jos GONGORA, Salvador FOX, Elo S, Mendel JENKINS, Neri NASSAR. Activity Measure for Post-Acute Care \"6-Clicks\" Basic Mobility Scores Predict Discharge Destination After Acute Care Hospitalization in Select Patient Groups: A Retrospective, Observational Study. Arch Rehabil Res Clin Transl. 2022 Jul 16;4(3):563369. doi: [email or web address]/j.[email or web address]. PMID: 77313993; PMCID: [id number].  4. Ekaterina PADILLA, Kerry S, Will W, Heidi P. AM-PAC Short Forms Manual 4.0. Revised 2/2020.                                                                                                                                                                                                                              Pain Rating:  Pt reports tolerable post-op pain  Pain Intervention(s):   pain is at a level acceptable to the patient    Activity Tolerance:   Good    After treatment:   Patient left in no apparent distress in bed, Bed/ chair alarm activated, Side rails x3, and Heels elevated for pressure relief    COMMUNICATION/EDUCATION:   The patient's plan of care was discussed with: occupational therapist and registered nurse    Patient Education  Education Given To: Patient  Education Provided: Role of Therapy;Plan of Care;Fall Prevention Strategies;Home Exercise Program  Education Provided Comments: educated pt on lateral tranfsers and Ttranfsers; initiated ex training R residual limb  Education Method: Verbal  Barriers to Learning: None  Education Outcome: Verbalized understanding;Continued education needed    Thank you for this referral.  Ema You, PT  Minutes: 25

## 2024-11-19 VITALS
HEART RATE: 83 BPM | DIASTOLIC BLOOD PRESSURE: 71 MMHG | OXYGEN SATURATION: 94 % | SYSTOLIC BLOOD PRESSURE: 116 MMHG | TEMPERATURE: 98.4 F | WEIGHT: 293 LBS | BODY MASS INDEX: 41.02 KG/M2 | HEIGHT: 71 IN | RESPIRATION RATE: 18 BRPM

## 2024-11-19 LAB
ALBUMIN SERPL-MCNC: 2.2 G/DL (ref 3.5–5)
ALBUMIN/GLOB SERPL: 0.6 (ref 1.1–2.2)
ALP SERPL-CCNC: 74 U/L (ref 45–117)
ALT SERPL-CCNC: 8 U/L (ref 12–78)
ANION GAP SERPL CALC-SCNC: 6 MMOL/L (ref 2–12)
AST SERPL-CCNC: 15 U/L (ref 15–37)
BILIRUB SERPL-MCNC: 0.3 MG/DL (ref 0.2–1)
BUN SERPL-MCNC: 26 MG/DL (ref 6–20)
BUN/CREAT SERPL: 8 (ref 12–20)
CALCIUM SERPL-MCNC: 7.7 MG/DL (ref 8.5–10.1)
CHLORIDE SERPL-SCNC: 110 MMOL/L (ref 97–108)
CO2 SERPL-SCNC: 26 MMOL/L (ref 21–32)
COMMENT:: NORMAL
CREAT SERPL-MCNC: 3.24 MG/DL (ref 0.55–1.02)
GLOBULIN SER CALC-MCNC: 3.4 G/DL (ref 2–4)
GLUCOSE SERPL-MCNC: 92 MG/DL (ref 65–100)
POTASSIUM SERPL-SCNC: 4 MMOL/L (ref 3.5–5.1)
PROT SERPL-MCNC: 5.6 G/DL (ref 6.4–8.2)
SODIUM SERPL-SCNC: 142 MMOL/L (ref 136–145)
SPECIMEN HOLD: NORMAL

## 2024-11-19 PROCEDURE — 6370000000 HC RX 637 (ALT 250 FOR IP)

## 2024-11-19 PROCEDURE — 80053 COMPREHEN METABOLIC PANEL: CPT

## 2024-11-19 PROCEDURE — 36415 COLL VENOUS BLD VENIPUNCTURE: CPT

## 2024-11-19 PROCEDURE — 2580000003 HC RX 258: Performed by: INTERNAL MEDICINE

## 2024-11-19 PROCEDURE — 6360000002 HC RX W HCPCS: Performed by: NURSE PRACTITIONER

## 2024-11-19 PROCEDURE — 6370000000 HC RX 637 (ALT 250 FOR IP): Performed by: NURSE PRACTITIONER

## 2024-11-19 PROCEDURE — 6370000000 HC RX 637 (ALT 250 FOR IP): Performed by: SURGERY

## 2024-11-19 RX ORDER — OXYCODONE HYDROCHLORIDE 10 MG/1
10 TABLET ORAL EVERY 4 HOURS PRN
Qty: 30 TABLET | Refills: 0 | Status: SHIPPED | OUTPATIENT
Start: 2024-11-19 | End: 2024-11-24

## 2024-11-19 RX ORDER — POLYETHYLENE GLYCOL 3350 17 G/17G
17 POWDER, FOR SOLUTION ORAL DAILY
Qty: 30 PACKET | Refills: 0 | Status: SHIPPED | OUTPATIENT
Start: 2024-11-19 | End: 2024-12-19

## 2024-11-19 RX ADMIN — DULOXETINE HYDROCHLORIDE 30 MG: 30 CAPSULE, DELAYED RELEASE ORAL at 07:58

## 2024-11-19 RX ADMIN — ATORVASTATIN CALCIUM 10 MG: 10 TABLET, FILM COATED ORAL at 07:55

## 2024-11-19 RX ADMIN — HEPARIN SODIUM 5000 UNITS: 5000 INJECTION INTRAVENOUS; SUBCUTANEOUS at 05:32

## 2024-11-19 RX ADMIN — OXYCODONE 10 MG: 5 TABLET ORAL at 07:58

## 2024-11-19 RX ADMIN — OXYCODONE 10 MG: 5 TABLET ORAL at 00:13

## 2024-11-19 RX ADMIN — SERTRALINE HYDROCHLORIDE 25 MG: 50 TABLET ORAL at 07:55

## 2024-11-19 RX ADMIN — SODIUM CHLORIDE: 9 INJECTION, SOLUTION INTRAVENOUS at 04:13

## 2024-11-19 RX ADMIN — PREGABALIN 150 MG: 75 CAPSULE ORAL at 07:55

## 2024-11-19 ASSESSMENT — PAIN DESCRIPTION - ORIENTATION
ORIENTATION: RIGHT

## 2024-11-19 ASSESSMENT — PAIN DESCRIPTION - LOCATION
LOCATION: LEG

## 2024-11-19 ASSESSMENT — PAIN DESCRIPTION - DESCRIPTORS: DESCRIPTORS: DISCOMFORT;DULL;THROBBING

## 2024-11-19 ASSESSMENT — PAIN SCALES - GENERAL
PAINLEVEL_OUTOF10: 4
PAINLEVEL_OUTOF10: 8
PAINLEVEL_OUTOF10: 9
PAINLEVEL_OUTOF10: 8

## 2024-11-19 NOTE — DISCHARGE INSTRUCTIONS
Discharge Instructions       PATIENT ID: Amy Roberts  MRN: [id number]   YOB: 1970    DATE OF ADMISSION: 11/8/2024   DATE OF DISCHARGE: 11/19/2024    PRIMARY CARE PROVIDER: Maricruz Vega     ATTENDING PHYSICIAN: Afshin Pena MD   DISCHARGING PROVIDER: ORTIZ Adams NP    To contact this individual call [phone] and ask the  to page.   If unavailable ask to be transferred the Adult Hospitalist Department.    DISCHARGE DIAGNOSES peripheral arterial disease of right lower extremity with gangrene    CONSULTATIONS: Nephrology, vascular surgery    PROCEDURES/SURGERIES: Procedure(s):  RIGHT BELOW KNEE AMPUTATION    PENDING TEST RESULTS:   At the time of discharge the following test results are still pending: None    FOLLOW UP APPOINTMENTS:    Dr. Carmona, Dr. Archer    ADDITIONAL CARE RECOMMENDATIONS:   You had a right below the knee amputation by Dr. Carmona on 11/13  Please follow-up with his office    Your kidney function has improved.  Your creatinine on discharge is 3.24.  You are able to urinate after your Bhatt has been removed.  Please contact Dr. Archer office in order to have your labs repeated in 1 week's time    Physical rehab has been recommended however you declined and would like to go home with therapy services.  This is being set up for you by the     Monitor your stump for any warmth or redness, should this devleop, call Dr. Zuñiga    Hold your losartan and spironolactone until you follow up with Dr. Archer and check your kidney function    The oxycodone can make you constipated, take Mirlax as needed to help.    DIET: Regular    ACTIVITY: Home health PT/OT    WOUND CARE: Dry dressing to right stump    EQUIPMENT needed:       DISCHARGE MEDICATIONS:   See Medication Reconciliation Form    It is important that you take the medication exactly as they are prescribed.   Keep your medication in the bottles provided by the pharmacist and keep a list of

## 2024-11-19 NOTE — CARE COORDINATION
Care Management Initial Assessment       RUR: 16% Moderate   Readmission? No  1st IM letter given? No n/a  1st  letter given: No n/a       11/11/24 1312   Service Assessment   Patient Orientation Alert and Oriented   Cognition Alert   History Provided By Patient   Primary Caregiver Other (Comment)  (Medicaid caregiver)   Accompanied By/Relationship n/a   Support Systems Spouse/Significant Other   Patient's Healthcare Decision Maker is: Legal Next of Kin   PCP Verified by CM No  (PCP on chart is no longer with clinic pt is followed by. [facility] [address] [phone])   Prior Functional Level Assistance with the following:;Bathing;Dressing;Toileting;Cooking;Housework;Shopping;Mobility   Current Functional Level Assistance with the following:;Bathing;Dressing;Toileting;Cooking;Housework;Shopping;Mobility   Can patient return to prior living arrangement Yes   Ability to make needs known: Good   Family able to assist with home care needs: Yes   Would you like for me to discuss the discharge plan with any other family members/significant others, and if so, who? No   Financial Resources [employer] [place];Medicaid   Social/Functional History   Lives With Spouse   Type of Home Apartment   Home Layout One level   Home Access Ramped entrance   Bathroom Shower/Tub None  (bed baths)   Bathroom Toilet Bedside commode  (baseline wears brief)   Bathroom Equipment Commode   Home Equipment Wheelchair - Electric;Wheelchair - Manual;Hospital bed;Mechanical lift   Receives Help From Family;Personal care attendant  (Medicaid caregivers 5 days week x 8 hours with option for additional respite hours)   Active  No   Mode of Transportation Van  (Medicaid nVoq van)   Type of Occupation disabled   Discharge Planning   Living Arrangements Spouse/Significant Other     CM met with pt to introduce self, role, and to conduct initial assessment. Facesheet demographics verified. Pt resides with  in her late (recently passed) mother's 
Transition of Care Plan:    RUR: 18% Medium   Prior Level of Functioning: Lives at home with medicaid caregivers and is primarily bedbound   Disposition: Home health PT, OT & SN w/ [facility] and Medicaid caregiver assistance w/ [facility] (40 hours a week, 5 days x 8 hours, with occasional additional respite hours)   BRANDON: Tues. 11/19   If SNF or IPR: Date FOC offered:   Date FOC received:   Accepting facility:   Date authorization started with reference number:   Date authorization received and expires:   Follow up appointments: PCP, specialist   DME needed: Has needed DME   Transportation at discharge: Medicaid stretcher transport today, Tues. 11/19 between 1:30PM - 4:00PM  /  (Trip reference #: [id number])  IM/IMM Medicare/ letter given: NA  Is patient a [place] and connected with VA? NA  Caregiver Contact: Spouse, Wilfrid Roberts   Discharge Caregiver contacted prior to discharge? Upon patient request  Care Conference needed? No  Barriers to discharge: None    CM reviewed chart. Per attending, patient is medically stable for DC; DC order to follow. CM arranged for Medicaid stretcher transport (p: [phone]) with a 1:30PM transport time request. Trip reference #: [id number]. Once transport is assigned to transport company, nurses station will be contacted to provide update on transport company and ETA. At this time, transport ETA is between 1:30PM - 4:00PM. Nursing made aware. Patient made aware at bedside and in agreement. No further CM needs.     ALMA ROSA Johnson   [phone]  
Transition of Care: TBD; possibly rehab (IPR?) (no final recommendations yet); (would need medicaid insurance auth to go to IPR)     NOTE: at baseline; pt lives at home with medicaid caregivers and spouse; needs help with all adls (except feeding)      Transport Plan: will need a medicaid transport in a stretcher (not set up yet)      RUR: 17%     Main contact is spouse- Wilfrid Roberts- pt states he has a new phone and is working on getting the new number for this CM     Discharge pending:  -patient is s/p POD#1 of right BKA  -pending progress with PT/OT  -pending medical progress and other care recommendations  -discharge likely greater than 48 hours     Prior Level of Functioning: lives at home with medicaid caregivers and is primarily bedbound  Disposition: TBD; may need rehab (IPR) for new BKA (has hx of rehab at [facility])   BRANDON: greater than 48 hours  If SNF or IPR: Date FOC offered: not yet  Date FOC received: not yet  Accepting facility: n/a  Date authorization started with reference number: will need if going to IPR  Date authorization received and expires: n/a  Follow up appointments: specialists/pcp  DME needed: none ordered yet  Transportation at discharge: stretcher or WC van (medicaid transport)   IM/IMM Medicare/ letter given: n/a  Is patient a [place] and connected with VA? N/a              If yes, was  transfer form completed and VA notified?   Caregiver Contact: spouse  Discharge Caregiver contacted prior to discharge? Not yet  Care Conference needed? no  Barriers to discharge:  medical progress     CM following   Gely Fuchs RN     NOTE: per previous CM notes:  Facesheet demographics verified. Pt resides with  in her late (recently passed) mother's home/duplex property. Pt requires max asistance with ADLs. Medicaid aids 40 hours a week, 5 days x 8 hours, with occasional additional respite hours.   Residence: One story duplex - verify stairs vs ramped entrance 
Transition of Care: likely return home with home health SN/PT/OT; pending  [facility] to [facility], [facility] (Pt is declining rehab after her new BKA)     NOTE: at baseline; pt lives at home with medicaid caregivers and spouse; needs help with all adls (except feeding)      Transport Plan: will need a medicaid transport in a stretcher (not set up yet)      RUR: 17%     Main contact is spouse- Wilfrid Camilodanny Roberts- pt states he has a new phone and is working on getting the new number for this CM     Discharge pending:  -patient is s/p POD#2 of right BKA  -pending nephrology final recs- patient continues with a caraballo  -watching hgb labs  -pending progress with PT/OT  -pending medical progress and other care recommendations  -discharge likely greater than 48 hours    1350: this cM met with pt at bedside; she prefers to return home with home health at discharge; she has no reap preference for HH agenices; this CM sent referrals via careport to [facility] Sánchez, [facility] and [facility]     Prior Level of Functioning: lives at home with medicaid caregivers and is primarily bedbound  Disposition: TBD; may need rehab (IPR) for new BKA (has hx of rehab at [facility])   BRANDON: greater than 48 hours  If SNF or IPR: Date FOC offered: not yet  Date FOC received: not yet  Accepting facility: n/a  Date authorization started with reference number: will need if going to IPR  Date authorization received and expires: n/a  Follow up appointments: specialists/pcp  DME needed: none ordered yet  Transportation at discharge: stretcher or  van (medicaid transport)   IM/IMM Medicare/ letter given: n/a  Is patient a  and connected with VA? N/a              If yes, was [place] transfer form completed and VA notified?   Caregiver Contact: spouse  Discharge Caregiver contacted prior to discharge? Not yet  Care Conference needed? no  Barriers to discharge:  medical progress   
Transition of Care: likely return home with home health SN/PT/OT; pending HH orders sent to  [facility],  [facility], [facility] [place]; (Pt is declining rehab after her new BKA)    Denied home health:  [facility]  [facility] [place]  [facility]    Conditional acceptance:  [facility]     NOTE: at baseline; pt lives at home with medicaid caregivers and spouse; needs help with all adls (except feeding)      Transport Plan: will need a medicaid transport in a stretcher (not set up yet)      RUR: 18%     Main contact is spouse- Wilfrid Page Armando- pt states he has a new phone and is working on getting the new number for this CM     Discharge pending:  -patient is s/p POD#5 of right BKA  -pending nephrology final recs- patient continues with a caraballo  -watching hgb labs  -pending progress with PT/OT  -pending medical progress and other care recommendations     1300: both [facility] and [facility] have conditionally accepted for home health       Prior Level of Functioning: lives at home with medicaid caregivers and is primarily bedbound  Disposition: TBD; may need rehab (IPR) for new BKA (has hx of rehab at [facility])   BRANDON: greater than 48 hours  If SNF or IPR: Date FOC offered: not yet  Date FOC received: not yet  Accepting facility: n/a  Date authorization started with reference number: will need if going to IPR  Date authorization received and expires: n/a  Follow up appointments: specialists/pcp  DME needed: none ordered yet  Transportation at discharge: stretcher or  van (medicaid transport)   IM/IMM Medicare/ letter given: n/a  Is patient a [place] and connected with VA? N/a              If yes, was [place] transfer form completed and VA notified?   Caregiver Contact: spouse  Discharge Caregiver contacted prior to discharge? Not yet  Care Conference needed? no  Barriers to discharge:  medical 
duplex - verify stairs vs ramped entrance   ADL: Assistance for all except feeding  DME: electric wc, manual wc (uses most often), BSC (wears brief at baseline), hospital bed  Pharmacy:   PCP: [facility], PCP has changed and pt does not know name  Verified Insurance: [employer] [facility] [place]   Emergency Contacts: None provided by pt  Previous rehab services: HH - yes, uncertain agency   [facility]   IPR no hx   Transportation: Medicaid transport

## 2024-11-19 NOTE — DISCHARGE SUMMARY
of breath, fever, chills, nausea, vomiting, diarrhea, change in mentation, falling, weakness, bleeding. Severe pain or pain not relieved by medications.  Or, any other signs or symptoms that you may have questions about.    DISPOSITION:   x Home With:  x OT x PT x HH  RN       Long term SNF/Inpatient Rehab    Independent/assisted living    Hospice    Other:       PATIENT CONDITION AT DISCHARGE:     Functional status   x Poor     Deconditioned     Independent      Cognition    x Lucid     Forgetful     Dementia      Catheters/lines (plus indication)    Caraballo     PICC     PEG    x None      Code status    x Full code     DNR      PHYSICAL EXAMINATION AT DISCHARGE:    General : alert x 3, awake, no acute distress, morbidly obese   HEENT: EOMI, moist mucus membranes  Neck: supple, no JVD  Chest: Clear to auscultation bilaterally, RA  CVS: RRR, S1 S2 heard  Abd: soft/obese, non tender, non distended, BS physiological  : caraballo to gravity  Ext: no clubbing, no cyanosis, right BKA -> wrapped with ACE wrap   Neuro/Psych: pleasant mood and affect, GALICIA spontaneously  Skin: warm, dry    CHRONIC MEDICAL DIAGNOSES:      Greater than 31 minutes were spent with the patient on counseling and coordination of care    Signed:   ORTIZ Adams NP  11/19/2024  12:53 PM

## 2024-11-19 NOTE — PROGRESS NOTES
[facility]  [facility]                                                                                          Hospitalist Progress Note  VernaORTIZ Bowman NP  Answering service: [phone] OR [phone] from in house phone        Date of Service:  2024  NAME:  Amy Roberts  :  1970  MRN:  [id number]       Admission Summary:     Amy Roberts is a 54 y.o. female with hx of cad, pad, htn, martha, fibromyalgia, neuropathy who presented to [facility] ed with complaints of right foot pain and discoloration.  Patient admits that she had some sort of injury/infection of her right foot several months ago.  She states she has had progressive discoloration of her foot as well as pain and paresthesias.  She started taking about 2 months to get a podiatry appointment.  She finally was able to see a podiatrist and was referred to ED for further evaluation and treatment.  The patient denies any fever, chills, chest or abdominal pain, nausea, vomiting, cough, congestion, recent illness, palpitations, or dysuria.     Remarkable vitals on ER Presentation: vss  Labs Remarkable for: unremarkable  ER Images: cta rle:  Extensive diffuse atherosclerotic disease. Occlusion of the right superficial  femoral artery. Reconstitution of the right popliteal artery by deep branches.  Right popliteal artery is diminutive in size. Diffuse atherosclerotic disease  and stenoses within the tibioperoneal trunk. Posterior tibial artery is  diminutive, but patent into the foot. Minimal flow within the proximal right  anterior tibial artery. More distal anterior tibial artery is nonopacified.  several minimal flow within the proximal peroneal artery. Additional patent but  diminutive arteries within the lower extremity.  ER Rx: morphine, zofran, heparin gtt       Interval history / Subjective:     Pt. Is s/p right BKA with vascular surgery today. Patient with improved urine output overnight (2L) ---- although her 
        [facility] Adult  Hospitalist Group                                                                                          Hospitalist Progress Note  ORTIZ Marin NP  Answering service: [phone] OR [phone] from in house phone        Date of Service:  11/15/2024  NAME:  Amy Roberts  :  1970  MRN:  [id number]       Admission Summary:   Amy Roberts is a 54 y.o. female with hx of cad, pad, htn, martha, fibromyalgia, neuropathy who presented to [facility] ed with complaints of right foot pain and discoloration.  Patient admits that she had some sort of injury/infection of her right foot several months ago.  She states she has had progressive discoloration of her foot as well as pain and paresthesias.  She started taking about 2 months to get a podiatry appointment.  She finally was able to see a podiatrist and was referred to ED for further evaluation and treatment.  The patient denies any fever, chills, chest or abdominal pain, nausea, vomiting, cough, congestion, recent illness, palpitations, or dysuria.     Remarkable vitals on ER Presentation: vss  Labs Remarkable for: unremarkable  ER Images: cta rle:  Extensive diffuse atherosclerotic disease. Occlusion of the right superficial  femoral artery. Reconstitution of the right popliteal artery by deep branches.  Right popliteal artery is diminutive in size. Diffuse atherosclerotic disease  and stenoses within the tibioperoneal trunk. Posterior tibial artery is  diminutive, but patent into the foot. Minimal flow within the proximal right  anterior tibial artery. More distal anterior tibial artery is nonopacified.  several minimal flow within the proximal peroneal artery. Additional patent but  diminutive arteries within the lower extremity.  ER Rx: morphine, zofran, heparin gtt       Interval history / Subjective:   Patient seen and examined, lying in bed. Complains of some pain to RLE.     Renal function improving. 3.8 L UO over past 24 
        [facility] Adult  Hospitalist Group                                                                                          Hospitalist Progress Note  ORTIZ Marin NP  Answering service: [phone] OR [phone] from in house phone        Date of Service:  2024  NAME:  Amy Roberts  :  1970  MRN:  [id number]       Admission Summary:   Amy Roberts is a 54 y.o. female with hx of cad, pad, htn, martha, fibromyalgia, neuropathy who presented to [facility] ed with complaints of right foot pain and discoloration.  Patient admits that she had some sort of injury/infection of her right foot several months ago.  She states she has had progressive discoloration of her foot as well as pain and paresthesias.  She started taking about 2 months to get a podiatry appointment.  She finally was able to see a podiatrist and was referred to ED for further evaluation and treatment.  The patient denies any fever, chills, chest or abdominal pain, nausea, vomiting, cough, congestion, recent illness, palpitations, or dysuria.     Remarkable vitals on ER Presentation: vss  Labs Remarkable for: unremarkable  ER Images: cta rle:  Extensive diffuse atherosclerotic disease. Occlusion of the right superficial  femoral artery. Reconstitution of the right popliteal artery by deep branches.  Right popliteal artery is diminutive in size. Diffuse atherosclerotic disease  and stenoses within the tibioperoneal trunk. Posterior tibial artery is  diminutive, but patent into the foot. Minimal flow within the proximal right  anterior tibial artery. More distal anterior tibial artery is nonopacified.  several minimal flow within the proximal peroneal artery. Additional patent but  diminutive arteries within the lower extremity.  ER Rx: morphine, zofran, heparin gtt       Interval history / Subjective:   Patient seen and examined, lying in bed. No acute issues overnight.    Renal function continuing to improve. 4.1 L UO over 
        [facility] Adult  Hospitalist Group                                                                                          Hospitalist Progress Note  Atiya Nixon PA-C  Answering service: [phone] OR [phone] from in house phone        Date of Service:  2024  NAME:  Amy Roberts  :  1970  MRN:  [id number]       Admission Summary:     Amy Roberts is a 54 y.o. female with hx of cad, pad, htn, martha, fibromyalgia, neuropathy who presented to [facility] ed with complaints of right foot pain and discoloration.  Patient admits that she had some sort of injury/infection of her right foot several months ago.  She states she has had progressive discoloration of her foot as well as pain and paresthesias.  She started taking about 2 months to get a podiatry appointment.  She finally was able to see a podiatrist and was referred to ED for further evaluation and treatment.  The patient denies any fever, chills, chest or abdominal pain, nausea, vomiting, cough, congestion, recent illness, palpitations, or dysuria.     Remarkable vitals on ER Presentation: vss  Labs Remarkable for: unremarkable  ER Images: cta rle:  Extensive diffuse atherosclerotic disease. Occlusion of the right superficial  femoral artery. Reconstitution of the right popliteal artery by deep branches.  Right popliteal artery is diminutive in size. Diffuse atherosclerotic disease  and stenoses within the tibioperoneal trunk. Posterior tibial artery is  diminutive, but patent into the foot. Minimal flow within the proximal right  anterior tibial artery. More distal anterior tibial artery is nonopacified.  several minimal flow within the proximal peroneal artery. Additional patent but  diminutive arteries within the lower extremity.  ER Rx: morphine, zofran, heparin gtt       Interval history / Subjective:     Patient seen and examined on rounds. Laying comfortably in bed. Asking about lunch. Denies pain to feet noting she has little to 
        [facility] Adult  Hospitalist Group                                                                                          Hospitalist Progress Note  Atiya Nixon PA-C  Answering service: [phone] OR [phone] from in house phone        Date of Service:  11/10/2024  NAME:  Amy Roberts  :  1970  MRN:  [id number]       Admission Summary:     Amy Roberts is a 54 y.o. female with hx of cad, pad, htn, martha, fibromyalgia, neuropathy who presented to [facility] ed with complaints of right foot pain and discoloration.  Patient admits that she had some sort of injury/infection of her right foot several months ago.  She states she has had progressive discoloration of her foot as well as pain and paresthesias.  She started taking about 2 months to get a podiatry appointment.  She finally was able to see a podiatrist and was referred to ED for further evaluation and treatment.  The patient denies any fever, chills, chest or abdominal pain, nausea, vomiting, cough, congestion, recent illness, palpitations, or dysuria.     Remarkable vitals on ER Presentation: vss  Labs Remarkable for: unremarkable  ER Images: cta rle:  Extensive diffuse atherosclerotic disease. Occlusion of the right superficial  femoral artery. Reconstitution of the right popliteal artery by deep branches.  Right popliteal artery is diminutive in size. Diffuse atherosclerotic disease  and stenoses within the tibioperoneal trunk. Posterior tibial artery is  diminutive, but patent into the foot. Minimal flow within the proximal right  anterior tibial artery. More distal anterior tibial artery is nonopacified.  several minimal flow within the proximal peroneal artery. Additional patent but  diminutive arteries within the lower extremity.  ER Rx: morphine, zofran, heparin gtt       Interval history / Subjective:     Patient seen and examined on rounds. Sitting up comfortably in bed eating breakfast. She reports feeling sad about the 
    [address], [facility] [place] [address]  Phone: [phone]   [phone]    [email or web address]     Nephrology Progress Note    Patient Name : Amy Roberts      : 1970     MRN : [id number]  Date of Admission : 2024  Date of Servive : 24    CC:  Follow up for ROSEMARIE       Assessment and Plan   ROSEMARIE :  - suspect ATN from IV vanc/ zosyn, IV contrast, NSAID use(renal ultrasound shows no hydro)  -Continue with Bhatt catheter  -Continue with IV fluids  -Noticed improvement in creatinine  -Daily labs    Hyponatremia   -resolved     PAD with RLE gangrene   - s/p BKA   - abx per ID    Blood loss anemia   - Hb still trending down      HTN   Obesity   FMG / Neuropathy   CAD     Care Plan discussed with:  pt and RN       Interval History:  Seen and examined.   Excellent urine output  Creatinine continues to improve  Review of Systems: Pertinent items are noted in HPI.    Current Medications:   Current Facility-Administered Medications   Medication Dose Route Frequency    oxyCODONE (ROXICODONE) immediate release tablet 5 mg  5 mg Oral Q4H PRN    oxyCODONE (ROXICODONE) immediate release tablet 10 mg  10 mg Oral Q4H PRN    heparin (porcine) injection 5,000 Units  5,000 Units SubCUTAneous 3 times per day    0.9 % sodium chloride infusion   IntraVENous Continuous    atorvastatin (LIPITOR) tablet 10 mg  10 mg Oral Daily    DULoxetine (CYMBALTA) extended release capsule 30 mg  30 mg Oral Daily    pregabalin (LYRICA) capsule 150 mg  150 mg Oral TID    sertraline (ZOLOFT) tablet 25 mg  25 mg Oral Daily    sodium chloride flush 0.9 % injection 5-40 mL  5-40 mL IntraVENous 2 times per day    sodium chloride flush 0.9 % injection 5-40 mL  5-40 mL IntraVENous PRN    0.9 % sodium chloride infusion   IntraVENous PRN    potassium chloride (KLOR-CON) extended release tablet 40 mEq  40 mEq Oral PRN    Or    potassium bicarb-citric acid (EFFER-K) effervescent tablet 
    [address], [place] [address]  Phone: [phone]   [phone]    [email or web address]     Nephrology Progress Note    Patient Name : Amy Roberts      : 1970     MRN : [id number]  Date of Admission : 2024  Date of Servive : 24    CC:  Follow up for ROSEMARIE       Assessment and Plan   ROSEMARIE :  - suspect ATN from IV vanc/ zosyn, IV contrast, NSAID use  - significant and rapid decline in GFR : AKIN stage III  - Non oliguric now, keep caraballo catheter for strict I.O  - continue IVF and hold Losartan   - no emergent need for RRT-- discussed potential risk w/ Pt   - labs again in am      Hyponatremia   - 2/2 Hypervolemia   - watch for now      PAD with RLE gangrene   - for BKA  - abx per ID     HTN   Obesity   FMG / Neuropathy   CAD     Care Plan discussed with:  pt and RN       Interval History:  Seen and examined. UOP 2900 cc, Cr worse. For BKA today     Review of Systems: Pertinent items are noted in HPI.    Current Medications:   Current Facility-Administered Medications   Medication Dose Route Frequency    [START ON 2024] Vancomycin, Random level - Please draw on  @ 0600. Thanks!   Other Once    lidocaine PF 1 % injection 1 mL  1 mL IntraDERmal Once PRN    acetaminophen (TYLENOL) tablet 1,000 mg  1,000 mg Oral Once    fentaNYL (SUBLIMAZE) injection 100 mcg  100 mcg IntraVENous Once PRN    lactated ringers infusion   IntraVENous Continuous    sodium chloride flush 0.9 % injection 5-40 mL  5-40 mL IntraVENous 2 times per day    sodium chloride flush 0.9 % injection 5-40 mL  5-40 mL IntraVENous PRN    0.9 % sodium chloride infusion   IntraVENous PRN    midazolam PF (VERSED) injection 2 mg  2 mg IntraVENous PRN    ROPivacaine (NAROPIN) 0.5% injection 30 mL  30 mL Infiltration Once    0.9 % sodium chloride infusion   IntraVENous Continuous    Vancomycin, Random level - Please draw on  @ 0600. Thanks!   
    [address], [place] [address]  Phone: [phone]   [phone]    [email or web address]     Nephrology Progress Note    Patient Name : Amy Roberts      : 1970     MRN : [id number]  Date of Admission : 2024  Date of Servive : 11/15/24    CC:  Follow up for ROSEMARIE       Assessment and Plan   ROSEMARIE :  - suspect ATN from IV vanc/ zosyn, IV contrast, NSAID use  - resolving now   - Keep Bhatt and continue IVF till Monday   - Dr Archer will check back Monday. Dr Barone covering servive if any issues over the w/e      Hyponatremia   -resolved     PAD with RLE gangrene   - s/p BKA   - abx per ID    Blood loss anemia   - Hb still trending down      HTN   Obesity   FMG / Neuropathy   CAD     Care Plan discussed with:  pt and RN       Interval History:  Seen and examined. Good UOP, Cr down to 5.3 mg/dl doing well w/ pain. Hb still trending down      Review of Systems: Pertinent items are noted in HPI.    Current Medications:   Current Facility-Administered Medications   Medication Dose Route Frequency    sodium zirconium cyclosilicate (LOKELMA) oral suspension 10 g  10 g Oral Daily    heparin (porcine) injection 5,000 Units  5,000 Units SubCUTAneous 3 times per day    fentaNYL (SUBLIMAZE) injection 50 mcg  50 mcg IntraVENous Q3H PRN    0.9 % sodium chloride infusion   IntraVENous Continuous    atorvastatin (LIPITOR) tablet 10 mg  10 mg Oral Daily    DULoxetine (CYMBALTA) extended release capsule 30 mg  30 mg Oral Daily    pregabalin (LYRICA) capsule 150 mg  150 mg Oral TID    sertraline (ZOLOFT) tablet 25 mg  25 mg Oral Daily    sodium chloride flush 0.9 % injection 5-40 mL  5-40 mL IntraVENous 2 times per day    sodium chloride flush 0.9 % injection 5-40 mL  5-40 mL IntraVENous PRN    0.9 % sodium chloride infusion   IntraVENous PRN    potassium chloride (KLOR-CON) extended release tablet 40 mEq  40 mEq Oral PRN    Or    potassium bicarb-citric 
    [address], [facility] [place] [address]  Phone: [phone]   [phone]    [email or web address]     Nephrology Progress Note    Patient Name : Amy Roberts      : 1970     MRN : [id number]  Date of Admission : 2024  Date of Servive : 24    CC:  Follow up for ROSEMAREI       Assessment and Plan   ROSEMARIE :  - suspect ATN from IV vanc/ zosyn, IV contrast, NSAID use  -Continue with Bhatt catheter  -Continue with IV fluids  -daily labs  -no need for RRT    Hyponatremia   -resolved     PAD with RLE gangrene   - s/p BKA   - abx per ID    Blood loss anemia   - Hgb stable     HTN   Obesity   FMG / Neuropathy   CAD       Interval History:  Seen and examined.  Cr down, good UOP.  No cp, sob, n/v/d reported    Review of Systems: Pertinent items are noted in HPI.    Current Medications:   Current Facility-Administered Medications   Medication Dose Route Frequency    oxyCODONE (ROXICODONE) immediate release tablet 5 mg  5 mg Oral Q4H PRN    oxyCODONE (ROXICODONE) immediate release tablet 10 mg  10 mg Oral Q4H PRN    heparin (porcine) injection 5,000 Units  5,000 Units SubCUTAneous 3 times per day    0.9 % sodium chloride infusion   IntraVENous Continuous    atorvastatin (LIPITOR) tablet 10 mg  10 mg Oral Daily    DULoxetine (CYMBALTA) extended release capsule 30 mg  30 mg Oral Daily    pregabalin (LYRICA) capsule 150 mg  150 mg Oral TID    sertraline (ZOLOFT) tablet 25 mg  25 mg Oral Daily    sodium chloride flush 0.9 % injection 5-40 mL  5-40 mL IntraVENous 2 times per day    sodium chloride flush 0.9 % injection 5-40 mL  5-40 mL IntraVENous PRN    0.9 % sodium chloride infusion   IntraVENous PRN    ondansetron (ZOFRAN-ODT) disintegrating tablet 4 mg  4 mg Oral Q8H PRN    Or    ondansetron (ZOFRAN) injection 4 mg  4 mg IntraVENous Q6H PRN    polyethylene glycol (GLYCOLAX) packet 17 g  17 g Oral Daily PRN    acetaminophen (TYLENOL) tablet 650 mg  650 mg 
    [address], [place] [address]  Phone: [phone]   [phone]    [email or web address].[email or web address]     Nephrology Progress Note    Patient Name : Amy Roberts      : 1970     MRN : [id number]  Date of Admission : 2024  Date of Servive : 24    CC:  Follow up for ROSEMARIE       Assessment and Plan   ROSEMARIE :  - suspect ATN from IV vanc/ zosyn, IV contrast, NSAID use  - significant and rapid decline in GFR : AKIN stage III  - Non oliguric now, keep caraballo catheter for strict I.O  - continue IVF and hold Losartan   - no emergent need for RRT-- discussed potential risk w/ Pt   - please dont remove caraballo   - labs again in am      Hyponatremia   - 2/2 Hypervolemia   - watch for now      PAD with RLE gangrene   - s/p BKA   - abx per ID     HTN   Obesity   FMG / Neuropathy   CAD     Care Plan discussed with:  pt and RN       Interval History:  Seen and examined. Good UOP, Cr upto 6, s/;p BKA and doing well w/ pain     Review of Systems: Pertinent items are noted in HPI.    Current Medications:   Current Facility-Administered Medications   Medication Dose Route Frequency    sodium zirconium cyclosilicate (LOKELMA) oral suspension 10 g  10 g Oral Daily    fentaNYL (SUBLIMAZE) injection 50 mcg  50 mcg IntraVENous Q3H PRN    oxyCODONE (ROXICODONE) immediate release tablet 5 mg  5 mg Oral Q4H PRN    0.9 % sodium chloride infusion   IntraVENous Continuous    Vancomycin, Random level - Please draw on Tuesday,  @ 0600. Thanks!   Other Once    metroNIDAZOLE (FLAGYL) 500 mg in 0.9% NaCl 100 mL IVPB premix  500 mg IntraVENous Q8H    cefepime (MAXIPIME) 1,000 mg in sodium chloride 0.9 % 50 mL IVPB (mini-bag)  1,000 mg IntraVENous Q24H    atorvastatin (LIPITOR) tablet 10 mg  10 mg Oral Daily    DULoxetine (CYMBALTA) extended release capsule 30 mg  30 mg Oral Daily    pregabalin (LYRICA) capsule 150 mg  150 mg Oral TID    sertraline (ZOLOFT) tablet 25 mg  25 mg 
    [address], [facility] [place] [address]  Phone: [phone]   [phone]    [email or web address]     Nephrology Progress Note    Patient Name : Amy Roberts      : 1970     MRN : [id number]  Date of Admission : 2024  Date of Servive : 24    CC:  Follow up for ROSEMARIE       Assessment and Plan   ROSEMARIE :  - suspect ATN from IV vanc/ zosyn, IV contrast, NSAID use(renal ultrasound shows no hydro)  -Continue with Bhatt catheter  -Continue with IV fluids  -Check labs daily, noticed improving creatinine    Hyponatremia   -resolved     PAD with RLE gangrene   - s/p BKA   - abx per ID    Blood loss anemia   - Hb still trending down      HTN   Obesity   FMG / Neuropathy   CAD     Care Plan discussed with:  pt and RN       Interval History:  Seen and examined.   Excellent urine output  Creatinine continues to improve  Review of Systems: Pertinent items are noted in HPI.    Current Medications:   Current Facility-Administered Medications   Medication Dose Route Frequency    oxyCODONE (ROXICODONE) immediate release tablet 5 mg  5 mg Oral Q4H PRN    oxyCODONE (ROXICODONE) immediate release tablet 10 mg  10 mg Oral Q4H PRN    heparin (porcine) injection 5,000 Units  5,000 Units SubCUTAneous 3 times per day    0.9 % sodium chloride infusion   IntraVENous Continuous    atorvastatin (LIPITOR) tablet 10 mg  10 mg Oral Daily    DULoxetine (CYMBALTA) extended release capsule 30 mg  30 mg Oral Daily    pregabalin (LYRICA) capsule 150 mg  150 mg Oral TID    sertraline (ZOLOFT) tablet 25 mg  25 mg Oral Daily    sodium chloride flush 0.9 % injection 5-40 mL  5-40 mL IntraVENous 2 times per day    sodium chloride flush 0.9 % injection 5-40 mL  5-40 mL IntraVENous PRN    0.9 % sodium chloride infusion   IntraVENous PRN    potassium chloride (KLOR-CON) extended release tablet 40 mEq  40 mEq Oral PRN    Or    potassium bicarb-citric acid (EFFER-K) effervescent tablet 
  Pharmacy Note - Renal Dosing    Cefepime 2000mg Q12h for treatment of Skin and soft tissue infection. Per [facility] Renal Dose Adjustment Policy, cefepime will be changed to 2000mg load followed by 1000mg Q24h extended infusion    Estimated Creatinine Clearance: Estimated Creatinine Clearance: 29 mL/min (A) (based on SCr of 3.3 mg/dL (H)).    BMI: There is no height or weight on file to calculate BMI.    Please call with any questions.    Thank you,    Libertad Larios [facility]    
0855: Herpain XA resulted, therapeutic, no changes needed. Next lab collection due ~1455.    1030: Patient pulled PIV, new attempt infiltrated. Notified MD for midline d/t patient stated ultrasound was needed for last two PIV insertions.     1615: heparin XA therapeutic range. Second consecutive therapeutic dose. Per order \"When Anti-Xa  is within target range for two consecutive times, check Anti-Xa once daily with morning labs\" therefore, next collection is AM labs.  
1712 - MARIE refused by patient due to pain, patient medicated before procedure but was still unable to tolerate. Technician recommending a different imaging approach if able, otherwise she will re-try MARIE tomorrow. Zara VENCES & Hussain CABALLERO notified.  
2230    Patient is A and O x4, was asked by writer about empty purewick canister output, patient denies having had any output all day. The charting supports this, denies diaper being wet.     When asked about input, patient says she has not drank much all day and estimates to have drank only half of her pitcher in the day (300 ml).     Patient was bladder scanned, revealing 177 ml. Patient's water pitcher refilled by writer and encouraged to increase fluid intake. patient drank 400 ml. No other concerns at this time.     
Chart reviewed, cleared by RN for PT tx. Upon entering room, pt observed semi santillan in bed, on RA.  Pt politely declined mobility at this time but requested an additional pillow and rolled L with UE support on bed rails for placement.  Attempted education and encouragement regarding mobilizing while acute but pt continued to politely decline. Will defer and follow up as able and appropriate tomorrow.     Ravinder Husain, PT    
Comprehensive Nutrition Assessment    Type and Reason for Visit: Initial, LOS    Nutrition Recommendations/Plan:   Continue Regular diet       Malnutrition Assessment:  Malnutrition Status:  No malnutrition (11/18/24 1508)         Nutrition Assessment:    55 yo female admitted for PVD.  PMHx: CAD, PAD, HTN, neuropathy.  S/P right BKA 11/13.     Seeing pt for LOS.  Nephrology following for ROSEMARIE, suspects from IV meds/contrast/NSAID use.  PO intakes documented as % most all meals.  Pt confirms good intakes/appetite here and PTA. Weight hx in EMR indicates weight gain PTA.  Labs reviewed - BUN/Cr reflective of ROSEMARIE.      Nutritionally Significant Medications:  Nacl at 75 ml/hr      Estimated Daily Nutrient Needs:  Energy Requirements Based On: Formula  Weight Used for Energy Requirements: Current  Energy (kcal/day): 2170 (MSJ x AF 1.2 - 500)  Weight Used for Protein Requirements: Current  Protein (g/day): 122 (0.8 gm/kg)  Method Used for Fluid Requirements: 1 ml/kcal  Fluid (ml/day):      Nutrition Related Findings:   Edema: Right lower extremity, Left lower extremity            RLE Edema: +1  LLE Edema: +1    Recent Labs     11/16/24  0528 11/17/24  0538 11/18/24  0425   GLUCOSE 93 104* 88   BUN 35* 34* 29*   CREATININE 4.93* 4.31* 3.76*    141 141   K 4.5 4.3 4.1   * 112* 111*   CO2 23 22 26   CALCIUM 7.6* 7.9* 8.0*       Lab Results   Component Value Date/Time    LABA1C 6.1 11/10/2024 03:43 AM     11/10/2024 03:43 AM       Triglycerides   Date Value Ref Range Status   11/10/2024 108 <150 MG/DL Final     Comment:     Based on NCEP-ATP III:  Triglycerides <150 mg/dL  is considered normal, 150-199 mg/dL  borderline high,  200-499 mg/dL high and  greater than or equal to 500 mg/dL very high.   08/30/2022 138 <150 mg/dL Final     Comment:     Based on NCEP-ATP III:  Triglycerides <150 mg/dL  is considered  normal, 150-199 mg/dL  borderline high,  200-499 mg/dL high and   greater than or equal to 
Noted elevated K+ (5.3) on AM labs, asymptomatic at this time. Perfect sent to PATRICIA Morrow.  
Occupational Therapy  11/09/24    Chart reviewed in prep for JODI lynn. RN cleared pt for therapy. Attempted to see pt, however, pt declining therapy at this time due to fatigue from late transport to [facility] from [facility]. Pt reports she was (I) with scoot transfers from bed>WC (has manual and electric) at baseline. Pt reports she is (I) with dressing and has aides 7 days a week to assist with bed baths and IADLs. Pt agreeable to participate in therapy tomorrow. Will follow up as able and appropriate.     Thank you  Jyothi Streeter, OTR/L    
Occupational Therapy  11/13/24    Chart reviewed. Transport arrived to take patient MARK for R BKA scheduled today. Will hold therapy and follow up tomorrow for re-evaluation as able/appropriate.     Thank you,  Cherelle Perez, OTD, OTR/L    
Pharmacist Note - Vancomycin Dosing    Consult provided for this 54 y.o. female for indication of RLE gangrene.  Antibiotic regimen(s): vancomycin + Zosyn   Patient on vancomycin PTA? NO   Pregnancy status: Negative    Recent Labs     24  1657 24  1822 24  2251 24  0756   WBC 8.1 8.1 9.7 7.9   CREATININE 0.65  --   --  0.82   BUN 4*  --   --  6     Frequency of BMP: daily through   Height: 180.3 cm  Weight: 132.9 kg  Est CrCl: >= 100 ml/min  Temp (24hrs), Av.1 °F (36.7 °C), Min:97.5 °F (36.4 °C), Max:98.6 °F (37 °C)    Cultures:   blood x 2 - NG, preliminary     MRSA Swab ordered (if applicable)? N/A    The plan below is expected to result in a target range of AUC/NASREEN 400-500    Therapy will be initiated with a loading dose of 2500 mg IV x 1 to be followed by a maintenance dose of 1500 mg IV every 12 hours.  Pharmacy to follow patient daily and order levels / make dose adjustments as appropriate.    *Vancomycin has been dosed used Bayesian kinetics software to target an AUC/NASREEN of 400-600, which provides adequate exposure for an assumed infection due to MRSA with an NASREEN of 1 or less while reducing the risk of nephrotoxicity as seen with traditional trough based dosing goals.     
Pharmacist Note - Vancomycin Dosing  Therapy day 3  Indication: RLE gangrene 2/2 extensive diffuse PVD   Current regimen: Dosing by levels    Recent Labs     11/09/24  0756 11/10/24  0343 11/11/24  0415   WBC 7.9 9.3 10.8   CREATININE 0.82 1.30* 3.30*   BUN 6 13 20     A random vancomycin level of 25 mcg/mL was obtained 26 hours post-dose     Goal target range Trough 10-15 mcg/mL      Assessment/Plan: Patient received 2 doses of vancomycin however, unexpectedly, random level is significantly elevated 2/2 ROSEMARIE and worsening renal function. Will continue to dose by levels. Repeat random level tomorrow AM. Pharmacy will continue to monitor this patient daily for changes in clinical status and renal function.      Agustín Capellan, PharmD  Clinical Pharmacist, Orthopedics and Med/Surg  [facility] ([phone])    
Pharmacist Note - Vancomycin Dosing  Therapy day 4  Indication: RLE gangrene 2/2 extensive diffuse PVD   Current regimen: Dosing by levels (Last dose 11/10 @ 01:54)    Recent Labs     11/10/24  0343 11/11/24  0415 11/12/24  0408   WBC 9.3 10.8 9.0   CREATININE 1.30* 3.30* 4.77*   BUN 13 20 27*       A random vancomycin level of 20 mcg/mL was obtained ~50 hours post previous dose.    Goal target range Trough 10-15 mcg/mL      Assessment/Plan: SCr on admission was 0.82 and initial dosing was based on kinetic modeling. Patient received 2 doses of vancomycin (a loading dose and one maintenance dose) however, unexpectedly, random levels are significantly elevated 2/2 ROSEMARIE/oligo-anuria. Will continue to dose by levels. Zosyn was also changed yesterday to cefepime and Flagyl to further limit potential nephrotoxicity. Repeat vancomycin random level tomorrow AM. Pharmacy will continue to monitor this patient daily for changes in clinical status and renal function.      Agustín Capellan, PharmD  Clinical Pharmacist, Orthopedics and Med/Surg  [facility] ([id number])      *Random vancomycin levels are used to calculate AUC/NASREEN, this level should not be interpreted as a trough. Vancomycin has been dosed using Bayesian kinetics software to target an AUC24:NASREEN of 400-600, which provides adequate exposure for as assumed infection due to MRSA with an NASREEN of 1 or less while reducing the risk of nephrotoxicity as seen with traditional trough based dosing goals.    
Pharmacist Note - Vancomycin Dosing  Therapy day 5  Indication: RLE gangrene 2/2 extensive diffuse PVD; planned Rt BKA today.  Current regimen: Dosing by levels (Last dose 11/10 @ 01:54)     Recent Labs     11/11/24  0415 11/12/24  0408 11/13/24  0508   WBC 10.8 9.0 9.6   CREATININE 3.30* 4.77* 5.63*   BUN 20 27* 32*     A random vancomycin level of 16.6 mcg/mL was obtained ~75 hours post dose.    Goal target range AUC/NASREEN 400-600      Assessment/Plan: SCr on admission was 0.82 and initial dosing was based on kinetic modeling. Patient received 2 doses of vancomycin (a loading dose and one maintenance dose) however, unexpectedly, random levels are significantly elevated 2/2 ROSEMARIE/oligo-anuria. Will continue to dose by levels. Zosyn was also changed on 11/11 to cefepime and Flagyl to further limit potential nephrotoxicity. ARB and spironolactone remain on hold. Repeat vancomycin random level tomorrow AM. Pharmacy will continue to monitor this patient daily for changes in clinical status and renal function.       Agustín Capellan, PharmD  Clinical Pharmacist, Orthopedics and Med/Surg  [facility] ([phone])    
Physical Therapy    Chart reviewed up to date, pt currently in OR for surgery, BKA.  Will follow up tomorrow to resume PT when appropriate and pt able to participate  PROSPER PIERRE, PT    
Physical Therapy Note    Patient declines eating breakfast EOB with PT/OT assist. Will return for PT tx as able.   
Report given to ANDREA Jaramillo RN signing off.   
Report given to ANDREA Roberson RN signing off.   
Report received from ANDREA Chirinos    Assumed pt care at this time.    Jim Augustin RN   
Report received from ANDREA Momin    Assumed pt care at this time.    Jim Augustin RN   
Spiritual Health History and Assessment/Progress Note  [facility]    (P) Initial Encounter,  ,  ,      Name: Amy Roberts MRN: [id number]    Age: 54 y.o.     Sex: female   Language: English   Moravian: Zoroastrian   PVD (peripheral vascular disease) (HCC)     Date: 11/15/2024            Total Time Calculated: (P) 20 min              Spiritual Assessment began in [facility] 5E1 SURGICAL UNIT        Referral/Consult From: (P) Rounding   Encounter Overview/Reason: (P) Initial Encounter  Service Provided For: (P) Patient    Shanita, Belief, Meaning:   Patient identifies as spiritual and has beliefs or practices that help with coping during difficult times  Family/Friends No family/friends present      Importance and Influence:  Patient has spiritual/personal beliefs that influence decisions regarding their health  Family/Friends No family/friends present    Community:  Patient feels well-supported. Support system includes: Children, Friends, and Extended family  Family/Friends No family/friends present    Assessment and Plan of Care:     Patient Interventions include: Facilitated expression of thoughts and feelings, Explored spiritual coping/struggle/distress, Engaged in theological reflection, and Affirmed coping skills/support systems  Family/Friends Interventions include: No family/friends present    Patient Plan of Care: Spiritual Care available upon further referral  Family/Friends Plan of Care: Spiritual Care available upon further referral    Electronically signed by Chaplain Goyo Resident on 11/15/2024 at 10:16 AM    
Vascular   --no major complaints  --leg wrapped; will change tomorrow  --Hg OK  --unfortunately, renal function has not improved yet  --? SNF eventually  
Vascular Surgery  --consult received--will see shortly  --patient is bed-bound, transferred for chronic and worsening PVD  --no emergency bypass planned; will resume diet  
Vascular Surgery  --discussed with her again  --she is agreeable for bka for foot gangrene  --planning for tomorrow afternoon  --renal function noted--unlikely that rhabdo is related; checking ck  
Vascular Surgery  --patient seen and examined in detail yesterday and today  ---(full note dictated yesterday but not transcribed?)--> will re-dictate  --I have recommended bka given multiple factors (extent of foot necrosis, non-ambulatory state, comorbidities)    Dict: #[id number]    
pt scheduled for surgery today, has heparin drip infusing. No orders for stopping heparin drip. Spoke with office and obtained orders from FEDERICO Abarca to stop heparin before sending pt to surgery.   
health.  Discharge pending nephrology/ vascular clearance     Assessment & Plan:     PAD of RLE w/ Gangrene  S/p R BKA 11/13 (Dr. Zuñiga)  - see CTA RLE findings  - BC X 2 (11/9): NGTD  - received vanco and zosyn, then cefepime and flagyl - antibiotics discontinued 11/14  - Vascular Surgery following  - Hgb 8.8; follow and transfuse if <7  - PT/OT following    ROSEMARIE, improved  Hyperkalemia, resolved  - suspect related to ATN from IV Vanc/Zosyn, IV contrast, NSAID use   - creatinine improving, now 4.31; good UO  - Nephrology following - continue IVF and Bhatt through Monday  - continue holding losartan and aldactone, avoid nephrotoxic agents   - received Lokelma 11/14  - follow I&Os and renal labs    Fibromyalgia / Chronic Pain / Neuropathy / MDD  - continue home cymbalta, lyrica, zoloft  - PRN Tylenol and oxycodone     HTN  - BP stable  - holding home losartan and aldactone due to ROSEMARIE     Obesity  - encourage weight loss, dieting and exercise    Code status: Full  Prophylaxis: Heparin  Care Plan discussed with: patient, RN, CM  Anticipated Disposition: likely early this week, patient declining SNF/IPR - wants home with   Inpatient  Cardiac monitoring: none     Principal Problem:    PVD (peripheral vascular disease) (HCC)  Resolved Problems:    * No resolved hospital problems. *         Social Determinants of Health     Tobacco Use: High Risk (11/13/2024)    Patient History     Smoking Tobacco Use: Every Day     Smokeless Tobacco Use: Never     Passive Exposure: Not on file   Alcohol Use: Not At Risk (11/9/2024)    AUDIT-C     Frequency of Alcohol Consumption: Never     Average Number of Drinks: Patient does not drink     Frequency of Binge Drinking: Never   Financial Resource Strain: Not on file   Food Insecurity: Not on file   Transportation Needs: Not on file   Physical Activity: Not on file   Stress: Not on file   Social Connections: Not on file   Intimate Partner Violence: Not on file   Depression: Not at 
\"PCO2I\", \"PO2I\", \"FIO2I\"     Ventilator:       Microbiology:  No results found for: \"SDES\"  No components found for: \"CULT\"      I have reviewed the flowsheets.  Chart and Pertinent Notes have been reviewed.   No change in PMH ,family and social history from Consult note.      Ede Archer MD  [facility]      
lesions.  MS: No joint swelling, erythema, warmth  Neurologic: non focal, AAO x 3  :Bhatt +       []    High complexity decision making was performed  []    Patient is at high-risk of decompensation with multiple organ involvement    Lab Data Personally Reviewed: I have reviewed all the pertinent labs, microbiology data and radiology studies during assessment.    Labs:  Recent Labs     11/10/24  0343 11/11/24  0415 11/12/24  0408   * 133* 130*   K 3.7 4.2 4.6    100 98   CO2 25 25 23   GLUCOSE 123* 89 98   BUN 13 20 27*   CREATININE 1.30* 3.30* 4.77*   CALCIUM 7.8* 8.1* 8.4*       Recent Labs     11/10/24  0343 11/11/24  0415 11/12/24  0408   WBC 9.3 10.8 9.0   RBC 3.35* 3.14* 3.27*   HGB 10.0* 9.3* 9.6*   HCT 32.5* 30.2* 31.1*   MCV 97.0 96.2 95.1   MCH 29.9 29.6 29.4   MCHC 30.8 30.8 30.9   RDW 14.8* 15.1* 15.2*    248 196   MPV 9.3 9.4 9.4     No results for input(s): \"TP\", \"GLOB\" in the last 72 hours.    Invalid input(s): \"SGOT\", \"GPT\", \"AP\", \"TBIL\", \"ALB\", \"AML\", \"AMYP\", \"LPSE\", \"LAC\"  No results for input(s): \"INR\", \"APTT\" in the last 72 hours.    Invalid input(s): \"PTP\"   No results for input(s): \"CPK\", \"CKMB\", \"TROPONINI\" in the last 72 hours.    Invalid input(s): \"B-NP\"  Invalid input(s): \"PHI\", \"PCO2I\", \"PO2I\", \"FIO2I\"     Ventilator:       Microbiology:  No results found for: \"SDES\"  No components found for: \"CULT\"      I have reviewed the flowsheets.  Chart and Pertinent Notes have been reviewed.   No change in PMH ,family and social history from Consult note.      MD [facility]      
    Labs:     Recent Labs     11/13/24  0508 11/14/24  0525   WBC 9.6 12.6*   HGB 9.2* 9.9*   HCT 29.6* 31.4*    264     Recent Labs     11/12/24  0408 11/13/24  0508 11/14/24  0525   * 134* 135*   K 4.6 4.5 5.3*   CL 98 103 107   CO2 23 22 21   BUN 27* 32* 36*       Lab Results   Component Value Date/Time    CHOL 99 11/10/2024 03:43 AM    HDL 35 11/10/2024 03:43 AM    LDL 42.4 11/10/2024 03:43 AM    .4 08/30/2022 09:24 AM       Medications Reviewed:     Current Facility-Administered Medications   Medication Dose Route Frequency    sodium zirconium cyclosilicate (LOKELMA) oral suspension 10 g  10 g Oral Daily    fentaNYL (SUBLIMAZE) injection 50 mcg  50 mcg IntraVENous Q3H PRN    0.9 % sodium chloride infusion   IntraVENous Continuous    atorvastatin (LIPITOR) tablet 10 mg  10 mg Oral Daily    DULoxetine (CYMBALTA) extended release capsule 30 mg  30 mg Oral Daily    pregabalin (LYRICA) capsule 150 mg  150 mg Oral TID    sertraline (ZOLOFT) tablet 25 mg  25 mg Oral Daily    sodium chloride flush 0.9 % injection 5-40 mL  5-40 mL IntraVENous 2 times per day    sodium chloride flush 0.9 % injection 5-40 mL  5-40 mL IntraVENous PRN    0.9 % sodium chloride infusion   IntraVENous PRN    potassium chloride (KLOR-CON) extended release tablet 40 mEq  40 mEq Oral PRN    Or    potassium bicarb-citric acid (EFFER-K) effervescent tablet 40 mEq  40 mEq Oral PRN    Or    potassium chloride 10 mEq/100 mL IVPB (Peripheral Line)  10 mEq IntraVENous PRN    magnesium sulfate 2000 mg in 50 mL IVPB premix  2,000 mg IntraVENous PRN    ondansetron (ZOFRAN-ODT) disintegrating tablet 4 mg  4 mg Oral Q8H PRN    Or    ondansetron (ZOFRAN) injection 4 mg  4 mg IntraVENous Q6H PRN    polyethylene glycol (GLYCOLAX) packet 17 g  17 g Oral Daily PRN    acetaminophen (TYLENOL) tablet 650 mg  650 mg Oral Q6H PRN    Or    acetaminophen (TYLENOL) suppository 650 mg  650 mg Rectal Q6H PRN    oxyCODONE (ROXICODONE) immediate release 
36* 38* 35*       Lab Results   Component Value Date/Time    CHOL 99 11/10/2024 03:43 AM    HDL 35 11/10/2024 03:43 AM    LDL 42.4 11/10/2024 03:43 AM    .4 08/30/2022 09:24 AM       Medications Reviewed:     Current Facility-Administered Medications   Medication Dose Route Frequency    oxyCODONE (ROXICODONE) immediate release tablet 5 mg  5 mg Oral Q4H PRN    oxyCODONE (ROXICODONE) immediate release tablet 10 mg  10 mg Oral Q4H PRN    heparin (porcine) injection 5,000 Units  5,000 Units SubCUTAneous 3 times per day    0.9 % sodium chloride infusion   IntraVENous Continuous    atorvastatin (LIPITOR) tablet 10 mg  10 mg Oral Daily    DULoxetine (CYMBALTA) extended release capsule 30 mg  30 mg Oral Daily    pregabalin (LYRICA) capsule 150 mg  150 mg Oral TID    sertraline (ZOLOFT) tablet 25 mg  25 mg Oral Daily    sodium chloride flush 0.9 % injection 5-40 mL  5-40 mL IntraVENous 2 times per day    sodium chloride flush 0.9 % injection 5-40 mL  5-40 mL IntraVENous PRN    0.9 % sodium chloride infusion   IntraVENous PRN    potassium chloride (KLOR-CON) extended release tablet 40 mEq  40 mEq Oral PRN    Or    potassium bicarb-citric acid (EFFER-K) effervescent tablet 40 mEq  40 mEq Oral PRN    Or    potassium chloride 10 mEq/100 mL IVPB (Peripheral Line)  10 mEq IntraVENous PRN    magnesium sulfate 2000 mg in 50 mL IVPB premix  2,000 mg IntraVENous PRN    ondansetron (ZOFRAN-ODT) disintegrating tablet 4 mg  4 mg Oral Q8H PRN    Or    ondansetron (ZOFRAN) injection 4 mg  4 mg IntraVENous Q6H PRN    polyethylene glycol (GLYCOLAX) packet 17 g  17 g Oral Daily PRN    acetaminophen (TYLENOL) tablet 650 mg  650 mg Oral Q6H PRN    Or    acetaminophen (TYLENOL) suppository 650 mg  650 mg Rectal Q6H PRN     ______________________________________________________________________  EXPECTED LENGTH OF STAY: Unable to retrieve estimated LOS  ACTUAL LENGTH OF STAY:          7                 ORTIZ Marin - NP     
   acetaminophen (TYLENOL) tablet 650 mg  650 mg Oral Q6H PRN    Or    acetaminophen (TYLENOL) suppository 650 mg  650 mg Rectal Q6H PRN    oxyCODONE (ROXICODONE) immediate release tablet 5 mg  5 mg Oral Q4H PRN    vancomycin - pharmacy to dose   Other RX Placeholder    heparin 25,000 units in dextrose 5% 250 mL (premix) infusion  5-30 Units/kg/hr IntraVENous Continuous    heparin (porcine) injection 10,000 Units  10,000 Units IntraVENous PRN    heparin (porcine) injection 5,000 Units  5,000 Units IntraVENous PRN     ______________________________________________________________________  EXPECTED LENGTH OF STAY: Unable to retrieve estimated LOS  ACTUAL LENGTH OF STAY:          3                 ORTIZ Oliva - NP     
Medications were reviewed, considered, added and adjusted based on the clinical condition today.      Home Medications were reconciled to the best of my ability given all available resources at the time of admission. Route is PO if not otherwise noted.      Admission Status:[id number]:::1}      Medications Reviewed:     Current Facility-Administered Medications   Medication Dose Route Frequency    [START ON 11/12/2024] Vancomycin, Random level - Please draw on Tuesday, 11/12 @ 0600. Thanks!   Other Once    lactated ringers infusion   IntraVENous Continuous    atorvastatin (LIPITOR) tablet 10 mg  10 mg Oral Daily    DULoxetine (CYMBALTA) extended release capsule 30 mg  30 mg Oral Daily    [Held by provider] losartan (COZAAR) tablet 100 mg  100 mg Oral Daily    pregabalin (LYRICA) capsule 150 mg  150 mg Oral TID    sertraline (ZOLOFT) tablet 25 mg  25 mg Oral Daily    [Held by provider] spironolactone (ALDACTONE) tablet 50 mg  50 mg Oral BID    sodium chloride flush 0.9 % injection 5-40 mL  5-40 mL IntraVENous 2 times per day    sodium chloride flush 0.9 % injection 5-40 mL  5-40 mL IntraVENous PRN    0.9 % sodium chloride infusion   IntraVENous PRN    potassium chloride (KLOR-CON) extended release tablet 40 mEq  40 mEq Oral PRN    Or    potassium bicarb-citric acid (EFFER-K) effervescent tablet 40 mEq  40 mEq Oral PRN    Or    potassium chloride 10 mEq/100 mL IVPB (Peripheral Line)  10 mEq IntraVENous PRN    magnesium sulfate 2000 mg in 50 mL IVPB premix  2,000 mg IntraVENous PRN    ondansetron (ZOFRAN-ODT) disintegrating tablet 4 mg  4 mg Oral Q8H PRN    Or    ondansetron (ZOFRAN) injection 4 mg  4 mg IntraVENous Q6H PRN    polyethylene glycol (GLYCOLAX) packet 17 g  17 g Oral Daily PRN    acetaminophen (TYLENOL) tablet 650 mg  650 mg Oral Q6H PRN    Or    acetaminophen (TYLENOL) suppository 650 mg  650 mg Rectal Q6H PRN    oxyCODONE (ROXICODONE) immediate release tablet 5 mg  5 mg Oral Q4H PRN

## 2024-11-24 PROBLEM — I99.8 VASCULAR OCCLUSION: Status: ACTIVE | Noted: 2024-11-24

## (undated) DEVICE — SPONGE GZ W4XL4IN COT 12 PLY TYP VII WVN C FLD DSGN STERILE

## (undated) DEVICE — SOLUTION IRRIG 1000ML 0.9% SOD CHL USP POUR PLAS BTL

## (undated) DEVICE — MASK O2 MED AD 7 FT 3 IN 1 W/ STD CONN LTX

## (undated) DEVICE — TOWEL,OR,DSP,ST,BLUE,STD,4/PK,20PK/CS: Brand: MEDLINE

## (undated) DEVICE — BANDAGE COMPR W6INXL5YD WHT BGE POLY COT M E WRP WV HK AND

## (undated) DEVICE — KIT ENDOSCOPIC  PROC VIA

## (undated) DEVICE — MASK ANES INF SZ 2 PREM TAIL VLV INFL PRT UNSCENTED SGL PT

## (undated) DEVICE — BANDAGE,GAUZE,BULKEE II,4.5"X4.1YD,STRL: Brand: MEDLINE

## (undated) DEVICE — X-RAY DETECTABLE SPONGES,16 PLY: Brand: VISTEC

## (undated) DEVICE — CANNULA NSL O2 AD 7 FT END-TIDAL CARBON DIOX VENTFLO

## (undated) DEVICE — SUTURE PERMAHAND SZ 0 L30IN NONABSORBABLE BLK SILK BRAID A306H

## (undated) DEVICE — SUTURE VICRYL SZ 2-0 L36IN ABSRB UD L36MM CT-1 1/2 CIR J945H

## (undated) DEVICE — DRESSING,GAUZE,XEROFORM,CURAD,5"X9",ST: Brand: CURAD

## (undated) DEVICE — STOCKINETTE,IMPERVIOUS,12X48,STERILE: Brand: MEDLINE

## (undated) DEVICE — CANNULA NASAL ADULT 10FT ETCO2/CO2 VENTFLO

## (undated) DEVICE — 450 ML BOTTLE OF 0.05% CHLORHEXIDINE GLUCONATE IN 99.95% STERILE WATER FOR IRRIGATION, USP AND APPLICATOR.: Brand: IRRISEPT ANTIMICROBIAL WOUND LAVAGE

## (undated) DEVICE — GLOVE ORANGE PI 7   MSG9070

## (undated) DEVICE — SOLUTION IRRIG 1000ML STRL H2O USP PLAS POUR BTL

## (undated) DEVICE — TAPE ADH CLTH SILK H2O REPELLENT CURAD

## (undated) DEVICE — FORCEPS BX L240CM JAW DIA2.4MM ORNG L CAP W/ NDL DISP RAD

## (undated) DEVICE — PAD,ABDOMINAL,5"X9",ST,LF,25/BX: Brand: MEDLINE INDUSTRIES, INC.

## (undated) DEVICE — BANDAGE COBAN 4 IN COMPR W4INXL5YD FOAM COHESIVE QUIK STK SELF ADH SFT

## (undated) DEVICE — EXTREMITY - SMH: Brand: MEDLINE INDUSTRIES, INC.

## (undated) DEVICE — MOUTHPIECE ENDOSCP 20X27MM --

## (undated) DEVICE — APPLICATOR MEDICATED 26 CC SOLUTION HI LT ORNG CHLORAPREP

## (undated) DEVICE — INTENT OT USE PROVIDES A STERILE INTERFACE BETWEEN THE OPERATING ROOM SURGICAL LAMPS (NON-STERILE) AND THE SURGEON OR STAFF WORKING IN THE STERILE FIELD.: Brand: ASPEN® ALC PLUS LIGHT HANDLE COVER

## (undated) DEVICE — SUTURE PERMAHAND SZ 2-0 L30IN NONABSORBABLE BLK SILK W/O A305H

## (undated) DEVICE — PENCIL SMK EVAC 10 FT BLADE ELECTRD ROCKER FOR TELSCP

## (undated) DEVICE — SUTURE VICRYL ABSORBABLE BRAIDED 2-0 CT 36 IN DA UD  VCP957H

## (undated) DEVICE — GLOVE SURG SZ 7 L12IN FNGR THK79MIL GRN LTX FREE

## (undated) DEVICE — Device

## (undated) DEVICE — FORCEPS BX L240CM JAW DIA2.8MM L CAP W/ NDL MIC MESH TOOTH

## (undated) DEVICE — ENDO KIT 1: Brand: MEDLINE INDUSTRIES, INC.

## (undated) DEVICE — BLADE SAW W073XL276IN THK0031IN CUT THK0036IN REPL SAG